# Patient Record
Sex: FEMALE | Race: WHITE | NOT HISPANIC OR LATINO | Employment: OTHER | URBAN - METROPOLITAN AREA
[De-identification: names, ages, dates, MRNs, and addresses within clinical notes are randomized per-mention and may not be internally consistent; named-entity substitution may affect disease eponyms.]

---

## 2017-01-16 RX ORDER — ESCITALOPRAM OXALATE 10 MG/1
TABLET ORAL
Qty: 90 TABLET | Refills: 0 | Status: SHIPPED | OUTPATIENT
Start: 2017-01-16 | End: 2017-05-20 | Stop reason: SDUPTHER

## 2017-02-03 ENCOUNTER — OFFICE VISIT (OUTPATIENT)
Dept: FAMILY MEDICINE CLINIC | Facility: CLINIC | Age: 72
End: 2017-02-03

## 2017-02-03 VITALS
RESPIRATION RATE: 16 BRPM | OXYGEN SATURATION: 93 % | BODY MASS INDEX: 26.55 KG/M2 | SYSTOLIC BLOOD PRESSURE: 122 MMHG | HEIGHT: 66 IN | HEART RATE: 63 BPM | TEMPERATURE: 97.5 F | DIASTOLIC BLOOD PRESSURE: 70 MMHG | WEIGHT: 165.2 LBS

## 2017-02-03 DIAGNOSIS — S83.401A SPRAIN OF COLLATERAL LIGAMENT OF RIGHT KNEE, INITIAL ENCOUNTER: Primary | ICD-10-CM

## 2017-02-03 DIAGNOSIS — M25.561 ACUTE PAIN OF RIGHT KNEE: ICD-10-CM

## 2017-02-03 PROCEDURE — 99213 OFFICE O/P EST LOW 20 MIN: CPT | Performed by: INTERNAL MEDICINE

## 2017-02-03 NOTE — PROGRESS NOTES
Subjective   Chris Cox is a 71 y.o. female. Patient is here today for   Chief Complaint   Patient presents with   • Knee Pain     right knee pain and pain behind knee and worried about blood clot and burning pain           Vitals:    02/03/17 1010   BP: 122/70   Pulse: 63   Resp: 16   Temp: 97.5 °F (36.4 °C)   SpO2: 93%     The following portions of the patient's history were reviewed and updated as appropriate: allergies, current medications, past family history, past medical history, past social history, past surgical history and problem list.    Past Medical History   Diagnosis Date   • Abdominal pain    • Disease of thyroid gland    • Hyperlipidemia    • Hypertension    • Obstructive sleep apnea    • Seizures      As a baby   • Tuberculosis      As a child   • Vertigo       Allergies   Allergen Reactions   • Cephalexin    • Cephalosporins    • Erythromycin    • Sulfa Antibiotics       Social History     Social History   • Marital status:      Spouse name: N/A   • Number of children: N/A   • Years of education: N/A     Occupational History   • Not on file.     Social History Main Topics   • Smoking status: Never Smoker   • Smokeless tobacco: Not on file   • Alcohol use No   • Drug use: Not on file   • Sexual activity: Not on file     Other Topics Concern   • Not on file     Social History Narrative        Current Outpatient Prescriptions:   •  VITAMIN K PO, Take  by mouth., Disp: , Rfl:   •  Ascorbic Acid (VITAMIN C PO), Take 1 tablet by mouth daily., Disp: , Rfl:   •  B Complex Vitamins (VITAMIN B COMPLEX PO), Take 1 tablet by mouth daily., Disp: , Rfl:   •  Dietary Management Product (GABADONE) capsule, Take by mouth., Disp: , Rfl:   •  escitalopram (LEXAPRO) 10 MG tablet, TAKE 1 TABLET BY MOUTH DAILY, Disp: 90 tablet, Rfl: 0  •  Nutritional Supplements (PYCNOGENOL) 30 MG capsule, Take by mouth., Disp: , Rfl:   •  vitamin A 49832 UNIT capsule, Take 10,000 Units by mouth daily., Disp: , Rfl:   •   VITAMIN D, CHOLECALCIFEROL, PO, Take 1 tablet by mouth daily., Disp: , Rfl:      Objective     History of Present Illness Chris slipped on a regular day and felt a sharp pain along her medial right knee.  She does have pain when she weight bears and has been using a cane.  Her right knee has severe osteoarthritis and she has seen Dr. Okeefe in the past.    Review of Systems   Constitutional: Positive for activity change.   Musculoskeletal: Negative for joint swelling.       Physical Exam   Constitutional: She appears well-developed and well-nourished.   Musculoskeletal:   Right knee has varus deformity.  There is no effusion.  There is normal range of motion.  There is point tenderness of the medial collateral ligament on the femoral epicondyle   Vitals reviewed.      ASSESSMENT     Problem List Items Addressed This Visit        Musculoskeletal and Integument    Right knee pain    Sprain of collateral ligament of right knee - Primary          PLAN  Patient Instructions   Suggest wearing a knee brace and rest.  Start some leg extension exercises that are nonweightbearing.    Return if symptoms worsen or fail to improve.

## 2017-02-03 NOTE — PATIENT INSTRUCTIONS
Suggest wearing a knee brace and rest.  Start some leg extension exercises that are nonweightbearing.

## 2017-03-10 ENCOUNTER — OFFICE VISIT (OUTPATIENT)
Dept: FAMILY MEDICINE CLINIC | Facility: CLINIC | Age: 72
End: 2017-03-10

## 2017-03-10 VITALS
BODY MASS INDEX: 26.52 KG/M2 | HEIGHT: 66 IN | DIASTOLIC BLOOD PRESSURE: 72 MMHG | TEMPERATURE: 97.9 F | HEART RATE: 68 BPM | SYSTOLIC BLOOD PRESSURE: 120 MMHG | WEIGHT: 165 LBS | OXYGEN SATURATION: 98 % | RESPIRATION RATE: 16 BRPM

## 2017-03-10 DIAGNOSIS — J06.9 VIRAL URI WITH COUGH: ICD-10-CM

## 2017-03-10 DIAGNOSIS — R68.89 FLU-LIKE SYMPTOMS: Primary | ICD-10-CM

## 2017-03-10 DIAGNOSIS — R50.9 FEVER IN ADULT: ICD-10-CM

## 2017-03-10 LAB
EXPIRATION DATE: NORMAL
FLUAV AG NPH QL: NEGATIVE
FLUBV AG NPH QL: NEGATIVE
INTERNAL CONTROL: NORMAL
Lab: NORMAL

## 2017-03-10 PROCEDURE — 99213 OFFICE O/P EST LOW 20 MIN: CPT | Performed by: INTERNAL MEDICINE

## 2017-03-10 PROCEDURE — 87804 INFLUENZA ASSAY W/OPTIC: CPT | Performed by: INTERNAL MEDICINE

## 2017-03-10 PROCEDURE — 85025 COMPLETE CBC W/AUTO DIFF WBC: CPT | Performed by: INTERNAL MEDICINE

## 2017-03-10 RX ORDER — AMOXICILLIN AND CLAVULANATE POTASSIUM 875; 125 MG/1; MG/1
1 TABLET, FILM COATED ORAL 2 TIMES DAILY
Qty: 14 TABLET | Refills: 0 | Status: SHIPPED | OUTPATIENT
Start: 2017-03-10 | End: 2017-04-12

## 2017-03-10 NOTE — PROGRESS NOTES
Subjective   Chris Cox is a 71 y.o. female. Patient is here today for   Chief Complaint   Patient presents with   • Earache     bilaterl eaer pain, sore throat and fever x 4 days           Vitals:    03/10/17 1028   BP: 120/72   Pulse: 68   Resp: 16   Temp: 97.9 °F (36.6 °C)   SpO2: 98%       Past Medical History   Diagnosis Date   • Abdominal pain    • Disease of thyroid gland    • Hyperlipidemia    • Hypertension    • Obstructive sleep apnea    • Seizures      As a baby   • Tuberculosis      As a child   • Vertigo       Allergies   Allergen Reactions   • Cephalexin    • Cephalosporins    • Erythromycin    • Sulfa Antibiotics       Social History     Social History   • Marital status:      Spouse name: N/A   • Number of children: N/A   • Years of education: N/A     Occupational History   • Not on file.     Social History Main Topics   • Smoking status: Never Smoker   • Smokeless tobacco: Not on file   • Alcohol use No   • Drug use: Not on file   • Sexual activity: Not on file     Other Topics Concern   • Not on file     Social History Narrative        Current Outpatient Prescriptions:   •  Ascorbic Acid (VITAMIN C PO), Take 1 tablet by mouth daily., Disp: , Rfl:   •  B Complex Vitamins (VITAMIN B COMPLEX PO), Take 1 tablet by mouth daily., Disp: , Rfl:   •  Dietary Management Product (GABADONE) capsule, Take by mouth., Disp: , Rfl:   •  escitalopram (LEXAPRO) 10 MG tablet, TAKE 1 TABLET BY MOUTH DAILY, Disp: 90 tablet, Rfl: 0  •  Nutritional Supplements (PYCNOGENOL) 30 MG capsule, Take by mouth., Disp: , Rfl:   •  vitamin A 16069 UNIT capsule, Take 10,000 Units by mouth daily., Disp: , Rfl:   •  VITAMIN D, CHOLECALCIFEROL, PO, Take 1 tablet by mouth daily., Disp: , Rfl:   •  VITAMIN K PO, Take  by mouth., Disp: , Rfl:   •  amoxicillin-clavulanate (AUGMENTIN) 875-125 MG per tablet, Take 1 tablet by mouth 2 (Two) Times a Day., Disp: 14 tablet, Rfl: 0     Objective     HPI Comments: She has had some  bilateral ear pain worse in the right side last week.    She has had a cough, she's had some nasal and chest congestion.    She has had some fevers or chills at home.    She denies any dyspnea or myalgias.    Earache           Review of Systems   Constitutional: Negative.    HENT: Positive for ear pain, postnasal drip and sinus pressure.    Eyes: Negative.    Respiratory: Negative.    Cardiovascular: Negative.    Genitourinary: Negative.    Musculoskeletal: Negative.    Psychiatric/Behavioral: Negative.        Physical Exam   Constitutional: She is oriented to person, place, and time. She appears well-developed and well-nourished.   HENT:   Head: Normocephalic and atraumatic.   Right Ear: External ear normal.   Left Ear: External ear normal.   Nose: Nose normal.   Mouth/Throat: Oropharynx is clear and moist.   Cardiovascular: Normal rate and regular rhythm.    Pulmonary/Chest: Effort normal and breath sounds normal.   Neurological: She is alert and oriented to person, place, and time.   Psychiatric: She has a normal mood and affect. Her behavior is normal.   Nursing note and vitals reviewed.        Problem List Items Addressed This Visit        Respiratory    Viral URI with cough    Relevant Medications    amoxicillin-clavulanate (AUGMENTIN) 875-125 MG per tablet      Other Visit Diagnoses     Flu-like symptoms    -  Primary    Relevant Orders    POC Influenza A / B (Completed)    Fever in adult        Relevant Orders    POC CBC With / Auto Diff (Completed)            COOPER    A was negative on this patient today.  Her CBC showed normal white blood cell count.    I feel should get a viral upper respiratory tract infection I asked her to treat her symptoms symptomatically with over-the-counter medications.    Should she not continue to improve or get worse by next week I asked her to take the prescription for an anabiotic I wrote her for Augmentin 875 one by mouth twice a day #14 with no refills.       No Follow-up on  file.

## 2017-04-12 ENCOUNTER — OFFICE VISIT (OUTPATIENT)
Dept: FAMILY MEDICINE CLINIC | Facility: CLINIC | Age: 72
End: 2017-04-12

## 2017-04-12 VITALS
WEIGHT: 164.8 LBS | HEART RATE: 66 BPM | SYSTOLIC BLOOD PRESSURE: 120 MMHG | BODY MASS INDEX: 26.48 KG/M2 | DIASTOLIC BLOOD PRESSURE: 58 MMHG | RESPIRATION RATE: 18 BRPM | HEIGHT: 66 IN

## 2017-04-12 DIAGNOSIS — H69.81 DYSFUNCTION OF RIGHT EUSTACHIAN TUBE: Primary | ICD-10-CM

## 2017-04-12 PROBLEM — H69.91 DYSFUNCTION OF RIGHT EUSTACHIAN TUBE: Status: ACTIVE | Noted: 2017-04-12

## 2017-04-12 PROCEDURE — 99213 OFFICE O/P EST LOW 20 MIN: CPT | Performed by: INTERNAL MEDICINE

## 2017-04-12 RX ORDER — FLUTICASONE PROPIONATE 50 MCG
2 SPRAY, SUSPENSION (ML) NASAL DAILY
Qty: 1 EACH | Refills: 0 | Status: SHIPPED | OUTPATIENT
Start: 2017-04-12 | End: 2017-05-12

## 2017-04-12 NOTE — PROGRESS NOTES
"Subjective   Chris Cox is a 71 y.o. female. Patient is here today for   Chief Complaint   Patient presents with   • \"Noises in ear\" no ringing     right ear           Vitals:    04/12/17 0947   BP: 120/58   Pulse: 66   Resp: 18     The following portions of the patient's history were reviewed and updated as appropriate: allergies, current medications, past family history, past medical history, past social history, past surgical history and problem list.    Past Medical History:   Diagnosis Date   • Abdominal pain    • Disease of thyroid gland    • Hyperlipidemia    • Hypertension    • Obstructive sleep apnea    • Seizures     As a baby   • Tuberculosis     As a child   • Vertigo       Allergies   Allergen Reactions   • Cephalexin    • Cephalosporins    • Erythromycin    • Sulfa Antibiotics       Social History     Social History   • Marital status:      Spouse name: N/A   • Number of children: N/A   • Years of education: N/A     Occupational History   • Not on file.     Social History Main Topics   • Smoking status: Never Smoker   • Smokeless tobacco: Not on file   • Alcohol use No   • Drug use: Not on file   • Sexual activity: Not on file     Other Topics Concern   • Not on file     Social History Narrative        Current Outpatient Prescriptions:   •  Ascorbic Acid (VITAMIN C PO), Take 1 tablet by mouth daily., Disp: , Rfl:   •  B Complex Vitamins (VITAMIN B COMPLEX PO), Take 1 tablet by mouth daily., Disp: , Rfl:   •  Dietary Management Product (GABADONE) capsule, Take by mouth., Disp: , Rfl:   •  escitalopram (LEXAPRO) 10 MG tablet, TAKE 1 TABLET BY MOUTH DAILY, Disp: 90 tablet, Rfl: 0  •  Nutritional Supplements (PYCNOGENOL) 30 MG capsule, Take by mouth., Disp: , Rfl:   •  vitamin A 78953 UNIT capsule, Take 10,000 Units by mouth daily., Disp: , Rfl:   •  VITAMIN D, CHOLECALCIFEROL, PO, Take 1 tablet by mouth daily., Disp: , Rfl:   •  VITAMIN K PO, Take  by mouth., Disp: , Rfl:   •  fluticasone " (FLONASE) 50 MCG/ACT nasal spray, 2 sprays into each nostril Daily for 30 days., Disp: 1 each, Rfl: 0     Objective     History of Present Illness Chris complains of intermittent noises in her right ear.  She has been putting cotton in her ear.  She was treated for an ear infection and sinusitis about 3 weeks ago with Augmentin.  She denies any ear pain.  She denies any history of upper respiratory allergies.    Review of Systems   Constitutional: Negative.    HENT: Negative for congestion, ear pain, hearing loss and rhinorrhea.    Respiratory: Negative for cough.        Physical Exam   Constitutional: She appears well-developed and well-nourished.   HENT:   Right Ear: Tympanic membrane is bulging. Tympanic membrane is not erythematous.   Left Ear: Tympanic membrane is bulging. Tympanic membrane is not erythematous.   Nose: Mucosal edema (on right) present.   There is no tympanic membrane movement with Valsalva maneuver   Lymphadenopathy:     She has no cervical adenopathy.   Vitals reviewed.      ASSESSMENT     Problem List Items Addressed This Visit        Nervous and Auditory    Dysfunction of right eustachian tube - Primary    Relevant Medications    fluticasone (FLONASE) 50 MCG/ACT nasal spray          PLAN  Patient Instructions   You have some residual eustachian tube dysfunction left over from your recent ear infection.  Start fluticasone nasal spray 1 puff twice a day for a week or 2.  You could also take pseudoephedrine 30 mg decongestant tablets as needed for ear congestion.    No Follow-up on file.

## 2017-04-12 NOTE — PATIENT INSTRUCTIONS
You have some residual eustachian tube dysfunction left over from your recent ear infection.  Start fluticasone nasal spray 1 puff twice a day for a week or 2.  You could also take pseudoephedrine 30 mg decongestant tablets as needed for ear congestion.

## 2017-04-20 ENCOUNTER — OFFICE VISIT (OUTPATIENT)
Dept: FAMILY MEDICINE CLINIC | Facility: CLINIC | Age: 72
End: 2017-04-20

## 2017-04-20 VITALS
DIASTOLIC BLOOD PRESSURE: 72 MMHG | SYSTOLIC BLOOD PRESSURE: 120 MMHG | HEIGHT: 66 IN | TEMPERATURE: 98 F | WEIGHT: 159 LBS | HEART RATE: 68 BPM | BODY MASS INDEX: 25.55 KG/M2 | OXYGEN SATURATION: 98 % | RESPIRATION RATE: 16 BRPM

## 2017-04-20 DIAGNOSIS — F98.8 ADD (ATTENTION DEFICIT DISORDER): Primary | ICD-10-CM

## 2017-04-20 PROCEDURE — 99213 OFFICE O/P EST LOW 20 MIN: CPT | Performed by: INTERNAL MEDICINE

## 2017-04-20 RX ORDER — ATOMOXETINE 25 MG/1
25 CAPSULE ORAL 2 TIMES DAILY
Qty: 60 CAPSULE | Refills: 3 | Status: SHIPPED | OUTPATIENT
Start: 2017-04-20 | End: 2018-05-07

## 2017-04-20 NOTE — PROGRESS NOTES
Subjective   Chris Cox is a 71 y.o. female. Patient is here today for   Chief Complaint   Patient presents with   • Stress     med check           Vitals:    04/20/17 1542   BP: 120/72   Pulse: 68   Resp: 16   Temp: 98 °F (36.7 °C)   SpO2: 98%       Past Medical History:   Diagnosis Date   • Abdominal pain    • Disease of thyroid gland    • Hyperlipidemia    • Hypertension    • Obstructive sleep apnea    • Seizures     As a baby   • Tuberculosis     As a child   • Vertigo       Allergies   Allergen Reactions   • Cephalexin    • Cephalosporins    • Erythromycin    • Sulfa Antibiotics       Social History     Social History   • Marital status:      Spouse name: N/A   • Number of children: N/A   • Years of education: N/A     Occupational History   • Not on file.     Social History Main Topics   • Smoking status: Never Smoker   • Smokeless tobacco: Not on file   • Alcohol use No   • Drug use: Not on file   • Sexual activity: Not on file     Other Topics Concern   • Not on file     Social History Narrative        Current Outpatient Prescriptions:   •  Ascorbic Acid (VITAMIN C PO), Take 1 tablet by mouth daily., Disp: , Rfl:   •  B Complex Vitamins (VITAMIN B COMPLEX PO), Take 1 tablet by mouth daily., Disp: , Rfl:   •  Dietary Management Product (GABADONE) capsule, Take by mouth., Disp: , Rfl:   •  escitalopram (LEXAPRO) 10 MG tablet, TAKE 1 TABLET BY MOUTH DAILY, Disp: 90 tablet, Rfl: 0  •  fluticasone (FLONASE) 50 MCG/ACT nasal spray, 2 sprays into each nostril Daily for 30 days., Disp: 1 each, Rfl: 0  •  Nutritional Supplements (PYCNOGENOL) 30 MG capsule, Take by mouth., Disp: , Rfl:   •  vitamin A 44365 UNIT capsule, Take 10,000 Units by mouth daily., Disp: , Rfl:   •  VITAMIN D, CHOLECALCIFEROL, PO, Take 1 tablet by mouth daily., Disp: , Rfl:   •  VITAMIN K PO, Take  by mouth., Disp: , Rfl:   •  atomoxetine (STRATTERA) 25 MG capsule, Take 1 capsule by mouth 2 (Two) Times a Day., Disp: 60 capsule, Rfl: 3      Objective     HPI Comments: She is feeling particularly stressed.  Her mother is in a nursing home and she is developed very severe memory loss over the years.  She sees her mother regularly in the nursing home.  Of course, she is been recently concerned about the care her mother receives.    Chris has ADD.  She is found Strattera to be useful in the past.  She was thinking that if she could manage her ADD a bit better (it's been a bit worse with the stresses that she is under regarding her mother's care) that perhaps her stress would be better controlled.       Review of Systems   Constitutional: Negative.    HENT: Negative.    Respiratory: Negative.    Cardiovascular: Negative.    Psychiatric/Behavioral:        She has had exacerbations of her ADD recently.       Physical Exam   Constitutional: She is oriented to person, place, and time. She appears well-developed and well-nourished.   HENT:   Head: Normocephalic and atraumatic.   Pulmonary/Chest: Effort normal.   Neurological: She is alert and oriented to person, place, and time.   Psychiatric: She has a normal mood and affect. Her behavior is normal.   Nursing note and vitals reviewed.        Problem List Items Addressed This Visit        Other    ADD (attention deficit disorder) - Primary    Relevant Medications    atomoxetine (STRATTERA) 25 MG capsule            PLAN  I will start her on a low-dose of Strattera 25 mg twice a day.  The patient also takes S-Citalopram which tend to increase the level of Strattera in the system.  It makes sense that she start a lower dose.  I like to see her back in about a month to see how she is getting along.  She knows to let me know if she needs attention before then.  No Follow-up on file.

## 2017-05-10 RX ORDER — ATOMOXETINE 10 MG/1
10 CAPSULE ORAL DAILY
Qty: 60 CAPSULE | Refills: 3 | Status: SHIPPED | OUTPATIENT
Start: 2017-05-10 | End: 2018-02-12

## 2017-05-22 RX ORDER — ESCITALOPRAM OXALATE 10 MG/1
TABLET ORAL
Qty: 90 TABLET | Refills: 0 | Status: SHIPPED | OUTPATIENT
Start: 2017-05-22 | End: 2017-08-19 | Stop reason: SDUPTHER

## 2017-08-21 RX ORDER — ESCITALOPRAM OXALATE 10 MG/1
TABLET ORAL
Qty: 90 TABLET | Refills: 0 | Status: SHIPPED | OUTPATIENT
Start: 2017-08-21 | End: 2017-11-17 | Stop reason: SDUPTHER

## 2017-08-22 ENCOUNTER — OFFICE VISIT (OUTPATIENT)
Dept: FAMILY MEDICINE CLINIC | Facility: CLINIC | Age: 72
End: 2017-08-22

## 2017-08-22 VITALS
HEIGHT: 66 IN | RESPIRATION RATE: 16 BRPM | SYSTOLIC BLOOD PRESSURE: 124 MMHG | TEMPERATURE: 98 F | DIASTOLIC BLOOD PRESSURE: 78 MMHG | OXYGEN SATURATION: 98 % | BODY MASS INDEX: 26.2 KG/M2 | WEIGHT: 163 LBS | HEART RATE: 68 BPM

## 2017-08-22 DIAGNOSIS — K59.1 FUNCTIONAL DIARRHEA: Primary | ICD-10-CM

## 2017-08-22 PROCEDURE — 99213 OFFICE O/P EST LOW 20 MIN: CPT | Performed by: INTERNAL MEDICINE

## 2017-08-22 RX ORDER — DIPHENOXYLATE HYDROCHLORIDE AND ATROPINE SULFATE 2.5; .025 MG/1; MG/1
1 TABLET ORAL 4 TIMES DAILY PRN
Qty: 100 TABLET | Refills: 0 | Status: SHIPPED | OUTPATIENT
Start: 2017-08-22 | End: 2019-10-09

## 2017-08-22 NOTE — PROGRESS NOTES
Subjective   Chris Cox is a 72 y.o. female. Patient is here today for   Chief Complaint   Patient presents with   • Diarrhea     diarrhea and abdominal pain and gas          Vitals:    08/22/17 0945   BP: 124/78   Pulse: 68   Resp: 16   Temp: 98 °F (36.7 °C)   SpO2: 98%       Past Medical History:   Diagnosis Date   • Abdominal pain    • Disease of thyroid gland    • Hyperlipidemia    • Hypertension    • Obstructive sleep apnea    • Seizures     As a baby   • Tuberculosis     As a child   • Vertigo       Allergies   Allergen Reactions   • Cephalexin    • Cephalosporins    • Erythromycin    • Sulfa Antibiotics       Social History     Social History   • Marital status:      Spouse name: N/A   • Number of children: N/A   • Years of education: N/A     Occupational History   • Not on file.     Social History Main Topics   • Smoking status: Never Smoker   • Smokeless tobacco: Not on file   • Alcohol use No   • Drug use: Not on file   • Sexual activity: Not on file     Other Topics Concern   • Not on file     Social History Narrative        Current Outpatient Prescriptions:   •  Ascorbic Acid (VITAMIN C PO), Take 1 tablet by mouth daily., Disp: , Rfl:   •  atomoxetine (STRATTERA) 10 MG capsule, Take 1 capsule by mouth Daily., Disp: 60 capsule, Rfl: 3  •  atomoxetine (STRATTERA) 25 MG capsule, Take 1 capsule by mouth 2 (Two) Times a Day., Disp: 60 capsule, Rfl: 3  •  B Complex Vitamins (VITAMIN B COMPLEX PO), Take 1 tablet by mouth daily., Disp: , Rfl:   •  Dietary Management Product (GABADONE) capsule, Take by mouth., Disp: , Rfl:   •  escitalopram (LEXAPRO) 10 MG tablet, TAKE 1 TABLET BY MOUTH DAILY, Disp: 90 tablet, Rfl: 0  •  Nutritional Supplements (PYCNOGENOL) 30 MG capsule, Take by mouth., Disp: , Rfl:   •  vitamin A 25016 UNIT capsule, Take 10,000 Units by mouth daily., Disp: , Rfl:   •  VITAMIN D, CHOLECALCIFEROL, PO, Take 1 tablet by mouth daily., Disp: , Rfl:   •  VITAMIN K PO, Take  by mouth., Disp:  , Rfl:   •  diphenoxylate-atropine (LOMOTIL) 2.5-0.025 MG per tablet, Take 1 tablet by mouth 4 (Four) Times a Day As Needed for Diarrhea., Disp: 100 tablet, Rfl: 0     Objective     HPI Comments: This patient occasionally has sharp crampy abdominal pain and diarrhea.  Happened pretty infrequently.  When it does happen, she finds Lomotil is pretty effective.  She requested refill this medication.    She has no other complaints.    Diarrhea           Review of Systems   Constitutional: Negative.    HENT: Negative.    Respiratory: Negative.    Cardiovascular: Negative.    Gastrointestinal: Positive for diarrhea.   Musculoskeletal: Negative.    Psychiatric/Behavioral: Negative.        Physical Exam   Constitutional: She is oriented to person, place, and time. She appears well-developed and well-nourished.   HENT:   Head: Normocephalic and atraumatic.   Pulmonary/Chest: Effort normal.   Neurological: She is alert and oriented to person, place, and time.   Psychiatric: She has a normal mood and affect. Her behavior is normal.   Nursing note and vitals reviewed.        Problem List Items Addressed This Visit        Digestive    Functional diarrhea - Primary            PLAN  She finds Lomotil be useful for her.  I've written a prescription for Lomotil for her to use occasionally.    She tells me that she is used to previous prescription for Lomotil 100 tablets.  She has about 30° tablets remaining and she get this prescription filled 2008.    I refilled this prescription for her.  No Follow-up on file.

## 2017-08-31 ENCOUNTER — TELEPHONE (OUTPATIENT)
Dept: FAMILY MEDICINE CLINIC | Facility: CLINIC | Age: 72
End: 2017-08-31

## 2017-08-31 DIAGNOSIS — K59.1 FUNCTIONAL DIARRHEA: ICD-10-CM

## 2017-08-31 DIAGNOSIS — Z80.0 FAMILY HISTORY OF COLON CANCER: Primary | ICD-10-CM

## 2017-09-06 ENCOUNTER — OFFICE VISIT (OUTPATIENT)
Dept: SURGERY | Facility: CLINIC | Age: 72
End: 2017-09-06

## 2017-09-06 VITALS
HEIGHT: 66 IN | HEART RATE: 59 BPM | BODY MASS INDEX: 26.34 KG/M2 | DIASTOLIC BLOOD PRESSURE: 64 MMHG | OXYGEN SATURATION: 98 % | SYSTOLIC BLOOD PRESSURE: 110 MMHG | WEIGHT: 163.9 LBS

## 2017-09-06 DIAGNOSIS — K92.1 BLACK STOOL: Primary | ICD-10-CM

## 2017-09-06 DIAGNOSIS — K92.1 MELANOTIC STOOLS: ICD-10-CM

## 2017-09-06 PROCEDURE — 99204 OFFICE O/P NEW MOD 45 MIN: CPT | Performed by: SURGERY

## 2017-09-06 RX ORDER — ASPIRIN 81 MG/1
81 TABLET ORAL DAILY
COMMUNITY
End: 2019-10-09

## 2017-09-06 NOTE — PROGRESS NOTES
Chief Complaint   Patient presents with   • Diarrhea     change in bowel color        Patient is a 72 y.o. female referred by Raghavendra Marquez MD for evaluation of diarrhea and black stool that started about 3 weeks ago. Patient denies any bright red blood per rectum, hematochezia or abdominal pain.Patient denies fever, chills, nausea or vomiting.  Patient has not traveled outside of the country.  Patient reports she has not had this in the past.  Patient has not had a previous history of peptic ulcer disease.  Patient denies taking NSAIDs.  Patient has multiple family members with colon caner. Patient denies F/C/N/V, no family history of Ulcerative Colitis, Crohn's disease or polyposis.     Past Medical History:   Diagnosis Date   • Abdominal pain    • Arthritis    • Disease of thyroid gland    • Hyperlipidemia    • Hypertension    • Obstructive sleep apnea    • Seizures     As a baby   • Tuberculosis     As a child   • Vertigo      Past Surgical History:   Procedure Laterality Date   • BREAST SURGERY      Benign fibroadnoma removed from the left breast   • HAND SURGERY     • LITHOTRIPSY      renal   • WRIST SURGERY       Family History   Problem Relation Age of Onset   • Hypertension Mother    • Other Mother    • Transient ischemic attack Mother    • Cancer Father      colon   • Diabetes Father    • Hypertension Father    • Other Father      Renal artery aneurysm   • Heart disease Father    • Stroke Father      Social History   Substance Use Topics   • Smoking status: Never Smoker   • Smokeless tobacco: None   • Alcohol use No         Current Outpatient Prescriptions:   •  Ascorbic Acid (VITAMIN C PO), Take 1 tablet by mouth daily., Disp: , Rfl:   •  aspirin 81 MG EC tablet, Take 81 mg by mouth Daily., Disp: , Rfl:   •  Aspirin-Acetaminophen-Caffeine (EXCEDRIN MIGRAINE PO), Take  by mouth., Disp: , Rfl:   •  B Complex Vitamins (VITAMIN B COMPLEX PO), Take 1 tablet by mouth daily., Disp: , Rfl:   •  Dietary  "Management Product (GABADONE) capsule, Take by mouth., Disp: , Rfl:   •  diphenoxylate-atropine (LOMOTIL) 2.5-0.025 MG per tablet, Take 1 tablet by mouth 4 (Four) Times a Day As Needed for Diarrhea., Disp: 100 tablet, Rfl: 0  •  escitalopram (LEXAPRO) 10 MG tablet, TAKE 1 TABLET BY MOUTH DAILY, Disp: 90 tablet, Rfl: 0  •  Nutritional Supplements (PYCNOGENOL) 30 MG capsule, Take by mouth., Disp: , Rfl:   •  PHOSPHATIDYLSERINE PO, Take  by mouth., Disp: , Rfl:   •  vitamin A 05651 UNIT capsule, Take 10,000 Units by mouth daily., Disp: , Rfl:   •  VITAMIN D, CHOLECALCIFEROL, PO, Take 1 tablet by mouth daily., Disp: , Rfl:   •  VITAMIN K PO, Take  by mouth., Disp: , Rfl:   •  atomoxetine (STRATTERA) 10 MG capsule, Take 1 capsule by mouth Daily., Disp: 60 capsule, Rfl: 3  •  atomoxetine (STRATTERA) 25 MG capsule, Take 1 capsule by mouth 2 (Two) Times a Day., Disp: 60 capsule, Rfl: 3    Review of Systems   Constitutional: Positive for unexpected weight change.   Eyes: Positive for visual disturbance.   Respiratory: Positive for shortness of breath.    Cardiovascular: Positive for leg swelling.   Genitourinary: Positive for dysuria.   Musculoskeletal: Positive for back pain and joint swelling.   Neurological: Positive for dizziness and headaches.   Hematological: Bruises/bleeds easily.     All other systems have been reviewed and are negative.  Vitals:    09/06/17 0932   BP: 110/64   BP Location: Left arm   Patient Position: Sitting   Cuff Size: Adult   Pulse: 59   SpO2: 98%   Weight: 163 lb 14.4 oz (74.3 kg)   Height: 66\" (167.6 cm)       Physical Exam  General/physcological:   Alert and oriented x3 in no acute distress  HEENT: Normal cephalic, atraumatic, PERRLA, EOMI, sclera anicteric, moist mucous membranes, neck is supple, no JVD, no carotid bruits, no thyromegaly no adenopathy  Respiratory: CTA and percussion  CVA: RRR, normal S1-S2, no murmurs, no gallops or rubs  GI: Positive BS, soft, nondistended, nontender, no " rebound, no guarding, no hernias, no organomegaly and no masses  Musculoskeletal: Full range of motion, no clubbing, no cyanosis or edema  Neurovascular: Grossly intact    Patient does not use tobacco products currently and I have counseled the patient to not start using tobacco products in the future.    Assessment:  Melanotic stool  Family history of multiple colon cancers    Plan:  I have recommended that the patient undergo further evaluation with a complete colonoscopy. I have discussed this procedure in detail with the patient. I have discussed the risk, benefits and alternatives. I have discussed the risk of anesthesia, bleeding and perforation. Patient understands these risk, benefits and alternatives and wishes to proceed.    Leonor Xavier MD  General, Minimally Invasive and Endoscopic Surgery  [facility]    [address], [address]  [place], [place], [address]  P: [phone]  F: [phone]    Cc:  Raghavendra Marquez MD

## 2017-09-11 ENCOUNTER — OUTSIDE FACILITY SERVICE (OUTPATIENT)
Dept: SURGERY | Facility: CLINIC | Age: 72
End: 2017-09-11

## 2017-09-11 PROCEDURE — 45378 DIAGNOSTIC COLONOSCOPY: CPT | Performed by: SURGERY

## 2017-09-11 NOTE — H&P
Chief Complaint   Patient presents with   • Diarrhea     change in bowel color        Patient is a 72 y.o. female referred by Raghavendra Marquez MD for evaluation of diarrhea and black stool that started about 3 weeks ago. Patient denies any bright red blood per rectum, hematochezia or abdominal pain. Patient has multiple family members with colon caner. Patient denies F/C/N/V, no family history of Ulcerative Colitis, Crohn's disease or polyposis.     Past Medical History:   Diagnosis Date   • Abdominal pain    • Arthritis    • Disease of thyroid gland    • Hyperlipidemia    • Hypertension    • Obstructive sleep apnea    • Seizures     As a baby   • Tuberculosis     As a child   • Vertigo      Past Surgical History:   Procedure Laterality Date   • BREAST SURGERY      Benign fibroadnoma removed from the left breast   • HAND SURGERY     • LITHOTRIPSY      renal   • WRIST SURGERY       Family History   Problem Relation Age of Onset   • Hypertension Mother    • Other Mother    • Transient ischemic attack Mother    • Cancer Father      colon   • Diabetes Father    • Hypertension Father    • Other Father      Renal artery aneurysm   • Heart disease Father    • Stroke Father      Social History   Substance Use Topics   • Smoking status: Never Smoker   • Smokeless tobacco: None   • Alcohol use No         Current Outpatient Prescriptions:   •  Ascorbic Acid (VITAMIN C PO), Take 1 tablet by mouth daily., Disp: , Rfl:   •  aspirin 81 MG EC tablet, Take 81 mg by mouth Daily., Disp: , Rfl:   •  Aspirin-Acetaminophen-Caffeine (EXCEDRIN MIGRAINE PO), Take  by mouth., Disp: , Rfl:   •  B Complex Vitamins (VITAMIN B COMPLEX PO), Take 1 tablet by mouth daily., Disp: , Rfl:   •  Dietary Management Product (GABADONE) capsule, Take by mouth., Disp: , Rfl:   •  diphenoxylate-atropine (LOMOTIL) 2.5-0.025 MG per tablet, Take 1 tablet by mouth 4 (Four) Times a Day As Needed for Diarrhea., Disp: 100 tablet, Rfl: 0  •  escitalopram (LEXAPRO) 10  "MG tablet, TAKE 1 TABLET BY MOUTH DAILY, Disp: 90 tablet, Rfl: 0  •  Nutritional Supplements (PYCNOGENOL) 30 MG capsule, Take by mouth., Disp: , Rfl:   •  PHOSPHATIDYLSERINE PO, Take  by mouth., Disp: , Rfl:   •  vitamin A 02245 UNIT capsule, Take 10,000 Units by mouth daily., Disp: , Rfl:   •  VITAMIN D, CHOLECALCIFEROL, PO, Take 1 tablet by mouth daily., Disp: , Rfl:   •  VITAMIN K PO, Take  by mouth., Disp: , Rfl:   •  atomoxetine (STRATTERA) 10 MG capsule, Take 1 capsule by mouth Daily., Disp: 60 capsule, Rfl: 3  •  atomoxetine (STRATTERA) 25 MG capsule, Take 1 capsule by mouth 2 (Two) Times a Day., Disp: 60 capsule, Rfl: 3    Review of Systems   Constitutional: Positive for unexpected weight change.   Eyes: Positive for visual disturbance.   Respiratory: Positive for shortness of breath.    Cardiovascular: Positive for leg swelling.   Genitourinary: Positive for dysuria.   Musculoskeletal: Positive for back pain and joint swelling.   Neurological: Positive for dizziness and headaches.   Hematological: Bruises/bleeds easily.     All other systems have been reviewed and are negative.  Vitals:    09/06/17 0932   BP: 110/64   BP Location: Left arm   Patient Position: Sitting   Cuff Size: Adult   Pulse: 59   SpO2: 98%   Weight: 163 lb 14.4 oz (74.3 kg)   Height: 66\" (167.6 cm)       Physical Exam  General/physcological:   Alert and oriented x3 in no acute distress  HEENT: Normal cephalic, atraumatic, PERRLA, EOMI, sclera anicteric, moist mucous membranes, neck is supple, no JVD, no carotid bruits, no thyromegaly no adenopathy  Respiratory: CTA and percussion  CVA: RRR, normal S1-S2, no murmurs, no gallops or rubs  GI: Positive BS, soft, nondistended, nontender, no rebound, no guarding, no hernias, no organomegaly and no masses  Musculoskeletal: Full range of motion, no clubbing, no cyanosis or edema  Neurovascular: Grossly intact    Patient does not use tobacco products currently and I have counseled the patient to " not start using tobacco products in the future.    Assessment:  Melanotic stool  Family history of multiple colon cancers    Plan:  I have recommended that the patient undergo further evaluation with a complete colonoscopy. I have discussed this procedure in detail with the patient. I have discussed the risk, benefits and alternatives. I have discussed the risk of anesthesia, bleeding and perforation. Patient understands these risk, benefits and alternatives and wishes to proceed.    Leonor Xavier MD  General, Minimally Invasive and Endoscopic Surgery  [facility]    [address], [address]  [place], [place], [address]  P: [phone]  F: [phone]    Cc:  Raghavendra Marquez MD

## 2017-10-10 ENCOUNTER — OFFICE VISIT (OUTPATIENT)
Dept: FAMILY MEDICINE CLINIC | Facility: CLINIC | Age: 72
End: 2017-10-10

## 2017-10-10 VITALS
OXYGEN SATURATION: 98 % | HEIGHT: 66 IN | SYSTOLIC BLOOD PRESSURE: 115 MMHG | BODY MASS INDEX: 26.52 KG/M2 | TEMPERATURE: 98 F | DIASTOLIC BLOOD PRESSURE: 64 MMHG | RESPIRATION RATE: 16 BRPM | HEART RATE: 65 BPM | WEIGHT: 165 LBS

## 2017-10-10 DIAGNOSIS — R30.9 PAINFUL URINATION: Primary | ICD-10-CM

## 2017-10-10 DIAGNOSIS — R31.29 MICROSCOPIC HEMATURIA: ICD-10-CM

## 2017-10-10 LAB
BILIRUB BLD-MCNC: NEGATIVE MG/DL
CLARITY, POC: CLEAR
COLOR UR: YELLOW
GLUCOSE UR STRIP-MCNC: NEGATIVE MG/DL
KETONES UR QL: NEGATIVE
LEUKOCYTE EST, POC: NEGATIVE
NITRITE UR-MCNC: NEGATIVE MG/ML
PH UR: 5.5 [PH] (ref 5–8)
PROT UR STRIP-MCNC: ABNORMAL MG/DL
RBC # UR STRIP: ABNORMAL /UL
SP GR UR: 1.02 (ref 1–1.03)
UROBILINOGEN UR QL: NORMAL

## 2017-10-10 PROCEDURE — 81003 URINALYSIS AUTO W/O SCOPE: CPT | Performed by: INTERNAL MEDICINE

## 2017-10-10 PROCEDURE — 99213 OFFICE O/P EST LOW 20 MIN: CPT | Performed by: INTERNAL MEDICINE

## 2017-10-10 NOTE — PROGRESS NOTES
Subjective   Chris Cox is a 72 y.o. female. Patient is here today for   Chief Complaint   Patient presents with   • Difficulty Urinating     x 1 week           Vitals:    10/10/17 0934   BP: 115/64   Pulse: 65   Resp: 16   Temp: 98 °F (36.7 °C)   SpO2: 98%       Past Medical History:   Diagnosis Date   • Abdominal pain    • Arthritis    • Disease of thyroid gland    • Hyperlipidemia    • Hypertension    • Obstructive sleep apnea    • Seizures     As a baby   • Tuberculosis     As a child   • Vertigo       Allergies   Allergen Reactions   • Cephalexin    • Cephalosporins    • Eggs Or Egg-Derived Products    • Erythromycin    • Milk-Related Compounds    • Molds & Smuts    • Sulfa Antibiotics       Social History     Social History   • Marital status:      Spouse name: N/A   • Number of children: N/A   • Years of education: N/A     Occupational History   • Not on file.     Social History Main Topics   • Smoking status: Never Smoker   • Smokeless tobacco: Not on file   • Alcohol use No   • Drug use: Not on file   • Sexual activity: Not on file     Other Topics Concern   • Not on file     Social History Narrative        Current Outpatient Prescriptions:   •  Ascorbic Acid (VITAMIN C PO), Take 1 tablet by mouth daily., Disp: , Rfl:   •  aspirin 81 MG EC tablet, Take 81 mg by mouth Daily., Disp: , Rfl:   •  Aspirin-Acetaminophen-Caffeine (EXCEDRIN MIGRAINE PO), Take  by mouth., Disp: , Rfl:   •  atomoxetine (STRATTERA) 10 MG capsule, Take 1 capsule by mouth Daily., Disp: 60 capsule, Rfl: 3  •  atomoxetine (STRATTERA) 25 MG capsule, Take 1 capsule by mouth 2 (Two) Times a Day., Disp: 60 capsule, Rfl: 3  •  B Complex Vitamins (VITAMIN B COMPLEX PO), Take 1 tablet by mouth daily., Disp: , Rfl:   •  Dietary Management Product (GABADONE) capsule, Take by mouth., Disp: , Rfl:   •  diphenoxylate-atropine (LOMOTIL) 2.5-0.025 MG per tablet, Take 1 tablet by mouth 4 (Four) Times a Day As Needed for Diarrhea., Disp: 100  tablet, Rfl: 0  •  escitalopram (LEXAPRO) 10 MG tablet, TAKE 1 TABLET BY MOUTH DAILY, Disp: 90 tablet, Rfl: 0  •  Nutritional Supplements (PYCNOGENOL) 30 MG capsule, Take by mouth., Disp: , Rfl:   •  PHOSPHATIDYLSERINE PO, Take  by mouth., Disp: , Rfl:   •  vitamin A 77399 UNIT capsule, Take 10,000 Units by mouth daily., Disp: , Rfl:   •  VITAMIN D, CHOLECALCIFEROL, PO, Take 1 tablet by mouth daily., Disp: , Rfl:   •  VITAMIN K PO, Take  by mouth., Disp: , Rfl:      Objective     HPI Comments: She notes urinating every 2 hours or so for many days.  She denies any burning on urination or dysuria.  She does have urgency.    Difficulty Urinating   Pertinent negatives include no chills or fever.        Review of Systems   Constitutional: Negative for chills and fever.   Genitourinary: Positive for difficulty urinating and frequency.   Musculoskeletal: Negative.    Psychiatric/Behavioral: Negative.        Physical Exam   Constitutional: She is oriented to person, place, and time. She appears well-developed and well-nourished.   HENT:   Head: Normocephalic and atraumatic.   Pulmonary/Chest: Effort normal.   Neurological: She is alert and oriented to person, place, and time.   Psychiatric: She has a normal mood and affect. Her behavior is normal.   Nursing note and vitals reviewed.        Problem List Items Addressed This Visit        Nervous and Auditory    Painful urination - Primary    Relevant Orders    POC Urinalysis Dipstick, Automated (Completed)       Genitourinary    Microscopic hematuria    Relevant Orders    CT Abdomen Pelvis Stone Protocol            PLAN  She has urinary frequency and microscopic hematuria.  She has a history of kidney problems.  I'm going to get a CT scan of her abdomen and pelvis with renal stone protocol.    I like her to try Mybetrique 25 mg daily.    I'll discuss the results of her CAT scan when it is available.  No Follow-up on file.

## 2017-10-12 ENCOUNTER — HOSPITAL ENCOUNTER (OUTPATIENT)
Dept: CT IMAGING | Facility: HOSPITAL | Age: 72
Discharge: HOME | End: 2017-10-12
Admitting: INTERNAL MEDICINE

## 2017-10-12 PROCEDURE — 74176 CT ABD & PELVIS W/O CONTRAST: CPT

## 2017-11-20 RX ORDER — ESCITALOPRAM OXALATE 10 MG/1
TABLET ORAL
Qty: 90 TABLET | Refills: 0 | Status: SHIPPED | OUTPATIENT
Start: 2017-11-20 | End: 2018-02-15 | Stop reason: SDUPTHER

## 2017-12-19 ENCOUNTER — OFFICE VISIT (OUTPATIENT)
Dept: FAMILY MEDICINE CLINIC | Facility: CLINIC | Age: 72
End: 2017-12-19

## 2017-12-19 VITALS
HEIGHT: 66 IN | WEIGHT: 167 LBS | DIASTOLIC BLOOD PRESSURE: 82 MMHG | RESPIRATION RATE: 18 BRPM | BODY MASS INDEX: 26.84 KG/M2 | HEART RATE: 68 BPM | TEMPERATURE: 97.9 F | OXYGEN SATURATION: 97 % | SYSTOLIC BLOOD PRESSURE: 156 MMHG

## 2017-12-19 DIAGNOSIS — H69.93 EUSTACHIAN TUBE DISORDER, BILATERAL: Primary | ICD-10-CM

## 2017-12-19 PROCEDURE — 99213 OFFICE O/P EST LOW 20 MIN: CPT | Performed by: NURSE PRACTITIONER

## 2017-12-19 NOTE — PROGRESS NOTES
Subjective   Cornel Cox is a 72 y.o. female.   Chief Complaint   Patient presents with   • Earache     patient c/o bilateral earache     Vitals:    12/19/17 1310   BP: 156/82   Pulse: 68   Resp: 18   Temp: 97.9 °F (36.6 °C)   SpO2: 97%     No LMP recorded.    History of Present Illness  cornel is a patient of Dr Marquez who is here for an acute visit. She c/o bilateral ear pressure for a few days. She has has some mild nasal congestion as well. She denies fever, chills or body aches. She has had some mild dizziness.     The following portions of the patient's history were reviewed and updated as appropriate: allergies, current medications, past family history, past medical history, past social history, past surgical history and problem list.    Review of Systems   Constitutional: Negative for chills, fatigue and fever.   HENT: Positive for congestion and ear pain. Negative for ear discharge.    Respiratory: Negative.    Cardiovascular: Negative.    Neurological: Positive for dizziness (mild ).       Objective   Physical Exam   Constitutional: Vital signs are normal. She appears well-developed and well-nourished. No distress.   HENT:   Right Ear: Ear canal normal. A middle ear effusion is present.   Left Ear: Tympanic membrane and ear canal normal.   Nose: Mucosal edema (nasal mucosa inflammed ) present.   Mouth/Throat: Uvula is midline and oropharynx is clear and moist.   Cardiovascular: Normal rate, regular rhythm and normal heart sounds.    Pulmonary/Chest: Effort normal and breath sounds normal.   Neurological: She is alert.   Skin: Skin is warm and dry.       Assessment/Plan   Cornel was seen today for earache.    Diagnoses and all orders for this visit:    Eustachian tube disorder, bilateral    Recommend flonase or nasacort otc for 30 days, follow up sooner if symptoms worsen or if new symptoms develop

## 2018-02-12 ENCOUNTER — OFFICE VISIT (OUTPATIENT)
Dept: FAMILY MEDICINE CLINIC | Facility: CLINIC | Age: 73
End: 2018-02-12

## 2018-02-12 VITALS
DIASTOLIC BLOOD PRESSURE: 80 MMHG | OXYGEN SATURATION: 98 % | TEMPERATURE: 97.9 F | SYSTOLIC BLOOD PRESSURE: 110 MMHG | HEART RATE: 69 BPM | BODY MASS INDEX: 26.65 KG/M2 | RESPIRATION RATE: 16 BRPM | HEIGHT: 66 IN | WEIGHT: 165.8 LBS

## 2018-02-12 DIAGNOSIS — M25.551 PAIN OF RIGHT HIP JOINT: ICD-10-CM

## 2018-02-12 DIAGNOSIS — M25.562 CHRONIC PAIN OF LEFT KNEE: ICD-10-CM

## 2018-02-12 DIAGNOSIS — G89.29 CHRONIC PAIN OF LEFT KNEE: ICD-10-CM

## 2018-02-12 DIAGNOSIS — M25.561 CHRONIC PAIN OF RIGHT KNEE: ICD-10-CM

## 2018-02-12 DIAGNOSIS — M25.552 LEFT HIP PAIN: ICD-10-CM

## 2018-02-12 DIAGNOSIS — D69.9 BLEEDING DIATHESIS (HCC): Primary | ICD-10-CM

## 2018-02-12 DIAGNOSIS — H11.30 CONJUNCTIVAL HEMORRHAGE, UNSPECIFIED LATERALITY: ICD-10-CM

## 2018-02-12 DIAGNOSIS — G89.29 CHRONIC PAIN OF RIGHT KNEE: ICD-10-CM

## 2018-02-12 DIAGNOSIS — J01.00 SUBACUTE MAXILLARY SINUSITIS: ICD-10-CM

## 2018-02-12 PROCEDURE — 99214 OFFICE O/P EST MOD 30 MIN: CPT | Performed by: INTERNAL MEDICINE

## 2018-02-12 RX ORDER — CIPROFLOXACIN 500 MG/1
500 TABLET, FILM COATED ORAL EVERY 12 HOURS SCHEDULED
Qty: 20 TABLET | Refills: 0 | Status: SHIPPED | OUTPATIENT
Start: 2018-02-12 | End: 2018-02-12 | Stop reason: SDUPTHER

## 2018-02-12 RX ORDER — CIPROFLOXACIN 500 MG/1
500 TABLET, FILM COATED ORAL EVERY 12 HOURS SCHEDULED
Qty: 28 TABLET | Refills: 0 | Status: SHIPPED | OUTPATIENT
Start: 2018-02-12 | End: 2018-05-07

## 2018-02-12 NOTE — PROGRESS NOTES
Subjective   Chris Cox is a 72 y.o. female. Patient is here today for   Chief Complaint   Patient presents with   • Eye Problem     note from [facility] in chart    • Ear Fullness     pt states all the time since 12/19/17   • Balance Issues          Vitals:    02/12/18 1446   BP: 110/80   Pulse: 69   Resp: 16   Temp: 97.9 °F (36.6 °C)   SpO2: 98%       Past Medical History:   Diagnosis Date   • Abdominal pain    • Arthritis    • Disease of thyroid gland    • Hyperlipidemia    • Hypertension    • Obstructive sleep apnea    • Seizures     As a baby   • Tuberculosis     As a child   • Vertigo       Allergies   Allergen Reactions   • Cephalexin    • Cephalosporins    • Eggs Or Egg-Derived Products    • Erythromycin    • Milk-Related Compounds    • Molds & Smuts    • Sulfa Antibiotics       Social History     Social History   • Marital status:      Spouse name: N/A   • Number of children: N/A   • Years of education: N/A     Occupational History   • Not on file.     Social History Main Topics   • Smoking status: Never Smoker   • Smokeless tobacco: Never Used   • Alcohol use No   • Drug use: Not on file   • Sexual activity: Not on file     Other Topics Concern   • Not on file     Social History Narrative   • No narrative on file        Current Outpatient Prescriptions:   •  Ascorbic Acid (VITAMIN C PO), Take 1 tablet by mouth daily., Disp: , Rfl:   •  aspirin 81 MG EC tablet, Take 81 mg by mouth Daily., Disp: , Rfl:   •  Aspirin-Acetaminophen-Caffeine (EXCEDRIN MIGRAINE PO), Take  by mouth., Disp: , Rfl:   •  atomoxetine (STRATTERA) 25 MG capsule, Take 1 capsule by mouth 2 (Two) Times a Day., Disp: 60 capsule, Rfl: 3  •  ciprofloxacin (CIPRO) 500 MG tablet, Take 1 tablet by mouth Every 12 (Twelve) Hours., Disp: 28 tablet, Rfl: 0  •  Dietary Management Product (GABADONE) capsule, Take by mouth., Disp: , Rfl:   •  diphenoxylate-atropine (LOMOTIL) 2.5-0.025 MG per tablet, Take 1 tablet by mouth 4  (Four) Times a Day As Needed for Diarrhea., Disp: 100 tablet, Rfl: 0  •  escitalopram (LEXAPRO) 10 MG tablet, TAKE 1 TABLET BY MOUTH DAILY, Disp: 90 tablet, Rfl: 0  •  Nutritional Supplements (PYCNOGENOL) 30 MG capsule, Take by mouth., Disp: , Rfl:   •  PHOSPHATIDYLSERINE PO, Take  by mouth., Disp: , Rfl:   •  vitamin A 35350 UNIT capsule, Take 10,000 Units by mouth daily., Disp: , Rfl:   •  VITAMIN D, CHOLECALCIFEROL, PO, Take 1 tablet by mouth daily., Disp: , Rfl:   •  VITAMIN K PO, Take  by mouth., Disp: , Rfl:      Objective     HPI Comments: She presents today with pain in her right maxillary sinus.  This been going on for at least 10 days.    She had the occurrence of a sub-conjunctival hemorrhage.  Her optometrist felt she ought to have a PT, INR, and a CBC done at least preliminarily to rule out a bleeding diathesis.    She complains of pain in both knees and in both hips.  The right hip and knee hurt a bit more than the left hip and knee.    Eye Problem      Ear Fullness           Review of Systems   Constitutional: Negative.    HENT: Positive for sinus pain and sinus pressure.    Cardiovascular: Negative.    Musculoskeletal: Negative.    Psychiatric/Behavioral: Negative.        Physical Exam   Constitutional: She is oriented to person, place, and time. She appears well-developed and well-nourished.   HENT:   Head: Normocephalic and atraumatic.   She has pain on palpation of the right maxillary sinus.    External auditory canals and tympanic membranes are normal bilaterally.   Eyes:   She does not have any conjunctival hemorrhage on today's exam.   Neurological: She is alert and oriented to person, place, and time.   Psychiatric: She has a normal mood and affect. Her behavior is normal.   Nursing note and vitals reviewed.        Problem List Items Addressed This Visit        Respiratory    Subacute maxillary sinusitis       Nervous and Auditory    Left hip pain    Relevant Orders    Ambulatory Referral to  Physical Therapy Evaluate and treat    Pain of right hip joint    Relevant Orders    Ambulatory Referral to Physical Therapy Evaluate and treat       Musculoskeletal and Integument    Right knee pain    Relevant Orders    Ambulatory Referral to Physical Therapy Evaluate and treat    Chronic pain of left knee    Relevant Orders    Ambulatory Referral to Physical Therapy Evaluate and treat       Hematopoietic and Hemostatic    Bleeding diathesis - Primary    Relevant Orders    Comprehensive Metabolic Panel    Protime-INR       Other    Conjunctival hemorrhage            PLAN  I've sent a prescription out for ciprofloxacin 500 mg 1 by mouth twice a day.  If she remains with any sinus tenderness in 2 weeks, she will let me know.  In that case, I would get a CT scan of the sinuses and would probably refer her to see ENT.    She has chronic right knee pain, left knee pain, right hip pain, left hip pain.  The right hip in the hurt a little bit more than the left hip and knee.    I think she may benefit from physical therapy and have referred her to results physical therapy per her request.    I think it would be prudent to check an INR and PT and a CBC and light of her recent conjunctival hemorrhage.  No Follow-up on file.

## 2018-02-13 ENCOUNTER — TELEPHONE (OUTPATIENT)
Dept: FAMILY MEDICINE CLINIC | Facility: CLINIC | Age: 73
End: 2018-02-13

## 2018-02-13 LAB
ALBUMIN SERPL-MCNC: 4.2 G/DL (ref 3.5–5.2)
ALBUMIN/GLOB SERPL: 1.6 G/DL
ALP SERPL-CCNC: 76 U/L (ref 39–117)
ALT SERPL-CCNC: 15 U/L (ref 1–33)
AST SERPL-CCNC: 16 U/L (ref 1–32)
BILIRUB SERPL-MCNC: 0.3 MG/DL (ref 0.1–1.2)
BUN SERPL-MCNC: 19 MG/DL (ref 8–23)
BUN/CREAT SERPL: 25.7 (ref 7–25)
CALCIUM SERPL-MCNC: 9.5 MG/DL (ref 8.6–10.5)
CHLORIDE SERPL-SCNC: 102 MMOL/L (ref 98–107)
CO2 SERPL-SCNC: 32.1 MMOL/L (ref 22–29)
CREAT SERPL-MCNC: 0.74 MG/DL (ref 0.57–1)
GFR SERPLBLD CREATININE-BSD FMLA CKD-EPI: 77 ML/MIN/1.73
GFR SERPLBLD CREATININE-BSD FMLA CKD-EPI: 94 ML/MIN/1.73
GLOBULIN SER CALC-MCNC: 2.7 GM/DL
GLUCOSE SERPL-MCNC: 89 MG/DL (ref 65–99)
INR PPP: 0.96 (ref 0.9–1.1)
POTASSIUM SERPL-SCNC: 4.4 MMOL/L (ref 3.5–5.2)
PROT SERPL-MCNC: 6.9 G/DL (ref 6–8.5)
PROTHROMBIN TIME: 12.6 SECONDS (ref 11.7–14.2)
SODIUM SERPL-SCNC: 145 MMOL/L (ref 136–145)

## 2018-02-13 RX ORDER — AMOXICILLIN AND CLAVULANATE POTASSIUM 875; 125 MG/1; MG/1
1 TABLET, FILM COATED ORAL EVERY 12 HOURS SCHEDULED
Qty: 28 TABLET | Refills: 0 | Status: SHIPPED | OUTPATIENT
Start: 2018-02-13 | End: 2018-05-07

## 2018-02-13 NOTE — TELEPHONE ENCOUNTER
PER DR. ARNDT SENDING OVER AMOXICILLIAN PER PT'S REQUEST, BECAUSE SHE DID NOT WANT TO TRY CIPROFLOXIN DUE TO THE SIDE AFFECTS.   ----- Message from Sonya Pantoja MA sent at 2/13/2018  1:09 PM EST -----  Contact: PT   PT  CALLED ABOUT PT RX HE SAYS THERE IS A PROBLEM WITH THE RX HE WOULD NOT GIVE ME ANY MORE INFORMATION HE CAN BE REACHED [phone]

## 2018-02-15 RX ORDER — ESCITALOPRAM OXALATE 10 MG/1
TABLET ORAL
Qty: 90 TABLET | Refills: 0 | Status: SHIPPED | OUTPATIENT
Start: 2018-02-15 | End: 2018-05-14 | Stop reason: SDUPTHER

## 2018-03-20 ENCOUNTER — TELEPHONE (OUTPATIENT)
Dept: FAMILY MEDICINE CLINIC | Facility: CLINIC | Age: 73
End: 2018-03-20

## 2018-03-20 NOTE — TELEPHONE ENCOUNTER
ATTEMPTED TO CALL PT AND LM FOR PT TO RETURN CALL. WANTED PT TO KNOW HER LABS WHERE NORMAL.   ----- Message from Reshma Baeza sent at 3/19/2018  8:14 AM EDT -----  Contact: PT  PLEASE CALL PT RE HER LABS RESULTS DONE ON 2-12-18.     CELL [phone]

## 2018-05-07 ENCOUNTER — OFFICE VISIT (OUTPATIENT)
Dept: FAMILY MEDICINE CLINIC | Facility: CLINIC | Age: 73
End: 2018-05-07

## 2018-05-07 ENCOUNTER — HOSPITAL ENCOUNTER (OUTPATIENT)
Dept: GENERAL RADIOLOGY | Facility: HOSPITAL | Age: 73
Discharge: HOME | End: 2018-05-07
Admitting: INTERNAL MEDICINE

## 2018-05-07 VITALS
SYSTOLIC BLOOD PRESSURE: 130 MMHG | RESPIRATION RATE: 16 BRPM | TEMPERATURE: 97.8 F | BODY MASS INDEX: 26.55 KG/M2 | HEIGHT: 66 IN | HEART RATE: 68 BPM | OXYGEN SATURATION: 97 % | DIASTOLIC BLOOD PRESSURE: 84 MMHG | WEIGHT: 165.2 LBS

## 2018-05-07 DIAGNOSIS — M25.562 CHRONIC PAIN OF LEFT KNEE: ICD-10-CM

## 2018-05-07 DIAGNOSIS — R60.0 LOWER EXTREMITY EDEMA: Primary | ICD-10-CM

## 2018-05-07 DIAGNOSIS — G89.29 CHRONIC PAIN OF LEFT KNEE: ICD-10-CM

## 2018-05-07 DIAGNOSIS — M25.511 ACUTE PAIN OF RIGHT SHOULDER: ICD-10-CM

## 2018-05-07 PROCEDURE — 99214 OFFICE O/P EST MOD 30 MIN: CPT | Performed by: INTERNAL MEDICINE

## 2018-05-07 PROCEDURE — 73030 X-RAY EXAM OF SHOULDER: CPT

## 2018-05-07 NOTE — PROGRESS NOTES
Subjective   Chris oCx is a 72 y.o. female. Patient is here today for   Chief Complaint   Patient presents with   • Joint Swelling     pt states left knee, has been going on for years, but gotten worse and become more frequent over last couple months    • Shoulder Pain     pt states right shoulder, cannot lay on right side, shooting pain and hurts to the touch    • Leg Swelling     pt state has started swelling since thursday           Vitals:    05/07/18 1359   BP: 130/84   Pulse: 68   Resp: 16   Temp: 97.8 °F (36.6 °C)   SpO2: 97%       Past Medical History:   Diagnosis Date   • Abdominal pain    • Arthritis    • Disease of thyroid gland    • Hyperlipidemia    • Hypertension    • Obstructive sleep apnea    • Seizures     As a baby   • Tuberculosis     As a child   • Vertigo       Allergies   Allergen Reactions   • Cephalexin    • Cephalosporins    • Eggs Or Egg-Derived Products    • Erythromycin    • Milk-Related Compounds    • Molds & Smuts    • Sulfa Antibiotics       Social History     Social History   • Marital status:      Spouse name: N/A   • Number of children: N/A   • Years of education: N/A     Occupational History   • Not on file.     Social History Main Topics   • Smoking status: Never Smoker   • Smokeless tobacco: Never Used   • Alcohol use No   • Drug use: Unknown   • Sexual activity: Not on file     Other Topics Concern   • Not on file     Social History Narrative   • No narrative on file        Current Outpatient Prescriptions:   •  Ascorbic Acid (VITAMIN C PO), Take 1 tablet by mouth daily., Disp: , Rfl:   •  aspirin 81 MG EC tablet, Take 81 mg by mouth Daily., Disp: , Rfl:   •  Aspirin-Acetaminophen-Caffeine (EXCEDRIN MIGRAINE PO), Take  by mouth., Disp: , Rfl:   •  Dietary Management Product (GABADONE) capsule, Take by mouth., Disp: , Rfl:   •  diphenoxylate-atropine (LOMOTIL) 2.5-0.025 MG per tablet, Take 1 tablet by mouth 4 (Four) Times a Day As Needed for Diarrhea., Disp: 100  tablet, Rfl: 0  •  escitalopram (LEXAPRO) 10 MG tablet, TAKE 1 TABLET BY MOUTH DAILY, Disp: 90 tablet, Rfl: 0  •  Nutritional Supplements (PYCNOGENOL) 30 MG capsule, Take by mouth., Disp: , Rfl:   •  PHOSPHATIDYLSERINE PO, Take  by mouth., Disp: , Rfl:   •  vitamin A 60144 UNIT capsule, Take 10,000 Units by mouth daily., Disp: , Rfl:   •  VITAMIN D, CHOLECALCIFEROL, PO, Take 1 tablet by mouth daily., Disp: , Rfl:   •  VITAMIN K PO, Take  by mouth., Disp: , Rfl:      Objective     She complains of extreme pain in her right shoulder, bilateral lower extremity edema, chronic pain of her left knee (she wished to have referral to see Dr. Okeefe orthopedic surgery).      Joint Swelling   Associated symptoms include joint swelling.   Leg Swelling   Associated symptoms include joint swelling.        Review of Systems   Constitutional: Negative.    HENT: Negative.    Respiratory: Negative.    Cardiovascular: Negative.    Musculoskeletal: Positive for joint swelling.        She was bilateral lower extremity swelling and tenderness in the calves.    She complains of pain in her left knee.    She complains of pain in her right shoulder and decreased range of motion.       Physical Exam   Constitutional: She is oriented to person, place, and time. She appears well-developed and well-nourished.   Pleasant, neatly groomed, in no distress.   Cardiovascular: Normal rate, regular rhythm and normal heart sounds.    Pulmonary/Chest: Effort normal and breath sounds normal.   Musculoskeletal:   She has 2+ bilateral lower extremity edema from the proximal calf distally.    She has crepitus in the left knee without effusion.    She is pain on palpation right shoulder.  There is no crepitus.   Neurological: She is alert and oriented to person, place, and time.   Nursing note and vitals reviewed.        Problem List Items Addressed This Visit        Nervous and Auditory    Right shoulder pain    Relevant Orders    XR shoulder 2+ vw right        Musculoskeletal and Integument    Chronic pain of left knee    Relevant Orders    Ambulatory Referral to Orthopedic Surgery       Other    Lower extremity edema - Primary    Relevant Orders    Comprehensive Metabolic Panel    CBC & Differential    TSH    Urinalysis With / Microscopic If Indicated - Urine, Clean Catch      Other Visit Diagnoses    None.           PLAN  I will be asking her to see Dr. Okeefe for her left knee.    I'm going get an x-ray of her left shoulder.    I like her to get a bilateral lower extremity venous Doppler to rule out DVT.    I asked her to keep relevant for her legs elevated.  If she has a negative venous Doppler, I will ask her to use knee-high KRISTIE hose.  No Follow-up on file.

## 2018-05-08 LAB
ALBUMIN SERPL-MCNC: 4.2 G/DL (ref 3.5–5.2)
ALBUMIN/GLOB SERPL: 1.9 G/DL
ALP SERPL-CCNC: 75 U/L (ref 39–117)
ALT SERPL-CCNC: 14 U/L (ref 1–33)
APPEARANCE UR: CLEAR
AST SERPL-CCNC: 18 U/L (ref 1–32)
BASOPHILS # BLD AUTO: 0.02 10*3/MM3 (ref 0–0.2)
BASOPHILS NFR BLD AUTO: 0.4 % (ref 0–1.5)
BILIRUB SERPL-MCNC: 0.3 MG/DL (ref 0.1–1.2)
BILIRUB UR QL STRIP: NEGATIVE
BUN SERPL-MCNC: 17 MG/DL (ref 8–23)
BUN/CREAT SERPL: 23 (ref 7–25)
CALCIUM SERPL-MCNC: 8.8 MG/DL (ref 8.6–10.5)
CHLORIDE SERPL-SCNC: 102 MMOL/L (ref 98–107)
CO2 SERPL-SCNC: 26.6 MMOL/L (ref 22–29)
COLOR UR: YELLOW
CREAT SERPL-MCNC: 0.74 MG/DL (ref 0.57–1)
EOSINOPHIL # BLD AUTO: 0.1 10*3/MM3 (ref 0–0.7)
EOSINOPHIL NFR BLD AUTO: 2 % (ref 0.3–6.2)
ERYTHROCYTE [DISTWIDTH] IN BLOOD BY AUTOMATED COUNT: 13.9 % (ref 11.7–13)
GFR SERPLBLD CREATININE-BSD FMLA CKD-EPI: 77 ML/MIN/1.73
GFR SERPLBLD CREATININE-BSD FMLA CKD-EPI: 94 ML/MIN/1.73
GLOBULIN SER CALC-MCNC: 2.2 GM/DL
GLUCOSE SERPL-MCNC: 89 MG/DL (ref 65–99)
GLUCOSE UR QL: NEGATIVE
HCT VFR BLD AUTO: 43.6 % (ref 35.6–45.5)
HGB BLD-MCNC: 13.6 G/DL (ref 11.9–15.5)
HGB UR QL STRIP: NEGATIVE
IMM GRANULOCYTES # BLD: 0 10*3/MM3 (ref 0–0.03)
IMM GRANULOCYTES NFR BLD: 0 % (ref 0–0.5)
KETONES UR QL STRIP: NEGATIVE
LEUKOCYTE ESTERASE UR QL STRIP: NEGATIVE
LYMPHOCYTES # BLD AUTO: 1.52 10*3/MM3 (ref 0.9–4.8)
LYMPHOCYTES NFR BLD AUTO: 30.3 % (ref 19.6–45.3)
MCH RBC QN AUTO: 29.5 PG (ref 26.9–32)
MCHC RBC AUTO-ENTMCNC: 31.2 G/DL (ref 32.4–36.3)
MCV RBC AUTO: 94.6 FL (ref 80.5–98.2)
MONOCYTES # BLD AUTO: 0.24 10*3/MM3 (ref 0.2–1.2)
MONOCYTES NFR BLD AUTO: 4.8 % (ref 5–12)
NEUTROPHILS # BLD AUTO: 3.13 10*3/MM3 (ref 1.9–8.1)
NEUTROPHILS NFR BLD AUTO: 62.5 % (ref 42.7–76)
NITRITE UR QL STRIP: NEGATIVE
PH UR STRIP: <=5 [PH] (ref 5–8)
PLATELET # BLD AUTO: 218 10*3/MM3 (ref 140–500)
POTASSIUM SERPL-SCNC: 4.2 MMOL/L (ref 3.5–5.2)
PROT SERPL-MCNC: 6.4 G/DL (ref 6–8.5)
PROT UR QL STRIP: NEGATIVE
RBC # BLD AUTO: 4.61 10*6/MM3 (ref 3.9–5.2)
SODIUM SERPL-SCNC: 144 MMOL/L (ref 136–145)
SP GR UR: 1.02 (ref 1–1.03)
TSH SERPL DL<=0.005 MIU/L-ACNC: 2.16 MIU/ML (ref 0.27–4.2)
UROBILINOGEN UR STRIP-MCNC: NORMAL MG/DL
WBC # BLD AUTO: 5.01 10*3/MM3 (ref 4.5–10.7)

## 2018-05-14 RX ORDER — ESCITALOPRAM OXALATE 10 MG/1
TABLET ORAL
Qty: 90 TABLET | Refills: 0 | Status: SHIPPED | OUTPATIENT
Start: 2018-05-14 | End: 2018-11-20 | Stop reason: SDUPTHER

## 2018-05-18 ENCOUNTER — HOSPITAL ENCOUNTER (OUTPATIENT)
Dept: CARDIOLOGY | Facility: HOSPITAL | Age: 73
Discharge: HOME | End: 2018-05-18
Admitting: INTERNAL MEDICINE

## 2018-05-18 DIAGNOSIS — R60.0 LOWER EXTREMITY EDEMA: ICD-10-CM

## 2018-05-18 LAB
BH CV LOW VAS RIGHT POPLITEAL SPONT: 1
BH CV LOWER VASCULAR LEFT COMMON FEMORAL AUGMENT: NORMAL
BH CV LOWER VASCULAR LEFT COMMON FEMORAL COMPETENT: NORMAL
BH CV LOWER VASCULAR LEFT COMMON FEMORAL COMPRESS: NORMAL
BH CV LOWER VASCULAR LEFT COMMON FEMORAL PHASIC: NORMAL
BH CV LOWER VASCULAR LEFT COMMON FEMORAL SPONT: NORMAL
BH CV LOWER VASCULAR LEFT DISTAL FEMORAL COMPRESS: NORMAL
BH CV LOWER VASCULAR LEFT GASTRONEMIUS COMPRESS: NORMAL
BH CV LOWER VASCULAR LEFT GREATER SAPH AK COMPRESS: NORMAL
BH CV LOWER VASCULAR LEFT GREATER SAPH BK COMPRESS: NORMAL
BH CV LOWER VASCULAR LEFT LESSER SAPH COMPRESS: NORMAL
BH CV LOWER VASCULAR LEFT MID FEMORAL AUGMENT: NORMAL
BH CV LOWER VASCULAR LEFT MID FEMORAL COMPETENT: NORMAL
BH CV LOWER VASCULAR LEFT MID FEMORAL COMPRESS: NORMAL
BH CV LOWER VASCULAR LEFT MID FEMORAL PHASIC: NORMAL
BH CV LOWER VASCULAR LEFT MID FEMORAL SPONT: NORMAL
BH CV LOWER VASCULAR LEFT PERONEAL COMPRESS: NORMAL
BH CV LOWER VASCULAR LEFT POPLITEAL AUGMENT: NORMAL
BH CV LOWER VASCULAR LEFT POPLITEAL COMPETENT: NORMAL
BH CV LOWER VASCULAR LEFT POPLITEAL COMPRESS: NORMAL
BH CV LOWER VASCULAR LEFT POPLITEAL PHASIC: NORMAL
BH CV LOWER VASCULAR LEFT POPLITEAL SPONT: NORMAL
BH CV LOWER VASCULAR LEFT POSTERIOR TIBIAL COMPRESS: NORMAL
BH CV LOWER VASCULAR LEFT PROXIMAL FEMORAL COMPRESS: NORMAL
BH CV LOWER VASCULAR LEFT SAPHENOFEMORAL JUNCTION AUGMENT: NORMAL
BH CV LOWER VASCULAR LEFT SAPHENOFEMORAL JUNCTION COMPETENT: NORMAL
BH CV LOWER VASCULAR LEFT SAPHENOFEMORAL JUNCTION COMPRESS: NORMAL
BH CV LOWER VASCULAR LEFT SAPHENOFEMORAL JUNCTION PHASIC: NORMAL
BH CV LOWER VASCULAR LEFT SAPHENOFEMORAL JUNCTION SPONT: NORMAL
BH CV LOWER VASCULAR RIGHT COMMON FEMORAL AUGMENT: NORMAL
BH CV LOWER VASCULAR RIGHT COMMON FEMORAL COMPETENT: NORMAL
BH CV LOWER VASCULAR RIGHT COMMON FEMORAL COMPRESS: NORMAL
BH CV LOWER VASCULAR RIGHT COMMON FEMORAL PHASIC: NORMAL
BH CV LOWER VASCULAR RIGHT COMMON FEMORAL SPONT: NORMAL
BH CV LOWER VASCULAR RIGHT DISTAL FEMORAL COMPRESS: NORMAL
BH CV LOWER VASCULAR RIGHT GASTRONEMIUS COMPRESS: NORMAL
BH CV LOWER VASCULAR RIGHT GREATER SAPH AK COMPRESS: NORMAL
BH CV LOWER VASCULAR RIGHT GREATER SAPH BK COMPRESS: NORMAL
BH CV LOWER VASCULAR RIGHT LESSER SAPH COMPRESS: NORMAL
BH CV LOWER VASCULAR RIGHT MID FEMORAL AUGMENT: NORMAL
BH CV LOWER VASCULAR RIGHT MID FEMORAL COMPETENT: NORMAL
BH CV LOWER VASCULAR RIGHT MID FEMORAL COMPRESS: NORMAL
BH CV LOWER VASCULAR RIGHT MID FEMORAL PHASIC: NORMAL
BH CV LOWER VASCULAR RIGHT MID FEMORAL SPONT: NORMAL
BH CV LOWER VASCULAR RIGHT PERONEAL COMPRESS: NORMAL
BH CV LOWER VASCULAR RIGHT POPLITEAL AUGMENT: NORMAL
BH CV LOWER VASCULAR RIGHT POPLITEAL COMPETENT: NORMAL
BH CV LOWER VASCULAR RIGHT POPLITEAL COMPRESS: NORMAL
BH CV LOWER VASCULAR RIGHT POPLITEAL PHASIC: NORMAL
BH CV LOWER VASCULAR RIGHT POPLITEAL SPONT: NORMAL
BH CV LOWER VASCULAR RIGHT POSTERIOR TIBIAL COMPRESS: NORMAL
BH CV LOWER VASCULAR RIGHT PROXIMAL FEMORAL COMPRESS: NORMAL
BH CV LOWER VASCULAR RIGHT SAPHENOFEMORAL JUNCTION AUGMENT: NORMAL
BH CV LOWER VASCULAR RIGHT SAPHENOFEMORAL JUNCTION COMPETENT: NORMAL
BH CV LOWER VASCULAR RIGHT SAPHENOFEMORAL JUNCTION COMPRESS: NORMAL
BH CV LOWER VASCULAR RIGHT SAPHENOFEMORAL JUNCTION PHASIC: NORMAL
BH CV LOWER VASCULAR RIGHT SAPHENOFEMORAL JUNCTION SPONT: NORMAL
BH CV VAS POP FLUID COLLECTED: 1

## 2018-05-18 PROCEDURE — 93970 EXTREMITY STUDY: CPT

## 2018-08-23 ENCOUNTER — OFFICE VISIT (OUTPATIENT)
Dept: FAMILY MEDICINE CLINIC | Facility: CLINIC | Age: 73
End: 2018-08-23

## 2018-08-23 VITALS
BODY MASS INDEX: 26.03 KG/M2 | SYSTOLIC BLOOD PRESSURE: 116 MMHG | DIASTOLIC BLOOD PRESSURE: 58 MMHG | RESPIRATION RATE: 18 BRPM | HEART RATE: 64 BPM | WEIGHT: 162 LBS | OXYGEN SATURATION: 98 % | TEMPERATURE: 97.9 F | HEIGHT: 66 IN

## 2018-08-23 DIAGNOSIS — M25.475 BILATERAL SWELLING OF FEET AND ANKLES: Primary | ICD-10-CM

## 2018-08-23 DIAGNOSIS — M25.472 BILATERAL SWELLING OF FEET AND ANKLES: Primary | ICD-10-CM

## 2018-08-23 DIAGNOSIS — M25.474 BILATERAL SWELLING OF FEET AND ANKLES: Primary | ICD-10-CM

## 2018-08-23 DIAGNOSIS — M25.471 BILATERAL SWELLING OF FEET AND ANKLES: Primary | ICD-10-CM

## 2018-08-23 PROCEDURE — 99213 OFFICE O/P EST LOW 20 MIN: CPT | Performed by: INTERNAL MEDICINE

## 2018-08-23 NOTE — PROGRESS NOTES
Subjective   Chris Cox is a 73 y.o. female. Patient is here today for   Chief Complaint   Patient presents with   • Joint Swelling     swollen and hot to touch           Vitals:    08/23/18 1559   BP: 116/58   Pulse: 64   Resp: 18   Temp: 97.9 °F (36.6 °C)   SpO2: 98%     The following portions of the patient's history were reviewed and updated as appropriate: allergies, current medications, past family history, past medical history, past social history, past surgical history and problem list.    Past Medical History:   Diagnosis Date   • Abdominal pain    • Arthritis    • Disease of thyroid gland    • Hyperlipidemia    • Hypertension    • Obstructive sleep apnea    • Seizures (CMS/HCC)     As a baby   • Tuberculosis     As a child   • Vertigo       Allergies   Allergen Reactions   • Cephalexin    • Cephalosporins    • Eggs Or Egg-Derived Products    • Erythromycin    • Milk-Related Compounds    • Molds & Smuts    • Sulfa Antibiotics       Social History     Social History   • Marital status:      Spouse name: N/A   • Number of children: N/A   • Years of education: N/A     Occupational History   • Not on file.     Social History Main Topics   • Smoking status: Never Smoker   • Smokeless tobacco: Never Used   • Alcohol use No   • Drug use: Unknown   • Sexual activity: Not on file     Other Topics Concern   • Not on file     Social History Narrative   • No narrative on file        Current Outpatient Prescriptions:   •  Ascorbic Acid (VITAMIN C PO), Take 1 tablet by mouth daily., Disp: , Rfl:   •  aspirin 81 MG EC tablet, Take 81 mg by mouth Daily., Disp: , Rfl:   •  Aspirin-Acetaminophen-Caffeine (EXCEDRIN MIGRAINE PO), Take  by mouth., Disp: , Rfl:   •  Dietary Management Product (GABADONE) capsule, Take by mouth., Disp: , Rfl:   •  diphenoxylate-atropine (LOMOTIL) 2.5-0.025 MG per tablet, Take 1 tablet by mouth 4 (Four) Times a Day As Needed for Diarrhea., Disp: 100 tablet, Rfl: 0  •  escitalopram  (LEXAPRO) 10 MG tablet, TAKE 1 TABLET BY MOUTH DAILY, Disp: 90 tablet, Rfl: 0  •  Nutritional Supplements (PYCNOGENOL) 30 MG capsule, Take by mouth., Disp: , Rfl:   •  PHOSPHATIDYLSERINE PO, Take  by mouth., Disp: , Rfl:   •  vitamin A 22175 UNIT capsule, Take 10,000 Units by mouth daily., Disp: , Rfl:   •  VITAMIN D, CHOLECALCIFEROL, PO, Take 1 tablet by mouth daily., Disp: , Rfl:   •  VITAMIN K PO, Take  by mouth., Disp: , Rfl:      Objective     History of Present Illness  Oksana complains of bilateral ankle swelling that started today.  She is in physical therapy and her physical therapist felt that her left ankle was also warm to touch and suggest that she see a physician.  She does watch dietary sodium but has recently been eating potato chips.  She had a normal lower extremity venous Doppler recently in May.  She denies any calf pain, chest pain, or shortness of breath.    Review of Systems   Constitutional: Positive for activity change.   Respiratory: Negative for chest tightness.    Cardiovascular: Positive for leg swelling. Negative for chest pain.   Skin: Positive for color change. Negative for rash.   Psychiatric/Behavioral: Negative.        Physical Exam   Constitutional: She appears well-developed and well-nourished.   Cardiovascular: Normal rate, regular rhythm and normal heart sounds.    Pulmonary/Chest: Effort normal and breath sounds normal.   Musculoskeletal:   There is trace edema both ankles.  Calves are nontender.  Negative Homans sign bilaterally.   Skin:   There is no erythema, tenderness, or increased temperature.  Ankles are discolored due to spider veins.   Psychiatric: She has a normal mood and affect. Her behavior is normal. Judgment and thought content normal.   Vitals reviewed.      ASSESSMENT     Problem List Items Addressed This Visit        Other    Bilateral swelling of feet and ankles - Primary          PLAN  Patient Instructions   I do not see any evidence of cellulitis or DVT.   Suggest getting legs elevated and avoiding dietary sodium.  We'll prescribed furosemide 20 mg daily as needed if indicated.    No Follow-up on file.

## 2018-08-23 NOTE — PATIENT INSTRUCTIONS
I do not see any evidence of cellulitis or DVT.  Suggest getting legs elevated and avoiding dietary sodium.  We'll prescribed furosemide 20 mg daily as needed if indicated.

## 2018-11-06 ENCOUNTER — OFFICE VISIT (OUTPATIENT)
Dept: FAMILY MEDICINE CLINIC | Facility: CLINIC | Age: 73
End: 2018-11-06

## 2018-11-06 VITALS
OXYGEN SATURATION: 96 % | BODY MASS INDEX: 26.03 KG/M2 | HEIGHT: 66 IN | SYSTOLIC BLOOD PRESSURE: 120 MMHG | RESPIRATION RATE: 20 BRPM | DIASTOLIC BLOOD PRESSURE: 68 MMHG | HEART RATE: 65 BPM | WEIGHT: 162 LBS | TEMPERATURE: 97.9 F

## 2018-11-06 DIAGNOSIS — Z00.00 INITIAL MEDICARE ANNUAL WELLNESS VISIT: Primary | ICD-10-CM

## 2018-11-06 PROBLEM — H69.91 DYSFUNCTION OF RIGHT EUSTACHIAN TUBE: Status: RESOLVED | Noted: 2017-04-12 | Resolved: 2018-11-06

## 2018-11-06 PROBLEM — M25.511 RIGHT SHOULDER PAIN: Status: RESOLVED | Noted: 2018-05-07 | Resolved: 2018-11-06

## 2018-11-06 PROBLEM — J01.00 SUBACUTE MAXILLARY SINUSITIS: Status: RESOLVED | Noted: 2018-02-12 | Resolved: 2018-11-06

## 2018-11-06 PROBLEM — M25.552 LEFT HIP PAIN: Status: RESOLVED | Noted: 2018-02-12 | Resolved: 2018-11-06

## 2018-11-06 PROBLEM — M25.551 PAIN OF RIGHT HIP JOINT: Status: RESOLVED | Noted: 2018-02-12 | Resolved: 2018-11-06

## 2018-11-06 PROBLEM — R30.9 PAINFUL URINATION: Status: RESOLVED | Noted: 2017-10-10 | Resolved: 2018-11-06

## 2018-11-06 PROBLEM — H11.30 CONJUNCTIVAL HEMORRHAGE: Status: RESOLVED | Noted: 2018-02-12 | Resolved: 2018-11-06

## 2018-11-06 PROBLEM — H69.81 DYSFUNCTION OF RIGHT EUSTACHIAN TUBE: Status: RESOLVED | Noted: 2017-04-12 | Resolved: 2018-11-06

## 2018-11-06 PROBLEM — J06.9 VIRAL URI WITH COUGH: Status: RESOLVED | Noted: 2017-03-10 | Resolved: 2018-11-06

## 2018-11-06 PROCEDURE — G0439 PPPS, SUBSEQ VISIT: HCPCS | Performed by: NURSE PRACTITIONER

## 2018-11-06 NOTE — PROGRESS NOTES
QUICK REFERENCE INFORMATION:  The ABCs of the Annual Wellness Visit    Initial Medicare Wellness Visit    HEALTH RISK ASSESSMENT    1945    Recent Hospitalizations:  No hospitalization(s) within the last year..        Current Medical Providers:  Patient Care Team:  Raghavendra Marquez MD as PCP - General  Raghavendra Marquez MD as PCP - Family Medicine  Raghavendra Marquez MD as PCP - Claims Attributed        Smoking Status:  History   Smoking Status   • Never Smoker   Smokeless Tobacco   • Never Used       Alcohol Consumption:  History   Alcohol Use No       Depression Screen:   PHQ-2/PHQ-9 Depression Screening 11/6/2018   Little interest or pleasure in doing things 0   Feeling down, depressed, or hopeless 0   Total Score 0       Health Habits and Functional and Cognitive Screening:  Functional & Cognitive Status 11/6/2018   Do you have difficulty preparing food and eating? No   Do you have difficulty bathing yourself, getting dressed or grooming yourself? No   Do you have difficulty using the toilet? No   Do you have difficulty moving around from place to place? Yes   Do you have trouble with steps or getting out of a bed or a chair? Yes   In the past year have you fallen or experienced a near fall? Yes   Current Diet Well Balanced Diet   Dental Exam Up to date   Eye Exam Up to date   Exercise (times per week) 0 times per week   Current Exercise Activities Include None   Do you need help using the phone?  No   Are you deaf or do you have serious difficulty hearing?  No   Do you need help with transportation? No   Do you need help shopping? No   Do you need help preparing meals?  No   Do you need help with housework?  No   Do you need help with laundry? No   Do you need help taking your medications? No   Do you need help managing money? No   Do you ever drive or ride in a car without wearing a seat belt? No   Have you felt unusual stress, anger or loneliness in the last month? Yes   Who do you live with? Spouse   If  you need help, do you have trouble finding someone available to you? No   Have you been bothered in the last four weeks by sexual problems? No   Do you have difficulty concentrating, remembering or making decisions? No           Does the patient have evidence of cognitive impairment? No    Asiprin use counseling: does not take on a regular basis       Recent Lab Results:    Visual Acuity:  No exam data present    Age-appropriate Screening Schedule:  Refer to the list below for future screening recommendations based on patient's age, sex and/or medical conditions. Orders for these recommended tests are listed in the plan section. The patient has been provided with a written plan.    Health Maintenance   Topic Date Due   • TDAP/TD VACCINES (1 - Tdap) 07/15/1964   • ZOSTER VACCINE (1 of 2) 07/15/1995   • LIPID PANEL  07/18/2016   • MAMMOGRAM  11/20/2016   • COLONOSCOPY  09/11/2027   • INFLUENZA VACCINE  Addressed   • PNEUMOCOCCAL VACCINES (65+ LOW/MEDIUM RISK)  Excluded        Subjective   History of Present Illness    Chris Cox is a 73 y.o. female who presents for an Annual Wellness Visit.    The following portions of the patient's history were reviewed and updated as appropriate: allergies, current medications, past family history, past medical history, past social history, past surgical history and problem list.    Outpatient Medications Prior to Visit   Medication Sig Dispense Refill   • Ascorbic Acid (VITAMIN C PO) Take 1 tablet by mouth daily.     • aspirin 81 MG EC tablet Take 81 mg by mouth Daily.     • Aspirin-Acetaminophen-Caffeine (EXCEDRIN MIGRAINE PO) Take  by mouth.     • Dietary Management Product (GABADONE) capsule Take by mouth.     • diphenoxylate-atropine (LOMOTIL) 2.5-0.025 MG per tablet Take 1 tablet by mouth 4 (Four) Times a Day As Needed for Diarrhea. 100 tablet 0   • escitalopram (LEXAPRO) 10 MG tablet TAKE 1 TABLET BY MOUTH DAILY 90 tablet 0   • Nutritional Supplements (PYCNOGENOL) 30 MG  "capsule Take by mouth.     • PHOSPHATIDYLSERINE PO Take  by mouth.     • vitamin A 57206 UNIT capsule Take 10,000 Units by mouth daily.     • VITAMIN D, CHOLECALCIFEROL, PO Take 1 tablet by mouth daily.     • VITAMIN K PO Take  by mouth.       No facility-administered medications prior to visit.        Patient Active Problem List   Diagnosis   • Abdominal bruit   • Blues   • DD (diverticular disease)   • Menopausal and perimenopausal disorder   • Neuralgia neuritis, sciatic nerve   • Right knee pain   • Primary osteoarthritis of both knees   • Radiculopathy with lower extremity symptoms   • Idiopathic peripheral neuropathy   • Sprain of collateral ligament of right knee   • ADD (attention deficit disorder)   • Functional diarrhea   • Microscopic hematuria   • Chronic pain of left knee   • Bleeding diathesis (CMS/HCC)   • Lower extremity edema   • Bilateral swelling of feet and ankles       Advance Care Planning:  has an advance directive - a copy HAS NOT been provided. Have asked the patient to send this to us to add to record.    Identification of Risk Factors:  Risk factors include: increased fall risk.    Review of Systems    Compared to one year ago, the patient feels her physical health is the same.  Compared to one year ago, the patient feels her mental health is the same.    Objective     Physical Exam    Vitals:    11/06/18 1332   BP: 120/68   BP Location: Left arm   Patient Position: Sitting   Cuff Size: Adult   Pulse: 65   Resp: 20   Temp: 97.9 °F (36.6 °C)   TempSrc: Oral   SpO2: 96%   Weight: 73.5 kg (162 lb)   Height: 167.6 cm (65.98\")   PainSc:   2   PainLoc: Knee       Patient's Body mass index is 26.16 kg/m². BMI is within normal parameters. No follow-up required.      Assessment/Plan   Patient Self-Management and Personalized Health Advice  The patient has been provided with information about: . and preventive services including:   · TdaP vaccine, at pharmacy, screening mammogram, DEXA and pap at " Manjeet's office, Declined pneumonia and flu vaccines, discussed shingrix at pharmacy .    Visit Diagnoses:    ICD-10-CM ICD-9-CM   1. Initial Medicare annual wellness visit Z00.00 V70.0       No orders of the defined types were placed in this encounter.      Outpatient Encounter Prescriptions as of 11/6/2018   Medication Sig Dispense Refill   • Ascorbic Acid (VITAMIN C PO) Take 1 tablet by mouth daily.     • aspirin 81 MG EC tablet Take 81 mg by mouth Daily.     • Aspirin-Acetaminophen-Caffeine (EXCEDRIN MIGRAINE PO) Take  by mouth.     • BIOTIN PO Take  by mouth.     • Dietary Management Product (GABADONE) capsule Take by mouth.     • diphenoxylate-atropine (LOMOTIL) 2.5-0.025 MG per tablet Take 1 tablet by mouth 4 (Four) Times a Day As Needed for Diarrhea. 100 tablet 0   • escitalopram (LEXAPRO) 10 MG tablet TAKE 1 TABLET BY MOUTH DAILY 90 tablet 0   • Nutritional Supplements (PYCNOGENOL) 30 MG capsule Take by mouth.     • PHOSPHATIDYLSERINE PO Take  by mouth.     • vitamin A 97391 UNIT capsule Take 10,000 Units by mouth daily.     • VITAMIN D, CHOLECALCIFEROL, PO Take 1 tablet by mouth daily.     • VITAMIN K PO Take  by mouth.       No facility-administered encounter medications on file as of 11/6/2018.        Reviewed use of high risk medication in the elderly: yes  Reviewed for potential of harmful drug interactions in the elderly: yes    Follow Up:  Schedule fasting lipid panel, cmp, hep c screening and follow up with Dr Alma Jarrett After Visit Summary and PPPS with all of these plans were given to the patient.

## 2018-11-20 RX ORDER — ESCITALOPRAM OXALATE 10 MG/1
TABLET ORAL
Qty: 90 TABLET | Refills: 1 | Status: SHIPPED | OUTPATIENT
Start: 2018-11-20 | End: 2019-05-16 | Stop reason: SDUPTHER

## 2018-11-21 DIAGNOSIS — E55.9 VITAMIN D DEFICIENCY: ICD-10-CM

## 2018-11-21 DIAGNOSIS — R73.01 ELEVATED FASTING GLUCOSE: Primary | ICD-10-CM

## 2018-11-26 LAB
25(OH)D3+25(OH)D2 SERPL-MCNC: 34.8 NG/ML (ref 30–100)
ALBUMIN SERPL-MCNC: 3.8 G/DL (ref 3.5–5.2)
ALBUMIN/GLOB SERPL: 1.6 G/DL
ALP SERPL-CCNC: 67 U/L (ref 39–117)
ALT SERPL-CCNC: 13 U/L (ref 1–33)
AST SERPL-CCNC: 14 U/L (ref 1–32)
BASOPHILS # BLD AUTO: 0.03 10*3/MM3 (ref 0–0.2)
BASOPHILS NFR BLD AUTO: 0.6 % (ref 0–1.5)
BILIRUB SERPL-MCNC: 0.3 MG/DL (ref 0.1–1.2)
BUN SERPL-MCNC: 15 MG/DL (ref 8–23)
BUN/CREAT SERPL: 20.3 (ref 7–25)
CALCIUM SERPL-MCNC: 9.1 MG/DL (ref 8.6–10.5)
CHLORIDE SERPL-SCNC: 105 MMOL/L (ref 98–107)
CO2 SERPL-SCNC: 30 MMOL/L (ref 22–29)
CREAT SERPL-MCNC: 0.74 MG/DL (ref 0.57–1)
EOSINOPHIL # BLD AUTO: 0.18 10*3/MM3 (ref 0–0.7)
EOSINOPHIL NFR BLD AUTO: 3.9 % (ref 0.3–6.2)
ERYTHROCYTE [DISTWIDTH] IN BLOOD BY AUTOMATED COUNT: 14.7 % (ref 11.7–13)
GLOBULIN SER CALC-MCNC: 2.4 GM/DL
GLUCOSE SERPL-MCNC: 103 MG/DL (ref 65–99)
HCT VFR BLD AUTO: 41.6 % (ref 35.6–45.5)
HGB BLD-MCNC: 13.2 G/DL (ref 11.9–15.5)
IMM GRANULOCYTES # BLD: 0.01 10*3/MM3 (ref 0–0.03)
IMM GRANULOCYTES NFR BLD: 0.2 % (ref 0–0.5)
LYMPHOCYTES # BLD AUTO: 1.38 10*3/MM3 (ref 0.9–4.8)
LYMPHOCYTES NFR BLD AUTO: 29.6 % (ref 19.6–45.3)
MCH RBC QN AUTO: 29.3 PG (ref 26.9–32)
MCHC RBC AUTO-ENTMCNC: 31.7 G/DL (ref 32.4–36.3)
MCV RBC AUTO: 92.2 FL (ref 80.5–98.2)
MONOCYTES # BLD AUTO: 0.23 10*3/MM3 (ref 0.2–1.2)
MONOCYTES NFR BLD AUTO: 4.9 % (ref 5–12)
NEUTROPHILS # BLD AUTO: 2.85 10*3/MM3 (ref 1.9–8.1)
NEUTROPHILS NFR BLD AUTO: 61 % (ref 42.7–76)
PLATELET # BLD AUTO: 223 10*3/MM3 (ref 140–500)
POTASSIUM SERPL-SCNC: 4.7 MMOL/L (ref 3.5–5.2)
PROT SERPL-MCNC: 6.2 G/DL (ref 6–8.5)
RBC # BLD AUTO: 4.51 10*6/MM3 (ref 3.9–5.2)
SODIUM SERPL-SCNC: 144 MMOL/L (ref 136–145)
WBC # BLD AUTO: 4.67 10*3/MM3 (ref 4.5–10.7)

## 2018-12-03 ENCOUNTER — OFFICE VISIT (OUTPATIENT)
Dept: FAMILY MEDICINE CLINIC | Facility: CLINIC | Age: 73
End: 2018-12-03

## 2018-12-03 VITALS
BODY MASS INDEX: 27.16 KG/M2 | HEIGHT: 65 IN | OXYGEN SATURATION: 98 % | RESPIRATION RATE: 15 BRPM | DIASTOLIC BLOOD PRESSURE: 60 MMHG | TEMPERATURE: 98.1 F | SYSTOLIC BLOOD PRESSURE: 120 MMHG | HEART RATE: 66 BPM | WEIGHT: 163 LBS

## 2018-12-03 DIAGNOSIS — M25.474 BILATERAL SWELLING OF FEET AND ANKLES: ICD-10-CM

## 2018-12-03 DIAGNOSIS — M25.472 BILATERAL SWELLING OF FEET AND ANKLES: ICD-10-CM

## 2018-12-03 DIAGNOSIS — M25.471 BILATERAL SWELLING OF FEET AND ANKLES: ICD-10-CM

## 2018-12-03 DIAGNOSIS — R73.9 HYPERGLYCEMIA: ICD-10-CM

## 2018-12-03 DIAGNOSIS — R60.0 LOWER EXTREMITY EDEMA: Primary | ICD-10-CM

## 2018-12-03 DIAGNOSIS — M25.475 BILATERAL SWELLING OF FEET AND ANKLES: ICD-10-CM

## 2018-12-03 PROBLEM — I87.8 VENOUS STASIS: Status: ACTIVE | Noted: 2018-12-03

## 2018-12-03 PROCEDURE — 99213 OFFICE O/P EST LOW 20 MIN: CPT | Performed by: INTERNAL MEDICINE

## 2018-12-03 NOTE — PROGRESS NOTES
Subjective   Chris Cox is a 73 y.o. female. Patient is here today for   Chief Complaint   Patient presents with   • Leg Swelling     both x 1 year right leg is worse   • Results     had labs          Vitals:    12/03/18 0900   BP: 120/60   Pulse: 66   Resp: 15   Temp: 98.1 °F (36.7 °C)   SpO2: 98%       Past Medical History:   Diagnosis Date   • Abdominal pain    • Arthritis    • Disease of thyroid gland    • Hyperlipidemia    • Hypertension    • Obstructive sleep apnea    • Seizures (CMS/HCC)     As a baby   • Tuberculosis     As a child   • Vertigo       Allergies   Allergen Reactions   • Erythromycin Anaphylaxis   • Cephalexin    • Cephalosporins    • Eggs Or Egg-Derived Products    • Milk-Related Compounds    • Molds & Smuts    • Sulfa Antibiotics       Social History     Socioeconomic History   • Marital status:      Spouse name: Not on file   • Number of children: Not on file   • Years of education: Not on file   • Highest education level: Not on file   Social Needs   • Financial resource strain: Not on file   • Food insecurity - worry: Not on file   • Food insecurity - inability: Not on file   • Transportation needs - medical: Not on file   • Transportation needs - non-medical: Not on file   Occupational History   • Not on file   Tobacco Use   • Smoking status: Never Smoker   • Smokeless tobacco: Never Used   Substance and Sexual Activity   • Alcohol use: No   • Drug use: Not on file   • Sexual activity: Not on file   Other Topics Concern   • Not on file   Social History Narrative   • Not on file        Current Outpatient Medications:   •  Ascorbic Acid (VITAMIN C PO), Take 1 tablet by mouth daily., Disp: , Rfl:   •  aspirin 81 MG EC tablet, Take 81 mg by mouth Daily., Disp: , Rfl:   •  Aspirin-Acetaminophen-Caffeine (EXCEDRIN MIGRAINE PO), Take  by mouth., Disp: , Rfl:   •  BIOTIN PO, Take  by mouth., Disp: , Rfl:   •  Dietary Management Product (GABADONE) capsule, Take by mouth., Disp: , Rfl:    •  diphenoxylate-atropine (LOMOTIL) 2.5-0.025 MG per tablet, Take 1 tablet by mouth 4 (Four) Times a Day As Needed for Diarrhea., Disp: 100 tablet, Rfl: 0  •  escitalopram (LEXAPRO) 10 MG tablet, TAKE 1 TABLET BY MOUTH DAILY, Disp: 90 tablet, Rfl: 1  •  Nutritional Supplements (PYCNOGENOL) 30 MG capsule, Take by mouth., Disp: , Rfl:   •  PHOSPHATIDYLSERINE PO, Take  by mouth., Disp: , Rfl:   •  vitamin A 76973 UNIT capsule, Take 10,000 Units by mouth daily., Disp: , Rfl:   •  VITAMIN D, CHOLECALCIFEROL, PO, Take 1 tablet by mouth daily., Disp: , Rfl:   •  VITAMIN K PO, Take  by mouth., Disp: , Rfl:      Objective     She complains of swelling bilateral lower extremities from what year.    She finds is worse at the end of the day but it isn't beginning of the day.    She denies any dyspnea, chest pain etc.    She had some lab work done last week that she is here to follow-up on her regarding her vitamin D deficiency,         Review of Systems   Constitutional: Negative.    HENT: Negative.    Respiratory: Negative.    Cardiovascular: Negative.    Musculoskeletal: Negative.    Psychiatric/Behavioral: Negative.        Physical Exam   Constitutional: She is oriented to person, place, and time. She appears well-developed and well-nourished.   Pleasant, neatly groomed, BMI 27.   HENT:   Head: Normocephalic and atraumatic.   Cardiovascular: Normal rate and regular rhythm.   Pulmonary/Chest: Effort normal.   Musculoskeletal: She exhibits edema.   She has 2+ pitting edema bilateral lower extremities to midcalf.   Neurological: She is alert and oriented to person, place, and time.   Psychiatric: She has a normal mood and affect. Her behavior is normal.   Nursing note and vitals reviewed.        Problem List Items Addressed This Visit        Other    Lower extremity edema - Primary    Bilateral swelling of feet and ankles    Hyperglycemia            PLAN  She is warned knee-high compression stockings in the past.  I think it  would be good on a regular basis at least 4 months.    Of asked her to keep her feet elevated whenever sitting down.    She and I reviewed her labs.  All seem to be in order except her blood sugar was a little bit elevated.    Unlike him back in 4-6 months.  Lab work should be drawn about a week before that visit to include: Hemoglobin A1c, comprehensive metabolic panel, urinalysis, and CBC.  No Follow-up on file.

## 2018-12-28 ENCOUNTER — APPOINTMENT (OUTPATIENT)
Dept: WOMENS IMAGING | Facility: HOSPITAL | Age: 73
End: 2018-12-28

## 2018-12-28 PROCEDURE — 77063 BREAST TOMOSYNTHESIS BI: CPT | Performed by: RADIOLOGY

## 2018-12-28 PROCEDURE — 77067 SCR MAMMO BI INCL CAD: CPT | Performed by: RADIOLOGY

## 2019-01-07 ENCOUNTER — OFFICE VISIT (OUTPATIENT)
Dept: FAMILY MEDICINE CLINIC | Facility: CLINIC | Age: 74
End: 2019-01-07

## 2019-01-07 VITALS
OXYGEN SATURATION: 96 % | HEIGHT: 65 IN | WEIGHT: 164.4 LBS | RESPIRATION RATE: 16 BRPM | DIASTOLIC BLOOD PRESSURE: 68 MMHG | HEART RATE: 64 BPM | TEMPERATURE: 97.6 F | SYSTOLIC BLOOD PRESSURE: 114 MMHG | BODY MASS INDEX: 27.39 KG/M2

## 2019-01-07 DIAGNOSIS — F98.8 ATTENTION DEFICIT DISORDER, UNSPECIFIED HYPERACTIVITY PRESENCE: Primary | ICD-10-CM

## 2019-01-07 PROCEDURE — 99213 OFFICE O/P EST LOW 20 MIN: CPT | Performed by: INTERNAL MEDICINE

## 2019-01-07 RX ORDER — ESTRADIOL 0.1 MG/G
CREAM VAGINAL
COMMUNITY
Start: 2018-12-31 | End: 2019-02-23

## 2019-01-07 RX ORDER — ATOMOXETINE 25 MG/1
25 CAPSULE ORAL 2 TIMES DAILY
Qty: 60 CAPSULE | Refills: 11 | Status: SHIPPED | OUTPATIENT
Start: 2019-01-07 | End: 2019-01-17 | Stop reason: SDUPTHER

## 2019-01-07 NOTE — PROGRESS NOTES
Subjective   Chris Cox is a 73 y.o. female. Patient is here today for   Chief Complaint   Patient presents with   • Follow-up     pt would like to go over thyroid labs performed by another physician, pt has labs with her today    • Memory Loss     PT WOULD ALSO LIKE MMSE PERFORMED           Vitals:    01/07/19 1047   BP: 114/68   Pulse: 64   Resp: 16   Temp: 97.6 °F (36.4 °C)   SpO2: 96%       Past Medical History:   Diagnosis Date   • Abdominal pain    • Arthritis    • Disease of thyroid gland    • Hyperlipidemia    • Hypertension    • Obstructive sleep apnea    • Seizures (CMS/HCC)     As a baby   • Tuberculosis     As a child   • Vertigo       Allergies   Allergen Reactions   • Erythromycin Anaphylaxis   • Cephalexin    • Cephalosporins    • Eggs Or Egg-Derived Products    • Milk-Related Compounds    • Molds & Smuts    • Sulfa Antibiotics       Social History     Socioeconomic History   • Marital status:      Spouse name: Not on file   • Number of children: Not on file   • Years of education: Not on file   • Highest education level: Not on file   Social Needs   • Financial resource strain: Not on file   • Food insecurity - worry: Not on file   • Food insecurity - inability: Not on file   • Transportation needs - medical: Not on file   • Transportation needs - non-medical: Not on file   Occupational History   • Not on file   Tobacco Use   • Smoking status: Never Smoker   • Smokeless tobacco: Never Used   Substance and Sexual Activity   • Alcohol use: No   • Drug use: Not on file   • Sexual activity: Not on file   Other Topics Concern   • Not on file   Social History Narrative   • Not on file        Current Outpatient Medications:   •  Ascorbic Acid (VITAMIN C PO), Take 1 tablet by mouth daily., Disp: , Rfl:   •  aspirin 81 MG EC tablet, Take 81 mg by mouth Daily., Disp: , Rfl:   •  Aspirin-Acetaminophen-Caffeine (EXCEDRIN MIGRAINE PO), Take  by mouth., Disp: , Rfl:   •  BIOTIN PO, Take  by mouth.,  Disp: , Rfl:   •  Dietary Management Product (GABADONE) capsule, Take by mouth., Disp: , Rfl:   •  diphenoxylate-atropine (LOMOTIL) 2.5-0.025 MG per tablet, Take 1 tablet by mouth 4 (Four) Times a Day As Needed for Diarrhea., Disp: 100 tablet, Rfl: 0  •  escitalopram (LEXAPRO) 10 MG tablet, TAKE 1 TABLET BY MOUTH DAILY, Disp: 90 tablet, Rfl: 1  •  ESTRACE VAGINAL 0.1 MG/GM vaginal cream, , Disp: , Rfl:   •  Nutritional Supplements (PYCNOGENOL) 30 MG capsule, Take by mouth., Disp: , Rfl:   •  PHOSPHATIDYLSERINE PO, Take  by mouth., Disp: , Rfl:   •  vitamin A 26054 UNIT capsule, Take 10,000 Units by mouth daily., Disp: , Rfl:   •  VITAMIN D, CHOLECALCIFEROL, PO, Take 1 tablet by mouth daily., Disp: , Rfl:   •  VITAMIN K PO, Take  by mouth., Disp: , Rfl:   •  atomoxetine (STRATTERA) 25 MG capsule, Take 1 capsule by mouth 2 (Two) Times a Day., Disp: 60 capsule, Rfl: 11     Objective     This patient was in her gynecologist office.  The day.  She repeated herself several times at that visit.  Her gynecologist recommended that she follow-up with me for the Mini-Mental Status test.    Patient is a family history significant for Alzheimer's dementia and her mother recently passed away.  Her mother's passing his been a source of stress for her.  She has a strained relationship with her brother with whom she was in close contact in the last month or so.    Patient has history of ADD which is not being treated currently.    She feels her depression is relatively well-controlled on S-Citalopram.             Review of Systems   Constitutional: Negative.    HENT: Negative.    Respiratory: Negative.    Cardiovascular: Negative.    Musculoskeletal: Negative.    Psychiatric/Behavioral:        She's had some difficulty with short-term memory since her mother's death.       Physical Exam   Constitutional: She is oriented to person, place, and time. She appears well-developed and well-nourished.   Pleasant, neatly groomed, BMI 27.  "  HENT:   Head: Normocephalic and atraumatic.   Cardiovascular: Normal rate and regular rhythm.   Pulmonary/Chest: Effort normal and breath sounds normal.   Neurological: She is alert and oriented to person, place, and time.   Psychiatric: She has a normal mood and affect. Her behavior is normal. Judgment and thought content normal.   Nursing note and vitals reviewed.    She achieves a 29 out of 30 on the Mini-Mental Status exam.        Problem List Items Addressed This Visit        Other    ADD (attention deficit disorder) - Primary    Relevant Medications    atomoxetine (STRATTERA) 25 MG capsule      I think her difficulty with short-term memory probably represents an exacerbation of her hypertension deficit disorder.  She had taken Strattera in the past and had found that useful however the cost is excessive.  I have sent out a prescription for Strattera 25 mg tablets.  I demonstrated to her how to use \"good Rx\" on her cell phone.    I do not believe that she needs a neuropsychological evaluation at this point..    I feel that she just needs some treatment for her ADD.  Like to have her back in about 3 months to see how she is getting along.          PLAN    No Follow-up on file.  "

## 2019-01-10 ENCOUNTER — APPOINTMENT (OUTPATIENT)
Dept: WOMENS IMAGING | Facility: HOSPITAL | Age: 74
End: 2019-01-10

## 2019-01-10 PROCEDURE — 77065 DX MAMMO INCL CAD UNI: CPT | Performed by: RADIOLOGY

## 2019-01-17 ENCOUNTER — OFFICE VISIT (OUTPATIENT)
Dept: FAMILY MEDICINE CLINIC | Facility: CLINIC | Age: 74
End: 2019-01-17

## 2019-01-17 VITALS
TEMPERATURE: 98.2 F | DIASTOLIC BLOOD PRESSURE: 70 MMHG | HEART RATE: 65 BPM | BODY MASS INDEX: 27.16 KG/M2 | OXYGEN SATURATION: 98 % | WEIGHT: 163 LBS | HEIGHT: 65 IN | SYSTOLIC BLOOD PRESSURE: 110 MMHG

## 2019-01-17 DIAGNOSIS — M25.562 CHRONIC PAIN OF LEFT KNEE: Primary | ICD-10-CM

## 2019-01-17 DIAGNOSIS — F98.8 ATTENTION DEFICIT DISORDER (ADD) WITHOUT HYPERACTIVITY: ICD-10-CM

## 2019-01-17 DIAGNOSIS — R60.0 LOWER EXTREMITY EDEMA: ICD-10-CM

## 2019-01-17 DIAGNOSIS — G89.29 CHRONIC PAIN OF LEFT KNEE: Primary | ICD-10-CM

## 2019-01-17 PROCEDURE — 99214 OFFICE O/P EST MOD 30 MIN: CPT | Performed by: INTERNAL MEDICINE

## 2019-01-17 RX ORDER — ATOMOXETINE 25 MG/1
25 CAPSULE ORAL 2 TIMES DAILY
Qty: 60 CAPSULE | Refills: 11 | OUTPATIENT
Start: 2019-01-17 | End: 2019-02-23

## 2019-01-20 NOTE — PROGRESS NOTES
Subjective   Chris Cox is a 73 y.o. female. Patient is here today for   Chief Complaint   Patient presents with   • Leg Swelling   • PT Referral          Vitals:    01/17/19 1344   BP: 110/70   Pulse: 65   Temp: 98.2 °F (36.8 °C)   SpO2: 98%       Past Medical History:   Diagnosis Date   • Abdominal pain    • Arthritis    • Disease of thyroid gland    • Hyperlipidemia    • Hypertension    • Obstructive sleep apnea    • Seizures (CMS/HCC)     As a baby   • Tuberculosis     As a child   • Vertigo       Allergies   Allergen Reactions   • Erythromycin Anaphylaxis   • Cephalexin    • Cephalosporins    • Eggs Or Egg-Derived Products    • Milk-Related Compounds    • Molds & Smuts    • Sulfa Antibiotics       Social History     Socioeconomic History   • Marital status:      Spouse name: Not on file   • Number of children: Not on file   • Years of education: Not on file   • Highest education level: Not on file   Social Needs   • Financial resource strain: Not on file   • Food insecurity - worry: Not on file   • Food insecurity - inability: Not on file   • Transportation needs - medical: Not on file   • Transportation needs - non-medical: Not on file   Occupational History   • Not on file   Tobacco Use   • Smoking status: Never Smoker   • Smokeless tobacco: Never Used   Substance and Sexual Activity   • Alcohol use: No   • Drug use: Not on file   • Sexual activity: Not on file   Other Topics Concern   • Not on file   Social History Narrative   • Not on file        Current Outpatient Medications:   •  Ascorbic Acid (VITAMIN C PO), Take 1 tablet by mouth daily., Disp: , Rfl:   •  aspirin 81 MG EC tablet, Take 81 mg by mouth Daily., Disp: , Rfl:   •  Aspirin-Acetaminophen-Caffeine (EXCEDRIN MIGRAINE PO), Take  by mouth., Disp: , Rfl:   •  atomoxetine (STRATTERA) 25 MG capsule, Take 1 capsule by mouth 2 (Two) Times a Day., Disp: 60 capsule, Rfl: 11  •  BIOTIN PO, Take  by mouth., Disp: , Rfl:   •  Dietary Management  Product (GABADONE) capsule, Take by mouth., Disp: , Rfl:   •  diphenoxylate-atropine (LOMOTIL) 2.5-0.025 MG per tablet, Take 1 tablet by mouth 4 (Four) Times a Day As Needed for Diarrhea., Disp: 100 tablet, Rfl: 0  •  escitalopram (LEXAPRO) 10 MG tablet, TAKE 1 TABLET BY MOUTH DAILY, Disp: 90 tablet, Rfl: 1  •  ESTRACE VAGINAL 0.1 MG/GM vaginal cream, , Disp: , Rfl:   •  Nutritional Supplements (PYCNOGENOL) 30 MG capsule, Take by mouth., Disp: , Rfl:   •  PHOSPHATIDYLSERINE PO, Take  by mouth., Disp: , Rfl:   •  vitamin A 66085 UNIT capsule, Take 10,000 Units by mouth daily., Disp: , Rfl:   •  VITAMIN D, CHOLECALCIFEROL, PO, Take 1 tablet by mouth daily., Disp: , Rfl:   •  VITAMIN K PO, Take  by mouth., Disp: , Rfl:      Objective     She would like physical therapy referral regarding some swelling that she's had in her legs and some pain that she has in her knees.  She requested a specific physical therapist today.    She would like to restart Strattera to help manage her ADD.             Review of Systems   Constitutional: Negative.    HENT: Negative.    Respiratory: Negative.    Cardiovascular: Negative.    Musculoskeletal:        She has pain above her knees.  She has some swelling in both of her legs as well.   Psychiatric/Behavioral: Negative.        Physical Exam   Constitutional: She is oriented to person, place, and time. She appears well-developed and well-nourished.   HENT:   Head: Normocephalic and atraumatic.   Pulmonary/Chest: Effort normal.   Musculoskeletal:   She has crepitus in both knees.  She has nonpitting edema bilaterally from the mid calf distally.  There are no venous stasis changes.   Neurological: She is alert and oriented to person, place, and time.   Psychiatric: She has a normal mood and affect. Her behavior is normal.   Nursing note and vitals reviewed.        Problem List Items Addressed This Visit        Musculoskeletal and Integument    Chronic pain of left knee - Primary        Other    Lower extremity edema    Attention deficit disorder (ADD) without hyperactivity    Relevant Medications    atomoxetine (STRATTERA) 25 MG capsule            PLAN  I referred her to physical therapy per her request.  Also, it's a good idea regarding her knee pain.    I asked her to wear knee-high support stockings if she were to go for long periods of time during the day standing or sitting.    Emy try Strattera for her ADD.  She had a good experience with this visit a few years ago.  No Follow-up on file.

## 2019-01-30 ENCOUNTER — APPOINTMENT (OUTPATIENT)
Dept: PREADMISSION TESTING | Facility: HOSPITAL | Age: 74
End: 2019-01-30

## 2019-01-30 ENCOUNTER — APPOINTMENT (OUTPATIENT)
Dept: WOMENS IMAGING | Facility: HOSPITAL | Age: 74
End: 2019-01-30

## 2019-01-30 PROCEDURE — 19081 BX BREAST 1ST LESION STRTCTC: CPT | Performed by: RADIOLOGY

## 2019-02-23 ENCOUNTER — APPOINTMENT (OUTPATIENT)
Dept: GENERAL RADIOLOGY | Facility: HOSPITAL | Age: 74
End: 2019-02-23

## 2019-02-23 PROCEDURE — 73630 X-RAY EXAM OF FOOT: CPT | Performed by: GENERAL PRACTICE

## 2019-02-23 PROCEDURE — 73610 X-RAY EXAM OF ANKLE: CPT | Performed by: GENERAL PRACTICE

## 2019-04-15 DIAGNOSIS — Z11.59 NEED FOR HEPATITIS C SCREENING TEST: ICD-10-CM

## 2019-04-15 DIAGNOSIS — R73.9 HYPERGLYCEMIA: Primary | ICD-10-CM

## 2019-04-17 LAB
ALBUMIN SERPL-MCNC: 4.4 G/DL (ref 3.5–5.2)
ALBUMIN/GLOB SERPL: 2.2 G/DL
ALP SERPL-CCNC: 74 U/L (ref 39–117)
ALT SERPL-CCNC: 12 U/L (ref 1–33)
APPEARANCE UR: CLEAR
AST SERPL-CCNC: 12 U/L (ref 1–32)
BACTERIA #/AREA URNS HPF: NORMAL /HPF
BASOPHILS # BLD AUTO: 0.04 10*3/MM3 (ref 0–0.2)
BASOPHILS NFR BLD AUTO: 0.9 % (ref 0–1.5)
BILIRUB SERPL-MCNC: 0.4 MG/DL (ref 0.2–1.2)
BILIRUB UR QL STRIP: NEGATIVE
BUN SERPL-MCNC: 17 MG/DL (ref 8–23)
BUN/CREAT SERPL: 21.5 (ref 7–25)
CALCIUM SERPL-MCNC: 9.3 MG/DL (ref 8.6–10.5)
CASTS URNS MICRO: NORMAL
CHLORIDE SERPL-SCNC: 104 MMOL/L (ref 98–107)
CO2 SERPL-SCNC: 30.7 MMOL/L (ref 22–29)
COLOR UR: YELLOW
CREAT SERPL-MCNC: 0.79 MG/DL (ref 0.57–1)
EOSINOPHIL # BLD AUTO: 0.17 10*3/MM3 (ref 0–0.4)
EOSINOPHIL NFR BLD AUTO: 3.9 % (ref 0.3–6.2)
EPI CELLS #/AREA URNS HPF: NORMAL /HPF
ERYTHROCYTE [DISTWIDTH] IN BLOOD BY AUTOMATED COUNT: 14 % (ref 12.3–15.4)
GLOBULIN SER CALC-MCNC: 2 GM/DL
GLUCOSE SERPL-MCNC: 86 MG/DL (ref 65–99)
GLUCOSE UR QL: NEGATIVE
HBA1C MFR BLD: 5.3 % (ref 4.8–5.6)
HCT VFR BLD AUTO: 44.1 % (ref 34–46.6)
HCV AB S/CO SERPL IA: <0.1 S/CO RATIO (ref 0–0.9)
HCV AB SERPL QL IA: NORMAL
HGB BLD-MCNC: 13.7 G/DL (ref 12–15.9)
HGB UR QL STRIP: NEGATIVE
IMM GRANULOCYTES # BLD AUTO: 0.01 10*3/MM3 (ref 0–0.05)
IMM GRANULOCYTES NFR BLD AUTO: 0.2 % (ref 0–0.5)
KETONES UR QL STRIP: NEGATIVE
LEUKOCYTE ESTERASE UR QL STRIP: NEGATIVE
LYMPHOCYTES # BLD AUTO: 1.29 10*3/MM3 (ref 0.7–3.1)
LYMPHOCYTES NFR BLD AUTO: 29.4 % (ref 19.6–45.3)
MCH RBC QN AUTO: 29.2 PG (ref 26.6–33)
MCHC RBC AUTO-ENTMCNC: 31.1 G/DL (ref 31.5–35.7)
MCV RBC AUTO: 94 FL (ref 79–97)
MONOCYTES # BLD AUTO: 0.33 10*3/MM3 (ref 0.1–0.9)
MONOCYTES NFR BLD AUTO: 7.5 % (ref 5–12)
NEUTROPHILS # BLD AUTO: 2.55 10*3/MM3 (ref 1.4–7)
NEUTROPHILS NFR BLD AUTO: 58.1 % (ref 42.7–76)
NITRITE UR QL STRIP: NEGATIVE
NRBC BLD AUTO-RTO: 0 /100 WBC (ref 0–0)
PH UR STRIP: 5.5 [PH] (ref 5–8)
PLATELET # BLD AUTO: 206 10*3/MM3 (ref 140–450)
POTASSIUM SERPL-SCNC: 3.9 MMOL/L (ref 3.5–5.2)
PROT SERPL-MCNC: 6.4 G/DL (ref 6–8.5)
PROT UR QL STRIP: NEGATIVE
RBC # BLD AUTO: 4.69 10*6/MM3 (ref 3.77–5.28)
RBC #/AREA URNS HPF: NORMAL /HPF
SODIUM SERPL-SCNC: 143 MMOL/L (ref 136–145)
SP GR UR: 1.02 (ref 1–1.03)
UROBILINOGEN UR STRIP-MCNC: (no result) MG/DL
WBC # BLD AUTO: 4.39 10*3/MM3 (ref 3.4–10.8)
WBC #/AREA URNS HPF: NORMAL /HPF

## 2019-04-23 ENCOUNTER — OFFICE VISIT (OUTPATIENT)
Dept: FAMILY MEDICINE CLINIC | Facility: CLINIC | Age: 74
End: 2019-04-23

## 2019-04-23 VITALS
SYSTOLIC BLOOD PRESSURE: 122 MMHG | BODY MASS INDEX: 27.27 KG/M2 | RESPIRATION RATE: 16 BRPM | TEMPERATURE: 97.8 F | DIASTOLIC BLOOD PRESSURE: 72 MMHG | OXYGEN SATURATION: 96 % | HEART RATE: 72 BPM | WEIGHT: 163.7 LBS | HEIGHT: 65 IN

## 2019-04-23 DIAGNOSIS — R73.9 HYPERGLYCEMIA: Primary | ICD-10-CM

## 2019-04-23 PROCEDURE — 99213 OFFICE O/P EST LOW 20 MIN: CPT | Performed by: INTERNAL MEDICINE

## 2019-04-23 NOTE — PROGRESS NOTES
Subjective   Chris Cox is a 73 y.o. female. Patient is here today for   Chief Complaint   Patient presents with   • Follow-up     HYPERGLYCEMIA          Vitals:    04/23/19 1022   BP: 122/72   Pulse: 72   Resp: 16   Temp: 97.8 °F (36.6 °C)   SpO2: 96%       Past Medical History:   Diagnosis Date   • Abdominal pain    • Arthritis    • Disease of thyroid gland    • Hyperlipidemia    • Hypertension    • Obstructive sleep apnea    • Seizures (CMS/HCC)     As a baby   • Tuberculosis     As a child   • Vertigo       Allergies   Allergen Reactions   • Erythromycin Anaphylaxis   • Cephalexin    • Cephalosporins    • Eggs Or Egg-Derived Products    • Milk-Related Compounds    • Molds & Smuts    • Sulfa Antibiotics       Social History     Socioeconomic History   • Marital status:      Spouse name: Not on file   • Number of children: Not on file   • Years of education: Not on file   • Highest education level: Not on file   Tobacco Use   • Smoking status: Never Smoker   • Smokeless tobacco: Never Used   Substance and Sexual Activity   • Alcohol use: No        Current Outpatient Medications:   •  Ascorbic Acid (VITAMIN C PO), Take 1 tablet by mouth daily., Disp: , Rfl:   •  aspirin 81 MG EC tablet, Take 81 mg by mouth Daily., Disp: , Rfl:   •  Aspirin-Acetaminophen-Caffeine (EXCEDRIN MIGRAINE PO), Take  by mouth., Disp: , Rfl:   •  BIOTIN PO, Take  by mouth., Disp: , Rfl:   •  Dietary Management Product (GABADONE) capsule, Take by mouth., Disp: , Rfl:   •  diphenoxylate-atropine (LOMOTIL) 2.5-0.025 MG per tablet, Take 1 tablet by mouth 4 (Four) Times a Day As Needed for Diarrhea., Disp: 100 tablet, Rfl: 0  •  escitalopram (LEXAPRO) 10 MG tablet, TAKE 1 TABLET BY MOUTH DAILY, Disp: 90 tablet, Rfl: 1  •  Nutritional Supplements (PYCNOGENOL) 30 MG capsule, Take by mouth., Disp: , Rfl:   •  PHOSPHATIDYLSERINE PO, Take  by mouth., Disp: , Rfl:   •  vitamin A 94905 UNIT capsule, Take 10,000 Units by mouth daily., Disp: ,  Rfl:   •  VITAMIN D, CHOLECALCIFEROL, PO, Take 1 tablet by mouth daily., Disp: , Rfl:   •  VITAMIN K PO, Take  by mouth., Disp: , Rfl:      Objective     She is here to follow-up on some lab work done last week.    She tells me that she is actually feeling pretty well.         Review of Systems   Constitutional: Negative.    HENT: Negative.    Respiratory: Negative.    Cardiovascular: Negative.    Musculoskeletal: Negative.    Psychiatric/Behavioral: Negative.        Physical Exam   Constitutional: She appears well-developed and well-nourished.   Cardiovascular: Normal rate and regular rhythm.   Pulmonary/Chest: Effort normal and breath sounds normal.   Abdominal: Soft. Bowel sounds are normal.   Psychiatric: She has a normal mood and affect. Her behavior is normal.   Nursing note and vitals reviewed.        Problem List Items Addressed This Visit        Other    Hyperglycemia - Primary            PLAN  Review of her most recent labs shows no hyperglycemia.    She was concerned about her hyperglycemia in light of her family history which is significant for type 2 diabetes.  I encouraged her to make sure that her weight was appropriate.    I would like to see her back in about 6 months to see how she is getting along and check the following labs: Lipid profile, comprehensive metabolic panel, hemoglobin A1c, urinalysis, and  No Follow-up on file.

## 2019-05-16 RX ORDER — ESCITALOPRAM OXALATE 10 MG/1
TABLET ORAL
Qty: 90 TABLET | Refills: 0 | Status: SHIPPED | OUTPATIENT
Start: 2019-05-16 | End: 2019-08-13 | Stop reason: SDUPTHER

## 2019-07-15 ENCOUNTER — OFFICE VISIT (OUTPATIENT)
Dept: FAMILY MEDICINE CLINIC | Facility: CLINIC | Age: 74
End: 2019-07-15

## 2019-07-15 VITALS
RESPIRATION RATE: 16 BRPM | DIASTOLIC BLOOD PRESSURE: 78 MMHG | SYSTOLIC BLOOD PRESSURE: 118 MMHG | HEART RATE: 67 BPM | HEIGHT: 65 IN | TEMPERATURE: 97.9 F | BODY MASS INDEX: 27.76 KG/M2 | OXYGEN SATURATION: 98 % | WEIGHT: 166.6 LBS

## 2019-07-15 DIAGNOSIS — R60.0 BILATERAL LOWER EXTREMITY EDEMA: Primary | ICD-10-CM

## 2019-07-15 PROCEDURE — 99213 OFFICE O/P EST LOW 20 MIN: CPT | Performed by: INTERNAL MEDICINE

## 2019-07-21 NOTE — PROGRESS NOTES
Subjective   Chris Cox is a 74 y.o. female. Patient is here today for   Chief Complaint   Patient presents with   • leg swelling     pt states leg swelling           Vitals:    07/15/19 1254   BP: 118/78   Pulse: 67   Resp: 16   Temp: 97.9 °F (36.6 °C)   SpO2: 98%       Past Medical History:   Diagnosis Date   • Abdominal pain    • Arthritis    • Disease of thyroid gland    • Hyperlipidemia    • Hypertension    • Obstructive sleep apnea    • Seizures (CMS/HCC)     As a baby   • Tuberculosis     As a child   • Vertigo       Allergies   Allergen Reactions   • Erythromycin Anaphylaxis   • Cephalexin    • Cephalosporins    • Eggs Or Egg-Derived Products    • Milk-Related Compounds    • Molds & Smuts    • Sulfa Antibiotics       Social History     Socioeconomic History   • Marital status:      Spouse name: Not on file   • Number of children: Not on file   • Years of education: Not on file   • Highest education level: Not on file   Tobacco Use   • Smoking status: Never Smoker   • Smokeless tobacco: Never Used   Substance and Sexual Activity   • Alcohol use: No        Current Outpatient Medications:   •  Ascorbic Acid (VITAMIN C PO), Take 1 tablet by mouth daily., Disp: , Rfl:   •  aspirin 81 MG EC tablet, Take 81 mg by mouth Daily., Disp: , Rfl:   •  Aspirin-Acetaminophen-Caffeine (EXCEDRIN MIGRAINE PO), Take  by mouth., Disp: , Rfl:   •  BIOTIN PO, Take  by mouth., Disp: , Rfl:   •  Dietary Management Product (GABADONE) capsule, Take by mouth., Disp: , Rfl:   •  diphenoxylate-atropine (LOMOTIL) 2.5-0.025 MG per tablet, Take 1 tablet by mouth 4 (Four) Times a Day As Needed for Diarrhea., Disp: 100 tablet, Rfl: 0  •  escitalopram (LEXAPRO) 10 MG tablet, TAKE 1 TABLET BY MOUTH DAILY, Disp: 90 tablet, Rfl: 0  •  Mirabegron ER (MYRBETRIQ) 50 MG tablet sustained-release 24 hour 24 hr tablet, Take 50 mg by mouth Daily., Disp: , Rfl:   •  Nutritional Supplements (PYCNOGENOL) 30 MG capsule, Take by mouth., Disp: ,  Rfl:   •  PHOSPHATIDYLSERINE PO, Take  by mouth., Disp: , Rfl:   •  vitamin A 54087 UNIT capsule, Take 10,000 Units by mouth daily., Disp: , Rfl:   •  VITAMIN D, CHOLECALCIFEROL, PO, Take 1 tablet by mouth daily., Disp: , Rfl:   •  VITAMIN K PO, Take  by mouth., Disp: , Rfl:      Objective     This pleasant patient notes that she has swelling in both of her calves but more notably on the left.         Review of Systems   Constitutional: Negative.    HENT: Negative.    Respiratory: Negative.    Cardiovascular: Negative.    Psychiatric/Behavioral: Negative.        Physical Exam   Constitutional: She is oriented to person, place, and time. She appears well-developed and well-nourished.   Cardiovascular: Normal rate, regular rhythm and normal heart sounds.   Pulmonary/Chest: Effort normal and breath sounds normal.   Musculoskeletal:   She has some varicosities evident in both of her calves.  She has some 1+ pitting edema bilateral lower extremities from the mid calf distally.   Neurological: She is alert and oriented to person, place, and time.   Nursing note and vitals reviewed.        Problem List Items Addressed This Visit        Other    Bilateral lower extremity edema - Primary            PLAN  She has bilateral lower extremity edema which is secondary to her varicosities.  She could wear knee-high support stockings during the day and to take them off at night.  It is a very hot time of year and I know that that would be inconvenient.    Also, if she spends any time sitting I asked her to elevate her legs on the ottoman.      No Follow-up on file.

## 2019-07-31 ENCOUNTER — OFFICE VISIT (OUTPATIENT)
Dept: FAMILY MEDICINE CLINIC | Facility: CLINIC | Age: 74
End: 2019-07-31

## 2019-07-31 VITALS
HEART RATE: 64 BPM | OXYGEN SATURATION: 97 % | SYSTOLIC BLOOD PRESSURE: 118 MMHG | WEIGHT: 166 LBS | TEMPERATURE: 97.6 F | RESPIRATION RATE: 16 BRPM | DIASTOLIC BLOOD PRESSURE: 70 MMHG | BODY MASS INDEX: 27.66 KG/M2 | HEIGHT: 65 IN

## 2019-07-31 DIAGNOSIS — J06.9 ACUTE URI: Primary | ICD-10-CM

## 2019-07-31 PROCEDURE — 99213 OFFICE O/P EST LOW 20 MIN: CPT | Performed by: NURSE PRACTITIONER

## 2019-07-31 RX ORDER — ALBUTEROL SULFATE 90 UG/1
2 AEROSOL, METERED RESPIRATORY (INHALATION) EVERY 4 HOURS PRN
Qty: 1 INHALER | Refills: 3 | Status: SHIPPED | OUTPATIENT
Start: 2019-07-31 | End: 2019-10-09

## 2019-07-31 NOTE — PROGRESS NOTES
Subjective   Chris Cox is a 74 y.o. female.   Chief Complaint   Patient presents with   • Cough     x 1 day    • Wheezing     Vitals:    07/31/19 0811   BP: 118/70   Pulse: 64   Resp: 16   Temp: 97.6 °F (36.4 °C)   SpO2: 97%     No LMP recorded. Patient is postmenopausal.    History of Present Illness  Chris is a patient of Dr Marquez who is here for an acute visit. She c/o dry cough, chest congestion that started yesterday. She had some wheezing at night. She denies fever, chills or body aches but has been fatigued. She denies shortness of breath and pleuritic CP.     The following portions of the patient's history were reviewed and updated as appropriate: allergies, current medications, past family history, past medical history, past social history, past surgical history and problem list.    Review of Systems   Constitutional: Positive for fatigue. Negative for chills and fever.   HENT: Negative for congestion, postnasal drip and rhinorrhea.    Respiratory: Positive for cough and wheezing. Negative for chest tightness and shortness of breath.    Cardiovascular: Negative.        Objective   Physical Exam   Constitutional: Vital signs are normal. She appears well-developed and well-nourished. No distress.   HENT:   Right Ear: Tympanic membrane and ear canal normal.   Left Ear: Tympanic membrane and ear canal normal.   Nose: Rhinorrhea present.   Mouth/Throat: Uvula is midline, oropharynx is clear and moist and mucous membranes are normal.   Cardiovascular: Normal rate, regular rhythm and normal heart sounds.   Pulmonary/Chest: Effort normal and breath sounds normal.   Neurological: She is alert.   Skin: Skin is warm, dry and intact.       Assessment/Plan   Chris was seen today for cough and wheezing.    Diagnoses and all orders for this visit:    Acute URI    Other orders  -     albuterol sulfate  (90 Base) MCG/ACT inhaler; Inhale 2 puffs Every 4 (Four) Hours As Needed for Wheezing or Shortness of  Air.      Her symptoms started yesterday so is likely the early start of a viral URI or bronchitis. Recommend symptom treatment for 7-10 days  Rest, fluids, robitussin  I gave her an albuterol inhaler to take as needed for wheezing  Call if symptoms persist,   RTO if new symptoms develop or symptoms worsen

## 2019-08-02 ENCOUNTER — TELEPHONE (OUTPATIENT)
Dept: FAMILY MEDICINE CLINIC | Facility: CLINIC | Age: 74
End: 2019-08-02

## 2019-08-02 RX ORDER — AMOXICILLIN AND CLAVULANATE POTASSIUM 875; 125 MG/1; MG/1
1 TABLET, FILM COATED ORAL 2 TIMES DAILY
Qty: 20 TABLET | Refills: 0 | Status: SHIPPED | OUTPATIENT
Start: 2019-08-02 | End: 2019-10-09

## 2019-08-02 NOTE — TELEPHONE ENCOUNTER
Patient states her cough got much worse, it's deep and she coughs some stuff up.   She will have chills, then get hot and sweaty. She has not checked her temp.

## 2019-08-02 NOTE — TELEPHONE ENCOUNTER
8/2/19 spoke to pt . Advised her to take the mucinex over the counter and if she wasn't better after the 7 days from which she was seen , that she has an antibiotic called into her pharmacy [facility]

## 2019-08-02 NOTE — TELEPHONE ENCOUNTER
Please call her and see what symptoms she has. It is still likely viral and she will have to treat her symptoms for now   ----- Message from Jaya Gonzáles MA sent at 8/2/2019  8:05 AM EDT -----    Follow up?     ----- Message -----  From: Cheyenne Norman  Sent: 8/1/2019   1:49 PM  To: Jaya Gonzáles MA    PT SYMPTOMS ARE WORSENEING  COUGH IS DEEPER AND SHE SAYS SHE IS SWEATING AND HOT   AND WAS TOLD TO CALL IF THIS WAS TO HAPPEN     HAS BEEN IN THE BED SINCE SHE SAW TATI ON WED     PLEASE CLL PT TO DISCUSS WHAT SHE IS TO DO    [phone]      [facility]

## 2019-08-02 NOTE — TELEPHONE ENCOUNTER
Please call her and tell her this is still likely a viral illness, she has only had symptoms for 3 days, things may get worse before they improve.  and let her know to continue to treat her symptoms at least for 7-10 days with results, fluids, mucinex. I will go ahead and send her an rx for augmentin to hold on to if things do not improve since I will be out of town next week. But antibiotics will not treat a viral infection

## 2019-08-13 RX ORDER — ESCITALOPRAM OXALATE 10 MG/1
10 TABLET ORAL DAILY
Qty: 90 TABLET | Refills: 1 | Status: SHIPPED | OUTPATIENT
Start: 2019-08-13 | End: 2020-02-04

## 2019-08-14 ENCOUNTER — OFFICE VISIT (OUTPATIENT)
Dept: FAMILY MEDICINE CLINIC | Facility: CLINIC | Age: 74
End: 2019-08-14

## 2019-08-14 VITALS
OXYGEN SATURATION: 98 % | HEART RATE: 61 BPM | WEIGHT: 162.8 LBS | SYSTOLIC BLOOD PRESSURE: 110 MMHG | HEIGHT: 65 IN | TEMPERATURE: 98.2 F | RESPIRATION RATE: 16 BRPM | DIASTOLIC BLOOD PRESSURE: 74 MMHG | BODY MASS INDEX: 27.12 KG/M2

## 2019-08-14 DIAGNOSIS — L98.9 LESION OF SKIN OF SCALP: Primary | ICD-10-CM

## 2019-08-14 PROCEDURE — 99213 OFFICE O/P EST LOW 20 MIN: CPT | Performed by: INTERNAL MEDICINE

## 2019-08-14 NOTE — PROGRESS NOTES
Subjective   Chris Cox is a 74 y.o. female. Patient is here today for   Chief Complaint   Patient presents with   • Rash     pt states in back of hair and itchy           Vitals:    08/14/19 1258   BP: 110/74   Pulse: 61   Resp: 16   Temp: 98.2 °F (36.8 °C)   SpO2: 98%       Past Medical History:   Diagnosis Date   • Abdominal pain    • Arthritis    • Disease of thyroid gland    • Hyperlipidemia    • Hypertension    • Obstructive sleep apnea    • Seizures (CMS/HCC)     As a baby   • Tuberculosis     As a child   • Vertigo       Allergies   Allergen Reactions   • Erythromycin Anaphylaxis   • Cephalexin    • Cephalosporins    • Eggs Or Egg-Derived Products    • Milk-Related Compounds    • Molds & Smuts    • Sulfa Antibiotics       Social History     Socioeconomic History   • Marital status:      Spouse name: Not on file   • Number of children: Not on file   • Years of education: Not on file   • Highest education level: Not on file   Tobacco Use   • Smoking status: Never Smoker   • Smokeless tobacco: Never Used   Substance and Sexual Activity   • Alcohol use: No        Current Outpatient Medications:   •  albuterol sulfate  (90 Base) MCG/ACT inhaler, Inhale 2 puffs Every 4 (Four) Hours As Needed for Wheezing or Shortness of Air., Disp: 1 inhaler, Rfl: 3  •  amoxicillin-clavulanate (AUGMENTIN) 875-125 MG per tablet, Take 1 tablet by mouth 2 (Two) Times a Day., Disp: 20 tablet, Rfl: 0  •  Ascorbic Acid (VITAMIN C PO), Take 1 tablet by mouth daily., Disp: , Rfl:   •  aspirin 81 MG EC tablet, Take 81 mg by mouth Daily., Disp: , Rfl:   •  Aspirin-Acetaminophen-Caffeine (EXCEDRIN MIGRAINE PO), Take  by mouth., Disp: , Rfl:   •  BIOTIN PO, Take  by mouth., Disp: , Rfl:   •  Dietary Management Product (GABADONE) capsule, Take by mouth., Disp: , Rfl:   •  diphenoxylate-atropine (LOMOTIL) 2.5-0.025 MG per tablet, Take 1 tablet by mouth 4 (Four) Times a Day As Needed for Diarrhea., Disp: 100 tablet, Rfl: 0  •   escitalopram (LEXAPRO) 10 MG tablet, Take 1 tablet by mouth Daily., Disp: 90 tablet, Rfl: 1  •  Mirabegron ER (MYRBETRIQ) 50 MG tablet sustained-release 24 hour 24 hr tablet, Take 50 mg by mouth Daily., Disp: , Rfl:   •  Nutritional Supplements (PYCNOGENOL) 30 MG capsule, Take by mouth., Disp: , Rfl:   •  PHOSPHATIDYLSERINE PO, Take  by mouth., Disp: , Rfl:   •  vitamin A 58830 UNIT capsule, Take 10,000 Units by mouth daily., Disp: , Rfl:   •  VITAMIN D, CHOLECALCIFEROL, PO, Take 1 tablet by mouth daily., Disp: , Rfl:   •  VITAMIN K PO, Take  by mouth., Disp: , Rfl:      Objective     She had developed a rash with an open sore to the left side of her nuchal line a couple weeks ago but that is since scabbed over and healed it is a bit itchy.         Review of Systems   Constitutional: Negative.    HENT: Negative.    Respiratory: Negative.    Cardiovascular: Negative.    Gastrointestinal: Negative.    Skin:        She has a nontender 3 x 2-1/2 cm patch at the left edge of her nuchal line which is hyperpigmented.  There is no ulcer in this region.  There is no scaling.  There are no papules.  There is no tenderness palpation.   Psychiatric/Behavioral: Negative.        Physical Exam      Problem List Items Addressed This Visit        Other    Lesion of skin of scalp - Primary            PLAN  This lesion at the left edge of her nuchal line.  The occipital scalp appears to be a healing wound.  She does not have any evidence of cellulitis today.  She finds that the itching that she has here resolves with hydrocortisone cream as needed which I told her she should continue as needed.    If she develops any bleeding of this lesion, or gets worse in any way, she will let me know.  No Follow-up on file.   English

## 2019-10-08 ENCOUNTER — OFFICE VISIT (OUTPATIENT)
Dept: FAMILY MEDICINE CLINIC | Facility: CLINIC | Age: 74
End: 2019-10-08

## 2019-10-08 ENCOUNTER — HOSPITAL ENCOUNTER (OUTPATIENT)
Dept: GENERAL RADIOLOGY | Facility: HOSPITAL | Age: 74
Discharge: HOME | End: 2019-10-08
Admitting: INTERNAL MEDICINE

## 2019-10-08 VITALS
TEMPERATURE: 98 F | HEIGHT: 65 IN | RESPIRATION RATE: 18 BRPM | BODY MASS INDEX: 26.82 KG/M2 | HEART RATE: 63 BPM | DIASTOLIC BLOOD PRESSURE: 72 MMHG | WEIGHT: 161 LBS | OXYGEN SATURATION: 98 % | SYSTOLIC BLOOD PRESSURE: 124 MMHG

## 2019-10-08 DIAGNOSIS — Z13.220 SCREENING FOR CHOLESTEROL LEVEL: Primary | ICD-10-CM

## 2019-10-08 DIAGNOSIS — Z01.818 PRE-OPERATIVE CLEARANCE: Primary | ICD-10-CM

## 2019-10-08 DIAGNOSIS — M17.0 PRIMARY OSTEOARTHRITIS OF BOTH KNEES: ICD-10-CM

## 2019-10-08 PROCEDURE — 99214 OFFICE O/P EST MOD 30 MIN: CPT | Performed by: INTERNAL MEDICINE

## 2019-10-08 PROCEDURE — 71046 X-RAY EXAM CHEST 2 VIEWS: CPT

## 2019-10-08 PROCEDURE — 93000 ELECTROCARDIOGRAM COMPLETE: CPT | Performed by: INTERNAL MEDICINE

## 2019-10-08 NOTE — PROGRESS NOTES
Subjective   Chris Cox is a 74 y.o. female. Patient is here today for   Chief Complaint   Patient presents with   • Pre-op Exam     total knee replacement right          Vitals:    10/08/19 1333   BP: 124/72   Pulse: 63   Resp: 18   Temp: 98 °F (36.7 °C)   SpO2: 98%     The following portions of the patient's history were reviewed and updated as appropriate: allergies, current medications, past family history, past medical history, past social history, past surgical history and problem list.    Past Medical History:   Diagnosis Date   • Abdominal pain    • ADHD    • Arthritis    • Bronchitis 08/2019   • Disease of thyroid gland    • Fractures     MULTIPLE BONE FRACTURES-TOOK FOSAMAX 3.5 YRS   • Hyperlipidemia    • Hypertension    • Irregular heart beat    • Kidney stone    • Migraines    • Mono exposure     AGE 35   • Obstructive sleep apnea     20 YRS AGO-NOT CURRENTLY   • Osteoporosis    • Seizures (CMS/HCC)     As a baby   • Tuberculosis     As a child   • Vertigo       Allergies   Allergen Reactions   • Erythromycin Anaphylaxis   • Sulfa Antibiotics Rash   • Milk-Related Compounds GI Intolerance   • Eggs Or Egg-Derived Products Other (See Comments)     PER ALLERGY TESTING RESULTS   • Cephalexin Rash   • Cephalosporins Rash     STOMACH ISSUES   • Molds & Smuts Rash     ITCHING      Social History     Socioeconomic History   • Marital status:      Spouse name: Not on file   • Number of children: Not on file   • Years of education: Not on file   • Highest education level: Not on file   Tobacco Use   • Smoking status: Never Smoker   • Smokeless tobacco: Never Used   Substance and Sexual Activity   • Alcohol use: No   • Drug use: No   • Sexual activity: Defer        Current Outpatient Medications:   •  ascorbic acid (VITAMIN C) 500 MG/5ML liquid, Take 500 mg by mouth Daily., Disp: , Rfl:   •  Aspirin-Acetaminophen-Caffeine (EXCEDRIN MIGRAINE PO), Take  by mouth As Needed (MIGRAINES)., Disp: , Rfl:   •   BIOTIN PO, Take 1 tablet by mouth Every Other Day. ON HOLD, Disp: , Rfl:   •  Dietary Management Product (GABADONE) capsule, Take 1 tablet by mouth. ON HOLD FOR SURGERY, Disp: , Rfl:   •  escitalopram (LEXAPRO) 10 MG tablet, Take 1 tablet by mouth Daily., Disp: 90 tablet, Rfl: 1  •  Mirabegron ER (MYRBETRIQ) 50 MG tablet sustained-release 24 hour 24 hr tablet, Take 50 mg by mouth Daily. CURRENTLY OUT OF THIS MED-TOO EXPENSIVE, Disp: , Rfl:   •  Nutritional Supplements (PYCNOGENOL) 30 MG capsule, Take 1 capsule by mouth Daily. ON HOLD, Disp: , Rfl:   •  PHOSPHATIDYLSERINE PO, Take 1 tablet by mouth Daily. ON HOLD, Disp: , Rfl:   •  vitamin A 58901 UNIT capsule, Take 10,000 Units by mouth Daily. ON HOLD, Disp: , Rfl:   •  VITAMIN D, CHOLECALCIFEROL, PO, Take 1 tablet by mouth daily., Disp: , Rfl:   •  VITAMIN K PO, Take 1 tablet by mouth Daily. ON HOLD, Disp: , Rfl:   No current facility-administered medications for this visit.     Facility-Administered Medications Ordered in Other Visits:   •  mupirocin (BACTROBAN) 2 % nasal ointment, , Each Nare, BID, Sary, Aneesh York II, MD     Objective     History of Present Illness Merly is here for a preop clearance.  She is planning a total knee replacement on October 18.  She has been healthy.  She has osteoarthritis of both knees and also the hips and is having some instability.  She has been doing physical therapy.  She has fallen a few times.  She really does not have any cardiovascular risk except for age.  She had lab work in April which was normal however lipid panel was not done.  She has preop labs scheduled for tomorrow.  Her father did have a cerebrovascular accident.    Review of Systems   Constitutional: Positive for activity change.   Respiratory: Negative.    Cardiovascular: Negative.    Gastrointestinal: Negative.    Genitourinary: Negative.    Musculoskeletal: Positive for gait problem.   Psychiatric/Behavioral: Negative.        Physical Exam    Constitutional: She appears well-developed and well-nourished.   Cardiovascular: Normal rate, regular rhythm and normal heart sounds.   108/55   Pulmonary/Chest: Effort normal and breath sounds normal.   Abdominal: Soft. Bowel sounds are normal.   Musculoskeletal: She exhibits no edema.   Neurological: She is alert.   Psychiatric: She has a normal mood and affect. Her behavior is normal. Judgment and thought content normal.   Vitals reviewed.      ASSESSMENT     Problem List Items Addressed This Visit        Other    Primary osteoarthritis of both knees    Pre-operative clearance - Primary    Relevant Orders    ECG 12 Lead (Completed)    XR Chest PA & Lateral (Completed)          PLAN  Patient Instructions   There are no contraindications to surgery.  Preop labs and chest x-ray are normal.  We will also check a fasting lipid panel.    No Follow-up on file.

## 2019-10-08 NOTE — PATIENT INSTRUCTIONS
There are no contraindications to surgery.  Preop labs and chest x-ray are normal.  We will also check a fasting lipid panel.

## 2019-10-09 ENCOUNTER — APPOINTMENT (OUTPATIENT)
Dept: PREADMISSION TESTING | Facility: HOSPITAL | Age: 74
End: 2019-10-09

## 2019-10-09 ENCOUNTER — HOSPITAL ENCOUNTER (OUTPATIENT)
Dept: GENERAL RADIOLOGY | Facility: HOSPITAL | Age: 74
Discharge: HOME | End: 2019-10-09
Admitting: ORTHOPAEDIC SURGERY

## 2019-10-09 VITALS
HEART RATE: 58 BPM | SYSTOLIC BLOOD PRESSURE: 130 MMHG | DIASTOLIC BLOOD PRESSURE: 70 MMHG | WEIGHT: 159 LBS | OXYGEN SATURATION: 97 % | RESPIRATION RATE: 18 BRPM | BODY MASS INDEX: 25.55 KG/M2 | HEIGHT: 66 IN | TEMPERATURE: 97.1 F

## 2019-10-09 DIAGNOSIS — Z96.659 STATUS POST TOTAL KNEE REPLACEMENT, UNSPECIFIED LATERALITY: ICD-10-CM

## 2019-10-09 LAB
ABO GROUP BLD: NORMAL
ALBUMIN SERPL-MCNC: 4.4 G/DL (ref 3.5–5.2)
ALBUMIN/GLOB SERPL: 1.8 G/DL
ALP SERPL-CCNC: 73 U/L (ref 39–117)
ALT SERPL W P-5'-P-CCNC: 15 U/L (ref 1–33)
ANION GAP SERPL CALCULATED.3IONS-SCNC: 12.2 MMOL/L (ref 5–15)
APTT PPP: 29.4 SECONDS (ref 22.7–35.4)
AST SERPL-CCNC: 15 U/L (ref 1–32)
BACTERIA UR QL AUTO: NORMAL /HPF
BASOPHILS # BLD AUTO: 0.03 10*3/MM3 (ref 0–0.2)
BASOPHILS NFR BLD AUTO: 0.7 % (ref 0–1.5)
BILIRUB SERPL-MCNC: 0.4 MG/DL (ref 0.2–1.2)
BILIRUB UR QL STRIP: NEGATIVE
BLD GP AB SCN SERPL QL: NEGATIVE
BUN BLD-MCNC: 15 MG/DL (ref 8–23)
BUN/CREAT SERPL: 21.7 (ref 7–25)
CALCIUM SPEC-SCNC: 8.8 MG/DL (ref 8.6–10.5)
CHLORIDE SERPL-SCNC: 104 MMOL/L (ref 98–107)
CLARITY UR: CLEAR
CO2 SERPL-SCNC: 27.8 MMOL/L (ref 22–29)
COLOR UR: YELLOW
CREAT BLD-MCNC: 0.69 MG/DL (ref 0.57–1)
DEPRECATED RDW RBC AUTO: 45.4 FL (ref 37–54)
EOSINOPHIL # BLD AUTO: 0.15 10*3/MM3 (ref 0–0.4)
EOSINOPHIL NFR BLD AUTO: 3.4 % (ref 0.3–6.2)
ERYTHROCYTE [DISTWIDTH] IN BLOOD BY AUTOMATED COUNT: 13.8 % (ref 12.3–15.4)
GFR SERPL CREATININE-BSD FRML MDRD: 83 ML/MIN/1.73
GLOBULIN UR ELPH-MCNC: 2.4 GM/DL
GLUCOSE BLD-MCNC: 96 MG/DL (ref 65–99)
GLUCOSE UR STRIP-MCNC: NEGATIVE MG/DL
HCT VFR BLD AUTO: 43.6 % (ref 34–46.6)
HGB BLD-MCNC: 14 G/DL (ref 12–15.9)
HGB UR QL STRIP.AUTO: NEGATIVE
HYALINE CASTS UR QL AUTO: NORMAL /LPF
IMM GRANULOCYTES # BLD AUTO: 0 10*3/MM3 (ref 0–0.05)
IMM GRANULOCYTES NFR BLD AUTO: 0 % (ref 0–0.5)
INR PPP: 0.93 (ref 0.9–1.1)
KETONES UR QL STRIP: ABNORMAL
LEUKOCYTE ESTERASE UR QL STRIP.AUTO: NEGATIVE
LYMPHOCYTES # BLD AUTO: 1.1 10*3/MM3 (ref 0.7–3.1)
LYMPHOCYTES NFR BLD AUTO: 25.3 % (ref 19.6–45.3)
MCH RBC QN AUTO: 28.7 PG (ref 26.6–33)
MCHC RBC AUTO-ENTMCNC: 32.1 G/DL (ref 31.5–35.7)
MCV RBC AUTO: 89.3 FL (ref 79–97)
MONOCYTES # BLD AUTO: 0.31 10*3/MM3 (ref 0.1–0.9)
MONOCYTES NFR BLD AUTO: 7.1 % (ref 5–12)
NEUTROPHILS # BLD AUTO: 2.76 10*3/MM3 (ref 1.7–7)
NEUTROPHILS NFR BLD AUTO: 63.5 % (ref 42.7–76)
NITRITE UR QL STRIP: NEGATIVE
NRBC BLD AUTO-RTO: 0 /100 WBC (ref 0–0.2)
PH UR STRIP.AUTO: <=5 [PH] (ref 5–8)
PLATELET # BLD AUTO: 215 10*3/MM3 (ref 140–450)
PMV BLD AUTO: 10.2 FL (ref 6–12)
POTASSIUM BLD-SCNC: 3.8 MMOL/L (ref 3.5–5.2)
PROT SERPL-MCNC: 6.8 G/DL (ref 6–8.5)
PROT UR QL STRIP: NEGATIVE
PROTHROMBIN TIME: 12.2 SECONDS (ref 11.7–14.2)
RBC # BLD AUTO: 4.88 10*6/MM3 (ref 3.77–5.28)
RBC # UR: NORMAL /HPF
REF LAB TEST METHOD: NORMAL
RH BLD: POSITIVE
SODIUM BLD-SCNC: 144 MMOL/L (ref 136–145)
SP GR UR STRIP: 1.03 (ref 1–1.03)
SQUAMOUS #/AREA URNS HPF: NORMAL /HPF
T&S EXPIRATION DATE: NORMAL
UROBILINOGEN UR QL STRIP: ABNORMAL
WBC NRBC COR # BLD: 4.35 10*3/MM3 (ref 3.4–10.8)
WBC UR QL AUTO: NORMAL /HPF

## 2019-10-09 PROCEDURE — A9270 NON-COVERED ITEM OR SERVICE: HCPCS | Performed by: ORTHOPAEDIC SURGERY

## 2019-10-09 PROCEDURE — 86850 RBC ANTIBODY SCREEN: CPT | Performed by: ORTHOPAEDIC SURGERY

## 2019-10-09 PROCEDURE — 81001 URINALYSIS AUTO W/SCOPE: CPT | Performed by: ORTHOPAEDIC SURGERY

## 2019-10-09 PROCEDURE — 80053 COMPREHEN METABOLIC PANEL: CPT | Performed by: ORTHOPAEDIC SURGERY

## 2019-10-09 PROCEDURE — 86901 BLOOD TYPING SEROLOGIC RH(D): CPT | Performed by: ORTHOPAEDIC SURGERY

## 2019-10-09 PROCEDURE — 36415 COLL VENOUS BLD VENIPUNCTURE: CPT

## 2019-10-09 PROCEDURE — 85025 COMPLETE CBC W/AUTO DIFF WBC: CPT | Performed by: ORTHOPAEDIC SURGERY

## 2019-10-09 PROCEDURE — 86900 BLOOD TYPING SEROLOGIC ABO: CPT | Performed by: ORTHOPAEDIC SURGERY

## 2019-10-09 PROCEDURE — 73560 X-RAY EXAM OF KNEE 1 OR 2: CPT

## 2019-10-09 PROCEDURE — 85730 THROMBOPLASTIN TIME PARTIAL: CPT | Performed by: ORTHOPAEDIC SURGERY

## 2019-10-09 PROCEDURE — 85610 PROTHROMBIN TIME: CPT | Performed by: ORTHOPAEDIC SURGERY

## 2019-10-09 PROCEDURE — 63710000001 MUPIROCIN 2 % OINTMENT: Performed by: ORTHOPAEDIC SURGERY

## 2019-10-09 ASSESSMENT — KOOS JR
KOOS JR SCORE: 47.487
KOOS JR SCORE: 16

## 2019-10-09 NOTE — DISCHARGE INSTRUCTIONS
Take the following medications the morning of surgery with a small sip of water: NONE    PLEASE ARRIVE AT THE  HOSPITAL AT: 0730 AM    General Instructions:  • Do not eat solid food after midnight the night before surgery.  • You may drink clear liquids day of surgery but must stop at least one hour before your hospital arrival time.  • It is beneficial for you to have a clear drink that contains carbohydrates the day of surgery.  We suggest a 12 to 20 ounce bottle of Gatorade or Powerade for non-diabetic patients or a 12 to 20 ounce bottle of G2 or Powerade Zero for diabetic patients. (Pediatric patients, are not advised to drink a 12 to 20 ounce carbohydrate drink)    Clear liquids are liquids you can see through.  Nothing red in color.     Plain water                               Sports drinks  Sodas                                   Gelatin (Jell-O)  Fruit juices without pulp such as white grape juice and apple juice  Popsicles that contain no fruit or yogurt  Tea or coffee (no cream or milk added)  Gatorade / Powerade  G2 / Powerade Zero    • Infants may have breast milk up to four hours before surgery.  • Infants drinking formula may drink formula up to six hours before surgery.   • Patients who avoid smoking, chewing tobacco and alcohol for 4 weeks prior to surgery have a reduced risk of post-operative complications.  Quit smoking as many days before surgery as you can.  • Do not smoke, use chewing tobacco or drink alcohol the day of surgery.   • If applicable bring your C-PAP/ BI-PAP machine.  • Bring any papers given to you in the doctor’s office.  • Wear clean comfortable clothes.  • Do not wear contact lenses, false eyelashes or make-up.  Bring a case for your glasses.   • Bring crutches or walker if applicable.  • Remove all piercings.  Leave jewelry and any other valuables at home.  • Hair extensions with metal clips must be removed prior to surgery.  • The Pre-Admission Testing nurse will instruct you  to bring medications if unable to obtain an accurate list in Pre-Admission Testing.        If you were given a blood bank ID arm band remember to bring it with you the day of surgery.    Preventing a Surgical Site Infection:  • For 2 to 3 days before surgery, avoid shaving with a razor because the razor can irritate skin and make it easier to develop an infection.    • Any areas of open skin can increase the risk of a post-operative wound infection by allowing bacteria to enter and travel throughout the body.  Notify your surgeon if you have any skin wounds / rashes even if it is not near the expected surgical site.  The area will need assessed to determine if surgery should be delayed until it is healed.  • The night prior to surgery sleep in a clean bed with clean clothing.  Do not allow pets to sleep with you.  • Shower on the morning of surgery using a fresh bar of anti-bacterial soap (such as Dial) and clean washcloth.  Dry with a clean towel and dress in clean clothing.  • Ask your surgeon if you will be receiving antibiotics prior to surgery.  • Make sure you, your family, and all healthcare providers clean their hands with soap and water or an alcohol based hand  before caring for you or your wound.    Day of surgery:  Your arrival time is approximately two hours before your scheduled surgery time.  Upon arrival, a Pre-op nurse and Anesthesiologist will review your health history, obtain vital signs, and answer questions you may have.  The only belongings needed at this time will be a list of your home medications and if applicable your C-PAP/BI-PAP machine.  If you are staying overnight your family can leave the rest of your belongings in the car and bring them to your room later.  A Pre-op nurse will start an IV and you may receive medication in preparation for surgery, including something to help you relax.  Your family will be able to see you in the Pre-op area.  Two visitors at a time will be  allowed in the Pre-op room.  While you are in surgery your family should notify the waiting room  if they leave the waiting room area and provide a contact phone number.    Please be aware that surgery does come with discomfort.  We want to make every effort to control your discomfort so please discuss any uncontrolled symptoms with your nurse.   Your doctor will most likely have prescribed pain medications.      If you are going home after surgery you will receive individualized written care instructions before being discharged.  A responsible adult must drive you to and from the hospital on the day of your surgery and stay with you for 24 hours.    If you are staying overnight following surgery, you will be transported to your hospital room following the recovery period.  [facility] has all private rooms.    You have received a list of surgical assistants for your reference.  If you have any questions please call Pre-Admission Testing at [phone].  Deductibles and co-payments are collected on the day of service. Please be prepared to pay the required co-pay, deductible or deposit on the day of service as defined by your plan.    2% CHLORAHEXIDINE GLUCONATE* CLOTH  Preparing or “prepping” skin before surgery can reduce the risk of infection at the surgical site. To make the process easier, [facility] has chosen disposable cloths moistened with a rinse-free, 2% Chlorhexidine Gluconate (CHG) antiseptic solution. The steps below outline the prepping process and should be carefully followed.        Use the prep cloth on the area that is circled in the diagram             Directions Night before Surgery  1) Shower using a fresh bar of anti-bacterial soap (such as Dial) and clean washcloth.  Use a clean towel to completely dry your skin.  2) Do not use any lotions, oils or creams on your skin.  3) Open the package and remove 1 cloth, wipe your skin for 30 seconds in a circular  motion.  Allow to dry for 3 minutes.  4) Repeat #3 with second cloth.  5) Do not touch your eyes, ears, or mouth with the prep cloth.  6) Allow the wet prep solution to air dry.  7) Discard the prep cloth and wash your hands with soap and water.   8) Dress in clean bed clothes and sleep on fresh clean bed sheets.   9) You may experience some temporary itching after the prep.    Directions Day of Surgery  1) Repeat steps 1,2,3,4,5,6,7, and 9.   2) Dress in clean clothes before coming to the hospital.    BACTROBAN NASAL OINTMENT  There are many germs normally in your nose. Bactroban is an ointment that will help reduce these germs. Please follow these instructions for Bactroban use:      ____The day before surgery in the morning  Date________    ____The day before surgery in the evening              Date________    ____The day of surgery in the morning    Date________    **Squirt ½ package of Bactroban Ointment onto a cotton applicator and apply to inside of 1st nostril.  Squirt the remaining Bactroban and apply to the inside of the other nostril.    PERIDEX- ORAL:  Use only if your surgeon has ordered  Use the night before and morning of surgery - Swish, gargle, and spit - do not swallow.

## 2019-10-12 LAB
CHOLEST SERPL-MCNC: 201 MG/DL (ref 0–200)
HDLC SERPL-MCNC: 63 MG/DL (ref 40–60)
LDLC SERPL CALC-MCNC: 121 MG/DL (ref 0–100)
LDLC/HDLC SERPL: 1.93 {RATIO}
TRIGL SERPL-MCNC: 83 MG/DL (ref 0–150)
VLDLC SERPL CALC-MCNC: 16.6 MG/DL

## 2019-10-21 ENCOUNTER — ANESTHESIA EVENT (OUTPATIENT)
Dept: PERIOP | Facility: HOSPITAL | Age: 74
End: 2019-10-21

## 2019-10-22 ENCOUNTER — APPOINTMENT (OUTPATIENT)
Dept: GENERAL RADIOLOGY | Facility: HOSPITAL | Age: 74
End: 2019-10-22

## 2019-10-22 ENCOUNTER — ANESTHESIA (OUTPATIENT)
Dept: PERIOP | Facility: HOSPITAL | Age: 74
End: 2019-10-22

## 2019-10-22 ENCOUNTER — HOSPITAL ENCOUNTER (INPATIENT)
Facility: HOSPITAL | Age: 74
LOS: 3 days | Discharge: HOME | End: 2019-10-25
Attending: ORTHOPAEDIC SURGERY | Admitting: ORTHOPAEDIC SURGERY

## 2019-10-22 PROBLEM — Z96.659 TOTAL KNEE REPLACEMENT STATUS: Status: ACTIVE | Noted: 2019-10-22

## 2019-10-22 LAB
ABO GROUP BLD: NORMAL
BLD GP AB SCN SERPL QL: NEGATIVE
RH BLD: POSITIVE
T&S EXPIRATION DATE: NORMAL

## 2019-10-22 PROCEDURE — C1713 ANCHOR/SCREW BN/BN,TIS/BN: HCPCS | Performed by: ORTHOPAEDIC SURGERY

## 2019-10-22 PROCEDURE — 25010000002 NEOSTIGMINE PER 0.5 MG: Performed by: NURSE ANESTHETIST, CERTIFIED REGISTERED

## 2019-10-22 PROCEDURE — 25010000002 ONDANSETRON PER 1 MG: Performed by: NURSE ANESTHETIST, CERTIFIED REGISTERED

## 2019-10-22 PROCEDURE — 25010000002 VANCOMYCIN PER 500 MG: Performed by: ORTHOPAEDIC SURGERY

## 2019-10-22 PROCEDURE — 25010000002 HYDROMORPHONE PER 4 MG: Performed by: NURSE ANESTHETIST, CERTIFIED REGISTERED

## 2019-10-22 PROCEDURE — C9290 INJ, BUPIVACAINE LIPOSOME: HCPCS | Performed by: ORTHOPAEDIC SURGERY

## 2019-10-22 PROCEDURE — 86901 BLOOD TYPING SEROLOGIC RH(D): CPT | Performed by: ORTHOPAEDIC SURGERY

## 2019-10-22 PROCEDURE — 0SRC069 REPLACEMENT OF RIGHT KNEE JOINT WITH OXIDIZED ZIRCONIUM ON POLYETHYLENE SYNTHETIC SUBSTITUTE, CEMENTED, OPEN APPROACH: ICD-10-PCS | Performed by: ORTHOPAEDIC SURGERY

## 2019-10-22 PROCEDURE — 25010000002 FENTANYL CITRATE (PF) 100 MCG/2ML SOLUTION: Performed by: ANESTHESIOLOGY

## 2019-10-22 PROCEDURE — 25010000002 DEXAMETHASONE PER 1 MG: Performed by: NURSE ANESTHETIST, CERTIFIED REGISTERED

## 2019-10-22 PROCEDURE — 86850 RBC ANTIBODY SCREEN: CPT | Performed by: ORTHOPAEDIC SURGERY

## 2019-10-22 PROCEDURE — C1776 JOINT DEVICE (IMPLANTABLE): HCPCS | Performed by: ORTHOPAEDIC SURGERY

## 2019-10-22 PROCEDURE — 25010000002 MIDAZOLAM PER 1 MG: Performed by: NURSE ANESTHETIST, CERTIFIED REGISTERED

## 2019-10-22 PROCEDURE — 86900 BLOOD TYPING SEROLOGIC ABO: CPT | Performed by: ORTHOPAEDIC SURGERY

## 2019-10-22 PROCEDURE — 25010000003 BUPIVACAINE LIPOSOME 1.3 % SUSPENSION 20 ML VIAL: Performed by: ORTHOPAEDIC SURGERY

## 2019-10-22 PROCEDURE — 97110 THERAPEUTIC EXERCISES: CPT

## 2019-10-22 PROCEDURE — 25010000002 PROPOFOL 10 MG/ML EMULSION: Performed by: NURSE ANESTHETIST, CERTIFIED REGISTERED

## 2019-10-22 PROCEDURE — 73560 X-RAY EXAM OF KNEE 1 OR 2: CPT

## 2019-10-22 PROCEDURE — 97162 PT EVAL MOD COMPLEX 30 MIN: CPT

## 2019-10-22 DEVICE — PALACOS® R IS A FAST-CURING, RADIOPAQUE, POLY(METHYL METHACRYLATE)-BASED BONE CEMENT.PALACOS ® R CONTAINS THE X-RAY CONTRAST MEDIUM ZIRCONIUM DIOXIDE. TO IMPROVE VISIBILITY IN THE SURGICAL FIELD PALACOS ® R HAS BEEN COLOURED WITH CHLOROPHYLL (E141). THE BONE CEMENT IS PREPARED DIRECTLY BEFORE USE BY MIXING A POLYMER POWDER COMPONENT WITH A LIQUID MONOMER COMPONENT. A DUCTILE DOUGH FORMS WHICH CURES WITHIN A FEW MINUTES.
Type: IMPLANTABLE DEVICE | Site: KNEE | Status: FUNCTIONAL
Brand: PALACOS®

## 2019-10-22 DEVICE — IMPLANTABLE DEVICE: Type: IMPLANTABLE DEVICE | Site: KNEE | Status: FUNCTIONAL

## 2019-10-22 DEVICE — JOURNEY II CR INSERT XLPE RIGHT SIZE 3-4 9MM
Type: IMPLANTABLE DEVICE | Site: KNEE | Status: FUNCTIONAL
Brand: JOURNEY

## 2019-10-22 DEVICE — JOURNEY 7.5 ROUND RESURF PAT 35MM STANDARD
Type: IMPLANTABLE DEVICE | Site: KNEE | Status: FUNCTIONAL
Brand: JOURNEY

## 2019-10-22 DEVICE — JOURNEY TIBIAL BASEPLATE NONPOROUS                                    RIGHT SIZE 4
Type: IMPLANTABLE DEVICE | Site: KNEE | Status: FUNCTIONAL
Brand: JOURNEY

## 2019-10-22 DEVICE — JOURNEY II CR FEMORAL OXINIUM                                    NONPOROUS RIGHT SIZE 6
Type: IMPLANTABLE DEVICE | Site: KNEE | Status: FUNCTIONAL
Brand: JOURNEY

## 2019-10-22 RX ORDER — ACETAMINOPHEN 160 MG/5ML
650 SOLUTION ORAL EVERY 8 HOURS
Status: DISCONTINUED | OUTPATIENT
Start: 2019-10-22 | End: 2019-10-25 | Stop reason: HOSPADM

## 2019-10-22 RX ORDER — GLYCOPYRROLATE 0.2 MG/ML
INJECTION INTRAMUSCULAR; INTRAVENOUS AS NEEDED
Status: DISCONTINUED | OUTPATIENT
Start: 2019-10-22 | End: 2019-10-22 | Stop reason: SURG

## 2019-10-22 RX ORDER — BISACODYL 10 MG
10 SUPPOSITORY, RECTAL RECTAL DAILY PRN
Status: DISCONTINUED | OUTPATIENT
Start: 2019-10-22 | End: 2019-10-25 | Stop reason: HOSPADM

## 2019-10-22 RX ORDER — ONDANSETRON 2 MG/ML
4 INJECTION INTRAMUSCULAR; INTRAVENOUS EVERY 6 HOURS PRN
Status: DISCONTINUED | OUTPATIENT
Start: 2019-10-22 | End: 2019-10-25 | Stop reason: HOSPADM

## 2019-10-22 RX ORDER — SODIUM CHLORIDE 0.9 % (FLUSH) 0.9 %
3 SYRINGE (ML) INJECTION EVERY 12 HOURS SCHEDULED
Status: DISCONTINUED | OUTPATIENT
Start: 2019-10-22 | End: 2019-10-22 | Stop reason: HOSPADM

## 2019-10-22 RX ORDER — HYDROCODONE BITARTRATE AND ACETAMINOPHEN 5; 325 MG/1; MG/1
2 TABLET ORAL EVERY 4 HOURS PRN
Status: DISCONTINUED | OUTPATIENT
Start: 2019-10-22 | End: 2019-10-25 | Stop reason: HOSPADM

## 2019-10-22 RX ORDER — OXYCODONE AND ACETAMINOPHEN 7.5; 325 MG/1; MG/1
1 TABLET ORAL ONCE AS NEEDED
Status: DISCONTINUED | OUTPATIENT
Start: 2019-10-22 | End: 2019-10-22 | Stop reason: HOSPADM

## 2019-10-22 RX ORDER — HYDROMORPHONE HYDROCHLORIDE 1 MG/ML
0.5 INJECTION, SOLUTION INTRAMUSCULAR; INTRAVENOUS; SUBCUTANEOUS
Status: DISCONTINUED | OUTPATIENT
Start: 2019-10-22 | End: 2019-10-22 | Stop reason: HOSPADM

## 2019-10-22 RX ORDER — NALOXONE HCL 0.4 MG/ML
0.2 VIAL (ML) INJECTION AS NEEDED
Status: DISCONTINUED | OUTPATIENT
Start: 2019-10-22 | End: 2019-10-22 | Stop reason: HOSPADM

## 2019-10-22 RX ORDER — DIPHENHYDRAMINE HYDROCHLORIDE 50 MG/ML
25 INJECTION INTRAMUSCULAR; INTRAVENOUS EVERY 6 HOURS PRN
Status: DISCONTINUED | OUTPATIENT
Start: 2019-10-22 | End: 2019-10-25 | Stop reason: HOSPADM

## 2019-10-22 RX ORDER — VANCOMYCIN HYDROCHLORIDE 1 G/200ML
15 INJECTION, SOLUTION INTRAVENOUS EVERY 12 HOURS
Status: DISCONTINUED | OUTPATIENT
Start: 2019-10-22 | End: 2019-10-23

## 2019-10-22 RX ORDER — MAGNESIUM HYDROXIDE 1200 MG/15ML
LIQUID ORAL AS NEEDED
Status: DISCONTINUED | OUTPATIENT
Start: 2019-10-22 | End: 2019-10-22 | Stop reason: HOSPADM

## 2019-10-22 RX ORDER — LIDOCAINE HYDROCHLORIDE 20 MG/ML
INJECTION, SOLUTION INFILTRATION; PERINEURAL AS NEEDED
Status: DISCONTINUED | OUTPATIENT
Start: 2019-10-22 | End: 2019-10-22 | Stop reason: SURG

## 2019-10-22 RX ORDER — ACETAMINOPHEN 500 MG
1000 TABLET ORAL ONCE
Status: COMPLETED | OUTPATIENT
Start: 2019-10-22 | End: 2019-10-22

## 2019-10-22 RX ORDER — HYDROMORPHONE HYDROCHLORIDE 1 MG/ML
1 INJECTION, SOLUTION INTRAMUSCULAR; INTRAVENOUS; SUBCUTANEOUS EVERY 4 HOURS PRN
Status: DISCONTINUED | OUTPATIENT
Start: 2019-10-22 | End: 2019-10-25 | Stop reason: HOSPADM

## 2019-10-22 RX ORDER — HYDROCODONE BITARTRATE AND ACETAMINOPHEN 5; 325 MG/1; MG/1
1 TABLET ORAL EVERY 4 HOURS PRN
Status: DISCONTINUED | OUTPATIENT
Start: 2019-10-22 | End: 2019-10-25 | Stop reason: HOSPADM

## 2019-10-22 RX ORDER — TRANEXAMIC ACID 100 MG/ML
INJECTION, SOLUTION INTRAVENOUS AS NEEDED
Status: DISCONTINUED | OUTPATIENT
Start: 2019-10-22 | End: 2019-10-22 | Stop reason: HOSPADM

## 2019-10-22 RX ORDER — PROMETHAZINE HYDROCHLORIDE 25 MG/ML
6.25 INJECTION, SOLUTION INTRAMUSCULAR; INTRAVENOUS
Status: DISCONTINUED | OUTPATIENT
Start: 2019-10-22 | End: 2019-10-22 | Stop reason: HOSPADM

## 2019-10-22 RX ORDER — SODIUM CHLORIDE 0.9 % (FLUSH) 0.9 %
3-10 SYRINGE (ML) INJECTION AS NEEDED
Status: DISCONTINUED | OUTPATIENT
Start: 2019-10-22 | End: 2019-10-22 | Stop reason: HOSPADM

## 2019-10-22 RX ORDER — FENTANYL CITRATE 50 UG/ML
50 INJECTION, SOLUTION INTRAMUSCULAR; INTRAVENOUS
Status: DISCONTINUED | OUTPATIENT
Start: 2019-10-22 | End: 2019-10-22 | Stop reason: HOSPADM

## 2019-10-22 RX ORDER — LIDOCAINE HYDROCHLORIDE 40 MG/ML
SOLUTION TOPICAL AS NEEDED
Status: DISCONTINUED | OUTPATIENT
Start: 2019-10-22 | End: 2019-10-22 | Stop reason: SURG

## 2019-10-22 RX ORDER — TRANEXAMIC ACID 100 MG/ML
1000 INJECTION, SOLUTION INTRAVENOUS ONCE
Status: COMPLETED | OUTPATIENT
Start: 2019-10-22 | End: 2019-10-22

## 2019-10-22 RX ORDER — PROMETHAZINE HYDROCHLORIDE 25 MG/1
25 SUPPOSITORY RECTAL ONCE AS NEEDED
Status: DISCONTINUED | OUTPATIENT
Start: 2019-10-22 | End: 2019-10-22 | Stop reason: HOSPADM

## 2019-10-22 RX ORDER — NALOXONE HCL 0.4 MG/ML
0.1 VIAL (ML) INJECTION
Status: DISCONTINUED | OUTPATIENT
Start: 2019-10-22 | End: 2019-10-25 | Stop reason: HOSPADM

## 2019-10-22 RX ORDER — FAMOTIDINE 40 MG/1
40 TABLET, FILM COATED ORAL DAILY
Status: DISCONTINUED | OUTPATIENT
Start: 2019-10-22 | End: 2019-10-25 | Stop reason: HOSPADM

## 2019-10-22 RX ORDER — SODIUM CHLORIDE, SODIUM LACTATE, POTASSIUM CHLORIDE, CALCIUM CHLORIDE 600; 310; 30; 20 MG/100ML; MG/100ML; MG/100ML; MG/100ML
INJECTION, SOLUTION INTRAVENOUS CONTINUOUS PRN
Status: DISCONTINUED | OUTPATIENT
Start: 2019-10-22 | End: 2019-10-22 | Stop reason: SURG

## 2019-10-22 RX ORDER — DIPHENHYDRAMINE HCL 25 MG
25 CAPSULE ORAL
Status: DISCONTINUED | OUTPATIENT
Start: 2019-10-22 | End: 2019-10-22 | Stop reason: HOSPADM

## 2019-10-22 RX ORDER — OXYCODONE HYDROCHLORIDE 5 MG/1
5 TABLET ORAL ONCE
Status: COMPLETED | OUTPATIENT
Start: 2019-10-22 | End: 2019-10-22

## 2019-10-22 RX ORDER — FAMOTIDINE 10 MG/ML
20 INJECTION, SOLUTION INTRAVENOUS ONCE
Status: COMPLETED | OUTPATIENT
Start: 2019-10-22 | End: 2019-10-22

## 2019-10-22 RX ORDER — ONDANSETRON 4 MG/1
4 TABLET, FILM COATED ORAL EVERY 6 HOURS PRN
Status: DISCONTINUED | OUTPATIENT
Start: 2019-10-22 | End: 2019-10-25 | Stop reason: HOSPADM

## 2019-10-22 RX ORDER — PROMETHAZINE HYDROCHLORIDE 25 MG/1
25 TABLET ORAL ONCE AS NEEDED
Status: DISCONTINUED | OUTPATIENT
Start: 2019-10-22 | End: 2019-10-22 | Stop reason: HOSPADM

## 2019-10-22 RX ORDER — DOCUSATE SODIUM 100 MG/1
100 CAPSULE, LIQUID FILLED ORAL 2 TIMES DAILY PRN
Status: DISCONTINUED | OUTPATIENT
Start: 2019-10-22 | End: 2019-10-25 | Stop reason: HOSPADM

## 2019-10-22 RX ORDER — SODIUM CHLORIDE 9 MG/ML
100 INJECTION, SOLUTION INTRAVENOUS CONTINUOUS
Status: DISCONTINUED | OUTPATIENT
Start: 2019-10-22 | End: 2019-10-22

## 2019-10-22 RX ORDER — HYDRALAZINE HYDROCHLORIDE 20 MG/ML
5 INJECTION INTRAMUSCULAR; INTRAVENOUS
Status: DISCONTINUED | OUTPATIENT
Start: 2019-10-22 | End: 2019-10-22 | Stop reason: HOSPADM

## 2019-10-22 RX ORDER — VANCOMYCIN HYDROCHLORIDE 1 G/200ML
1000 INJECTION, SOLUTION INTRAVENOUS ONCE
Status: COMPLETED | OUTPATIENT
Start: 2019-10-22 | End: 2019-10-22

## 2019-10-22 RX ORDER — HYDROCODONE BITARTRATE AND ACETAMINOPHEN 5; 325 MG/1; MG/1
1 TABLET ORAL EVERY 4 HOURS PRN
Qty: 40 TABLET | Refills: 0 | Status: SHIPPED | OUTPATIENT
Start: 2019-10-22 | End: 2019-11-01

## 2019-10-22 RX ORDER — ASPIRIN 325 MG
325 TABLET ORAL AS NEEDED
Status: ON HOLD | COMMUNITY
End: 2019-10-23 | Stop reason: SDUPTHER

## 2019-10-22 RX ORDER — SENNA AND DOCUSATE SODIUM 50; 8.6 MG/1; MG/1
2 TABLET, FILM COATED ORAL NIGHTLY
Status: DISCONTINUED | OUTPATIENT
Start: 2019-10-22 | End: 2019-10-25 | Stop reason: HOSPADM

## 2019-10-22 RX ORDER — MIDAZOLAM HYDROCHLORIDE 1 MG/ML
1 INJECTION INTRAMUSCULAR; INTRAVENOUS
Status: DISCONTINUED | OUTPATIENT
Start: 2019-10-22 | End: 2019-10-22 | Stop reason: HOSPADM

## 2019-10-22 RX ORDER — ACETAMINOPHEN 325 MG/1
650 TABLET ORAL ONCE AS NEEDED
Status: DISCONTINUED | OUTPATIENT
Start: 2019-10-22 | End: 2019-10-22 | Stop reason: HOSPADM

## 2019-10-22 RX ORDER — DIPHENHYDRAMINE HYDROCHLORIDE 50 MG/ML
12.5 INJECTION INTRAMUSCULAR; INTRAVENOUS
Status: DISCONTINUED | OUTPATIENT
Start: 2019-10-22 | End: 2019-10-22 | Stop reason: HOSPADM

## 2019-10-22 RX ORDER — LIDOCAINE HYDROCHLORIDE 10 MG/ML
0.5 INJECTION, SOLUTION EPIDURAL; INFILTRATION; INTRACAUDAL; PERINEURAL ONCE AS NEEDED
Status: DISCONTINUED | OUTPATIENT
Start: 2019-10-22 | End: 2019-10-22 | Stop reason: HOSPADM

## 2019-10-22 RX ORDER — PROPOFOL 10 MG/ML
VIAL (ML) INTRAVENOUS AS NEEDED
Status: DISCONTINUED | OUTPATIENT
Start: 2019-10-22 | End: 2019-10-22 | Stop reason: SURG

## 2019-10-22 RX ORDER — DEXAMETHASONE SODIUM PHOSPHATE 10 MG/ML
INJECTION INTRAMUSCULAR; INTRAVENOUS AS NEEDED
Status: DISCONTINUED | OUTPATIENT
Start: 2019-10-22 | End: 2019-10-22 | Stop reason: SURG

## 2019-10-22 RX ORDER — EPHEDRINE SULFATE 50 MG/ML
5 INJECTION, SOLUTION INTRAVENOUS ONCE AS NEEDED
Status: DISCONTINUED | OUTPATIENT
Start: 2019-10-22 | End: 2019-10-22 | Stop reason: HOSPADM

## 2019-10-22 RX ORDER — ONDANSETRON 2 MG/ML
4 INJECTION INTRAMUSCULAR; INTRAVENOUS ONCE AS NEEDED
Status: DISCONTINUED | OUTPATIENT
Start: 2019-10-22 | End: 2019-10-22 | Stop reason: HOSPADM

## 2019-10-22 RX ORDER — PROMETHAZINE HYDROCHLORIDE 25 MG/ML
12.5 INJECTION, SOLUTION INTRAMUSCULAR; INTRAVENOUS ONCE AS NEEDED
Status: DISCONTINUED | OUTPATIENT
Start: 2019-10-22 | End: 2019-10-22 | Stop reason: HOSPADM

## 2019-10-22 RX ORDER — FLUMAZENIL 0.1 MG/ML
0.2 INJECTION INTRAVENOUS AS NEEDED
Status: DISCONTINUED | OUTPATIENT
Start: 2019-10-22 | End: 2019-10-22 | Stop reason: HOSPADM

## 2019-10-22 RX ORDER — ESCITALOPRAM OXALATE 10 MG/1
10 TABLET ORAL DAILY
Status: DISCONTINUED | OUTPATIENT
Start: 2019-10-22 | End: 2019-10-25 | Stop reason: HOSPADM

## 2019-10-22 RX ORDER — DIPHENHYDRAMINE HCL 25 MG
50 CAPSULE ORAL EVERY 6 HOURS PRN
Status: DISCONTINUED | OUTPATIENT
Start: 2019-10-22 | End: 2019-10-25 | Stop reason: HOSPADM

## 2019-10-22 RX ORDER — ONDANSETRON 2 MG/ML
INJECTION INTRAMUSCULAR; INTRAVENOUS AS NEEDED
Status: DISCONTINUED | OUTPATIENT
Start: 2019-10-22 | End: 2019-10-22 | Stop reason: SURG

## 2019-10-22 RX ORDER — HYDROCODONE BITARTRATE AND ACETAMINOPHEN 7.5; 325 MG/1; MG/1
1 TABLET ORAL ONCE AS NEEDED
Status: DISCONTINUED | OUTPATIENT
Start: 2019-10-22 | End: 2019-10-22 | Stop reason: HOSPADM

## 2019-10-22 RX ORDER — MIDAZOLAM HYDROCHLORIDE 1 MG/ML
INJECTION INTRAMUSCULAR; INTRAVENOUS AS NEEDED
Status: DISCONTINUED | OUTPATIENT
Start: 2019-10-22 | End: 2019-10-22 | Stop reason: SURG

## 2019-10-22 RX ORDER — WOUND DRESSING ADHESIVE - LIQUID
LIQUID MISCELLANEOUS AS NEEDED
Status: DISCONTINUED | OUTPATIENT
Start: 2019-10-22 | End: 2019-10-22 | Stop reason: HOSPADM

## 2019-10-22 RX ORDER — EPHEDRINE SULFATE 50 MG/ML
INJECTION, SOLUTION INTRAVENOUS AS NEEDED
Status: DISCONTINUED | OUTPATIENT
Start: 2019-10-22 | End: 2019-10-22 | Stop reason: SURG

## 2019-10-22 RX ORDER — UREA 10 %
1 LOTION (ML) TOPICAL NIGHTLY PRN
Status: DISCONTINUED | OUTPATIENT
Start: 2019-10-22 | End: 2019-10-25 | Stop reason: HOSPADM

## 2019-10-22 RX ORDER — ACETAMINOPHEN 650 MG/1
650 SUPPOSITORY RECTAL EVERY 8 HOURS
Status: DISCONTINUED | OUTPATIENT
Start: 2019-10-22 | End: 2019-10-25 | Stop reason: HOSPADM

## 2019-10-22 RX ORDER — ASPIRIN 325 MG
325 TABLET, DELAYED RELEASE (ENTERIC COATED) ORAL DAILY
Status: DISCONTINUED | OUTPATIENT
Start: 2019-10-23 | End: 2019-10-25 | Stop reason: HOSPADM

## 2019-10-22 RX ORDER — ACETAMINOPHEN 500 MG
1000 TABLET ORAL EVERY 8 HOURS
Status: DISCONTINUED | OUTPATIENT
Start: 2019-10-22 | End: 2019-10-25 | Stop reason: HOSPADM

## 2019-10-22 RX ORDER — ROCURONIUM BROMIDE 10 MG/ML
INJECTION, SOLUTION INTRAVENOUS AS NEEDED
Status: DISCONTINUED | OUTPATIENT
Start: 2019-10-22 | End: 2019-10-22 | Stop reason: SURG

## 2019-10-22 RX ORDER — LABETALOL HYDROCHLORIDE 5 MG/ML
5 INJECTION, SOLUTION INTRAVENOUS
Status: DISCONTINUED | OUTPATIENT
Start: 2019-10-22 | End: 2019-10-22 | Stop reason: HOSPADM

## 2019-10-22 RX ORDER — SODIUM CHLORIDE, SODIUM LACTATE, POTASSIUM CHLORIDE, CALCIUM CHLORIDE 600; 310; 30; 20 MG/100ML; MG/100ML; MG/100ML; MG/100ML
9 INJECTION, SOLUTION INTRAVENOUS CONTINUOUS
Status: DISCONTINUED | OUTPATIENT
Start: 2019-10-22 | End: 2019-10-22

## 2019-10-22 RX ADMIN — LIDOCAINE HYDROCHLORIDE 80 MG: 20 INJECTION, SOLUTION INFILTRATION; PERINEURAL at 10:02

## 2019-10-22 RX ADMIN — ACETAMINOPHEN 1000 MG: 500 TABLET, FILM COATED ORAL at 09:33

## 2019-10-22 RX ADMIN — HYDROCODONE BITARTRATE AND ACETAMINOPHEN 1 TABLET: 5; 325 TABLET ORAL at 15:12

## 2019-10-22 RX ADMIN — ESCITALOPRAM 10 MG: 10 TABLET, FILM COATED ORAL at 15:12

## 2019-10-22 RX ADMIN — GLYCOPYRROLATE 0.4 MG: 0.2 INJECTION INTRAMUSCULAR; INTRAVENOUS at 11:09

## 2019-10-22 RX ADMIN — VANCOMYCIN HYDROCHLORIDE 1000 MG: 1 INJECTION, SOLUTION INTRAVENOUS at 21:05

## 2019-10-22 RX ADMIN — FENTANYL CITRATE 50 MCG: 50 INJECTION INTRAMUSCULAR; INTRAVENOUS at 10:19

## 2019-10-22 RX ADMIN — VANCOMYCIN HYDROCHLORIDE 1000 MG: 1 INJECTION, SOLUTION INTRAVENOUS at 09:33

## 2019-10-22 RX ADMIN — SENNOSIDES AND DOCUSATE SODIUM 2 TABLET: 8.6; 5 TABLET ORAL at 21:05

## 2019-10-22 RX ADMIN — EPHEDRINE SULFATE 10 MG: 50 INJECTION INTRAMUSCULAR; INTRAVENOUS; SUBCUTANEOUS at 10:58

## 2019-10-22 RX ADMIN — DEXAMETHASONE SODIUM PHOSPHATE 8 MG: 10 INJECTION INTRAMUSCULAR; INTRAVENOUS at 10:09

## 2019-10-22 RX ADMIN — SODIUM CHLORIDE, POTASSIUM CHLORIDE, SODIUM LACTATE AND CALCIUM CHLORIDE 9 ML/HR: 600; 310; 30; 20 INJECTION, SOLUTION INTRAVENOUS at 09:33

## 2019-10-22 RX ADMIN — FENTANYL CITRATE 50 MCG: 50 INJECTION INTRAMUSCULAR; INTRAVENOUS at 10:14

## 2019-10-22 RX ADMIN — TRANEXAMIC ACID 1000 MG: 100 INJECTION, SOLUTION INTRAVENOUS at 10:06

## 2019-10-22 RX ADMIN — FAMOTIDINE 20 MG: 10 INJECTION INTRAVENOUS at 09:33

## 2019-10-22 RX ADMIN — HYDROMORPHONE HYDROCHLORIDE 0.5 MG: 1 INJECTION, SOLUTION INTRAMUSCULAR; INTRAVENOUS; SUBCUTANEOUS at 12:20

## 2019-10-22 RX ADMIN — SODIUM CHLORIDE 100 ML/HR: 9 INJECTION, SOLUTION INTRAVENOUS at 15:13

## 2019-10-22 RX ADMIN — LIDOCAINE HYDROCHLORIDE 1 EACH: 40 SOLUTION TOPICAL at 10:05

## 2019-10-22 RX ADMIN — HYDROMORPHONE HYDROCHLORIDE 0.5 MG: 1 INJECTION, SOLUTION INTRAMUSCULAR; INTRAVENOUS; SUBCUTANEOUS at 11:55

## 2019-10-22 RX ADMIN — SODIUM CHLORIDE, POTASSIUM CHLORIDE, SODIUM LACTATE AND CALCIUM CHLORIDE: 600; 310; 30; 20 INJECTION, SOLUTION INTRAVENOUS at 11:16

## 2019-10-22 RX ADMIN — HYDROCODONE BITARTRATE AND ACETAMINOPHEN 1 TABLET: 5; 325 TABLET ORAL at 22:31

## 2019-10-22 RX ADMIN — SODIUM CHLORIDE, POTASSIUM CHLORIDE, SODIUM LACTATE AND CALCIUM CHLORIDE: 600; 310; 30; 20 INJECTION, SOLUTION INTRAVENOUS at 08:24

## 2019-10-22 RX ADMIN — NEOSTIGMINE METHYLSULFATE 3 MG: 1 INJECTION INTRAMUSCULAR; INTRAVENOUS; SUBCUTANEOUS at 11:09

## 2019-10-22 RX ADMIN — ONDANSETRON 4 MG: 2 INJECTION INTRAMUSCULAR; INTRAVENOUS at 11:09

## 2019-10-22 RX ADMIN — OXYCODONE HYDROCHLORIDE 5 MG: 5 TABLET ORAL at 09:32

## 2019-10-22 RX ADMIN — PROPOFOL 150 MG: 10 INJECTION, EMULSION INTRAVENOUS at 10:02

## 2019-10-22 RX ADMIN — Medication 2 MG: at 09:54

## 2019-10-22 RX ADMIN — ROCURONIUM BROMIDE 30 MG: 10 INJECTION INTRAVENOUS at 10:02

## 2019-10-22 RX ADMIN — GLYCOPYRROLATE 0.2 MG: 0.2 INJECTION INTRAMUSCULAR; INTRAVENOUS at 10:34

## 2019-10-22 NOTE — PLAN OF CARE
Problem: Patient Care Overview  Goal: Plan of Care Review  Outcome: Ongoing (interventions implemented as appropriate)   10/22/19 5019   Coping/Psychosocial   Plan of Care Reviewed With patient   OTHER   Outcome Summary Pt presents s/p R TKA POD 0 and is WBAT. This visit she walked in room, performed toilet transfer and exercises and mild complaints of pain. PLOF is independent, lives with  and has no stairs to enter and has a walker at home already. Plan is d/c home with assist/HH PT.

## 2019-10-22 NOTE — ANESTHESIA PREPROCEDURE EVALUATION
Anesthesia Evaluation     Patient summary reviewed and Nursing notes reviewed   no history of anesthetic complications:  NPO Solid Status: > 8 hours  NPO Liquid Status: > 2 hours           Airway   Mallampati: II  TM distance: >3 FB  Neck ROM: full  no difficulty expected  Dental - normal exam     Pulmonary - normal exam   (+) sleep apnea on CPAP,   (-) COPD, asthma, not a smoker, lung cancer  Cardiovascular - normal exam  Exercise tolerance: good (4-7 METS)    ECG reviewed  Rhythm: regular  Rate: normal    (+) hypertension well controlled less than 2 medications, hyperlipidemia,   (-) valvular problems/murmurs, past MI, CAD, dysrhythmias, angina, CHF, cardiac stents, CABG, pericardial effusion      Neuro/Psych  (+) headaches, dizziness/light headedness, numbness, psychiatric history ADD and ADHD,     GI/Hepatic/Renal/Endo    (+)   renal disease stones,     Musculoskeletal     Abdominal  - normal exam   Substance History - negative use     OB/GYN negative ob/gyn ROS         Other   (+) arthritis                     Anesthesia Plan    ASA 2     general     intravenous induction   Anesthetic plan, all risks, benefits, and alternatives have been provided, discussed and informed consent has been obtained with: patient.    Plan discussed with CRNA and attending.

## 2019-10-22 NOTE — THERAPY TREATMENT NOTE
Patient Name: Chris Cox  : 1945    MRN: [id number]                              Today's Date: 10/22/2019       Admit Date: 10/22/2019    Visit Dx: No diagnosis found.  Patient Active Problem List   Diagnosis   • Abdominal bruit   • Blues   • DD (diverticular disease)   • Menopausal and perimenopausal disorder   • Neuralgia neuritis, sciatic nerve   • Right knee pain   • Primary osteoarthritis of both knees   • Radiculopathy with lower extremity symptoms   • Idiopathic peripheral neuropathy   • Sprain of collateral ligament of right knee   • ADD (attention deficit disorder)   • Functional diarrhea   • Microscopic hematuria   • Chronic pain of left knee   • Bleeding diathesis (CMS/HCC)   • Bilateral lower extremity edema   • Bilateral swelling of feet and ankles   • Hyperglycemia   • Venous stasis   • Attention deficit disorder (ADD) without hyperactivity   • Lesion of skin of scalp   • Pre-operative clearance   • Total knee replacement status     Past Medical History:   Diagnosis Date   • Abdominal pain    • ADHD    • Arthritis    • Bronchitis 2019   • Disease of thyroid gland    • Fractures     MULTIPLE BONE FRACTURES-TOOK FOSAMAX 3.5 YRS   • Hyperlipidemia    • Hypertension    • Irregular heart beat    • Kidney stone    • Migraines    • Mono exposure     AGE 35   • Obstructive sleep apnea     20 YRS AGO-NOT CURRENTLY   • Osteoporosis    • Seizures (CMS/HCC)     As a baby   • Tuberculosis     As a child   • Vertigo      Past Surgical History:   Procedure Laterality Date   • BREAST SURGERY      Benign fibroadnoma removed from the left breast   • HAND SURGERY Bilateral 2012    RECONSTRUCTION ON BOTH HANDS   • LITHOTRIPSY      renal   • WRIST SURGERY       General Information     Row Name 10/22/19 1547          PT Evaluation Time/Intention    Document Type  evaluation  -CS     Mode of Treatment  physical therapy  -CS     Row Name 10/22/19 1549          General Information    Patient Profile Reviewed?   yes  -CS     Prior Level of Function  independent:;all household mobility;community mobility  -CS     Existing Precautions/Restrictions  fall  -CS     Row Name 10/22/19 1547          Relationship/Environment    Lives With  spouse  -CS     Row Name 10/22/19 1547          Resource/Environmental Concerns    Current Living Arrangements  home/apartment/condo  -CS     Row Name 10/22/19 1547          Home Main Entrance    Number of Stairs, Main Entrance  none  -CS     Row Name 10/22/19 1547          Cognitive Assessment/Intervention- PT/OT    Orientation Status (Cognition)  oriented x 3  -CS     Row Name 10/22/19 1547          Safety Issues, Functional Mobility    Safety Issues Affecting Function (Mobility)  insight into deficits/self awareness  -CS     Impairments Affecting Function (Mobility)  pain  -CS       User Key  (r) = Recorded By, (t) = Taken By, (c) = Cosigned By    Initials Name Provider Type    CS Raghavendra Erickson, PT Physical Therapist        Mobility     Row Name 10/22/19 1547          Bed Mobility Assessment/Treatment    Bed Mobility Assessment/Treatment  supine-sit;sit-supine  -CS     Supine-Sit [place] (Bed Mobility)  minimum assist (75% patient effort)  -CS     Assistive Device (Bed Mobility)  bed rails;head of bed elevated  -CS     Row Name 10/22/19 1547          Bed-Chair Transfer    Bed-Chair [place] (Transfers)  minimum assist (75% patient effort)  -CS     Assistive Device (Bed-Chair Transfers)  walker, front-wheeled  -CS     Row Name 10/22/19 1547          Sit-Stand Transfer    Sit-Stand [place] (Transfers)  minimum assist (75% patient effort)  -CS     Assistive Device (Sit-Stand Transfers)  walker, front-wheeled  -CS     Row Name 10/22/19 1547          Gait/Stairs Assessment/Training    Gait/Stairs Assessment/Training  gait/ambulation assistive device  -CS     [place] Level (Gait)  minimum assist (75% patient effort)  -CS     Assistive Device (Gait)  walker, front-wheeled  -CS      Distance in Feet (Gait)  12  -CS     Deviations/Abnormal Patterns (Gait)  gait speed decreased;antalgic  -CS     Row Name 10/22/19 1547          Mobility Assessment/Intervention    Extremity Weight-bearing Status  right lower extremity  -CS     Right Lower Extremity (Weight-bearing Status)  weight-bearing as tolerated (WBAT)  -CS       User Key  (r) = Recorded By, (t) = Taken By, (c) = Cosigned By    Initials Name Provider Type    Raghavendra oMntalvo, PT Physical Therapist        Obj/Interventions     Row Name 10/22/19 1548          General ROM    GENERAL ROM COMMENTS  10-95  -CS     Row Name 10/22/19 1548          MMT (Manual Muscle Testing)    General MMT Comments  >3/5  -CS     Row Name 10/22/19 1548          Therapeutic Exercise    Comment (Therapeutic Exercise)  R TKA protocol x10  -CS       User Key  (r) = Recorded By, (t) = Taken By, (c) = Cosigned By    Initials Name Provider Type    Raghavendra Montalvo, PT Physical Therapist        Goals/Plan     Row Name 10/22/19 1549          Bed Mobility Goal 1 (PT)    Activity/Assistive Device (Bed Mobility Goal 1, PT)  sit to supine;supine to sit  -CS     [place] Level/Cues Needed (Bed Mobility Goal 1, PT)  conditional independence  -CS     Time Frame (Bed Mobility Goal 1, PT)  1 week  -CS     Row Name 10/22/19 1549          Transfer Goal 1 (PT)    Activity/Assistive Device (Transfer Goal 1, PT)  sit-to-stand/stand-to-sit;bed-to-chair/chair-to-bed  -CS     [place] Level/Cues Needed (Transfer Goal 1, PT)  conditional independence  -CS     Time Frame (Transfer Goal 1, PT)  1 week  -CS     Row Name 10/22/19 1549          Gait Training Goal 1 (PT)    Activity/Assistive Device (Gait Training Goal 1, PT)  assistive device use  -CS     [place] Level (Gait Training Goal 1, PT)  conditional independence  -CS     Distance (Gait Goal 1, PT)  150  -CS     Time Frame (Gait Training Goal 1, PT)  1 week  -CS       User Key  (r) = Recorded By, (t) = Taken By, (c) =  Cosigned By    Initials Name Provider Type    Raghavendra Montalvo, PT Physical Therapist        Clinical Impression     Row Name 10/22/19 1548          Pain Assessment    Additional Documentation  Pain Scale: FACES Pre/Post-Treatment (Group)  -CS     Row Name 10/22/19 1548          Pain Scale: Numbers Pre/Post-Treatment    Pain Location - Side  Right  -CS     Pain Location - Orientation  incisional  -CS     Pain Location  knee  -CS     Pain Intervention(s)  Repositioned;Ambulation/increased activity  -CS     Row Name 10/22/19 1548          Pain Scale: FACES Pre/Post-Treatment    Pain: FACES Scale, Pretreatment  2-->hurts little bit  -CS     Pain: FACES Scale, Post-Treatment  4-->hurts little more  -CS     Row Name 10/22/19 1548          Plan of Care Review    Plan of Care Reviewed With  patient  -CS     Row Name 10/22/19 1548          Physical Therapy Clinical Impression    Patient/Family Goals Statement (PT Clinical Impression)  home  -CS     Criteria for Skilled Interventions Met (PT Clinical Impression)  yes  -CS     Rehab Potential (PT Clinical Summary)  good, to achieve stated therapy goals  -CS     Row Name 10/22/19 1548          Vital Signs    O2 Delivery Pre Treatment  supplemental O2  -CS     O2 Delivery Intra Treatment  room air  -CS     O2 Delivery Post Treatment  supplemental O2  -CS     Pre Patient Position  Supine  -CS     Intra Patient Position  Standing  -CS     Post Patient Position  Sitting  -CS     Row Name 10/22/19 1548          Positioning and Restraints    Pre-Treatment Position  in bed  -CS     Post Treatment Position  chair  -CS     In Chair  reclined;call light within reach;encouraged to call for assist;with family/caregiver;exit alarm on  -CS       User Key  (r) = Recorded By, (t) = Taken By, (c) = Cosigned By    Initials Name Provider Type    Raghavendra Montalvo, PT Physical Therapist        Outcome Measures     Row Name 10/22/19 1952          How much help from another person do you  currently need...    Turning from your back to your side while in flat bed without using bedrails?  3  -CS     Moving from lying on back to sitting on the side of a flat bed without bedrails?  3  -CS     Moving to and from a bed to a chair (including a wheelchair)?  3  -CS     Standing up from a chair using your arms (e.g., wheelchair, bedside chair)?  3  -CS     Climbing 3-5 steps with a railing?  3  -CS     To walk in hospital room?  3  -CS     AM-PAC 6 Clicks Score (PT)  18  -CS     Row Name 10/22/19 1552          Functional Assessment    Outcome Measure Options  AM-PAC 6 Clicks Basic Mobility (PT)  -CS       User Key  (r) = Recorded By, (t) = Taken By, (c) = Cosigned By    Initials Name Provider Type    Raghavendra Montalvo, PT Physical Therapist        Physical Therapy Education     Title: PT OT SLP Therapies (Done)     Topic: Physical Therapy (Done)     Point: Mobility training (Done)     Learning Progress Summary           Patient Acceptance, E,TB, VU,NR by  at 10/22/2019  3:50 PM                   Point: Home exercise program (Done)     Learning Progress Summary           Patient Acceptance, E,TB, VU,NR by  at 10/22/2019  3:50 PM                   Point: Body mechanics (Done)     Learning Progress Summary           Patient Acceptance, E,TB, VU,NR by  at 10/22/2019  3:50 PM                   Point: Precautions (Done)     Learning Progress Summary           Patient Acceptance, E,TB, VU,NR by  at 10/22/2019  3:50 PM                               User Key     Initials Effective Dates Name Provider Type Discipline     05/14/18 -  Raghavendra Erickson, PT Physical Therapist PT              PT Recommendation and Plan  Planned Therapy Interventions (PT Eval): balance training, bed mobility training, gait training, transfer training, home exercise program  Outcome Summary/Treatment Plan (PT)  Anticipated Discharge Disposition (PT): home with assist, home with home health  Plan of Care Reviewed With:  patient  Outcome Summary: Pt presents s/p R TKA POD 0 and is WBAT. This visit she walked in room, performed toilet transfer and exercises and mild complaints of pain. PLOF is independent, lives with  and has no stairs to enter and has a walker at home already. Plan is d/c home with assist/HH PT.      Time Calculation:   PT Charges     Row Name 10/22/19 1552             Time Calculation    Start Time  1533  -CS      Stop Time  1552  -CS      Time Calculation (min)  19 min  -CS      PT Received On  10/22/19  -      PT - Next Appointment  10/23/19  -      PT Goal Re-Cert Due Date  10/29/19  -        User Key  (r) = Recorded By, (t) = Taken By, (c) = Cosigned By    Initials Name Provider Type    CS Raghavendra Erickson, PT Physical Therapist        Therapy Charges for Today     Code Description Service Date Service Provider Modifiers Qty    [id number]  PT EVAL MOD COMPLEXITY 2 10/22/2019 Raghavendra Erickson, PT GP 1    [id number]  PT THER PROC EA 15 MIN 10/22/2019 Raghavendra Erickson, PT GP 1    [id number]  PT THER SUPP EA 15 MIN 10/22/2019 Raghavendra Erickson, PT GP 1          PT G-Codes  Outcome Measure Options: AM-PAC 6 Clicks Basic Mobility (PT)  AM-PAC 6 Clicks Score (PT): 18    Raghavendra Erickson PT  10/22/2019

## 2019-10-22 NOTE — DISCHARGE INSTRUCTIONS
Total Knee  Discharge Instructions  Dr. MONALISA Pina” Berg[place] II  [phone]    • INCISION CARE  o Wash your hands prior to dressing changes  o SAIMA Wound VAC: Postoperatively you had a SAIMA Wound Vac placed on the incision. This was placed under sterile conditions in the operating room. It remains in place for 7 days postoperatively. After 7 days, the entire dressing must be removed, including all of the sticky adhesive. The dressing and battery pack provide gentle suction to the incision and provide several benefits over a traditional dressing:  - It maintains the sterile environment of the OR and reduces the risk of infection  - The suction removes unwanted buildup of blood/hematoma under the skin to reduce swelling  - The suction also promotes fresh blood supply to the skin and soft tissue to speed up healing  - The postoperative scar is reduced in size  - Showering is permitted immediately after surgery, but the battery pack must be protected or removed during the shower.   o After 7 days the SAIMA Wound Vac is removed. If there is no drainage, no dressing is required. If there is some scant drainage a dry bandage can be applied and changed daily until seen in the office or until the drainage stops.   o No creams or ointments to the incisions until 4 weeks post op.  o Do not touch or pick at the incision  o Check incision every day and notify surgeon immediately if any of the following signs or symptoms are seen:  - Increase in redness  - Increase in swelling around the incision and of the entire extremity  - Increase in pain  - NEW drainage or oozing from the incision  - Pulling apart of the edges of the incision  - Increase in overall body temperature (greater than 100.4 degrees)  o Zip-Line: your incision was closed with a state of the art device.   - Is a non-invasive and easy to use wound closure device that replaces sutures, staples and glue for surgical incisions  - It minimizes scarring and eliminating  “railroad” marks that come with staples or sutures  - It makes removal as atraumatic as peeling off a bandage  - Can be removed at home or by a physical therapist or nurse at 10-14 days postoperatively    • ACTIVITIES  o Exercises:  - Physical therapy will begin immediately while in the hospital. Patients going to a nursing home will get therapy as part of their care at the SNU/SNF facility. Patients going home may also have a therapist come to the house to help them mobilize until they can safely get to an outpatient therapy facility.  - Elevate the affected leg most of the day during the first week post operatively. Caution must be taken to avoid pillow placement directly under the heel of the leg, as this can cause pressure ulcers even with a soft pillow. All pillows and blankets should be placed underneath of the thigh and calf so that the heel is free-floating.  - Use cold packs for 20-30 minutes approximately 5 times per day.  - You should perform the daily stretching and strengthening exercises as taught by the therapist as often as possible. This can be done many times a day.  - Full weight bearing is allowed after surgery. It will be sore/painful to put weight on the leg, but this will help the bone to heal and prevent complications such as pneumonia, bed sores and blood clots. Mobilization is vital to the recovery process.  o Activities of Daily Living:  - No tub baths, hot tubs, or swimming pools for 4 weeks.  - May shower and let water run over the incision immediately after surgery. The battery pack of the SAIMA Wound Vac must be protected or removed while in the shower. After the SAIMA is removed 7 days after surgery showering is permitted as long as there is no drainage from the wound.     • Restrictions  o Weight: It is ok to allow full weight bearing after surgery. Weight on the leg actually quickens the recovery process. While it will be sore/painful to put weight on the leg, it is safe to do so. Hip  replacement after hip fracture has a much slower recover process. It can take months to heal fully from a hip fracture and patients even make some slow benefits up to a year afterwards.   o Driving: Many patients have questions about when it is safe to return to driving. The answer is that this is extremely variable. It depends on the extent of the surgery, as well as how quickly you heal. Certainly left leg surgeries make returning to driving easier while right leg surgeries require more extensive rehabilitation before driving can be safe. Until you can press down on the brake hard, and are off of all narcotics, driving is not permitted. Your surgeon cannot “clear” you to return to driving, only you can make the decision when you feel it is safe.    • Medications  o Anticoagulants: After upper extremity surgery most patients do not require an anticoagulant unless you have another injury that will be keeping you from mobilizing. Lower extremity surgery typically does require use of an anticoagulation medicine.   - IF YOU HAD LOWER EXTREMITY SURGERY AND ARE NOT DISCHARGED HOME WITH ANY ANTICOAGULANT MEDICINE YOU SHOULD TAKE ASPIRIN 325mg DAILY FOR 30 DAYS POSTOPERATIVELY.  - If you are discharged home with an anticoagulant such as Aspirin, Xarelto, Eliquis, Coumadin, or Lovenox, follow these simple instructions:   - Notify surgeon immediately if any adam bleeding is noted in the urine, stool, emesis, or from the nose or the incision. Blood in the stool will often appear as black rather than red. Blood in urine may appear as pink. Blood in emesis may appear as brown/black like coffee grounds.  - You will need to apply pressure for longer periods of time to any cuts or abrasions to stop bleeding  - Avoid alcohol while taking anticoagulants  - Most anticoagulants are to be taken for 30 days postoperatively. After this time, you may stop using them unless instructed otherwise.   - If you were already taking an  anticoagulant (commonly Aspirin, Coumadin, or Plavix) you will likely be resuming your normal dose postoperatively and will be continuing that medication at the discretion of the prescribing physician.  o Stool Softeners: You will be at greater risk of constipation after surgery due to being less mobile and the pain medications.  - Take stool softeners as needed. Over the counter Colace 100 mg 1-2 capsules twice daily can be taken.  - If stools become too loose or too frequent, please decreases the dosage or stop the stool softener.  - If constipation occurs despite use of stool softeners, you are to continue the stool softeners and add a laxative (Milk of Magnesia 1 ounce daily as needed)  - Drink plenty of fluids, and eat fruits and vegetables during your recovery time. Getting up and mobilizing will help the bowels to recover their regular function, as will weaning off of all narcotics when the pain becomes tolerable.  o Pain Medications: Utilized after surgery are narcotics. This is some general information about these medications.  - CLASSIFICATION: Pain medications are called Opioids and are narcotics  - LEGALITIES: It is illegal to share narcotics with others  - DRIVING: it is illegal to drive while under the influence of narcotics. Doing so is a DUI.  - POTENTIAL SIDE EFFECTS: nausea, vomiting, itching, dizziness, drowsiness, dry mouth, constipation, and difficulty urinating.  - POTENTIAL ADVERSE EFFECTS:  - Opioid tolerance can develop with use of pain medications and this simply means that it requires more and more of the medication to control pain. However, this is seen more in patients that use opioids for longer periods of time.  - Opioid dependence can develop with use of Opioids. People with opioid dependence will experience withdrawal symptoms upon cessation of the medication.  - Opioid addiction can develop with use of Opioids. The incidence of this is very unlikely in patients who take the  medications as ordered and stop the medications as instructed.  - Opioid overdose can be dangerous, but is unlikely when the medication is taken as ordered and stopped when ordered. It is important not to mix opioids with alcohol as this can lead to over sedation and respiratory difficulty.  - DOSAGE:  - After the initial surgical pain begins to resolve, you may begin to decrease the pain medication. By the end of a few weeks, you should be off of pain medications.  - Refills will not be given by the office during evening hours, on weekends, or after 6 weeks post-op. You are responsible for weaning off of pain medication. You can increase the time between narcotic pills, taking one every 4 then 6 then 8 hours and so on.  - To seek refills on pain medications during the initial 6-week post-operative period, you must call the office to request the refill. The office will then notify you when to  the prescription. DO NOT wait until you are out of the medication to request a refill. Prescriptions will not be filled over the weekend and depending on the schedule, it may take a couple days for the prescription to be available. Someone will have to pick the prescription in person at the office.    • FOLLOW-UP VISITS  o You will need to follow up in the office with your surgeon in 3 weeks, or as instructed elsewhere in your discharge paperwork. Please call this number [phone] to schedule this appointment. If you are going to an SNF/SNU facility, they will arrange for you to follow up in the office.  o If you have any concerns or suspected complications prior to your follow up visit, please call the office. Do not wait until your appointment time if you suspect complications. These will need to be addressed in the office promptly.      Aneesh Okeefe II, MD  Orthopaedic Surgery  [facility]

## 2019-10-22 NOTE — ANESTHESIA POSTPROCEDURE EVALUATION
Patient: Chris Cox    Procedure Summary     Date:  10/22/19 Room / Location:  [facility] OR [address] [place] [address] MAIN OR    Anesthesia Start:  0951 Anesthesia Stop:  1134    Procedure:  TOTAL KNEE ARTHROPLASTY RIGHT (Right Knee) Diagnosis:      Surgeon:  Aneesh Okeefe II, MD Provider:  Doug Diop MD    Anesthesia Type:  general ASA Status:  2          Anesthesia Type: general  Last vitals  BP   142/64 (10/22/19 1215)   Temp   36.3 °C (97.3 °F) (10/22/19 1135)   Pulse   54 (10/22/19 1215)   Resp   16 (10/22/19 1215)     SpO2   100 % (10/22/19 1215)     Post Anesthesia Care and Evaluation    Patient location during evaluation: bedside  Patient participation: complete - patient participated  Level of consciousness: awake and alert  Pain management: adequate  Airway patency: patent  Anesthetic complications: No anesthetic complications  PONV Status: controlled  Cardiovascular status: acceptable  Respiratory status: acceptable  Hydration status: acceptable

## 2019-10-22 NOTE — PLAN OF CARE
Problem: Patient Care Overview  Goal: Plan of Care Review  Outcome: Ongoing (interventions implemented as appropriate)   10/22/19 1551 10/22/19 1645   Coping/Psychosocial   Plan of Care Reviewed With patient --    OTHER   Outcome Summary --  Pt is 75 y/o female, s/p right TKA. Dressing is CDI, vss, n/v intact. Pt complains of some dizziness but was able to ambulate with PT. Pain controlled with PO meds. Will continue to monitor bp r/t history of HTN.   Plan of Care Review   Progress --  improving     Goal: Individualization and Mutuality  Outcome: Ongoing (interventions implemented as appropriate)    Goal: Discharge Needs Assessment  Outcome: Ongoing (interventions implemented as appropriate)    Goal: Interprofessional Rounds/Family Conf  Outcome: Ongoing (interventions implemented as appropriate)      Problem: Pain, Chronic (Adult)  Goal: Identify Related Risk Factors and Signs and Symptoms  Outcome: Ongoing (interventions implemented as appropriate)    Goal: Acceptable Pain/Comfort Level and Functional Ability  Outcome: Ongoing (interventions implemented as appropriate)      Problem: Fall Risk (Adult)  Goal: Identify Related Risk Factors and Signs and Symptoms  Outcome: Ongoing (interventions implemented as appropriate)    Goal: Absence of Fall  Outcome: Ongoing (interventions implemented as appropriate)      Problem: Knee Arthroplasty (Total, Partial) (Adult)  Goal: Signs and Symptoms of Listed Potential Problems Will be Absent, Minimized or Managed (Knee Arthroplasty)  Outcome: Ongoing (interventions implemented as appropriate)    Goal: Anesthesia/Sedation Recovery  Outcome: Ongoing (interventions implemented as appropriate)

## 2019-10-22 NOTE — OP NOTE
Total Knee Replacement Operative Note  Dr. MONALISA Okeefe II  [phone]    PATIENT NAME: Chris Cox  MRN: [id number]  : 1945 AGE: 74 y.o. GENDER: female  DATE OF OPERATION: 10/22/2019  PREOPERATIVE DIAGNOSIS: End Stage Arthritis  POSTOPERATIVE DIAGNOSIS: Same  OPERATION PERFORMED: Right Total Knee Arthroplasty  SURGEON: Aneesh Okeefe MD  Circulator: Brianna Tripp RN  Scrub Person: Luisa Hennessy  Vendor Representative: Abraham Pierce  Assistant: Taty Lovell PA  ANESTHESIA: General  ASSISTANT: Taty Lovell. This case would not have been possible without another set of skilled surgical hands for retraction, use of instrumentation, and general assistance.  This assistance was vital to the success of the case.   ESTIMATED BLOOD LOSS: 50cc  SPONGE AND NEEDLE COUNT: Correct  INDICATIONS:   A discussion of operative versus nonoperative treatment was had with the patient and they failed conservative management. They elected to undergo total knee arthroplasty. The risks of surgery were discussed and included the risk of anesthesia, infection, damage to neurovascular structures, implant loosening/failure, fracture, hardware prominence, continued pain, early failure, the need for further procedures, medical complications, and others. No guarantees were made. The patient wished to proceed with surgery and a surgical consent was signed.    COMPONENTS:   · Journey II CR Oxinium Femoral Component: Size 6  · Journey II Tibial Baseplate: 4   · CR Articular Insert: 9  · Patella: 35mm    PERTINENT FINDINGS: Degenerative Arthritis    DETAILS OF PROCEDURE:  The patient was met in the preoperative area. The site was marked. The consent and H&P were reviewed. The patient was then wheeled back to the operative suite and transferred to the operative table. The patient underwent anesthesia. A tourniquet was placed on the upper thigh. The Osorio baseplate was secured to the table. Surgical alcohol was used to  thoroughly clean the entire operative extremity. A bump was placed under the operative hip.     The leg was then prepped in the normal sterile fashion, which included ChloraPrep, multiple layers of sterile drapes, and surgical space suits for the entire operative team. The Osorio boot was applied to the foot after adequate padding. An Ioban dressing was applied to the knee after the surgical incision was marked. New outer gloves were used by all sterile surgical team members after final draping. After a surgical timeout in which administration of preoperative antibiotics as well as 1g of tranexamic acid (if not contraindicated) and the surgical site were confirmed, the tourniquet was inflated.     In flexion, a midline knee incision was utilized centered on the patella and ending medial to the tibial tubercle. Dissection was carried down to the knee capsule. A midvastus ararthrotomy was completed. The patellar fat pad was excised. The MCL was minimally elevated to gain adequate exposure to the knee.      The patella was subluxed laterally and then the height was measured. The patella was held vertical using 2 clamps, and was then cut using a saw. The patella was then sized, and the lug holes were drilled. Excess patellar bone was removed using a saw. The patella was then protected during this case using the metal patella shield.    The femoral canal was breached using the reamer. The canal was thoroughly irrigated. The intramedullary femoral jig was inserted. The distal cutting block was pinned in place and held with a kocher clamp. The cut was made with an oscillating saw. With all saw cuts, the soft tissues were protected with retractors. The sizing jig was placed onto the femur and set to 3 degrees of external rotation. Rotation was checked against the epicondylar axis and Whitesides line. The femur was then sized. The size matching block was placed and secured with pins. The cuts were then made with  oscillating saw in a routine fashion. All bone cuts were removed.     The tibial canal was then breached using the entry drill. The canal was thoroughly irrigated. Next the intramedullary guide was inserted down the tibial canal. The cutting jig was aligned with the medial third of the tibial tubercle. The height of the cut was measured and the cutting jig was then secured with pins. The slope and varus/valgus positioning was checked with an extramedullary jose enrique which was attached to the cutting jig.     The cut was then made using the oscillating saw, again ensuring that the retractors were in proper position to protect the collateral ligaments and the patellar tendon, as well as the neurovascular bundle and PCL posteriorly.     A lamina  was inserted into the notch with the knee in flexion and used to expose the posterior joint. Using a kocher and bovie the remaining medial and lateral menisci were removed. Excess posterior osteophytes were also removed with a osteotome, mallet, and rongeur as necessary.      Next a trial of the knee was performed using trial femoral and tibial trial components. Polyethylene trials were inserted as needed to gain appropriate stability. A drop jose enrique was once again used to assess the varus/valgus alignment of the knee and the knee was noted to be in excellent alignment. Soft tissue releases were then performed as necessary to fine-tune the balancing of the knee.  After taking the knee through one final range of motion, the tibial rotation of the trial was noted. The femoral lug holes were then drilled and the anterior femoral cut was finalized with a saw.    We next turned our attention back to the tibia to finish the tibial preparation. The tibia was measured and sized. The tibial plate was aligned with the rotation from the trialing process and verified to be positioned near the medial third of the tibial tubercle. The tibial surface was then prepared for the keel .    The  "knee was thoroughly irrigated with sterile saline using a pulse-lavage system while the final tibial baseplate, femoral component and patellar component were opened. Cement was prepared and mixed using standard techniques. Outer gloves were changed before implant handling to ensure no soft tissue or oily material was exposed to the surfaces of the final implants. The bony knee surfaces were dried and the implants were cemented in place, starting with the tibia, then the femur and finally the patella. Excess cement was removed at each step. A trial poly was utilized during cementation for compression. The tourniquet was taken down and adequate hemostasis was achieved. The knee was thoroughly irrigated once again.     The soft tissues about the knee were then injected with an anesthetic cocktail. Care was taken to avoid the peroneal nerve and the neurovascular bundle posteriorly. The cement was allowed to harden. After the cement was fully set, the knee was ranged with various thickness of polyethylene trials to achieve full extension and adequate flexion. The knee was inspected for excess cement, which was removed. The real poly, of corresponding thickness was then opened and inserted into the knee. One final range of motion and stability test showed the knee to be in good condition with a well tracking patellar component.    The knee capsule was then closed with a running barbed suture and supplemented with interrupted #1 Vicryl. 2g of tranexamic acid was then injected into the knee joint, and the knee capsule was noted to be water tight. The subcutaneous layer was closed with 2-0 Vicryl and the skin was closed with a running barbed Monocryl. A SAIMA Wound VAC was then applied    The patient was awoken from anesthesia, moved to the [facility] and taken to the recovery room in stable condition. Sponge and needle count were correct. There were no complications. Patient tolerated the procedure well.    R \"Triston\" Sweet II " MD  Orthopaedic Surgery  [facility]  [phone]

## 2019-10-23 LAB
ANION GAP SERPL CALCULATED.3IONS-SCNC: 10 MMOL/L (ref 5–15)
BUN BLD-MCNC: 11 MG/DL (ref 8–23)
BUN/CREAT SERPL: 16.4 (ref 7–25)
CALCIUM SPEC-SCNC: 8.2 MG/DL (ref 8.6–10.5)
CHLORIDE SERPL-SCNC: 104 MMOL/L (ref 98–107)
CO2 SERPL-SCNC: 27 MMOL/L (ref 22–29)
CREAT BLD-MCNC: 0.67 MG/DL (ref 0.57–1)
DEPRECATED RDW RBC AUTO: 44.3 FL (ref 37–54)
ERYTHROCYTE [DISTWIDTH] IN BLOOD BY AUTOMATED COUNT: 13.5 % (ref 12.3–15.4)
GFR SERPL CREATININE-BSD FRML MDRD: 86 ML/MIN/1.73
GLUCOSE BLD-MCNC: 133 MG/DL (ref 65–99)
HCT VFR BLD AUTO: 36.5 % (ref 34–46.6)
HGB BLD-MCNC: 12.2 G/DL (ref 12–15.9)
MCH RBC QN AUTO: 30.1 PG (ref 26.6–33)
MCHC RBC AUTO-ENTMCNC: 33.4 G/DL (ref 31.5–35.7)
MCV RBC AUTO: 90.1 FL (ref 79–97)
PLATELET # BLD AUTO: 201 10*3/MM3 (ref 140–450)
PMV BLD AUTO: 10.6 FL (ref 6–12)
POTASSIUM BLD-SCNC: 4.2 MMOL/L (ref 3.5–5.2)
RBC # BLD AUTO: 4.05 10*6/MM3 (ref 3.77–5.28)
SODIUM BLD-SCNC: 141 MMOL/L (ref 136–145)
WBC NRBC COR # BLD: 9.01 10*3/MM3 (ref 3.4–10.8)

## 2019-10-23 PROCEDURE — 80048 BASIC METABOLIC PNL TOTAL CA: CPT | Performed by: ORTHOPAEDIC SURGERY

## 2019-10-23 PROCEDURE — 97150 GROUP THERAPEUTIC PROCEDURES: CPT

## 2019-10-23 PROCEDURE — 97110 THERAPEUTIC EXERCISES: CPT

## 2019-10-23 PROCEDURE — 85027 COMPLETE CBC AUTOMATED: CPT | Performed by: ORTHOPAEDIC SURGERY

## 2019-10-23 RX ORDER — ASPIRIN 325 MG
325 TABLET ORAL DAILY
Qty: 60 TABLET | Refills: 0
Start: 2019-10-23 | End: 2019-12-05

## 2019-10-23 RX ADMIN — FAMOTIDINE 40 MG: 40 TABLET, FILM COATED ORAL at 08:02

## 2019-10-23 RX ADMIN — SENNOSIDES AND DOCUSATE SODIUM 2 TABLET: 8.6; 5 TABLET ORAL at 20:15

## 2019-10-23 RX ADMIN — HYDROCODONE BITARTRATE AND ACETAMINOPHEN 1 TABLET: 5; 325 TABLET ORAL at 11:22

## 2019-10-23 RX ADMIN — HYDROCODONE BITARTRATE AND ACETAMINOPHEN 1 TABLET: 5; 325 TABLET ORAL at 18:17

## 2019-10-23 RX ADMIN — ASPIRIN 325 MG: 325 TABLET, DELAYED RELEASE ORAL at 08:02

## 2019-10-23 RX ADMIN — ESCITALOPRAM 10 MG: 10 TABLET, FILM COATED ORAL at 08:02

## 2019-10-23 RX ADMIN — HYDROCODONE BITARTRATE AND ACETAMINOPHEN 1 TABLET: 5; 325 TABLET ORAL at 06:34

## 2019-10-23 NOTE — DISCHARGE SUMMARY
Orthopaedic Discharge Summary  Dr. SHELDON “Triston” Ovando II  [phone]    NAME: Chris Cox PCP: Raghavendra Marquez MD   :  MRN: [id number]  [id number] LOS:  ADMIT: 2 days  10/22/2019   AGE/SEX: 74 y.o. female DC:  tomorrow             · Admitting Diagnosis: Total knee replacement status [Z96.659]    · Surgery Performed: TOTAL KNEE ARTHROPLASTY RIGHT    · Discharge Medications:         Discharge Medications      New Medications      Instructions Start Date   HYDROcodone-acetaminophen 5-325 MG per tablet  Commonly known as:  NORCO   1 tablet, Oral, Every 4 Hours PRN         Changes to Medications      Instructions Start Date   aspirin 325 MG tablet  What changed:    · when to take this  · reasons to take this   325 mg, Oral, Daily, TAKES FOR PAIN.          Continue These Medications      Instructions Start Date   ascorbic acid 500 MG/5ML liquid  Commonly known as:  VITAMIN C   500 mg, Oral, Daily      BIOTIN PO   1 tablet, Oral, Every Other Day, ON HOLD      escitalopram 10 MG tablet  Commonly known as:  LEXAPRO   10 mg, Oral, Daily      EXCEDRIN MIGRAINE PO   Oral, As Needed      GABADONE capsule   1 tablet, Oral, ON HOLD FOR SURGERY      MYRBETRIQ 50 MG tablet sustained-release 24 hour 24 hr tablet  Generic drug:  Mirabegron ER   50 mg, Oral, Daily, CURRENTLY OUT OF THIS MED-TOO EXPENSIVE      PHOSPHATIDYLSERINE PO   1 tablet, Oral, Daily, ON HOLD      Pycnogenol 30 MG capsule   1 capsule, Oral, Daily, ON HOLD      vitamin A 26815 UNIT capsule   10,000 Units, Oral, Daily, ON HOLD      VITAMIN D (CHOLECALCIFEROL) PO   1 tablet, Oral, Daily      VITAMIN K PO   1 tablet, Oral, Daily, ON HOLD             · Vitals:     Vitals:    10/23/19 1445 10/23/19 1900 10/23/19 2248 10/24/19 0352   BP: 111/50 106/50 107/56 116/64   BP Location: Left arm Right arm Right arm Right arm   Patient Position: Sitting Lying Lying Lying   Pulse: 63 66 65 72   Resp: 16 16 16 16   Temp: 98.2 °F (36.8 °C) 98.6 °F (37 °C) 99.7 °F (37.6 °C)  99.4 °F (37.4 °C)   TempSrc: Oral      SpO2: 94% 95% 94% 97%   Weight:       Height:           · Labs:      Admission on 10/22/2019   Component Date Value Ref Range Status   • ABO Type 10/22/2019 A   Final   • RH type 10/22/2019 Positive   Final   • Antibody Screen 10/22/2019 Negative   Final   • T&S Expiration Date 10/22/2019 10/25/2019 11:59:59 PM   Final   • Glucose 10/23/2019 133* 65 - 99 mg/dL Final   • BUN 10/23/2019 11  8 - 23 mg/dL Final   • Creatinine 10/23/2019 0.67  0.57 - 1.00 mg/dL Final   • Sodium 10/23/2019 141  136 - 145 mmol/L Final   • Potassium 10/23/2019 4.2  3.5 - 5.2 mmol/L Final   • Chloride 10/23/2019 104  98 - 107 mmol/L Final   • CO2 10/23/2019 27.0  22.0 - 29.0 mmol/L Final   • Calcium 10/23/2019 8.2* 8.6 - 10.5 mg/dL Final   • eGFR Non  Amer 10/23/2019 86  >60 mL/min/1.73 Final   • BUN/Creatinine Ratio 10/23/2019 16.4  7.0 - 25.0 Final   • Anion Gap 10/23/2019 10.0  5.0 - 15.0 mmol/L Final   • WBC 10/23/2019 9.01  3.40 - 10.80 10*3/mm3 Final   • RBC 10/23/2019 4.05  3.77 - 5.28 10*6/mm3 Final   • Hemoglobin 10/23/2019 12.2  12.0 - 15.9 g/dL Final   • Hematocrit 10/23/2019 36.5  34.0 - 46.6 % Final   • MCV 10/23/2019 90.1  79.0 - 97.0 fL Final   • MCH 10/23/2019 30.1  26.6 - 33.0 pg Final   • MCHC 10/23/2019 33.4  31.5 - 35.7 g/dL Final   • RDW 10/23/2019 13.5  12.3 - 15.4 % Final   • RDW-SD 10/23/2019 44.3  37.0 - 54.0 fl Final   • MPV 10/23/2019 10.6  6.0 - 12.0 fL Final   • Platelets 10/23/2019 201  140 - 450 10*3/mm3 Final        No results found.    · Hospital Course:   74 y.o. female was admitted to [facility], [place], operated by [facility] to services of Aneesh Okeefe II, MD with Total knee replacement status [Z96.659] on 10/22/2019 and underwent TOTAL KNEE ARTHROPLASTY RIGHT. Postoperative DVT ppx was accomplished with ASA 325mg Daily starting on POD#1 and will be continued for 30 days. Post-operatively the patient transferred to the floor where the patient underwent mobilization therapy.  "Opioids were titrated to achieve appropriate pain management to allow for participation in mobilization exercises. Vital signs and laboratory values are now within safe parameters for discharge. The dressings and/or incision is intact without signs or symptoms of acute infection. Operative extremity neurovascular status remains intact as compared to the preoperative exam. Appropriate education re: incision care, activity levels, medications, and follow up visits was completed and all questions were answered. The patient is now deemed stable for discharge.  She did stay an extra night for some fatigue and not feeling safe to go home.    HOME: The patient progressed well with physical therapy. There were cleared for discharge to home. The patietn was sent home in good condition}.       R \"Triston\" Sary SULLIVAN MD  Orthopaedic Surgery  [facility]  [phone]                                               "

## 2019-10-23 NOTE — PLAN OF CARE
Problem: Patient Care Overview  Goal: Plan of Care Review  Outcome: Ongoing (interventions implemented as appropriate)   10/23/19 1949   Coping/Psychosocial   Plan of Care Reviewed With patient;spouse   OTHER   Outcome Summary Pt is 73 y/o female pod 1 right TKA. Dressing is dry and intact with small amount of drainage. N/v intact, vss, pain controlled with po meds. Pt complains of increased pain today and decided to spend another night and discharge home tomorrow. Dr. Okeefe gave the pt this choice for d/c this morning. Will continue to monitor bp r/t history of HTN.   Plan of Care Review   Progress improving     Goal: Individualization and Mutuality  Outcome: Ongoing (interventions implemented as appropriate)    Goal: Discharge Needs Assessment  Outcome: Ongoing (interventions implemented as appropriate)    Goal: Interprofessional Rounds/Family Conf  Outcome: Ongoing (interventions implemented as appropriate)      Problem: Pain, Chronic (Adult)  Goal: Identify Related Risk Factors and Signs and Symptoms  Outcome: Ongoing (interventions implemented as appropriate)    Goal: Acceptable Pain/Comfort Level and Functional Ability  Outcome: Ongoing (interventions implemented as appropriate)      Problem: Fall Risk (Adult)  Goal: Absence of Fall  Outcome: Ongoing (interventions implemented as appropriate)      Problem: Knee Arthroplasty (Total, Partial) (Adult)  Goal: Signs and Symptoms of Listed Potential Problems Will be Absent, Minimized or Managed (Knee Arthroplasty)  Outcome: Ongoing (interventions implemented as appropriate)

## 2019-10-23 NOTE — PROGRESS NOTES
Discharge Planning Assessment  [facility]     Patient Name: Chris Cox  MRN: [id number]  Today's Date: 10/23/2019    Admit Date: 10/22/2019    Discharge Needs Assessment     Row Name 10/23/19 0859       Living Environment    Lives With  spouse    Name(s) of Who Lives With Patient  , MARKY    Current Living Arrangements  home/apartment/condo    Primary Care Provided by  self    Provides Primary Care For  no one    Family Caregiver if Needed  friend(s);spouse    Family Caregiver Names  spouse, Marky.  Patient states that she also has friends who can help as needed    Quality of Family Relationships  helpful;involved;supportive       Resource/Environmental Concerns    Resource/Environmental Concerns  none    Transportation Concerns  car, none       Transition Planning    Patient/Family Anticipates Transition to  home with family;home with help/services    Patient/Family Anticipated Services at Transition  ;home health care    Transportation Anticipated  family or friend will provide       Discharge Needs Assessment    Readmission Within the Last 30 Days  no previous admission in last 30 days    Concerns to be Addressed  discharge planning    Equipment Currently Used at Home  cane, quad;grab bar;shower chair    Anticipated Changes Related to Illness  none    Equipment Needed After Discharge  walker, rolling;raised toilet    Outpatient/Agency/Support Group Needs  homecare agency    Discharge Facility/Level of Care Needs  home with home health    Offered/Gave Vendor List  yes        Discharge Plan     Row Name 10/23/19 0853       Plan    Plan  retun home with spouse and [facility]    Patient/Family in Agreement with Plan  yes    Plan Comments  Spoke with patient at bedside.  Facesheet, PCP and pharmacy verified.  Patient lives in a multi level house with her , Marky Cox.  She does have a bathroom and sleeping area on the main floor.  She has borrowed a walker and 3 in 1 commode from a friend.   Her  is current with [facility] and she would like to have them follow as well.  Referral sent via EPIC and message left at [phone]. Indira Smith RN        Destination      No service coordination in this encounter.      Durable Medical Equipment      No service coordination in this encounter.      Dialysis/Infusion      No service coordination in this encounter.      Home Medical Care      Service Provider Request Status Selected Services Address Phone Number Fax Number    [facility] Pending - Request Sent N/A [address] [address] [phone]      Therapy      No service coordination in this encounter.      Community Resources      No service coordination in this encounter.        Expected Discharge Date and Time     Expected Discharge Date Expected Discharge Time    Oct 23, 2019         Demographic Summary     Row Name 10/23/19 0859       General Information    Admission Type  observation    Arrived From  PACU/recovery room    Required Notices Provided  Observation Status Notice    Referral Source  admission list    Reason for Consult  discharge planning    Preferred Language  English        Functional Status     Row Name 10/23/19 0859       Functional Status    Usual Activity Tolerance  moderate    Current Activity Tolerance  fair       Functional Status, IADL    Medications  independent    Meal Preparation  independent    Housekeeping  independent    Laundry  independent    Shopping  independent       Mental Status    General Appearance WDL  WDL       Mental Status Summary    Recent Changes in Mental Status/Cognitive Functioning  no changes        Psychosocial    No documentation.       Abuse/Neglect    No documentation.       Legal    No documentation.       Substance Abuse    No documentation.       Patient Forms    No documentation.           Indira Smith RN

## 2019-10-23 NOTE — PLAN OF CARE
Problem: Patient Care Overview  Goal: Plan of Care Review   10/23/19 1140   Coping/Psychosocial   Plan of Care Reviewed With patient   OTHER   Outcome Summary Pt is s/p R TKA. Pt PLOF was independent but requires CGA x1 for safe ambulation. Pt improving in exercise repetitions. Pt demonstrates deficits in R LE ROM, strength and functional mobility. PT is recommending d/c with home health to address the deficts listed above.    Plan of Care Review   Progress improving

## 2019-10-23 NOTE — PLAN OF CARE
Problem: Patient Care Overview  Goal: Plan of Care Review  Outcome: Ongoing (interventions implemented as appropriate)   10/23/19 0111   Coping/Psychosocial   Plan of Care Reviewed With patient   OTHER   Outcome Summary patient ambulating with assistance to bathroom and in hallway, pain controlled at this time, educated on b/p monitoring   Plan of Care Review   Progress improving     Goal: Individualization and Mutuality  Outcome: Ongoing (interventions implemented as appropriate)    Goal: Discharge Needs Assessment  Outcome: Ongoing (interventions implemented as appropriate)    Goal: Interprofessional Rounds/Family Conf  Outcome: Ongoing (interventions implemented as appropriate)      Problem: Fall Risk (Adult)  Goal: Identify Related Risk Factors and Signs and Symptoms  Outcome: Outcome(s) achieved Date Met: 10/23/19    Goal: Absence of Fall  Outcome: Ongoing (interventions implemented as appropriate)      Problem: Knee Arthroplasty (Total, Partial) (Adult)  Goal: Signs and Symptoms of Listed Potential Problems Will be Absent, Minimized or Managed (Knee Arthroplasty)  Outcome: Ongoing (interventions implemented as appropriate)    Goal: Anesthesia/Sedation Recovery  Outcome: Outcome(s) achieved Date Met: 10/23/19

## 2019-10-23 NOTE — THERAPY TREATMENT NOTE
Patient Name: Chris Cox  : 1945    MRN: [id number]                              Today's Date: 10/23/2019       Admit Date: 10/22/2019    Visit Dx: No diagnosis found.  Patient Active Problem List   Diagnosis   • Abdominal bruit   • Blues   • DD (diverticular disease)   • Menopausal and perimenopausal disorder   • Neuralgia neuritis, sciatic nerve   • Right knee pain   • Primary osteoarthritis of both knees   • Radiculopathy with lower extremity symptoms   • Idiopathic peripheral neuropathy   • Sprain of collateral ligament of right knee   • ADD (attention deficit disorder)   • Functional diarrhea   • Microscopic hematuria   • Chronic pain of left knee   • Bleeding diathesis (CMS/HCC)   • Bilateral lower extremity edema   • Bilateral swelling of feet and ankles   • Hyperglycemia   • Venous stasis   • Attention deficit disorder (ADD) without hyperactivity   • Lesion of skin of scalp   • Pre-operative clearance   • Total knee replacement status     Past Medical History:   Diagnosis Date   • Abdominal pain    • ADHD    • Arthritis    • Bronchitis 2019   • Disease of thyroid gland    • Fractures     MULTIPLE BONE FRACTURES-TOOK FOSAMAX 3.5 YRS   • Hyperlipidemia    • Hypertension    • Irregular heart beat    • Kidney stone    • Migraines    • Mono exposure     AGE 35   • Obstructive sleep apnea     20 YRS AGO-NOT CURRENTLY   • Osteoporosis    • Seizures (CMS/HCC)     As a baby   • Tuberculosis     As a child   • Vertigo      Past Surgical History:   Procedure Laterality Date   • BREAST SURGERY      Benign fibroadnoma removed from the left breast   • HAND SURGERY Bilateral 2012    RECONSTRUCTION ON BOTH HANDS   • LITHOTRIPSY      renal   • TOTAL KNEE ARTHROPLASTY Right 10/22/2019    Procedure: TOTAL KNEE ARTHROPLASTY RIGHT;  Surgeon: Aneesh Okeefe II, MD;  Location: [facility] OR;  Service: Orthopedics   • WRIST SURGERY       General Information     Row Name 10/23/19 1134          PT Evaluation  Time/Intention    Document Type  therapy note (daily note)  -MD (r) CF (t) MD (c)     Mode of Treatment  physical therapy  -MD (r) CF (t) MD (c)     Row Name 10/23/19 1134          Cognitive Assessment/Intervention- PT/OT    Orientation Status (Cognition)  oriented x 3  -MD (r) CF (t) MD (c)       User Key  (r) = Recorded By, (t) = Taken By, (c) = Cosigned By    Initials Name Provider Type    Carolyn Naidu, PT Physical Therapist    CF Barbara Xie, PT Student PT Student        Mobility     Row Name 10/23/19 1134          Sit-Stand Transfer    Sit-Stand [place] (Transfers)  contact guard;1 person assist;verbal cues  -MD (r) CF (t) MD (c)     Assistive Device (Sit-Stand Transfers)  walker, front-wheeled  -MD (r) CF (t) MD (c)     Row Name 10/23/19 1134          Gait/Stairs Assessment/Training    Gait/Stairs Assessment/Training  gait/ambulation assistive device  -MD (r) CF (t) MD (c)     [place] Level (Gait)  contact guard;1 person assist;verbal cues  -MD (r) CF (t) MD (c)     Assistive Device (Gait)  walker, front-wheeled  -MD (r) CF (t) MD (c)     Distance in Feet (Gait)  30'  -MD (r) CF (t) MD (c)     Pattern (Gait)  step-to  -MD (r) CF (t) MD (c)     Deviations/Abnormal Patterns (Gait)  antalgic;monica decreased;gait speed decreased;stride length decreased  -MD (r) CF (t) MD (c)     Bilateral Gait Deviations  forward flexed posture  -MD (r) CF (t) MD (c)     Right Sided Gait Deviations  weight shift ability decreased  -MD (r) CF (t) MD (c)     Row Name 10/23/19 1134          Mobility Assessment/Intervention    Extremity Weight-bearing Status  right lower extremity  -MD (r) CF (t) MD (c)     Right Lower Extremity (Weight-bearing Status)  weight-bearing as tolerated (WBAT)  -MD (r) CF (t) MD (c)       User Key  (r) = Recorded By, (t) = Taken By, (c) = Cosigned By    Initials Name Provider Type    Carolyn Naidu, PT Physical Therapist    Barbara Perry, PT Student PT Student         Obj/Interventions     [facility] Name 10/23/19 1136          General ROM    GENERAL ROM COMMENTS  R knee AROM 20-75  -MD looney) KVNG (t) MD (c)     [facility] Name 10/23/19 1136          Therapeutic Exercise    Comment (Therapeutic Exercise)  R TKA exercise program x15 reps  -MD KVNG montoya (r)) MD (c)     Row Name 10/23/19 1136          Static Standing Balance    Level of [place] (Supported Standing, Static Balance)  contact guard assist;1 person assist  -MD KVNG montoya (r)) MD (c)     Time Able to Maintain Position (Supported Standing, Static Balance)  15 to 30 seconds  -MD KVNG montoya (r)) MD (c)     Assistive Device Utilized (Supported Standing, Static Balance)  walker, rolling  -MD KVNG montoya (r)) MD (jose martin)       User Key  (r) = Recorded By, (t) = Taken By, (c) = Cosigned By    Initials Name Provider Type    Carolyn Naidu, PT Physical Therapist    CF Barbara Xie, PT Student PT Student        Goals/Plan    No documentation.       Clinical Impression     Row Name 10/23/19 1136          Pain Assessment    Additional Documentation  Pain Scale: Numbers Pre/Post-Treatment (Group)  -MD looney) KVNG (destiny) MD (c)     Row Name 10/23/19 1136          Pain Scale: Numbers Pre/Post-Treatment    Pain Scale: Numbers, Pretreatment  8/10  -MD looney) KVNG montoya) MD (jose martin)     Pain Location - Side  Right  -MD KVNG montoya (r)) MD (jose martin)     Pain Location  knee  -MD KVNG montoya (r)) MD (jose martin)     Pain Intervention(s)  Medication (See MAR);Repositioned;Cold pack;Ambulation/increased activity  -MD looney) KVNG (destiny) MD (c)     Row Name 10/23/19 1136          Plan of Care Review    Plan of Care Reviewed With  patient  -MD looney) KVNG montoya) MD (c)     Row Name 10/23/19 1136          Positioning and Restraints    Pre-Treatment Position  sitting in chair/recliner  -MD looney) KVNG montoya) MD (jose martin)     Post Treatment Position  chair  -MD KVNG montoya (r)) MD tyson)     In Chair  sitting;call light within reach;exit alarm on;with family/caregiver  -MD looney) KVNG (t) MD (jose martin)       User Key  (r) = Recorded By, (t) = Taken By, (c) = Cosigned By     Initials Name Provider Type    Carolyn Naidu, PT Physical Therapist     Barbara Xie, PT Student PT Student        Outcome Measures     Row Name 10/23/19 1143          How much help from another person do you currently need...    Turning from your back to your side while in flat bed without using bedrails?  3  -MD (r) KVNG (t) MD (c)     Moving from lying on back to sitting on the side of a flat bed without bedrails?  3  -MD (r) KVNG (t) MD (c)     Moving to and from a bed to a chair (including a wheelchair)?  3  -MD (r) KVNG (t) MD (c)     Standing up from a chair using your arms (e.g., wheelchair, bedside chair)?  3  -MD (r) CF (t) MD (c)     Climbing 3-5 steps with a railing?  3  -MD (r) KVNG (t) MD (c)     To walk in hospital room?  3  -MD (r) CF (t) MD (c)     AM-PAC 6 Clicks Score (PT)  18  -MD (r) KVNG (t)     Row Name 10/23/19 1149          Functional Assessment    Outcome Measure Options  AM-PAC 6 Clicks Basic Mobility (PT)  -MD (r) CF (t) MD (c)       User Key  (r) = Recorded By, (t) = Taken By, (c) = Cosigned By    Initials Name Provider Type    Carolyn Naidu, PT Physical Therapist     Barbara Xie, PT Student PT Student        Physical Therapy Education     Title: PT OT SLP Therapies (Done)     Topic: Physical Therapy (Done)     Point: Mobility training (Done)     Learning Progress Summary           Patient Acceptance, E, VU by  at 10/23/2019 11:39 AM    Acceptance, E,TB, VU,NR by  at 10/22/2019  3:50 PM                   Point: Home exercise program (Done)     Learning Progress Summary           Patient Acceptance, E,TB, VU,NR by  at 10/22/2019  3:50 PM                   Point: Body mechanics (Done)     Learning Progress Summary           Patient Acceptance, E,TB, VU,NR by  at 10/22/2019  3:50 PM                   Point: Precautions (Done)     Learning Progress Summary           Patient Acceptance, E,TB, VU,NR by  at 10/22/2019  3:50 PM                               User Key     Initials  Effective Dates Name Provider Type Discipline     05/14/18 -  Raghavendra Erickson, PT Physical Therapist PT     10/14/19 -  Barbara Xie PT Student PT Student PT              PT Recommendation and Plan     Outcome Summary/Treatment Plan (PT)  Anticipated Discharge Disposition (PT): home with assist, home with home health  Plan of Care Reviewed With: patient  Progress: improving  Outcome Summary: Pt is s/p R TKA. Pt PLOF was independent but requires CGA x1 for safe ambulation. Pt improving in exercise repetitions. Pt demonstrates deficits in R LE ROM, strength and functional mobility. PT is recommending d/c with home health to address the deficts listed above.      Time Calculation:   PT Charges     Row Name 10/23/19 1144             Time Calculation    Start Time  1034  -MD (r) CF (t) MD (c)      Stop Time  1117  -MD (r) KVNG (t) MD (c)      Time Calculation (min)  43 min  -MD (r) CF (t)      PT Received On  10/23/19  -MD (r) KVNG (t) MD (c)        User Key  (r) = Recorded By, (t) = Taken By, (c) = Cosigned By    Initials Name Provider Type    Carolyn Naidu, PT Physical Therapist     Barbara Xie, PT Student PT Student        Therapy Charges for Today     Code Description Service Date Service Provider Modifiers Qty    08346598706 HC PT THER PROC GROUP 10/23/2019 Barbara Xie, PT Student GP 1    84256820355 HC PT THER PROC EA 15 MIN 10/23/2019 Barbara Xie, PT Student GP 1          PT G-Codes  Outcome Measure Options: AM-PAC 6 Clicks Basic Mobility (PT)  AM-PAC 6 Clicks Score (PT): 18    Barbara Xie PT Student  10/23/2019

## 2019-10-23 NOTE — PROGRESS NOTES
Discharge Planning Assessment  [facility]     Patient Name: Chris Cox  MRN: [id number]  Today's Date: 10/23/2019    Admit Date: 10/22/2019    Discharge Needs Assessment    No documentation.       Discharge Plan     Row Name 10/23/19 0853       Plan    Plan  retun home with spouse and [facility]    Patient/Family in Agreement with Plan  yes    Plan Comments  Spoke with patient at bedside.  Facesheet, PCP and pharmacy verified.  Patient lives in a multi level house with her , Marky Cox.  She does have a bathroom and sleeping area on the main floor.  She has borrowed a walker and 3 in 1 commode from a friend.  Her  is current with [facility] and she would like to have them follow as well.  Referral sent via EPIC and message left at [phone]. Indira Smith RN        Destination      No service coordination in this encounter.      Durable Medical Equipment      No service coordination in this encounter.      Dialysis/Infusion      No service coordination in this encounter.      Home Medical Care      Service Provider Request Status Selected Services Address Phone Number Fax Number    [facility] Pending - Request Sent N/A [id number] [address] [phone]      Therapy      No service coordination in this encounter.      Community Resources      No service coordination in this encounter.        Expected Discharge Date and Time     Expected Discharge Date Expected Discharge Time    Oct 23, 2019         Demographic Summary    No documentation.       Functional Status    No documentation.       Psychosocial    No documentation.       Abuse/Neglect    No documentation.       Legal    No documentation.       Substance Abuse    No documentation.       Patient Forms    No documentation.           Indira Smith RN

## 2019-10-23 NOTE — DISCHARGE PLACEMENT REQUEST
"Mildred Aggarwal (74 y.o. Female)     Date of Birth Social Security Number Address Home Phone MRN    1945  [id number] [address] [phone] [id number]    Gnosticist Marital Status          Mormon        Admission Date Admission Type Admitting Provider Attending Provider Department, Room/Bed    10/22/19 Elective Aneesh Okeefe II, MD Sweet, Richard Alexander II, MD [address], 78/[address]    Discharge Date Discharge Disposition Discharge Destination         Home or Self Care              Attending Provider:  Aneesh Okeefe II, MD    Allergies:  Erythromycin, Sulfa Antibiotics, Milk-related Compounds, Eggs Or Egg-derived Products, Cephalexin, Cephalosporins, Molds & Smuts    Isolation:  None   Infection:  None   Code Status:  CPR    Ht:  167.6 cm (66\")   Wt:  72.2 kg (159 lb 1 oz)    Admission Cmt:  None   Principal Problem:  None                Active Insurance as of 10/22/2019     Primary Coverage     Payor Plan Insurance Group Employer/Plan Group    MEDICARE MEDICARE A & B      Payor Plan Address Payor Plan Phone Number Payor Plan Fax Number Effective Dates    [address] [phone]  7/1/2010 - None Entered    [facility] [id number]       Subscriber Name Subscriber Birth Date Member ID       MILDRED AGGARWAL 1945 [id number]           Secondary Coverage     Payor Plan Insurance Group Employer/Plan Group    AETNA COMMERCIAL AETNA  [employer]      Payor Plan Address Payor Plan Phone Number Payor Plan Fax Number Effective Dates    [address] [phone]  1/1/2016 - None Entered    [facility] [id number]       Subscriber Name Subscriber Birth Date Member ID       MILDRED AGGARWAL 1945 [id number]                 Emergency Contacts      (Rel.) Home Phone Work Phone Mobile Phone    Marky Aggarwal (Spouse) [phone] -- --              "

## 2019-10-23 NOTE — PROGRESS NOTES
Case Management Discharge Note    Final Note: [place] home with [facility]. Indira Smith, NENA    Destination      No service has been selected for the patient.      Durable Medical Equipment      No service has been selected for the patient.      Dialysis/Infusion      No service has been selected for the patient.      Home Medical Care - Selection Complete      Service Provider Request Status Selected Services Address Phone Number Fax Number    [facility] Selected Home Health Services [address] [address] [phone]      Therapy      No service has been selected for the patient.      Community Resources      No service has been selected for the patient.        Transportation Services  Private: Car    Final Discharge Disposition Code: 01 - home or self-care, 06 - home with home health care

## 2019-10-23 NOTE — PROGRESS NOTES
Orthopaedic Surgery   Daily Progress Note  Dr. MONALISA Okeefe II  [phone]  DEMOGRAPHICS:   · Chris Cox   · Age:74 y.o.   · MRN:[id number]  · Admitted: 10/22/2019    PROCEDURE: 1 Day Post-Op s/p Procedure(s):  TOTAL KNEE ARTHROPLASTY RIGHT     HOSPITAL PROGRESS  · Patient Issues: No major issues, overall doing very well  · Ambulation/Activity: Ambulating minimally with PT/Nursing.      VITALS:  Vitals:    10/22/19 2225 10/23/19 0300 10/23/19 0700 10/23/19 1100   BP: 126/57 107/56 113/61 104/56   BP Location: Right arm Right arm Right arm Left arm   Patient Position: Lying Lying Lying Sitting   Pulse: 56 60 61 64   Resp: 16 16 16 16   Temp: 97 °F (36.1 °C) 97.1 °F (36.2 °C) 98.5 °F (36.9 °C) 97.1 °F (36.2 °C)   TempSrc:   Oral Oral   SpO2: 98% 100% 92% 96%   Weight:       Height:           PHYSICAL EXAM:  · LUNGS: Equal chest rise, no shortness of air  · CARDIOVASCULAR: brisk capillary refill intact  · WOUND: SAIMA Wound Vac System working in good condition, minimal drainage  · EXTREMITY: Operative Leg  · Pulses: brisk capillary refill intact  · Sensation: Sensation intact to light touch to the saphenous/sural/tibial/deep peroneal/superficial peroneal nerves, and grossly throughout the extremity.  · Motor: 5/5 EHL/FHL/TA/GS motor complexes    LABS:   Lab Results   Component Value Date    HGB 12.2 10/23/2019     Lab Results   Component Value Date    WBC 9.01 10/23/2019     Lab Results   Component Value Date    GLUCOSE 133 (H) 10/23/2019    CALCIUM 8.2 (L) 10/23/2019     10/23/2019    K 4.2 10/23/2019    CO2 27.0 10/23/2019     10/23/2019    BUN 11 10/23/2019    CREATININE 0.67 10/23/2019    EGFRIFAFRI 86 04/16/2019    EGFRIFNONA 86 10/23/2019    BCR 16.4 10/23/2019    ANIONGAP 10.0 10/23/2019       ASSESSMENT: Patient is a 74 y.o. female who is 1 Day Post-Op s/p Procedure(s):  TOTAL KNEE ARTHROPLASTY RIGHT     PLAN:   · Weight Bearing: Weight Bearing As Tolerated  · Labs: Above lab values  "review. Plan: No intervention necessary at this time.   · PT/OT: To Mobilize  · DVT PPX: ASA 325mg Daily  · Post-Op Xray: TKA implants in good alignment.   · Dispo: Home today    R \"Triston\" Sary SULLIVAN MD  Orthopaedic Surgery  [facility]  [phone] - [place] Office  [phone] - [place] Office      "

## 2019-10-24 PROCEDURE — 97110 THERAPEUTIC EXERCISES: CPT

## 2019-10-24 PROCEDURE — 97150 GROUP THERAPEUTIC PROCEDURES: CPT

## 2019-10-24 RX ADMIN — HYDROCODONE BITARTRATE AND ACETAMINOPHEN 1 TABLET: 5; 325 TABLET ORAL at 17:52

## 2019-10-24 RX ADMIN — HYDROCODONE BITARTRATE AND ACETAMINOPHEN 1 TABLET: 5; 325 TABLET ORAL at 09:13

## 2019-10-24 RX ADMIN — SENNOSIDES AND DOCUSATE SODIUM 2 TABLET: 8.6; 5 TABLET ORAL at 19:49

## 2019-10-24 RX ADMIN — ESCITALOPRAM 10 MG: 10 TABLET, FILM COATED ORAL at 07:39

## 2019-10-24 RX ADMIN — ASPIRIN 325 MG: 325 TABLET, DELAYED RELEASE ORAL at 07:39

## 2019-10-24 RX ADMIN — FAMOTIDINE 40 MG: 40 TABLET, FILM COATED ORAL at 07:39

## 2019-10-24 NOTE — THERAPY TREATMENT NOTE
Patient Name: Chris Cox  : 1945    MRN: [id number]                              Today's Date: 10/24/2019       Admit Date: 10/22/2019    Visit Dx: No diagnosis found.  Patient Active Problem List   Diagnosis   • Abdominal bruit   • Blues   • DD (diverticular disease)   • Menopausal and perimenopausal disorder   • Neuralgia neuritis, sciatic nerve   • Right knee pain   • Primary osteoarthritis of both knees   • Radiculopathy with lower extremity symptoms   • Idiopathic peripheral neuropathy   • Sprain of collateral ligament of right knee   • ADD (attention deficit disorder)   • Functional diarrhea   • Microscopic hematuria   • Chronic pain of left knee   • Bleeding diathesis (CMS/HCC)   • Bilateral lower extremity edema   • Bilateral swelling of feet and ankles   • Hyperglycemia   • Venous stasis   • Attention deficit disorder (ADD) without hyperactivity   • Lesion of skin of scalp   • Pre-operative clearance   • Total knee replacement status     Past Medical History:   Diagnosis Date   • Abdominal pain    • ADHD    • Arthritis    • Bronchitis 2019   • Disease of thyroid gland    • Fractures     MULTIPLE BONE FRACTURES-TOOK FOSAMAX 3.5 YRS   • Hyperlipidemia    • Hypertension    • Irregular heart beat    • Kidney stone    • Migraines    • Mono exposure     AGE 35   • Obstructive sleep apnea     20 YRS AGO-NOT CURRENTLY   • Osteoporosis    • Seizures (CMS/HCC)     As a baby   • Tuberculosis     As a child   • Vertigo      Past Surgical History:   Procedure Laterality Date   • BREAST SURGERY      Benign fibroadnoma removed from the left breast   • HAND SURGERY Bilateral 2012    RECONSTRUCTION ON BOTH HANDS   • LITHOTRIPSY      renal   • TOTAL KNEE ARTHROPLASTY Right 10/22/2019    Procedure: TOTAL KNEE ARTHROPLASTY RIGHT;  Surgeon: Aneesh Okeefe II, MD;  [facility];  Service: Orthopedics   • WRIST SURGERY       General Information     Row Name 10/24/19 1516          PT Evaluation  Time/Intention    Document Type  therapy note (daily note)  -     Mode of Treatment  physical therapy;group therapy  -     Row Name 10/24/19 1516          General Information    Existing Precautions/Restrictions  fall  -     Row Name 10/24/19 1516          Cognitive Assessment/Intervention- PT/OT    Orientation Status (Cognition)  oriented x 3  -       User Key  (r) = Recorded By, (t) = Taken By, (c) = Cosigned By    Initials Name Provider Type     Isabel Amin PTA Physical Therapy Assistant        Mobility     Row Name 10/24/19 1516          Bed-Chair Transfer    Bed-Chair [place] (Transfers)  minimum assist (75% patient effort) toilet transfer sit<>stand  -     Assistive Device (Bed-Chair Transfers)  walker, front-wheeled  -     Row Name 10/24/19 1516          Sit-Stand Transfer    Sit-Stand [place] (Transfers)  contact guard  -     Assistive Device (Sit-Stand Transfers)  walker, front-wheeled  -     Row Name 10/24/19 1516          Gait/Stairs Assessment/Training    [place] Level (Gait)  contact guard  -     Assistive Device (Gait)  walker, front-wheeled  -     Distance in Feet (Gait)  125  -     Pattern (Gait)  step-to  -     Deviations/Abnormal Patterns (Gait)  gait speed decreased;antalgic;monica decreased;stride length decreased  -     Bilateral Gait Deviations  forward flexed posture  -     Right Sided Gait Deviations  weight shift ability decreased  -     Row Name 10/24/19 1516          Mobility Assessment/Intervention    Extremity Weight-bearing Status  right lower extremity  -     Right Lower Extremity (Weight-bearing Status)  weight-bearing as tolerated (WBAT)  -       User Key  (r) = Recorded By, (t) = Taken By, (c) = Cosigned By    Initials Name Provider Type     Isabel Amin PTA Physical Therapy Assistant        Obj/Interventions     Row Name 10/24/19 1518          General ROM    GENERAL ROM COMMENTS  R Knee AROM -15, 82  -     Row Name  10/24/19 1518          Therapeutic Exercise    Comment (Therapeutic Exercise)  R TKA protocol X20 reps, assist with SLRs and SAQs  -       User Key  (r) = Recorded By, (t) = Taken By, (c) = Cosigned By    Initials Name Provider Type    Isabel Weinstein PTA Physical Therapy Assistant        Goals/Plan    No documentation.       Clinical Impression     Row Name 10/24/19 1518          Pain Scale: Numbers Pre/Post-Treatment    Pain Location - Side  Right  -EH     Pain Location  knee  -     Pain Intervention(s)  Repositioned;Ambulation/increased activity  -     Row Name 10/24/19 1518          Positioning and Restraints    Pre-Treatment Position  sitting in chair/recliner  -     Post Treatment Position  chair  -EH     In Chair  reclined;call light within reach;encouraged to call for assist;exit alarm on;with family/caregiver  -     Row Name 10/24/19 1518          Pain Scale: Word Pre/Post-Treatment    Pain: Word Scale, Pretreatment  6 - moderate-severe pain  -       User Key  (r) = Recorded By, (t) = Taken By, (c) = Cosigned By    Initials Name Provider Type    Isabel Weinstein PTA Physical Therapy Assistant        Outcome Measures     Row Name 10/24/19 1519          How much help from another person do you currently need...    Turning from your back to your side while in flat bed without using bedrails?  3  -EH     Moving from lying on back to sitting on the side of a flat bed without bedrails?  3  -EH     Moving to and from a bed to a chair (including a wheelchair)?  3  -EH     Standing up from a chair using your arms (e.g., wheelchair, bedside chair)?  3  -EH     Climbing 3-5 steps with a railing?  3  -EH     To walk in hospital room?  3  -EH     AM-PAC 6 Clicks Score (PT)  18  -EH       User Key  (r) = Recorded By, (t) = Taken By, (c) = Cosigned By    Initials Name Provider Type    Isabel Weinstein PTA Physical Therapy Assistant        Physical Therapy Education     Title: PT OT SLP Therapies (Done)      Topic: Physical Therapy (Done)     Point: Mobility training (Done)     Learning Progress Summary           Patient Acceptance, E,TB,D, VU,DU by  at 10/24/2019  3:19 PM    Acceptance, E, VU by  at 10/23/2019 11:39 AM    Acceptance, E,TB, VU,NR by  at 10/22/2019  3:50 PM                   Point: Home exercise program (Done)     Learning Progress Summary           Patient Acceptance, E,TB,D, VU,DU by  at 10/24/2019  3:19 PM    Acceptance, E,TB, VU,NR by  at 10/22/2019  3:50 PM                   Point: Body mechanics (Done)     Learning Progress Summary           Patient Acceptance, E,TB,D, VU,DU by  at 10/24/2019  3:19 PM    Acceptance, E,TB, VU,NR by  at 10/22/2019  3:50 PM                   Point: Precautions (Done)     Learning Progress Summary           Patient Acceptance, E,TB,D, VU,DU by  at 10/24/2019  3:19 PM    Acceptance, E,TB, VU,NR by  at 10/22/2019  3:50 PM                               User Key     Initials Effective Dates Name Provider Type Discipline     08/19/18 -  Isabel Amin, PTA Physical Therapy Assistant PT     05/14/18 -  Raghavendra Erickson PT Physical Therapist PT     10/14/19 -  Barbara Xie, ELY Student PT Student PT              PT Recommendation and Plan     Plan of Care Reviewed With: patient  Progress: improving  Outcome Summary: Pt tolerated treatment with c/o pain of right knee. Pt continues to be able to perform TKR protocol exercises with min c/o difficulty and assist with SLRS and SAQs. Pt ambulated 125 feet with rwx, CGA.      Time Calculation:   PT Charges     Row Name 10/24/19 5356             Time Calculation    Start Time  1030  -      Stop Time  1205  -      Time Calculation (min)  95 min  -      PT Received On  10/24/19  -      PT - Next Appointment  10/24/19  -         Time Calculation- PT    Total Timed Code Minutes- PT  45 minute(s)  -        User Key  (r) = Recorded By, (t) = Taken By, (c) = Cosigned By    Initials Name Provider Type      Isabel Amin PTA Physical Therapy Assistant        Therapy Charges for Today     Code Description Service Date Service Provider Modifiers Qty    63841019279 HC PT THER PROC EA 15 MIN 10/24/2019 Isabel Amin PTA GP 2    75812746315 HC PT THER PROC GROUP 10/24/2019 Isabel Amin PTA GP 1    02506078994 HC PT THER SUPP EA 15 MIN 10/24/2019 Isabel Amin PTA GP 1          PT G-Codes  Outcome Measure Options: AM-PAC 6 Clicks Basic Mobility (PT)  AM-PAC 6 Clicks Score (PT): 18    Isabel Amin PTA  10/24/2019

## 2019-10-24 NOTE — PROGRESS NOTES
Orthopaedic Surgery   Daily Progress Note  Dr. MONALISA Okeefe   [phone]  DEMOGRAPHICS:   · Chris Cox   · Age:74 y.o.   · MRN:[id number]  · Admitted: 10/22/2019    PROCEDURE: 2 Days Post-Op s/p Procedure(s):  TOTAL KNEE ARTHROPLASTY RIGHT     HOSPITAL PROGRESS  · Patient Issues: Had some minor fatigue yesterday that kept her from going home as her  just had open heart surgery, but otherwise doing very well.  Feeling much better today  · Ambulation/Activity: Improved mobility    VITALS:  Vitals:    10/23/19 1445 10/23/19 1900 10/23/19 2248 10/24/19 0352   BP: 111/50 106/50 107/56 116/64   BP Location: Left arm Right arm Right arm Right arm   Patient Position: Sitting Lying Lying Lying   Pulse: 63 66 65 72   Resp: 16 16 16 16   Temp: 98.2 °F (36.8 °C) 98.6 °F (37 °C) 99.7 °F (37.6 °C) 99.4 °F (37.4 °C)   TempSrc: Oral      SpO2: 94% 95% 94% 97%   Weight:       Height:           PHYSICAL EXAM:  · LUNGS: Equal chest rise, no shortness of air  · CARDIOVASCULAR: brisk capillary refill intact  · WOUND: SAIMA Wound Vac System working in good condition, minimal drainage  · EXTREMITY: Operative Leg  · Pulses: brisk capillary refill intact  · Sensation: Sensation intact to light touch to the saphenous/sural/tibial/deep peroneal/superficial peroneal nerves, and grossly throughout the extremity.  · Motor: 5/5 EHL/FHL/TA/GS motor complexes    LABS:   Lab Results   Component Value Date    HGB 12.2 10/23/2019     Lab Results   Component Value Date    WBC 9.01 10/23/2019     Lab Results   Component Value Date    GLUCOSE 133 (H) 10/23/2019    CALCIUM 8.2 (L) 10/23/2019     10/23/2019    K 4.2 10/23/2019    CO2 27.0 10/23/2019     10/23/2019    BUN 11 10/23/2019    CREATININE 0.67 10/23/2019    EGFRIFAFRI 86 04/16/2019    EGFRIFNONA 86 10/23/2019    BCR 16.4 10/23/2019    ANIONGAP 10.0 10/23/2019       ASSESSMENT: Patient is a 74 y.o. female who is 2 Days Post-Op s/p Procedure(s):  TOTAL KNEE  "ARTHROPLASTY RIGHT     PLAN:   · Weight Bearing: Weight Bearing As Tolerated  · Labs: Above lab values review. Plan: No intervention necessary at this time.   · PT/OT: To Mobilize  · DVT PPX: ASA 325mg Daily  · Post-Op Xray: TKA implants in good alignment.   · Dispo: Home today as long as she is feeling up to it    R \"Triston\" Sary SULLIVAN MD  Orthopaedic Surgery  [facility]  [phone] - [place] Office  [phone] - [place] Office      "

## 2019-10-24 NOTE — PLAN OF CARE
Problem: Patient Care Overview  Goal: Plan of Care Review  Outcome: Ongoing (interventions implemented as appropriate)   10/24/19 5540   Coping/Psychosocial   Plan of Care Reviewed With patient   OTHER   Outcome Summary Pt s/p R TKA post op day 2. Staying one more night for pain controll. Pain being controlled with oral pain medications. VSS. On RA. IV S/L. 1 assist to BR. Plans do d/c home with hh tomorrow.     Goal: Individualization and Mutuality  Outcome: Ongoing (interventions implemented as appropriate)    Goal: Discharge Needs Assessment  Outcome: Ongoing (interventions implemented as appropriate)    Goal: Interprofessional Rounds/Family Conf  Outcome: Ongoing (interventions implemented as appropriate)      Problem: Pain, Chronic (Adult)  Goal: Identify Related Risk Factors and Signs and Symptoms  Outcome: Ongoing (interventions implemented as appropriate)    Goal: Acceptable Pain/Comfort Level and Functional Ability  Outcome: Ongoing (interventions implemented as appropriate)      Problem: Fall Risk (Adult)  Goal: Absence of Fall  Outcome: Ongoing (interventions implemented as appropriate)

## 2019-10-24 NOTE — THERAPY TREATMENT NOTE
Patient Name: Chris Cox  : 1945    MRN: [id number]                              Today's Date: 10/24/2019       Admit Date: 10/22/2019    Visit Dx: No diagnosis found.  Patient Active Problem List   Diagnosis   • Abdominal bruit   • Blues   • DD (diverticular disease)   • Menopausal and perimenopausal disorder   • Neuralgia neuritis, sciatic nerve   • Right knee pain   • Primary osteoarthritis of both knees   • Radiculopathy with lower extremity symptoms   • Idiopathic peripheral neuropathy   • Sprain of collateral ligament of right knee   • ADD (attention deficit disorder)   • Functional diarrhea   • Microscopic hematuria   • Chronic pain of left knee   • Bleeding diathesis (CMS/HCC)   • Bilateral lower extremity edema   • Bilateral swelling of feet and ankles   • Hyperglycemia   • Venous stasis   • Attention deficit disorder (ADD) without hyperactivity   • Lesion of skin of scalp   • Pre-operative clearance   • Total knee replacement status     Past Medical History:   Diagnosis Date   • Abdominal pain    • ADHD    • Arthritis    • Bronchitis 2019   • Disease of thyroid gland    • Fractures     MULTIPLE BONE FRACTURES-TOOK FOSAMAX 3.5 YRS   • Hyperlipidemia    • Hypertension    • Irregular heart beat    • Kidney stone    • Migraines    • Mono exposure     AGE 35   • Obstructive sleep apnea     20 YRS AGO-NOT CURRENTLY   • Osteoporosis    • Seizures (CMS/HCC)     As a baby   • Tuberculosis     As a child   • Vertigo      Past Surgical History:   Procedure Laterality Date   • BREAST SURGERY      Benign fibroadnoma removed from the left breast   • HAND SURGERY Bilateral 2012    RECONSTRUCTION ON BOTH HANDS   • LITHOTRIPSY      renal   • TOTAL KNEE ARTHROPLASTY Right 10/22/2019    Procedure: TOTAL KNEE ARTHROPLASTY RIGHT;  Surgeon: Aneesh Okeefe II, MD;  Location: [facility] OR;  Service: Orthopedics   • WRIST SURGERY       General Information     Row Name 10/24/19 1522 10/24/19 1516       PT  Evaluation Time/Intention    Document Type  therapy note (daily note)  -  therapy note (daily note)  -    Mode of Treatment  physical therapy;group therapy  -  physical therapy;group therapy  -    Row Name 10/24/19 1522 10/24/19 1516       General Information    Existing Precautions/Restrictions  fall  -  fall  -    Row Name 10/24/19 1522 10/24/19 1516       Cognitive Assessment/Intervention- PT/OT    Orientation Status (Cognition)  oriented x 3  -EH  oriented x 3  -EH      User Key  (r) = Recorded By, (t) = Taken By, (c) = Cosigned By    Initials Name Provider Type     Isabel Amin PTA Physical Therapy Assistant        Mobility     Row Name 10/24/19 1524          Bed Mobility Assessment/Treatment    Supine-Sit [place] (Bed Mobility)  minimum assist (75% patient effort)  -     Row Name 10/24/19 1516          Bed-Chair Transfer    Bed-Chair [place] (Transfers)  minimum assist (75% patient effort) toilet transfer sit<>stand  -     Assistive Device (Bed-Chair Transfers)  walker, front-wheeled  -     Row Name 10/24/19 1524 10/24/19 1516       Sit-Stand Transfer    Sit-Stand [place] (Transfers)  contact guard  -  contact guard  -    Assistive Device (Sit-Stand Transfers)  walker, front-wheeled  -  walker, front-wheeled  -    Row Name 10/24/19 1524 10/24/19 1516       Gait/Stairs Assessment/Training    [place] Level (Gait)  contact guard  -  contact guard  -    Assistive Device (Gait)  walker, front-wheeled  -  walker, front-wheeled  -    Distance in Feet (Gait)  125  -EH  125  -    Pattern (Gait)  --  step-to  -EH    Deviations/Abnormal Patterns (Gait)  gait speed decreased;antalgic;monica decreased;stride length decreased  -  gait speed decreased;antalgic;monica decreased;stride length decreased  -    Bilateral Gait Deviations  forward flexed posture  -  forward flexed posture  -    Right Sided Gait Deviations  --  weight shift ability decreased  -     Row Name 10/24/19 1524 10/24/19 1516       Mobility Assessment/Intervention    Extremity Weight-bearing Status  right lower extremity  -EH  right lower extremity  -EH    Right Lower Extremity (Weight-bearing Status)  weight-bearing as tolerated (WBAT)  -EH  weight-bearing as tolerated (WBAT)  -      User Key  (r) = Recorded By, (t) = Taken By, (c) = Cosigned By    Initials Name Provider Type     Isabel Amin PTA Physical Therapy Assistant        Obj/Interventions     Row Name 10/24/19 1518          General ROM    GENERAL ROM COMMENTS  R Knee AROM -15, 82  -EH     Row Name 10/24/19 1525 10/24/19 1518       Therapeutic Exercise    Comment (Therapeutic Exercise)  R TKA protocol X25 reps, assist with SLRs and SAQs, spent time educating pt with exercises.   -EH  R TKA protocol X20 reps, assist with SLRs and SAQs  -      User Key  (r) = Recorded By, (t) = Taken By, (c) = Cosigned By    Initials Name Provider Type     Isabel Amin PTA Physical Therapy Assistant        Goals/Plan    No documentation.       Clinical Impression     [facility] Name 10/24/19 1529 10/24/19 1518       Pain Scale: Numbers Pre/Post-Treatment    Pain Location - Side  Right  -EH  Right  -EH    Pain Location  knee  -EH  knee  -EH    Pain Intervention(s)  Ambulation/increased activity;Repositioned  -EH  Repositioned;Ambulation/increased activity  -EH    [facility] Name 10/24/19 1529 10/24/19 1518       Positioning and Restraints    Pre-Treatment Position  sitting in chair/recliner  -  sitting in chair/recliner  -    Post Treatment Position  bed  -  chair  -    In Bed  supine;call light within reach;encouraged to call for assist;exit alarm on;with family/caregiver  -  --    In Chair  --  reclined;call light within reach;encouraged to call for assist;exit alarm on;with family/caregiver  -    Row Name 10/24/19 1529 10/24/19 1518       Pain Scale: Word Pre/Post-Treatment    Pain: Word Scale, Pretreatment  2 - mild pain  -EH  6 -  moderate-severe pain  -      User Key  (r) = Recorded By, (t) = Taken By, (c) = Cosigned By    Initials Name Provider Type     Isabel Amin PTA Physical Therapy Assistant        Outcome Measures     Row Name 10/24/19 1518          How much help from another person do you currently need...    Turning from your back to your side while in flat bed without using bedrails?  3  -EH     Moving from lying on back to sitting on the side of a flat bed without bedrails?  3  -EH     Moving to and from a bed to a chair (including a wheelchair)?  3  -EH     Standing up from a chair using your arms (e.g., wheelchair, bedside chair)?  3  -EH     Climbing 3-5 steps with a railing?  3  -EH     To walk in hospital room?  3  -EH     AM-PAC 6 Clicks Score (PT)  18  -       User Key  (r) = Recorded By, (t) = Taken By, (c) = Cosigned By    Initials Name Provider Type     Isabel Amin PTA Physical Therapy Assistant        Physical Therapy Education     Title: PT OT SLP Therapies (Done)     Topic: Physical Therapy (Done)     Point: Mobility training (Done)     Learning Progress Summary           Patient Acceptance, E,TB,D, VU,DU by  at 10/24/2019  3:19 PM    Acceptance, E, VU by  at 10/23/2019 11:39 AM    Acceptance, E,TB, VU,NR by  at 10/22/2019  3:50 PM                   Point: Home exercise program (Done)     Learning Progress Summary           Patient Acceptance, E,TB,D, VU,DU by  at 10/24/2019  3:19 PM    Acceptance, E,TB, VU,NR by  at 10/22/2019  3:50 PM                   Point: Body mechanics (Done)     Learning Progress Summary           Patient Acceptance, E,TB,D, VU,DU by  at 10/24/2019  3:19 PM    Acceptance, E,TB, VU,NR by  at 10/22/2019  3:50 PM                   Point: Precautions (Done)     Learning Progress Summary           Patient Acceptance, E,TB,D, VU,DU by  at 10/24/2019  3:19 PM    Acceptance, E,TB, VU,NR by  at 10/22/2019  3:50 PM                               User Key     Initials  Effective Dates Name Provider Type Discipline     08/19/18 -  Isabel Amin PTA Physical Therapy Assistant PT    CS 05/14/18 -  Raghavendra Erickson PT Physical Therapist PT    CF 10/14/19 -  Barbara Xie PT Student PT Student PT              PT Recommendation and Plan     Plan of Care Reviewed With: patient  Progress: improving  Outcome Summary: Pt tolerated treatment with c/o pain of right knee. Pt continues to be able to perform TKR protocol exercises with min c/o difficulty and assist with SLRS and SAQs. Pt ambulated 125 feet with rwx, CGA.      Time Calculation:   PT Charges     Row Name 10/24/19 1521 10/24/19 1516          Time Calculation    Start Time  1400  -  1030  -     Stop Time  1442  -  1205  -     Time Calculation (min)  42 min  -  95 min  -     PT Received On  10/24/19  -  10/24/19  -     PT - Next Appointment  10/25/19  -  10/24/19  -        Time Calculation- PT    Total Timed Code Minutes- PT  42 minute(s)  -EH  45 minute(s)  -       User Key  (r) = Recorded By, (t) = Taken By, (c) = Cosigned By    Initials Name Provider Type     Isabel Amin PTA Physical Therapy Assistant        Therapy Charges for Today     Code Description Service Date Service Provider Modifiers Qty    71928342779 HC PT THER PROC EA 15 MIN 10/24/2019 Isabel Amin PTA GP 2    59525272411 HC PT THER PROC GROUP 10/24/2019 Isabel Amin PTA GP 1    71963126156 HC PT THER SUPP EA 15 MIN 10/24/2019 Isabel Amin PTA GP 1    69761798414 HC PT THER PROC EA 15 MIN 10/24/2019 Isabel Amin PTA GP 2    50370842558 HC PT THER PROC GROUP 10/24/2019 Isabel Amin, MIGUEL GP 1          PT G-Codes  Outcome Measure Options: AM-PAC 6 Clicks Basic Mobility (PT)  AM-PAC 6 Clicks Score (PT): 18    Isabel Amin PTA  10/24/2019

## 2019-10-24 NOTE — PLAN OF CARE
Problem: Patient Care Overview  Goal: Plan of Care Review  Outcome: Ongoing (interventions implemented as appropriate)   10/24/19 9120   Coping/Psychosocial   Plan of Care Reviewed With patient   OTHER   Outcome Summary patient ambulating with assistance, rates pain 7/10 but falls asleep while talking, appears to rest comfortably between care, educated on b/p monitoring   Plan of Care Review   Progress improving     Goal: Individualization and Mutuality  Outcome: Ongoing (interventions implemented as appropriate)    Goal: Discharge Needs Assessment  Outcome: Ongoing (interventions implemented as appropriate)    Goal: Interprofessional Rounds/Family Conf  Outcome: Ongoing (interventions implemented as appropriate)      Problem: Fall Risk (Adult)  Goal: Absence of Fall  Outcome: Ongoing (interventions implemented as appropriate)      Problem: Knee Arthroplasty (Total, Partial) (Adult)  Goal: Signs and Symptoms of Listed Potential Problems Will be Absent, Minimized or Managed (Knee Arthroplasty)  Outcome: Ongoing (interventions implemented as appropriate)

## 2019-10-24 NOTE — PLAN OF CARE
Problem: Patient Care Overview  Goal: Plan of Care Review  Outcome: Ongoing (interventions implemented as appropriate)   10/24/19 2988   Coping/Psychosocial   Plan of Care Reviewed With patient   OTHER   Outcome Summary Pt tolerated treatment with c/o pain of right knee. Pt continues to be able to perform TKR protocol exercises with min c/o difficulty and assist with SLRS and SAQs. Pt ambulated 125 feet with rwx, CGA.    Plan of Care Review   Progress improving

## 2019-10-25 VITALS
HEART RATE: 72 BPM | TEMPERATURE: 97 F | OXYGEN SATURATION: 97 % | RESPIRATION RATE: 16 BRPM | DIASTOLIC BLOOD PRESSURE: 59 MMHG | WEIGHT: 159.06 LBS | SYSTOLIC BLOOD PRESSURE: 120 MMHG | HEIGHT: 66 IN | BODY MASS INDEX: 25.56 KG/M2

## 2019-10-25 PROCEDURE — 97150 GROUP THERAPEUTIC PROCEDURES: CPT

## 2019-10-25 PROCEDURE — 97110 THERAPEUTIC EXERCISES: CPT

## 2019-10-25 RX ADMIN — DOCUSATE SODIUM 100 MG: 100 CAPSULE, LIQUID FILLED ORAL at 08:40

## 2019-10-25 RX ADMIN — ASPIRIN 325 MG: 325 TABLET, DELAYED RELEASE ORAL at 08:40

## 2019-10-25 RX ADMIN — HYDROCODONE BITARTRATE AND ACETAMINOPHEN 1 TABLET: 5; 325 TABLET ORAL at 08:40

## 2019-10-25 RX ADMIN — ESCITALOPRAM 10 MG: 10 TABLET, FILM COATED ORAL at 08:40

## 2019-10-25 RX ADMIN — FAMOTIDINE 40 MG: 40 TABLET, FILM COATED ORAL at 08:40

## 2019-10-25 NOTE — THERAPY TREATMENT NOTE
Patient Name: Chris Cox  : 1945    MRN: [id number]                              Today's Date: 10/25/2019       Admit Date: 10/22/2019    Visit Dx: No diagnosis found.  Patient Active Problem List   Diagnosis   • Abdominal bruit   • Blues   • DD (diverticular disease)   • Menopausal and perimenopausal disorder   • Neuralgia neuritis, sciatic nerve   • Right knee pain   • Primary osteoarthritis of both knees   • Radiculopathy with lower extremity symptoms   • Idiopathic peripheral neuropathy   • Sprain of collateral ligament of right knee   • ADD (attention deficit disorder)   • Functional diarrhea   • Microscopic hematuria   • Chronic pain of left knee   • Bleeding diathesis (CMS/HCC)   • Bilateral lower extremity edema   • Bilateral swelling of feet and ankles   • Hyperglycemia   • Venous stasis   • Attention deficit disorder (ADD) without hyperactivity   • Lesion of skin of scalp   • Pre-operative clearance   • Total knee replacement status     Past Medical History:   Diagnosis Date   • Abdominal pain    • ADHD    • Arthritis    • Bronchitis 2019   • Disease of thyroid gland    • Fractures     MULTIPLE BONE FRACTURES-TOOK FOSAMAX 3.5 YRS   • Hyperlipidemia    • Hypertension    • Irregular heart beat    • Kidney stone    • Migraines    • Mono exposure     AGE 35   • Obstructive sleep apnea     20 YRS AGO-NOT CURRENTLY   • Osteoporosis    • Seizures (CMS/HCC)     As a baby   • Tuberculosis     As a child   • Vertigo      Past Surgical History:   Procedure Laterality Date   • BREAST SURGERY      Benign fibroadnoma removed from the left breast   • HAND SURGERY Bilateral 2012    RECONSTRUCTION ON BOTH HANDS   • LITHOTRIPSY      renal   • TOTAL KNEE ARTHROPLASTY Right 10/22/2019    Procedure: TOTAL KNEE ARTHROPLASTY RIGHT;  Surgeon: Aneesh Okeefe II, MD;  Location: [facility] OR;  Service: Orthopedics   • WRIST SURGERY       General Information     Row Name 10/25/19 1114          PT Evaluation  Time/Intention    Document Type  therapy note (daily note)  -     Mode of Treatment  physical therapy;group therapy  -     Row Name 10/25/19 1114          General Information    Existing Precautions/Restrictions  fall  -     Row Name 10/25/19 1114          Cognitive Assessment/Intervention- PT/OT    Orientation Status (Cognition)  oriented x 3  -       User Key  (r) = Recorded By, (t) = Taken By, (c) = Cosigned By    Initials Name Provider Type     Isabel Amin PTA Physical Therapy Assistant        Mobility     Row Name 10/25/19 1115          Sit-Stand Transfer    Sit-Stand [place] (Transfers)  contact guard  -     Assistive Device (Sit-Stand Transfers)  walker, front-wheeled  -     Row Name 10/25/19 1115          Gait/Stairs Assessment/Training    [place] Level (Gait)  contact guard  -     Assistive Device (Gait)  walker, front-wheeled  -     Distance in Feet (Gait)  125  -EH     Pattern (Gait)  step-to  -EH     Deviations/Abnormal Patterns (Gait)  gait speed decreased;antalgic;monica decreased;stride length decreased  -     Row Name 10/25/19 1115          Mobility Assessment/Intervention    Extremity Weight-bearing Status  right lower extremity  -     Right Lower Extremity (Weight-bearing Status)  weight-bearing as tolerated (WBAT)  -       User Key  (r) = Recorded By, (t) = Taken By, (c) = Cosigned By    Initials Name Provider Type     Isabel Amin PTA Physical Therapy Assistant        Obj/Interventions     Row Name 10/25/19 1115          General ROM    GENERAL ROM COMMENTS  R Knee AROM -15, 88  -     Row Name 10/25/19 1115          Therapeutic Exercise    Comment (Therapeutic Exercise)  R TKA protocol X30 reps, assist with SLRs  -       User Key  (r) = Recorded By, (t) = Taken By, (c) = Cosigned By    Initials Name Provider Type     Isabel Amin PTA Physical Therapy Assistant        Goals/Plan    No documentation.       Clinical Impression     Row Name 10/25/19  1115          Pain Scale: Numbers Pre/Post-Treatment    Pain Location - Side  Right  -EH     Pain Location  knee  -EH     Pain Intervention(s)  Ambulation/increased activity;Repositioned;Cold applied  -     Row Name 10/25/19 1115          Positioning and Restraints    Pre-Treatment Position  sitting in chair/recliner  -EH     Post Treatment Position  chair  -EH     In Chair  reclined;call light within reach;encouraged to call for assist;with family/caregiver  -     Row Name 10/25/19 1115          Pain Scale: Word Pre/Post-Treatment    Pain: Word Scale, Pretreatment  2 - mild pain  -       User Key  (r) = Recorded By, (t) = Taken By, (c) = Cosigned By    Initials Name Provider Type     Isabel Amin PTA Physical Therapy Assistant        Outcome Measures     Row Name 10/25/19 1116          How much help from another person do you currently need...    Turning from your back to your side while in flat bed without using bedrails?  3  -EH     Moving from lying on back to sitting on the side of a flat bed without bedrails?  3  -EH     Moving to and from a bed to a chair (including a wheelchair)?  3  -EH     Standing up from a chair using your arms (e.g., wheelchair, bedside chair)?  3  -EH     Climbing 3-5 steps with a railing?  3  -EH     To walk in hospital room?  3  -EH     AM-PAC 6 Clicks Score (PT)  18  -       User Key  (r) = Recorded By, (t) = Taken By, (c) = Cosigned By    Initials Name Provider Type     Isabel Amin PTA Physical Therapy Assistant        Physical Therapy Education     Title: PT OT SLP Therapies (Resolved)     Topic: Physical Therapy (Resolved)     Point: Mobility training (Resolved)     Learning Progress Summary           Patient Acceptance, E,TB, VU,DU by NL at 10/25/2019 10:34 AM    Acceptance, E,TB,D, VU,DU by  at 10/24/2019  3:19 PM    Acceptance, E, VU by CF at 10/23/2019 11:39 AM    Acceptance, E,TB, VU,NR by CS at 10/22/2019  3:50 PM                   Point: Home exercise  program (Resolved)     Learning Progress Summary           Patient Acceptance, E,TB, VU,DU by NL at 10/25/2019 10:34 AM    Acceptance, E,TB,D, VU,DU by  at 10/24/2019  3:19 PM    Acceptance, E,TB, VU,NR by  at 10/22/2019  3:50 PM                   Point: Body mechanics (Resolved)     Learning Progress Summary           Patient Acceptance, E,TB, VU,DU by NL at 10/25/2019 10:34 AM    Acceptance, E,TB,D, VU,DU by  at 10/24/2019  3:19 PM    Acceptance, E,TB, VU,NR by  at 10/22/2019  3:50 PM                   Point: Precautions (Resolved)     Learning Progress Summary           Patient Acceptance, E,TB, VU,DU by NL at 10/25/2019 10:34 AM    Acceptance, E,TB,D, VU,DU by  at 10/24/2019  3:19 PM    Acceptance, E,TB, VU,NR by  at 10/22/2019  3:50 PM                               User Key     Initials Effective Dates Name Provider Type Discipline    NL 06/16/16 -  Danita Melendrez RN Registered Nurse Nurse     08/19/18 -  Isabel Amin PTA Physical Therapy Assistant PT     05/14/18 -  Raghavendra Erickson, PT Physical Therapist PT     10/14/19 -  Barbara Xie PT Student PT Student PT              PT Recommendation and Plan     Plan of Care Reviewed With: patient  Progress: improving  Outcome Summary: Pt tolerated treatment with c/o pain of right knee. Pt continues to be able to perform TKR protocol exercises with min c/o difficulty and assist with SLRS and SAQs. Pt ambulated 125 feet with rwx, CGA.      Time Calculation:   PT Charges     Row Name 10/25/19 1114             Time Calculation    Start Time  0930  -      Stop Time  1016  -      Time Calculation (min)  46 min  -      PT Received On  10/25/19  -         Time Calculation- PT    Total Timed Code Minutes- PT  36 minute(s)  -        User Key  (r) = Recorded By, (t) = Taken By, (c) = Cosigned By    Initials Name Provider Type     Isabel Amin PTA Physical Therapy Assistant        Therapy Charges for Today     Code Description Service Date  Service Provider Modifiers Qty    29462585971 HC PT THER PROC EA 15 MIN 10/24/2019 Isabel Amin, PTA GP 2    00783085728 HC PT THER PROC GROUP 10/24/2019 Isabel Amin, PTA GP 1    13608752197 HC PT THER SUPP EA 15 MIN 10/24/2019 Isabel Amin, PTA GP 1    76779453369 HC PT THER PROC EA 15 MIN 10/24/2019 Isabel Amin, PTA GP 2    19695168658 HC PT THER PROC GROUP 10/24/2019 Isabel Amin, PTA GP 1    38838126383 HC PT THER PROC EA 15 MIN 10/25/2019 Isabel Amin, PTA GP 1    93045102935 HC PT THER PROC GROUP 10/25/2019 Isabel Amin, PTA GP 1          PT G-Codes  Outcome Measure Options: AM-PAC 6 Clicks Basic Mobility (PT)  AM-PAC 6 Clicks Score (PT): 18    Isabel Amin PTA  10/25/2019

## 2019-10-25 NOTE — PLAN OF CARE
Problem: Patient Care Overview  Goal: Plan of Care Review  Outcome: Ongoing (interventions implemented as appropriate)   10/24/19 4662   Coping/Psychosocial   Plan of Care Reviewed With patient   OTHER   Outcome Summary patient resting comfortably between care, ambulating with assistance, pain controlled at this time, educated on b/p monitoring   Plan of Care Review   Progress improving     Goal: Individualization and Mutuality  Outcome: Ongoing (interventions implemented as appropriate)    Goal: Discharge Needs Assessment  Outcome: Ongoing (interventions implemented as appropriate)    Goal: Interprofessional Rounds/Family Conf  Outcome: Ongoing (interventions implemented as appropriate)      Problem: Fall Risk (Adult)  Goal: Absence of Fall  Outcome: Ongoing (interventions implemented as appropriate)      Problem: Knee Arthroplasty (Total, Partial) (Adult)  Goal: Signs and Symptoms of Listed Potential Problems Will be Absent, Minimized or Managed (Knee Arthroplasty)  Outcome: Ongoing (interventions implemented as appropriate)

## 2019-10-25 NOTE — H&P
Orthopaedic Surgery  History & Physical For Elective Total Knee  Dr. MONALISA Okeefe II  [phone]    HPI:  Patient is a 74 y.o. Not  or  female who presents with End-stage arthritis of the right knee. They failed conservative treatment of their knee pain and a thorough discussion of the risks and benefits of surgery was had. The patient wishes to continue with elective total knee replacement, they were scheduled and are here for surgery. They did get medical clearance as well as a thorough preoperative workup.    MEDICAL HISTORY  Past Medical History:   Diagnosis Date   • Abdominal pain    • ADHD    • Arthritis    • Bronchitis 08/2019   • Disease of thyroid gland    • Fractures     MULTIPLE BONE FRACTURES-TOOK FOSAMAX 3.5 YRS   • Hyperlipidemia    • Hypertension    • Irregular heart beat    • Kidney stone    • Migraines    • Mono exposure     AGE 35   • Obstructive sleep apnea     20 YRS AGO-NOT CURRENTLY   • Osteoporosis    • Seizures (CMS/HCC)     As a baby   • Tuberculosis     As a child   • Vertigo    ·   Past Surgical History:   Procedure Laterality Date   • BREAST SURGERY      Benign fibroadnoma removed from the left breast   • HAND SURGERY Bilateral 2012    RECONSTRUCTION ON BOTH HANDS   • LITHOTRIPSY      renal   • TOTAL KNEE ARTHROPLASTY Right 10/22/2019    Procedure: TOTAL KNEE ARTHROPLASTY RIGHT;  Surgeon: Aneesh Okeefe II, MD;  Location: [facility];  Service: Orthopedics   • WRIST SURGERY     ·   Prior to Admission medications    Medication Sig Start Date End Date Taking? Authorizing Provider   ascorbic acid (VITAMIN C) 500 MG/5ML liquid Take 500 mg by mouth Daily.   Yes Jose Kaba MD   aspirin 325 MG tablet Take 1 tablet by mouth Daily. TAKES FOR PAIN. 10/23/19   Aneesh Okeefe II, MD   Aspirin-Acetaminophen-Caffeine (EXCEDRIN MIGRAINE PO) Take  by mouth As Needed (MIGRAINES).    Jose Kaba MD   BIOTIN PO Take 1 tablet by mouth Every  Other Day. ON HOLD    Jose Kaba MD   Dietary Management Product (GABADONE) capsule Take 1 tablet by mouth. ON HOLD FOR SURGERY 7/29/14   Jose Kaba MD   escitalopram (LEXAPRO) 10 MG tablet Take 1 tablet by mouth Daily. 8/13/19   Raghavendra Marquez MD   HYDROcodone-acetaminophen (NORCO) 5-325 MG per tablet Take 1 tablet by mouth Every 4 (Four) Hours As Needed for Moderate Pain  for up to 10 days. 10/22/19 11/1/19  Aneesh Okeefe II, MD   Mirabegron ER (MYRBETRIQ) 50 MG tablet sustained-release 24 hour 24 hr tablet Take 50 mg by mouth Daily. CURRENTLY OUT OF THIS MED-TOO EXPENSIVE    Jose Kaba MD   Nutritional Supplements (PYCNOGENOL) 30 MG capsule Take 1 capsule by mouth Daily. ON HOLD 7/29/14   Jose Kaba MD   PHOSPHATIDYLSERINE PO Take 1 tablet by mouth Daily. ON HOLD    Jose Kaba MD   vitamin A 06528 UNIT capsule Take 10,000 Units by mouth Daily. ON HOLD    Jose Kaba MD   VITAMIN D, CHOLECALCIFEROL, PO Take 1 tablet by mouth daily. 1/26/13   Jose Kaba MD   VITAMIN K PO Take 1 tablet by mouth Daily. ON HOLD    Jose Kaba MD   ·   Allergies   Allergen Reactions   • Erythromycin Anaphylaxis   • Sulfa Antibiotics Rash   • Milk-Related Compounds GI Intolerance   • Eggs Or Egg-Derived Products Other (See Comments)     PER ALLERGY TESTING RESULTS   • Cephalexin Rash   • Cephalosporins Rash     STOMACH ISSUES   • Molds & Smuts Rash     ITCHING   ·   ·   · There is no immunization history on file for this patient.  Social History     Tobacco Use   • Smoking status: Never Smoker   • Smokeless tobacco: Never Used   Substance Use Topics   • Alcohol use: No   ·    Social History     Substance and Sexual Activity   Drug Use No   ·     REVIEW OF SYSTEMS:  · Head: negative for headache  · Respiratory: negative for shortness of breath.   · Cardiovascular: negative for chest pain.   · Gastrointestinal: negative abdominal pain.  "  · Neurological: negative for LOC  · Psychiatric/Behavioral: negative for memory loss.   · All other systems reviewed and are negative    VITALS: /59 (BP Location: Left arm, Patient Position: Sitting)   Pulse 72   Temp 97 °F (36.1 °C) (Oral)   Resp 16   Ht 167.6 cm (66\")   Wt 72.2 kg (159 lb 1 oz)   SpO2 97%   BMI 25.67 kg/m²  Body mass index is 25.67 kg/m².    PHYSICAL EXAM:   · CONSTITUTIONAL: A&Ox3, No acute distress  · LUNGS: Equal chest rise, no shortness of air  · CARDIOVASCULAR: palpable peripheral pulses  · SKIN: no skin lesions in the area examined  · LYMPH: no lymphadenopathy in the area examined  · EXTREMITY: Knee  · Pulses:  Brisk Capillary Refill  · Sensation: Intact to Saphenous, Sural, Deep Peroneal, Superficial Peroneal, and Tibial Nerves and grossly throughout extremity  · Motor: 5/5 EHL/FHL/TA/GS motor complexes    RADIOLOGY REVIEW:   No radiology results for the last 7 days    LABS:   Results for the past 24 hours: No results found for this or any previous visit (from the past 24 hour(s)).    IMPRESSION:  Patient is a 74 y.o. Not  or  female with end-stage arthritis of the right knee    PLAN:   · Surgery: Elective total knee arthroplasty  · Consent: The risks and benefits of operative versus nonoperative treatment were discussed. The patient elected to undergo operative treatment of their knee arthritis. The risks discussed included but were not limited to blood clots, MI, stroke, other medical complications, infection, damage to neurovascular structures, continued pain, hardware prominence, loss of range of motion, need for further procedures, and and risk of anesthesia..  No guarantees were made   · Disposition: Elective right Total Knee Arthroplasty today.    Aneesh Okeefe II, MD  Orthopaedic Surgery  [facility]    "

## 2019-10-28 ENCOUNTER — TRANSITIONAL CARE MANAGEMENT TELEPHONE ENCOUNTER (OUTPATIENT)
Dept: FAMILY MEDICINE CLINIC | Facility: CLINIC | Age: 74
End: 2019-10-28

## 2019-10-30 NOTE — OUTREACH NOTE
Unable to connect x 3 attempts to complete TCM Encounter. Pt is not sched for a TCM [facility]. Pt is s/p TKR

## 2019-10-31 ENCOUNTER — HOSPITAL ENCOUNTER (EMERGENCY)
Facility: HOSPITAL | Age: 74
Discharge: HOME | End: 2019-10-31
Attending: EMERGENCY MEDICINE | Admitting: EMERGENCY MEDICINE

## 2019-10-31 VITALS
TEMPERATURE: 98.3 F | BODY MASS INDEX: 26.05 KG/M2 | HEIGHT: 66 IN | HEART RATE: 70 BPM | RESPIRATION RATE: 14 BRPM | WEIGHT: 162.1 LBS | SYSTOLIC BLOOD PRESSURE: 127 MMHG | OXYGEN SATURATION: 95 % | DIASTOLIC BLOOD PRESSURE: 56 MMHG

## 2019-10-31 DIAGNOSIS — R60.0 LOCALIZED EDEMA: ICD-10-CM

## 2019-10-31 DIAGNOSIS — M25.461 PAIN AND SWELLING OF KNEE, RIGHT: Primary | ICD-10-CM

## 2019-10-31 DIAGNOSIS — M25.561 PAIN AND SWELLING OF KNEE, RIGHT: Primary | ICD-10-CM

## 2019-10-31 DIAGNOSIS — Z96.651 HISTORY OF RIGHT KNEE JOINT REPLACEMENT: ICD-10-CM

## 2019-10-31 PROCEDURE — 96372 THER/PROPH/DIAG INJ SC/IM: CPT

## 2019-10-31 PROCEDURE — 99282 EMERGENCY DEPT VISIT SF MDM: CPT

## 2019-10-31 PROCEDURE — 25010000002 ENOXAPARIN PER 10 MG: Performed by: EMERGENCY MEDICINE

## 2019-10-31 RX ADMIN — ENOXAPARIN SODIUM 70 MG: 80 INJECTION SUBCUTANEOUS at 23:21

## 2019-11-01 ENCOUNTER — HOSPITAL ENCOUNTER (OUTPATIENT)
Dept: CARDIOLOGY | Facility: HOSPITAL | Age: 74
Setting detail: HOSPITAL OUTPATIENT SURGERY
Discharge: HOME | End: 2019-11-01
Admitting: EMERGENCY MEDICINE

## 2019-11-01 DIAGNOSIS — M25.461 PAIN AND SWELLING OF KNEE, RIGHT: ICD-10-CM

## 2019-11-01 DIAGNOSIS — M25.561 PAIN AND SWELLING OF KNEE, RIGHT: ICD-10-CM

## 2019-11-01 DIAGNOSIS — Z96.651 HISTORY OF RIGHT KNEE JOINT REPLACEMENT: ICD-10-CM

## 2019-11-01 DIAGNOSIS — R60.0 LOCALIZED EDEMA: ICD-10-CM

## 2019-11-01 LAB
BH CV LOW VAS RIGHT GASTRONEMIUS VESSEL: 1
BH CV LOWER VASCULAR LEFT COMMON FEMORAL AUGMENT: NORMAL
BH CV LOWER VASCULAR LEFT COMMON FEMORAL COMPETENT: NORMAL
BH CV LOWER VASCULAR LEFT COMMON FEMORAL COMPRESS: NORMAL
BH CV LOWER VASCULAR LEFT COMMON FEMORAL PHASIC: NORMAL
BH CV LOWER VASCULAR LEFT COMMON FEMORAL SPONT: NORMAL
BH CV LOWER VASCULAR RIGHT COMMON FEMORAL AUGMENT: NORMAL
BH CV LOWER VASCULAR RIGHT COMMON FEMORAL COMPETENT: NORMAL
BH CV LOWER VASCULAR RIGHT COMMON FEMORAL COMPRESS: NORMAL
BH CV LOWER VASCULAR RIGHT COMMON FEMORAL PHASIC: NORMAL
BH CV LOWER VASCULAR RIGHT COMMON FEMORAL SPONT: NORMAL
BH CV LOWER VASCULAR RIGHT DISTAL FEMORAL COMPRESS: NORMAL
BH CV LOWER VASCULAR RIGHT GASTRONEMIUS COMPRESS: NORMAL
BH CV LOWER VASCULAR RIGHT GASTRONEMIUS THROMBUS: NORMAL
BH CV LOWER VASCULAR RIGHT GREATER SAPH AK COMPRESS: NORMAL
BH CV LOWER VASCULAR RIGHT GREATER SAPH BK COMPRESS: NORMAL
BH CV LOWER VASCULAR RIGHT MID FEMORAL AUGMENT: NORMAL
BH CV LOWER VASCULAR RIGHT MID FEMORAL COMPETENT: NORMAL
BH CV LOWER VASCULAR RIGHT MID FEMORAL COMPRESS: NORMAL
BH CV LOWER VASCULAR RIGHT MID FEMORAL PHASIC: NORMAL
BH CV LOWER VASCULAR RIGHT MID FEMORAL SPONT: NORMAL
BH CV LOWER VASCULAR RIGHT PERONEAL COMPRESS: NORMAL
BH CV LOWER VASCULAR RIGHT POPLITEAL AUGMENT: NORMAL
BH CV LOWER VASCULAR RIGHT POPLITEAL COMPETENT: NORMAL
BH CV LOWER VASCULAR RIGHT POPLITEAL COMPRESS: NORMAL
BH CV LOWER VASCULAR RIGHT POPLITEAL PHASIC: NORMAL
BH CV LOWER VASCULAR RIGHT POPLITEAL SPONT: NORMAL
BH CV LOWER VASCULAR RIGHT POSTERIOR TIBIAL COMPRESS: NORMAL
BH CV LOWER VASCULAR RIGHT PROFUNDA FEMORAL COMPRESS: NORMAL
BH CV LOWER VASCULAR RIGHT PROXIMAL FEMORAL COMPRESS: NORMAL
BH CV LOWER VASCULAR RIGHT SAPHENOFEMORAL JUNCTION COMPRESS: NORMAL

## 2019-11-01 PROCEDURE — 93971 EXTREMITY STUDY: CPT

## 2019-11-01 NOTE — ED TRIAGE NOTES
Pt reports right leg pain s/p knee replacement on 10/22. Pt concerned about a blood clot states leg more swollen than the left.

## 2019-11-01 NOTE — DISCHARGE INSTRUCTIONS
Ice and elevation for pain and swelling control tonight, return tomorrow as dictated by the radiology department for an outpatient DVT ultrasound of your lower extremity.  Return to the emergency department tomorrow afternoon if you have not been able to confirm an appointment by 3 PM.

## 2019-11-01 NOTE — ED PROVIDER NOTES
EMERGENCY DEPARTMENT ENCOUNTER    Room Number:  14/14  Date of encounter:  11/1/2019  PCP: Raghavendra Marquez MD  Historian: Patient      HPI:  Chief Complaint: Right knee swelling  A complete HPI/ROS/PMH/PSH/SH/FH are unobtainable due to: None    Context: Chris Cox is a 74 y.o. female who presents to the ED c/o acute onset of swelling of her right knee today as the day progressed.  Patient states that she had a knee replacement on October 22, has not had any significant redness or swelling until today.  She states that she was active but did not overdo it.  States that she has some very mild redness to the inside of her right knee and thigh and the right knee and the entirety is newly swollen.  Denies any specific or significant calf pain or swelling.  Denies any chest pain, shortness of breath.  Is currently taking 325 mill grams of aspirin otherwise not on any other anticoagulation.  No falls or trauma to the knee otherwise reported.      PAST MEDICAL HISTORY  Active Ambulatory Problems     Diagnosis Date Noted   • Abdominal bruit 01/26/2016   • Blues 01/26/2016   • DD (diverticular disease) 01/26/2016   • Menopausal and perimenopausal disorder 01/26/2016   • Neuralgia neuritis, sciatic nerve 01/26/2016   • Right knee pain 01/26/2016   • Primary osteoarthritis of both knees 08/08/2016   • Radiculopathy with lower extremity symptoms 08/15/2016   • Idiopathic peripheral neuropathy 09/23/2016   • Sprain of collateral ligament of right knee 02/03/2017   • ADD (attention deficit disorder) 04/20/2017   • Functional diarrhea 08/22/2017   • Microscopic hematuria 10/10/2017   • Chronic pain of left knee 02/12/2018   • Bleeding diathesis (CMS/HCC) 02/12/2018   • Bilateral lower extremity edema 05/07/2018   • Bilateral swelling of feet and ankles 08/23/2018   • Hyperglycemia 12/03/2018   • Venous stasis 12/03/2018   • Attention deficit disorder (ADD) without hyperactivity 01/07/2019   • Lesion of skin of scalp  08/14/2019   • Pre-operative clearance 10/08/2019   • Total knee replacement status 10/22/2019     Resolved Ambulatory Problems     Diagnosis Date Noted   • Chronic lower back pain 01/26/2016   • Tear of meniscus of left knee 07/29/2016   • Lumbar spine pain 08/15/2016   • Viral URI with cough 03/10/2017   • Dysfunction of right eustachian tube 04/12/2017   • Painful urination 10/10/2017   • Conjunctival hemorrhage 02/12/2018   • Subacute maxillary sinusitis 02/12/2018   • Left hip pain 02/12/2018   • Pain of right hip joint 02/12/2018   • Right shoulder pain 05/07/2018     Past Medical History:   Diagnosis Date   • Abdominal pain    • ADHD    • Arthritis    • Bronchitis 08/2019   • Disease of thyroid gland    • Fractures    • Hyperlipidemia    • Hypertension    • Irregular heart beat    • Kidney stone    • Migraines    • Mono exposure    • Obstructive sleep apnea    • Osteoporosis    • Seizures (CMS/HCC)    • Tuberculosis    • Vertigo          PAST SURGICAL HISTORY  Past Surgical History:   Procedure Laterality Date   • BREAST SURGERY      Benign fibroadnoma removed from the left breast   • HAND SURGERY Bilateral 2012    RECONSTRUCTION ON BOTH HANDS   • LITHOTRIPSY      renal   • TOTAL KNEE ARTHROPLASTY Right 10/22/2019    Procedure: TOTAL KNEE ARTHROPLASTY RIGHT;  Surgeon: Aneesh Okeefe II, MD;  Location: [facility];  Service: Orthopedics   • WRIST SURGERY           FAMILY HISTORY  Family History   Problem Relation Age of Onset   • Hypertension Mother    • Other Mother    • Transient ischemic attack Mother    • Cancer Father         colon   • Diabetes Father    • Hypertension Father    • Other Father         Renal artery aneurysm   • Heart disease Father    • Stroke Father          SOCIAL HISTORY  Social History     Socioeconomic History   • Marital status:      Spouse name: Not on file   • Number of children: Not on file   • Years of education: Not on file   • Highest education level: Not on  file   Tobacco Use   • Smoking status: Never Smoker   • Smokeless tobacco: Never Used   Substance and Sexual Activity   • Alcohol use: No   • Drug use: No   • Sexual activity: Defer         ALLERGIES  Erythromycin; Sulfa antibiotics; Milk-related compounds; Eggs or egg-derived products; Cephalexin; Cephalosporins; and Molds & smuts        REVIEW OF SYSTEMS  Review of Systems     All systems reviewed and negative except for those discussed in HPI.       PHYSICAL EXAM    I have reviewed the triage vital signs and nursing notes.    ED Triage Vitals   Temp Heart Rate Resp BP SpO2   10/31/19 2231 10/31/19 2231 10/31/19 2231 10/31/19 2246 10/31/19 2231   99.4 °F (37.4 °C) 81 16 118/60 95 %      Temp src Heart Rate Source Patient Position BP Location FiO2 (%)   10/31/19 2231 10/31/19 2231 10/31/19 2246 10/31/19 2246 --   Tympanic Monitor Sitting Right arm        Physical Exam  General: Awake, alert, no acute distress  HEENT: EOMI  Pulm: Symmetric chest rise, nonlabored breathing, lungs CTAB  CV: Regular rate and rhythm, intact distal pulses  GI: Non-distended  MSK: No deformity.  Edematous right knee with minimal erythema and mild warmth but no cellulitis or purulent drainage from the surgical site of the anterior knee  Skin: Warm, dry  Neuro: Alert and oriented x 3, moving all extremities, no focal deficit  Psych: Calm, cooperative    Vital signs and nursing notes reviewed.           LAB RESULTS  No results found for this or any previous visit (from the past 24 hour(s)).          RADIOLOGY  No Radiology Exams Resulted Within Past 24 Hours        PROCEDURES    Procedures      MEDICATIONS GIVEN IN ER    Medications   enoxaparin (LOVENOX) syringe 70 mg (70 mg Subcutaneous Given 10/31/19 2321)         PROGRESS, DATA ANALYSIS, CONSULTS, AND MEDICAL DECISION MAKING    All labs have been independently reviewed by me.  All radiology studies have been reviewed by me and discussed with radiologist dictating the report.   EKG's  independently viewed and interpreted by me.  Discussion below represents my analysis of pertinent findings related to patient's condition, differential diagnosis, treatment plan and final disposition.    She arrived today with concerns for possible developing blood clot in her right knee due to acute onset of pain and swelling.  She is approximately 1 week out from right knee replacement.  By exam, no acute infectious process apparent, without cellulitis, purulent drainage, or significant warmth.  More in line with postoperative edema versus DVT.  We will give 1 mg/kg of subcutaneous Lovenox, and order an outpatient DVT ultrasound to be performed tomorrow.  Patient to be given the number for the radiology department to contact them tomorrow to confirm appointment time.  She agrees to return to the emergency part for worsening symptoms otherwise.  Advised elevation and ice for pain and swelling tonight.         AS OF 5:57 AM VITALS:    BP - 127/56  HR - 70  TEMP - 98.3 °F (36.8 °C) (Oral)  02 SATS - 95%        DIAGNOSIS  Final diagnoses:   Pain and swelling of knee, right   History of right knee joint replacement   Localized edema          DISPOSITION  DISCHARGE    Patient discharged in stable condition.    Reviewed implications of results, diagnosis, meds, responsibility to follow up, warning signs and symptoms of possible worsening, potential complications and reasons to return to ER.    Patient/Family voiced understanding of above instructions.    Discussed plan for discharge, as there is no emergent indication for admission. Patient referred to primary care provider for BP management due to today's BP. Pt/family is agreeable and understands need for follow up and repeat testing.  Pt is aware that discharge does not mean that nothing is wrong but it indicates no emergency is present that requires admission and they must continue care with follow-up as given below or physician of their choice.      FOLLOW-UP  [facility]  [address] [facility] [address] [phone]    As needed, If symptoms worsen    Raghavednra Marquez MD  [address] [facility] [id number] [phone]    Schedule an appointment as soon as possible for a visit   As needed    Aneesh Okeefe II, MD  [address] [facility] [address]  [facility] [id number] [phone]    Call   As needed         Medication List      No changes were made to your prescriptions during this visit.                  Gee Swift MD  11/01/19 0557

## 2019-11-01 NOTE — PROGRESS NOTES
Right lower extremity venous doppler completed, Prelim positive for acute calf DVT given to BRENDON Vences and Pt sent home.

## 2019-11-03 ENCOUNTER — HOSPITAL ENCOUNTER (EMERGENCY)
Facility: HOSPITAL | Age: 74
Discharge: HOME | End: 2019-11-03
Attending: EMERGENCY MEDICINE | Admitting: EMERGENCY MEDICINE

## 2019-11-03 VITALS
HEIGHT: 66 IN | TEMPERATURE: 99.1 F | DIASTOLIC BLOOD PRESSURE: 61 MMHG | OXYGEN SATURATION: 93 % | SYSTOLIC BLOOD PRESSURE: 123 MMHG | HEART RATE: 67 BPM | RESPIRATION RATE: 16 BRPM | WEIGHT: 161 LBS | BODY MASS INDEX: 25.88 KG/M2

## 2019-11-03 DIAGNOSIS — M25.461 PAIN AND SWELLING OF RIGHT KNEE: Primary | ICD-10-CM

## 2019-11-03 DIAGNOSIS — M25.561 PAIN AND SWELLING OF RIGHT KNEE: Primary | ICD-10-CM

## 2019-11-03 DIAGNOSIS — I82.431 ACUTE DEEP VEIN THROMBOSIS (DVT) OF POPLITEAL VEIN OF RIGHT LOWER EXTREMITY (HCC): ICD-10-CM

## 2019-11-03 PROCEDURE — 99283 EMERGENCY DEPT VISIT LOW MDM: CPT

## 2019-11-03 PROCEDURE — 87205 SMEAR GRAM STAIN: CPT | Performed by: EMERGENCY MEDICINE

## 2019-11-03 PROCEDURE — 87070 CULTURE OTHR SPECIMN AEROBIC: CPT | Performed by: EMERGENCY MEDICINE

## 2019-11-03 RX ORDER — DOXYCYCLINE 100 MG/1
100 CAPSULE ORAL 2 TIMES DAILY
Qty: 20 CAPSULE | Refills: 0 | Status: SHIPPED | OUTPATIENT
Start: 2019-11-03 | End: 2020-03-09

## 2019-11-04 NOTE — ED TRIAGE NOTES
Pt to ED from home with reports of blood clot to right calf, dx earlier this week.  Pt reports leg more swollen and painful.      Pt taking 10mg eliquis BID.

## 2019-11-04 NOTE — ED PROVIDER NOTES
EMERGENCY DEPARTMENT ENCOUNTER    Room Number:  29/29  Date of encounter:  11/4/2019  PCP: Raghavendra Marquez MD  Historian: Patient      HPI:  Chief Complaint: Right leg pain and swelling  A complete HPI/ROS/PMH/PSH/SH/FH are unobtainable due to: Nothing    Context: Chris Cox is a 74 y.o. female who presents to the ED c/o right leg pain and swelling increasing over the last 3 to 4 days.  Patient had a total knee replacement with Dr. Triston Okeefe on 10/22.  She came back to the ER 2 days ago complaining of increased swelling and pain and was found to have a DVT in her popliteal vein.  For that she was started on Eliquis.    She comes in tonight complaining of increased swelling, some redness around the knee and increased pain.  She has no chest pain or shortness of breath and is been compliant with her Eliquis.      PAST MEDICAL HISTORY  Active Ambulatory Problems     Diagnosis Date Noted   • Abdominal bruit 01/26/2016   • Blues 01/26/2016   • DD (diverticular disease) 01/26/2016   • Menopausal and perimenopausal disorder 01/26/2016   • Neuralgia neuritis, sciatic nerve 01/26/2016   • Right knee pain 01/26/2016   • Primary osteoarthritis of both knees 08/08/2016   • Radiculopathy with lower extremity symptoms 08/15/2016   • Idiopathic peripheral neuropathy 09/23/2016   • Sprain of collateral ligament of right knee 02/03/2017   • ADD (attention deficit disorder) 04/20/2017   • Functional diarrhea 08/22/2017   • Microscopic hematuria 10/10/2017   • Chronic pain of left knee 02/12/2018   • Bleeding diathesis (CMS/HCC) 02/12/2018   • Bilateral lower extremity edema 05/07/2018   • Bilateral swelling of feet and ankles 08/23/2018   • Hyperglycemia 12/03/2018   • Venous stasis 12/03/2018   • Attention deficit disorder (ADD) without hyperactivity 01/07/2019   • Lesion of skin of scalp 08/14/2019   • Pre-operative clearance 10/08/2019   • Total knee replacement status 10/22/2019     Resolved Ambulatory Problems      Diagnosis Date Noted   • Chronic lower back pain 01/26/2016   • Tear of meniscus of left knee 07/29/2016   • Lumbar spine pain 08/15/2016   • Viral URI with cough 03/10/2017   • Dysfunction of right eustachian tube 04/12/2017   • Painful urination 10/10/2017   • Conjunctival hemorrhage 02/12/2018   • Subacute maxillary sinusitis 02/12/2018   • Left hip pain 02/12/2018   • Pain of right hip joint 02/12/2018   • Right shoulder pain 05/07/2018     Past Medical History:   Diagnosis Date   • Abdominal pain    • ADHD    • Arthritis    • Bronchitis 08/2019   • Disease of thyroid gland    • Fractures    • Hyperlipidemia    • Hypertension    • Irregular heart beat    • Kidney stone    • Migraines    • Mono exposure    • Obstructive sleep apnea    • Osteoporosis    • Seizures (CMS/HCC)    • Tuberculosis    • Vertigo          PAST SURGICAL HISTORY  Past Surgical History:   Procedure Laterality Date   • BREAST SURGERY      Benign fibroadnoma removed from the left breast   • HAND SURGERY Bilateral 2012    RECONSTRUCTION ON BOTH HANDS   • LITHOTRIPSY      renal   • TOTAL KNEE ARTHROPLASTY Right 10/22/2019    Procedure: TOTAL KNEE ARTHROPLASTY RIGHT;  Surgeon: Aneesh Okeefe II, MD;  Location: [facility];  Service: Orthopedics   • WRIST SURGERY           FAMILY HISTORY  Family History   Problem Relation Age of Onset   • Hypertension Mother    • Other Mother    • Transient ischemic attack Mother    • Cancer Father         colon   • Diabetes Father    • Hypertension Father    • Other Father         Renal artery aneurysm   • Heart disease Father    • Stroke Father          SOCIAL HISTORY  Social History     Socioeconomic History   • Marital status:      Spouse name: Not on file   • Number of children: Not on file   • Years of education: Not on file   • Highest education level: Not on file   Tobacco Use   • Smoking status: Never Smoker   • Smokeless tobacco: Never Used   Substance and Sexual Activity   • Alcohol  use: No   • Drug use: No   • Sexual activity: Defer         ALLERGIES  Erythromycin; Sulfa antibiotics; Milk-related compounds; Eggs or egg-derived products; Cephalexin; Cephalosporins; and Molds & smuts        REVIEW OF SYSTEMS  Review of Systems     All systems reviewed and negative except for those discussed in HPI.       PHYSICAL EXAM    I have reviewed the triage vital signs and nursing notes.    ED Triage Vitals   Temp Heart Rate Resp BP SpO2   11/03/19 2035 11/03/19 2035 11/03/19 2035 11/03/19 2043 11/03/19 2035   99.1 °F (37.3 °C) 78 18 131/62 99 %      Temp src Heart Rate Source Patient Position BP Location FiO2 (%)   11/03/19 2035 11/03/19 2035 11/03/19 2043 11/03/19 2043 --   Tympanic Monitor Lying Right arm        Physical Exam  GENERAL: not distressed  HENT: nares patent  EYES: no scleral icterus  CV: regular rhythm, regular rate  RESPIRATORY: normal effort  ABDOMEN: soft  MUSCULOSKELETAL: no deformity.  There is a surgical dressing over the right knee.  It was removed to reveal a healing surgical wound with some minimal purulent discharge near the middle, but no fluctuance.  There is some mild taylor-incisional erythema, but no significant skin induration.  The right leg is swollen from the knee down when compared to the left but there is no significant calf tenderness.  She has 2+ DP and PT pulse.  There is a moderate size knee effusion with limited range of motion.  NEURO: alert, moves all extremities, follows commands          LAB RESULTS  No results found for this or any previous visit (from the past 24 hour(s)).    Ordered the above labs and independently reviewed the results.        RADIOLOGY  No Radiology Exams Resulted Within Past 24 Hours    I ordered the above noted radiological studies. Reviewed by me and discussed with radiologist.  See dictation for official radiology interpretation.      PROCEDURES    Procedures      MEDICATIONS GIVEN IN ER    Medications - No data to display      PROGRESS,  DATA ANALYSIS, CONSULTS, AND MEDICAL DECISION MAKING    All labs have been independently reviewed by me.  All radiology studies have been reviewed by me and discussed with radiologist dictating the report.   EKG's independently viewed and interpreted by me.  Discussion below represents my analysis of pertinent findings related to patient's condition, differential diagnosis, treatment plan and final disposition.        ED Course as of Nov 04 0048 Mon Nov 04, 2019 0047 The wound was cleaned and redressed.  There was a culture taken of the purulent discharge and she was started on doxycycline.  I have encouraged her to follow-up with Dr. Okeefe on Monday  [DP]   0047 With respect to the swelling, I see no signs of a propagating DVT.  From review of the records, I see that 3 days ago she had a Doppler that showed a DVT in the popliteal vein.  She has not been completely compliant with elevation and ice, and I have encouraged her to do so.  [DP]   0048 Also provided her with an Eliquis starter pack  [DP]      ED Course User Index  [DP] Wilman Shannon MD       AS OF 12:48 AM VITALS:    BP - 123/61  HR - 67  TEMP - 99.1 °F (37.3 °C) (Tympanic)  02 SATS - 93%        DIAGNOSIS  Final diagnoses:   Pain and swelling of right knee   Acute deep vein thrombosis (DVT) of popliteal vein of right lower extremity (CMS/[facility])         DISPOSITION  Discharge           Wilman Shannon MD  11/04/19 0048

## 2019-11-08 LAB
BACTERIA SPEC AEROBE CULT: NORMAL
GRAM STN SPEC: NORMAL

## 2019-11-13 ENCOUNTER — TELEPHONE (OUTPATIENT)
Dept: FAMILY MEDICINE CLINIC | Facility: CLINIC | Age: 74
End: 2019-11-13

## 2019-11-13 RX ORDER — NITROFURANTOIN 25; 75 MG/1; MG/1
100 CAPSULE ORAL 2 TIMES DAILY
Qty: 20 CAPSULE | Refills: 0 | Status: SHIPPED | OUTPATIENT
Start: 2019-11-13 | End: 2020-03-09

## 2019-11-13 NOTE — TELEPHONE ENCOUNTER
SENT RX TO PHARMACY FOR PATIENT. PT IS AWARE    ----- Message from Juvenal Pizarro MD sent at 11/13/2019 11:01 AM EST -----  Contact: PATIENT  Macrobid 100 mg twice a day, #10      ----- Message -----  From: Marielena Rodriguez MA  Sent: 11/13/2019  10:09 AM  To: MD DR. SHANNAN Salamanca,    THIS IS A PATIENT OF DR. SOMMER, SHE THINKS SHE HAS A UTI, SHE JUST HAD A KNEE REPLACEMENT AT THE END OF October AND HER  IS IN CARDIAC REHAB. CAN YOU CALL SOMETHING IN FOR A UTI FOR PATIENT? PLEASE ADVISE. HER ALLERIGES ARE:   ErythromycinAnaphylaxis  Sulfa AntibioticsRash  Milk-related CompoundsGI Intolerance  Eggs Or Egg-derived ProductsOther (See Comments)  CephalexinRash  CephalosporinsRash  Molds & SmutsRash      ----- Message -----  From: Vangie Escobar  Sent: 11/13/2019   9:57 AM  To: Marielena Rodriguez MA    PATIENT JUST HAD KNEE REPLACEMENT AND CANT DRIVE AND HER  IS IN CARDIAC REHAB AND SHE WANTED TO KNOW IF SHE CAN COME BY AND DROP OFF A URINE SAMPLE BC SHE THINKS SHE MAY HAVE A UTI.    PLEASE CALL PT BACK ABOUT THIS -[phone] OK TO LEAVE VMAIL    THANK YOU

## 2019-12-05 ENCOUNTER — CONSULT (OUTPATIENT)
Dept: ONCOLOGY | Facility: CLINIC | Age: 74
End: 2019-12-05

## 2019-12-05 ENCOUNTER — APPOINTMENT (OUTPATIENT)
Dept: LAB | Facility: HOSPITAL | Age: 74
End: 2019-12-05

## 2019-12-05 VITALS
OXYGEN SATURATION: 99 % | TEMPERATURE: 97 F | BODY MASS INDEX: 26.6 KG/M2 | HEART RATE: 63 BPM | RESPIRATION RATE: 16 BRPM | HEIGHT: 64 IN | WEIGHT: 155.8 LBS | SYSTOLIC BLOOD PRESSURE: 135 MMHG | DIASTOLIC BLOOD PRESSURE: 62 MMHG

## 2019-12-05 DIAGNOSIS — I82.409 DEEP VEIN THROMBOSIS (DVT) OF LOWER EXTREMITY, UNSPECIFIED CHRONICITY, UNSPECIFIED LATERALITY, UNSPECIFIED VEIN (HCC): Primary | ICD-10-CM

## 2019-12-05 DIAGNOSIS — I82.4Z1 ACUTE DEEP VEIN THROMBOSIS (DVT) OF DISTAL VEIN OF RIGHT LOWER EXTREMITY (HCC): Primary | ICD-10-CM

## 2019-12-05 LAB
BASOPHILS # BLD AUTO: 0.02 10*3/MM3 (ref 0–0.2)
BASOPHILS NFR BLD AUTO: 0.3 % (ref 0–1.5)
DEPRECATED RDW RBC AUTO: 45.4 FL (ref 37–54)
EOSINOPHIL # BLD AUTO: 0.08 10*3/MM3 (ref 0–0.4)
EOSINOPHIL NFR BLD AUTO: 1.4 % (ref 0.3–6.2)
ERYTHROCYTE [DISTWIDTH] IN BLOOD BY AUTOMATED COUNT: 14 % (ref 12.3–15.4)
HCT VFR BLD AUTO: 43.1 % (ref 34–46.6)
HGB BLD-MCNC: 14 G/DL (ref 12–15.9)
IMM GRANULOCYTES # BLD AUTO: 0.01 10*3/MM3 (ref 0–0.05)
IMM GRANULOCYTES NFR BLD AUTO: 0.2 % (ref 0–0.5)
LYMPHOCYTES # BLD AUTO: 1.36 10*3/MM3 (ref 0.7–3.1)
LYMPHOCYTES NFR BLD AUTO: 23.8 % (ref 19.6–45.3)
MCH RBC QN AUTO: 29 PG (ref 26.6–33)
MCHC RBC AUTO-ENTMCNC: 32.5 G/DL (ref 31.5–35.7)
MCV RBC AUTO: 89.2 FL (ref 79–97)
MONOCYTES # BLD AUTO: 0.43 10*3/MM3 (ref 0.1–0.9)
MONOCYTES NFR BLD AUTO: 7.5 % (ref 5–12)
NEUTROPHILS # BLD AUTO: 3.82 10*3/MM3 (ref 1.7–7)
NEUTROPHILS NFR BLD AUTO: 66.8 % (ref 42.7–76)
NRBC BLD AUTO-RTO: 0 /100 WBC (ref 0–0.2)
PLATELET # BLD AUTO: 192 10*3/MM3 (ref 140–450)
PMV BLD AUTO: 11.1 FL (ref 6–12)
RBC # BLD AUTO: 4.83 10*6/MM3 (ref 3.77–5.28)
WBC NRBC COR # BLD: 5.72 10*3/MM3 (ref 3.4–10.8)

## 2019-12-05 PROCEDURE — 36416 COLLJ CAPILLARY BLOOD SPEC: CPT

## 2019-12-05 PROCEDURE — 99204 OFFICE O/P NEW MOD 45 MIN: CPT | Performed by: INTERNAL MEDICINE

## 2019-12-05 PROCEDURE — 85025 COMPLETE CBC W/AUTO DIFF WBC: CPT

## 2019-12-05 RX ORDER — VITAMIN B COMPLEX
CAPSULE ORAL DAILY
COMMUNITY
End: 2023-03-15

## 2019-12-05 NOTE — PROGRESS NOTES
Subjective     REASON FOR CONSULTATION:  Provide an opinion on any further workup or treatment on:    Right calf vein thrombosis                       REQUESTING PHYSICIAN: Aneesh Okeefe MD    RECORDS OBTAINED: Records of the patients history including those obtained from the referring provider were reviewed and summarized in detail.    HISTORY OF PRESENT ILLNESS:    Chris Cox is a 74 y.o. patient who was referred for evaluation of chronic calf vein thrombosis.  Patient had right knee replacement on 10/22/2019.  She was hospitalized between 10/22/2019 and 10/25/2019.  After discharge, she was taking aspirin 325 mg daily.  After she went home, she started noticing discomfort in the right calf area which felt to her like a knot.  She did not notice changes in the skin color or temperature.  She had a venous Doppler done on 11/1/2019 which revealed acute deep vein thrombosis in the gastrocnemius/soleal vein.  She was placed on Eliquis 10 mg twice daily for 7 days followed by 5 mg twice daily.  About 3 days after taking the medicine, she started noticing improvement.  She did not notice any symptoms in the other side.  No chest pain or shortness of breath.    Patient reports remote history of phlebitis in the 70s after an object hit her leg.  Otherwise, no history of lower extremity venous thrombosis.    Patient's family history is negative for DVT or PE.       REVIEW OF SYSTEMS:  Review of Systems   Constitutional: Positive for unexpected weight change. Negative for chills and fever.   HENT: Negative for mouth sores, nosebleeds, sore throat and voice change.    Eyes: Positive for visual disturbance.   Respiratory: Negative for cough and shortness of breath.    Cardiovascular: Negative for chest pain and leg swelling.   Gastrointestinal: Positive for diarrhea. Negative for abdominal pain, blood in stool, constipation, nausea and vomiting.   Endocrine: Positive for cold intolerance.   Genitourinary: Positive for  frequency. Negative for dysuria and hematuria.   Musculoskeletal: Positive for back pain and joint swelling. Negative for arthralgias.   Skin: Negative for rash.   Neurological: Negative for dizziness, numbness and headaches.   Hematological: Negative for adenopathy. Does not bruise/bleed easily.   Psychiatric/Behavioral: Negative for dysphoric mood. The patient is not nervous/anxious.        Past Medical History:   Diagnosis Date   • Abdominal pain    • ADHD    • Arthritis     primary both knees   • Bronchitis 08/2019   • Disease of thyroid gland    • Diverticulosis of intestine    • DVT (deep venous thrombosis) (CMS/HCC) 10/2019    right lower extremity   • Fractures     MULTIPLE BONE FRACTURES-TOOK FOSAMAX 3.5 YRS   • Hyperlipidemia    • Hypertension    • Irregular heart beat    • Kidney stone    • Lumbar spine pain    • Migraines    • Mono exposure     AGE 35   • Neuralgia neuritis, sciatic nerve    • Obstructive sleep apnea     20 YRS AGO-NOT CURRENTLY   • Osteoporosis    • Seizures (CMS/[facility])     As a baby   • Tuberculosis     As a child   • Vertigo        Past Surgical History:   Procedure Laterality Date   • BREAST SURGERY      Benign fibroadnoma removed from the left breast   • HAND SURGERY Bilateral 2012    RECONSTRUCTION ON BOTH HANDS   • LITHOTRIPSY      renal   • TONGUE SURGERY     • TOTAL KNEE ARTHROPLASTY Right 10/22/2019    Procedure: TOTAL KNEE ARTHROPLASTY RIGHT;  Surgeon: Aneesh Okeefe II, MD;  Location: [facility];  Service: Orthopedics   • WRIST SURGERY         Social History     Socioeconomic History   • Marital status:      Spouse name: Marky   • Number of children: Not on file   • Years of education: Not on file   • Highest education level: Not on file   Occupational History     Employer: RETIRED   Tobacco Use   • Smoking status: Never Smoker   • Smokeless tobacco: Never Used   Substance and Sexual Activity   • Alcohol use: No   • Drug use: No   • Sexual activity: Defer  "      Cancer-related family history includes Cancer in her father.    MEDICATIONS:    Current Outpatient Medications:   •  Apixaban (ELIQUIS STARTER PACK) tablet, Take two 5 mg tablets by mouth every 12 hours for 7 days. Followed by one 5 mg tablet every 12 hours., Disp: 74 tablet, Rfl: 0  •  ascorbic acid (VITAMIN C) 500 MG/5ML liquid, Take 500 mg by mouth Daily., Disp: , Rfl:   •  B Complex Vitamins (VITAMIN B COMPLEX) capsule capsule, Take  by mouth Daily., Disp: , Rfl:   •  escitalopram (LEXAPRO) 10 MG tablet, Take 1 tablet by mouth Daily., Disp: 90 tablet, Rfl: 1  •  HYDROcodone-acetaminophen (NORCO) 5-325 MG per tablet, Take 1-2 tablets by mouth Every 4 (Four)-6(Six) Hours As Needed for pain., Disp: 40 tablet, Rfl: 0  •  vitamin A 79149 UNIT capsule, Take 10,000 Units by mouth Daily. ON HOLD, Disp: , Rfl:   •  VITAMIN D, CHOLECALCIFEROL, PO, Take 1 tablet by mouth daily., Disp: , Rfl:   •  VITAMIN E PO, Take  by mouth., Disp: , Rfl:   •  VITAMIN K PO, Take 1 tablet by mouth Daily. ON HOLD, Disp: , Rfl:   •  doxycycline (MONODOX) 100 MG capsule, Take 1 capsule by mouth 2 (Two) Times a Day., Disp: 20 capsule, Rfl: 0  •  nitrofurantoin, macrocrystal-monohydrate, (MACROBID) 100 MG capsule, Take 1 capsule by mouth 2 (Two) Times a Day., Disp: 20 capsule, Rfl: 0     ALLERGIES:  Allergies   Allergen Reactions   • Erythromycin Anaphylaxis   • Sulfa Antibiotics Rash   • Milk-Related Compounds GI Intolerance   • Eggs Or Egg-Derived Products Unknown - Low Severity     PER ALLERGY TESTING RESULTS   • Cephalexin Rash   • Cephalosporins Rash     STOMACH ISSUES   • Molds & Smuts Rash     ITCHING        Objective   VITAL SIGNS:  Vitals:    12/05/19 1206   BP: 135/62   Pulse: 63   Resp: 16   Temp: 97 °F (36.1 °C)   TempSrc: Oral   SpO2: 99%   Weight: 70.7 kg (155 lb 12.8 oz)   Height: 163 cm (64.17\")  Comment: new ht   PainSc: 0-No pain       Wt Readings from Last 3 Encounters:   12/05/19 70.7 kg (155 lb 12.8 oz)   11/03/19 73 " kg (161 lb)   10/31/19 73.5 kg (162 lb 1.6 oz)       PHYSICAL EXAMINATION  GENERAL:  The patient appears in good general condition, not in acute distress.  SKIN: Warm and dry. No skin rashes. No ecchymosis.  HEAD:  Normocephalic.  EYES:  No Jaundice. No Pallor. Pupils equal. EOMI.  NECK:  Supple with Good ROM. No Thyromegaly. No Masses.  LYMPHATICS:  No cervical or supraclavicular lymphadenopathy.  CHEST: Normal respiratory effort. Lungs clear to auscultation.   CARDIAC:  Normal S1 & S2. No murmur. No edema.  ABDOMEN:  Soft. No tenderness. No Hepatomegaly. No Splenomegaly. No masses.  EXTREMITIES: No prominent varicose veins.  Mild right mid calf tenderness.  NEUROLOGICAL:  No Focal neurological deficits.         RESULT REVIEW:   Results from last 7 days   Lab Units 12/05/19  1124   WBC 10*3/mm3 5.72   NEUTROS ABS 10*3/mm3 3.82   HEMOGLOBIN g/dL 14.0   HEMATOCRIT % 43.1   PLATELETS 10*3/mm3 192     Venous Doppler right lower extremity on 11/1/2019:  · Acute right lower extremity deep vein thrombosis noted in the gastrocnemius/soleal.  · All other right sided veins appeared normal.    Assessment/Plan   Acute right lower extremity deep vein thrombosis involving the gastrocnemius/soleal vein.  She was diagnosed on 11/1/2019.  Her symptoms started after she had right total knee replacement on 10/22/2019.  She has no prior history of deep vein thrombosis.  Her family history is negative for venous thromboembolism.    Patient episode is considered a provoked episode since it developed soon after the surgery.  She was anticoagulated with Eliquis and noticed improvement in her symptoms about 3 days after taking the medicine.  She is tolerating Eliquis well.  No problem with bleeding.    PLAN:    1.    Based on the location of the deep vein thrombosis, I recommended a 3 month course of anticoagulation.  I will send a prescription to her pharmacy for Eliquis 5 mg twice daily.    2.  I recommended repeating the venous Doppler  in early February 2020.  This will be at the 3-month ni.  If the thrombosis has resolved, we would recommend stopping Eliquis.    3.  I did not recommend thrombophilia work-up since her family history is negative and since this was a provoked episode.    4.  We would recommend prophylactic anticoagulation in the future if the patient undergoes surgical procedures or during hospital stays.    We will see the patient in follow-up in February 2020 to review the venous Doppler and will obtain a CBC at her return visit.        Sunitha Walter MD  12/05/19

## 2020-02-03 ENCOUNTER — HOSPITAL ENCOUNTER (OUTPATIENT)
Dept: CARDIOLOGY | Facility: HOSPITAL | Age: 75
Discharge: HOME | End: 2020-02-03
Admitting: INTERNAL MEDICINE

## 2020-02-03 DIAGNOSIS — I82.4Z1 ACUTE DEEP VEIN THROMBOSIS (DVT) OF DISTAL VEIN OF RIGHT LOWER EXTREMITY (HCC): ICD-10-CM

## 2020-02-03 LAB
BH CV LOW VAS RIGHT GASTRONEMIUS VESSEL: 1
BH CV LOWER VASCULAR LEFT COMMON FEMORAL AUGMENT: NORMAL
BH CV LOWER VASCULAR LEFT COMMON FEMORAL COMPETENT: NORMAL
BH CV LOWER VASCULAR LEFT COMMON FEMORAL COMPRESS: NORMAL
BH CV LOWER VASCULAR LEFT COMMON FEMORAL PHASIC: NORMAL
BH CV LOWER VASCULAR LEFT COMMON FEMORAL SPONT: NORMAL
BH CV LOWER VASCULAR RIGHT COMMON FEMORAL AUGMENT: NORMAL
BH CV LOWER VASCULAR RIGHT COMMON FEMORAL COMPETENT: NORMAL
BH CV LOWER VASCULAR RIGHT COMMON FEMORAL COMPRESS: NORMAL
BH CV LOWER VASCULAR RIGHT COMMON FEMORAL PHASIC: NORMAL
BH CV LOWER VASCULAR RIGHT COMMON FEMORAL SPONT: NORMAL
BH CV LOWER VASCULAR RIGHT DISTAL FEMORAL COMPRESS: NORMAL
BH CV LOWER VASCULAR RIGHT GASTRONEMIUS COMPRESS: NORMAL
BH CV LOWER VASCULAR RIGHT GASTRONEMIUS THROMBUS: NORMAL
BH CV LOWER VASCULAR RIGHT GREATER SAPH AK COMPRESS: NORMAL
BH CV LOWER VASCULAR RIGHT GREATER SAPH BK COMPRESS: NORMAL
BH CV LOWER VASCULAR RIGHT LESSER SAPH COMPRESS: NORMAL
BH CV LOWER VASCULAR RIGHT MID FEMORAL AUGMENT: NORMAL
BH CV LOWER VASCULAR RIGHT MID FEMORAL COMPETENT: NORMAL
BH CV LOWER VASCULAR RIGHT MID FEMORAL COMPRESS: NORMAL
BH CV LOWER VASCULAR RIGHT MID FEMORAL PHASIC: NORMAL
BH CV LOWER VASCULAR RIGHT MID FEMORAL SPONT: NORMAL
BH CV LOWER VASCULAR RIGHT PERONEAL COMPRESS: NORMAL
BH CV LOWER VASCULAR RIGHT POPLITEAL AUGMENT: NORMAL
BH CV LOWER VASCULAR RIGHT POPLITEAL COMPETENT: NORMAL
BH CV LOWER VASCULAR RIGHT POPLITEAL COMPRESS: NORMAL
BH CV LOWER VASCULAR RIGHT POPLITEAL PHASIC: NORMAL
BH CV LOWER VASCULAR RIGHT POPLITEAL SPONT: NORMAL
BH CV LOWER VASCULAR RIGHT POSTERIOR TIBIAL COMPRESS: NORMAL
BH CV LOWER VASCULAR RIGHT PROFUNDA FEMORAL COMPRESS: NORMAL
BH CV LOWER VASCULAR RIGHT PROXIMAL FEMORAL COMPRESS: NORMAL
BH CV LOWER VASCULAR RIGHT SAPHENOFEMORAL JUNCTION COMPRESS: NORMAL

## 2020-02-03 PROCEDURE — 93971 EXTREMITY STUDY: CPT

## 2020-02-04 RX ORDER — ESCITALOPRAM OXALATE 10 MG/1
10 TABLET ORAL DAILY
Qty: 30 TABLET | Refills: 0 | Status: SHIPPED | OUTPATIENT
Start: 2020-02-04 | End: 2020-02-13 | Stop reason: SDUPTHER

## 2020-02-07 ENCOUNTER — OFFICE VISIT (OUTPATIENT)
Dept: ONCOLOGY | Facility: CLINIC | Age: 75
End: 2020-02-07

## 2020-02-07 ENCOUNTER — LAB (OUTPATIENT)
Dept: LAB | Facility: HOSPITAL | Age: 75
End: 2020-02-07

## 2020-02-07 VITALS
HEART RATE: 62 BPM | DIASTOLIC BLOOD PRESSURE: 80 MMHG | OXYGEN SATURATION: 96 % | SYSTOLIC BLOOD PRESSURE: 149 MMHG | HEIGHT: 64 IN | RESPIRATION RATE: 16 BRPM | WEIGHT: 154.4 LBS | BODY MASS INDEX: 26.36 KG/M2 | TEMPERATURE: 98.3 F

## 2020-02-07 DIAGNOSIS — I82.4Z1 ACUTE DEEP VEIN THROMBOSIS (DVT) OF DISTAL VEIN OF RIGHT LOWER EXTREMITY (HCC): ICD-10-CM

## 2020-02-07 DIAGNOSIS — I82.561 CHRONIC DEEP VEIN THROMBOSIS (DVT) OF CALF MUSCLE VEIN OF RIGHT LOWER EXTREMITY (HCC): Primary | ICD-10-CM

## 2020-02-07 LAB
BASOPHILS # BLD AUTO: 0.03 10*3/MM3 (ref 0–0.2)
BASOPHILS NFR BLD AUTO: 0.6 % (ref 0–1.5)
DEPRECATED RDW RBC AUTO: 47.9 FL (ref 37–54)
EOSINOPHIL # BLD AUTO: 0.1 10*3/MM3 (ref 0–0.4)
EOSINOPHIL NFR BLD AUTO: 2 % (ref 0.3–6.2)
ERYTHROCYTE [DISTWIDTH] IN BLOOD BY AUTOMATED COUNT: 14.7 % (ref 12.3–15.4)
HCT VFR BLD AUTO: 42.3 % (ref 34–46.6)
HGB BLD-MCNC: 13.5 G/DL (ref 12–15.9)
IMM GRANULOCYTES # BLD AUTO: 0.08 10*3/MM3 (ref 0–0.05)
IMM GRANULOCYTES NFR BLD AUTO: 1.6 % (ref 0–0.5)
LYMPHOCYTES # BLD AUTO: 1.43 10*3/MM3 (ref 0.7–3.1)
LYMPHOCYTES NFR BLD AUTO: 28.4 % (ref 19.6–45.3)
MCH RBC QN AUTO: 28.5 PG (ref 26.6–33)
MCHC RBC AUTO-ENTMCNC: 31.9 G/DL (ref 31.5–35.7)
MCV RBC AUTO: 89.2 FL (ref 79–97)
MONOCYTES # BLD AUTO: 0.34 10*3/MM3 (ref 0.1–0.9)
MONOCYTES NFR BLD AUTO: 6.7 % (ref 5–12)
NEUTROPHILS # BLD AUTO: 3.06 10*3/MM3 (ref 1.7–7)
NEUTROPHILS NFR BLD AUTO: 60.7 % (ref 42.7–76)
NRBC BLD AUTO-RTO: 0 /100 WBC (ref 0–0.2)
PLATELET # BLD AUTO: 164 10*3/MM3 (ref 140–450)
PMV BLD AUTO: 11.6 FL (ref 6–12)
RBC # BLD AUTO: 4.74 10*6/MM3 (ref 3.77–5.28)
WBC NRBC COR # BLD: 5.04 10*3/MM3 (ref 3.4–10.8)

## 2020-02-07 PROCEDURE — 36415 COLL VENOUS BLD VENIPUNCTURE: CPT

## 2020-02-07 PROCEDURE — 99214 OFFICE O/P EST MOD 30 MIN: CPT | Performed by: INTERNAL MEDICINE

## 2020-02-07 PROCEDURE — 85025 COMPLETE CBC W/AUTO DIFF WBC: CPT

## 2020-02-07 NOTE — PROGRESS NOTES
Subjective     CHIEF COMPLAINT:      Chief Complaint   Patient presents with   • Follow-up     discuss doppler results       HISTORY OF PRESENT ILLNESS:     Chris Cox is a 74 y.o. female patient who returns today for follow up on her right lower extremity deep vein thrombosis. She is on Eliquis 5 mg twice daily. She is tolerating it well. She is not having problem with bleeding or bruising.     Patient reports occasional discomfort in the right knee area.          REVIEW OF SYSTEMS:  Review of Systems   Constitutional: Negative for chills, fever and unexpected weight change.   HENT: Negative for mouth sores, nosebleeds, sore throat and voice change.    Eyes: Negative for visual disturbance.   Respiratory: Negative for cough and shortness of breath.    Cardiovascular: Positive for leg swelling. Negative for chest pain.   Gastrointestinal: Negative for abdominal pain, blood in stool, constipation, diarrhea, nausea and vomiting.   Genitourinary: Negative for dysuria, frequency and hematuria.   Musculoskeletal: Negative for arthralgias, back pain and joint swelling.   Skin: Negative for rash.   Neurological: Negative for dizziness, numbness and headaches.   Hematological: Negative for adenopathy. Bruises/bleeds easily.   Psychiatric/Behavioral: Negative for dysphoric mood. The patient is not nervous/anxious.      I verified the ROS obtained by the MA.      Past Medical History:   Diagnosis Date   • Abdominal pain    • ADHD    • Arthritis     primary both knees   • Bronchitis 08/2019   • Disease of thyroid gland    • Diverticulosis of intestine    • DVT (deep venous thrombosis) (CMS/[facility]) 10/2019    right lower extremity   • Fractures     MULTIPLE BONE FRACTURES-TOOK FOSAMAX 3.5 YRS   • Hyperlipidemia    • Hypertension    • Irregular heart beat    • Kidney stone    • Lumbar spine pain    • Migraines    • Mono exposure     AGE 35   • Neuralgia neuritis, sciatic nerve    • Obstructive sleep apnea     20 YRS AGO-NOT  CURRENTLY   • Osteoporosis    • Seizures (CMS/HCC)     As a baby   • Tuberculosis     As a child   • Vertigo        Past Surgical History:   Procedure Laterality Date   • BREAST SURGERY      Benign fibroadnoma removed from the left breast   • HAND SURGERY Bilateral 2012    RECONSTRUCTION ON BOTH HANDS   • LITHOTRIPSY      renal   • TONGUE SURGERY     • TOTAL KNEE ARTHROPLASTY Right 10/22/2019    Procedure: TOTAL KNEE ARTHROPLASTY RIGHT;  Surgeon: Aneesh Okeefe II, MD;  Location: [facility];  Service: Orthopedics   • WRIST SURGERY         Cancer-related family history includes Cancer in her father and paternal grandfather.  Social History     Tobacco Use   • Smoking status: Never Smoker   • Smokeless tobacco: Never Used   Substance Use Topics   • Alcohol use: Yes     Comment: wine       MEDICATIONS:    Current Outpatient Medications:   •  apixaban (ELIQUIS) 5 MG tablet tablet, Take 1 tablet by mouth Every 12 (Twelve) Hours., Disp: 60 tablet, Rfl: 1  •  ascorbic acid (VITAMIN C) 500 MG/5ML liquid, Take 500 mg by mouth Daily., Disp: , Rfl:   •  B Complex Vitamins (VITAMIN B COMPLEX) capsule capsule, Take  by mouth Daily., Disp: , Rfl:   •  escitalopram (LEXAPRO) 10 MG tablet, TAKE 1 TABLET BY MOUTH DAILY, Disp: 30 tablet, Rfl: 0  •  vitamin A 59774 UNIT capsule, Take 10,000 Units by mouth Daily. ON HOLD, Disp: , Rfl:   •  VITAMIN D, CHOLECALCIFEROL, PO, Take 1 tablet by mouth daily., Disp: , Rfl:   •  VITAMIN E PO, Take  by mouth., Disp: , Rfl:   •  VITAMIN K PO, Take 1 tablet by mouth Daily. ON HOLD, Disp: , Rfl:   •  doxycycline (MONODOX) 100 MG capsule, Take 1 capsule by mouth 2 (Two) Times a Day., Disp: 20 capsule, Rfl: 0  •  HYDROcodone-acetaminophen (NORCO) 5-325 MG per tablet, Take 1-2 tablets by mouth Every 4 (Four)-6(Six) Hours As Needed for pain., Disp: 40 tablet, Rfl: 0  •  nitrofurantoin, macrocrystal-monohydrate, (MACROBID) 100 MG capsule, Take 1 capsule by mouth 2 (Two) Times a Day., Disp:  "20 capsule, Rfl: 0    ALLERGIES:  Allergies   Allergen Reactions   • Erythromycin Anaphylaxis   • Sulfa Antibiotics Rash   • Milk-Related Compounds GI Intolerance   • Eggs Or Egg-Derived Products Unknown - Low Severity     PER ALLERGY TESTING RESULTS   • Cephalexin Rash   • Cephalosporins Rash     STOMACH ISSUES   • Molds & Smuts Rash     ITCHING         Objective   VITAL SIGNS:     Vitals:    02/07/20 1100   BP: 149/80   Pulse: 62   Resp: 16   Temp: 98.3 °F (36.8 °C)   TempSrc: Oral   SpO2: 96%   Weight: 70 kg (154 lb 6.4 oz)   Height: 163 cm (64.17\")   PainSc: 0-No pain     Body mass index is 26.36 kg/m².     Wt Readings from Last 3 Encounters:   02/07/20 70 kg (154 lb 6.4 oz)   12/05/19 70.7 kg (155 lb 12.8 oz)   11/03/19 73 kg (161 lb)       PHYSICAL EXAMINATION:  GENERAL:  The patient appears in good general condition, not in acute distress.  SKIN: Warm and dry. No skin rashes. No ecchymosis.  HEAD:  Normocephalic.  EYES:  No Jaundice. No Pallor. Pupils equal. EOMI.  CHEST: Normal respiratory effort.   CARDIAC:  No edema.  EXTREMITIES:  Some prominent veins in the right leg but no signs of superficial thrombophlebitis. No Calf tenderness.  NEUROLOGICAL:  No Focal neurological deficits.     DIAGNOSTIC DATA:     Results from last 7 days   Lab Units 02/07/20  1049   WBC 10*3/mm3 5.04   NEUTROS ABS 10*3/mm3 3.06   HEMOGLOBIN g/dL 13.5   HEMATOCRIT % 42.3   PLATELETS 10*3/mm3 164     Venous Doppler right lower extremity on 2/3/2020:  · Chronic right lower extremity deep vein thrombosis noted in the gastrocnemius/soleal.  · All other right sided veins appeared normal.    Assessment/Plan   Acute right lower extremity deep vein thrombosis involving the gastrocnemius/soleal vein, diagnosed on 11/1/2019.    · Her symptoms started after she had right total knee replacement on 10/22/2019.    · She has no prior history of deep vein thrombosis.    · Her family history is negative for VTE.  · Since it was a provoked episode, " thrombophilia workup was not recommended.   · Venous doppler on 2/3/2020 after 3 months of anticoagulation showed a chronic thrombus in the gastrocnemius/soleal vein.  · The patient is not having symptoms at present.  · Since the thrombus is now chronic and since it is in a small calf vein, I recommended switching to ASA 81 mg daily after she completes her current supply of Eliquis.      PLAN:    1.  Stop Eliquis in about 2 weeks.  2.  The day after she stops Eliquis, I asked her to start ASA 81 mg a day and to continue with ASA long-term.  3.  The patient will need prophylactic anticoagulation in the future during periods of increased risk like in-patient hospital stays and in the post-operative period.   4.  For prophylaxis when the patient travels, I asked to take Eliquis 2.5 mg (1/2 a 5 mg tablet) BID x 2 days and will hold ASA on the days she is taking Eliquis.     I did not schedule her for a routine follow up at our office. We will see her in the future if the need arises.     Sunitha Walter MD  02/07/20

## 2020-02-13 ENCOUNTER — OFFICE VISIT (OUTPATIENT)
Dept: FAMILY MEDICINE CLINIC | Facility: CLINIC | Age: 75
End: 2020-02-13

## 2020-02-13 VITALS
RESPIRATION RATE: 16 BRPM | HEART RATE: 65 BPM | OXYGEN SATURATION: 98 % | DIASTOLIC BLOOD PRESSURE: 62 MMHG | WEIGHT: 154.2 LBS | HEIGHT: 64 IN | TEMPERATURE: 97.6 F | BODY MASS INDEX: 26.32 KG/M2 | SYSTOLIC BLOOD PRESSURE: 112 MMHG

## 2020-02-13 DIAGNOSIS — L03.211 CELLULITIS OF FACE: Primary | ICD-10-CM

## 2020-02-13 DIAGNOSIS — L30.9 DERMATITIS: ICD-10-CM

## 2020-02-13 DIAGNOSIS — F32.5 MAJOR DEPRESSIVE DISORDER WITH SINGLE EPISODE, IN FULL REMISSION (HCC): ICD-10-CM

## 2020-02-13 PROCEDURE — 99213 OFFICE O/P EST LOW 20 MIN: CPT | Performed by: INTERNAL MEDICINE

## 2020-02-13 RX ORDER — BETAMETHASONE DIPROPIONATE 0.5 MG/G
CREAM TOPICAL 2 TIMES DAILY
Qty: 15 G | Refills: 0 | Status: SHIPPED | OUTPATIENT
Start: 2020-02-13 | End: 2020-06-24

## 2020-02-13 RX ORDER — ESCITALOPRAM OXALATE 10 MG/1
10 TABLET ORAL DAILY
Qty: 30 TABLET | Refills: 5 | Status: SHIPPED | OUTPATIENT
Start: 2020-02-13 | End: 2021-03-25

## 2020-02-13 RX ORDER — AMOXICILLIN AND CLAVULANATE POTASSIUM 875; 125 MG/1; MG/1
1 TABLET, FILM COATED ORAL 2 TIMES DAILY
Qty: 14 TABLET | Refills: 1 | Status: SHIPPED | OUTPATIENT
Start: 2020-02-13 | End: 2020-03-09

## 2020-03-01 PROBLEM — L30.9 DERMATITIS: Status: ACTIVE | Noted: 2020-03-01

## 2020-03-01 PROBLEM — L03.211 CELLULITIS OF FACE: Status: ACTIVE | Noted: 2020-03-01

## 2020-03-01 PROBLEM — F32.5 MAJOR DEPRESSIVE DISORDER WITH SINGLE EPISODE, IN FULL REMISSION: Status: ACTIVE | Noted: 2020-03-01

## 2020-03-01 NOTE — PROGRESS NOTES
Subjective   Chris Cox is a 74 y.o. female. Patient is here today for   Chief Complaint   Patient presents with   • Follow-up     med refill, had knee replacement 10/22/2019 deveoped blood clot in right calf    • Rash     on clavical and face x couple years           Vitals:    02/13/20 1257   BP: 112/62   Pulse: 65   Resp: 16   Temp: 97.6 °F (36.4 °C)   SpO2: 98%     Body mass index is 26.47 kg/m².      Past Medical History:   Diagnosis Date   • Abdominal pain    • ADHD    • Arthritis     primary both knees   • Bronchitis 08/2019   • Disease of thyroid gland    • Diverticulosis of intestine    • DVT (deep venous thrombosis) (CMS/HCC) 10/2019    right lower extremity   • Fractures     MULTIPLE BONE FRACTURES-TOOK FOSAMAX 3.5 YRS   • Hyperlipidemia    • Hypertension    • Irregular heart beat    • Kidney stone    • Lumbar spine pain    • Migraines    • Mono exposure     AGE 35   • Neuralgia neuritis, sciatic nerve    • Obstructive sleep apnea     20 YRS AGO-NOT CURRENTLY   • Osteoporosis    • Seizures (CMS/[facility])     As a baby   • Tuberculosis     As a child   • Vertigo       Allergies   Allergen Reactions   • Erythromycin Anaphylaxis   • Sulfa Antibiotics Rash   • Milk-Related Compounds GI Intolerance   • Eggs Or Egg-Derived Products Unknown - Low Severity     PER ALLERGY TESTING RESULTS   • Cephalexin Rash   • Cephalosporins Rash     STOMACH ISSUES   • Molds & Smuts Rash     ITCHING      Social History     Socioeconomic History   • Marital status:      Spouse name: Marky   • Number of children: Not on file   • Years of education: College   • Highest education level: Not on file   Occupational History   • Occupation: Pediatric Occupational therapist     Comment: [employer]   Tobacco Use   • Smoking status: Never Smoker   • Smokeless tobacco: Never Used   Substance and Sexual Activity   • Alcohol use: Yes     Comment: wine   • Drug use: No   • Sexual activity: Defer        Current Outpatient  Medications:   •  ascorbic acid (VITAMIN C) 500 MG/5ML liquid, Take 500 mg by mouth Daily., Disp: , Rfl:   •  escitalopram (LEXAPRO) 10 MG tablet, Take 1 tablet by mouth Daily., Disp: 30 tablet, Rfl: 5  •  vitamin A 74289 UNIT capsule, Take 10,000 Units by mouth Daily. ON HOLD, Disp: , Rfl:   •  VITAMIN D, CHOLECALCIFEROL, PO, Take 1 tablet by mouth daily., Disp: , Rfl:   •  VITAMIN E PO, Take  by mouth., Disp: , Rfl:   •  VITAMIN K PO, Take 1 tablet by mouth Daily. ON HOLD, Disp: , Rfl:   •  amoxicillin-clavulanate (AUGMENTIN) 875-125 MG per tablet, Take 1 tablet by mouth 2 (Two) Times a Day., Disp: 14 tablet, Rfl: 1  •  B Complex Vitamins (VITAMIN B COMPLEX) capsule capsule, Take  by mouth Daily., Disp: , Rfl:   •  betamethasone dipropionate (DIPROLENE) 0.05 % cream, Apply  topically to the appropriate area as directed 2 (Two) Times a Day., Disp: 15 g, Rfl: 0  •  doxycycline (MONODOX) 100 MG capsule, Take 1 capsule by mouth 2 (Two) Times a Day., Disp: 20 capsule, Rfl: 0  •  HYDROcodone-acetaminophen (NORCO) 5-325 MG per tablet, Take 1-2 tablets by mouth Every 4 (Four)-6(Six) Hours As Needed for pain., Disp: 40 tablet, Rfl: 0  •  nitrofurantoin, macrocrystal-monohydrate, (MACROBID) 100 MG capsule, Take 1 capsule by mouth 2 (Two) Times a Day., Disp: 20 capsule, Rfl: 0     Objective     She has developed a rash on her face and left clavicular area.  It is erythematous it does not itch.    She is Status post knee replacement on October 22 last year and she developed blood clot in her right calf is a complication.  She is pleased with results following surgery however.       Review of Systems   Constitutional: Negative.    HENT: Negative.    Respiratory: Negative.    Cardiovascular: Negative.    Skin:        Left-sided facial and clavicular erythema.   Psychiatric/Behavioral: Negative.        Physical Exam   Constitutional: She is oriented to person, place, and time. She appears well-developed and well-nourished.    Pleasant, neatly groomed, BMI 26.   HENT:   Head: Normocephalic and atraumatic.   Cardiovascular: Normal rate.   Pulmonary/Chest: Effort normal and breath sounds normal.   Neurological: She is alert and oriented to person, place, and time.   Skin:   She has some left-sided facial erythema and a little bit of erythema on her left clavicular area as well.    Psychiatric: She has a normal mood and affect. Her behavior is normal.   Nursing note and vitals reviewed.        Problem List Items Addressed This Visit        Musculoskeletal and Integument    Dermatitis    Relevant Medications    betamethasone dipropionate (DIPROLENE) 0.05 % cream       Other    Cellulitis of face - Primary    Major depressive disorder with single episode, in full remission (CMS/[facility])    Relevant Medications    escitalopram (LEXAPRO) 10 MG tablet            PLAN  She feels her depression is well controlled on S-Citalopram.  We will continue with citalopram.    She has a cellulitis of the face and perhaps a dermatitis.  I gave her a prescription for Augmentin and betamethasone.    Follow-up 6 months or so.  No follow-ups on file.

## 2020-03-04 RX ORDER — ESCITALOPRAM OXALATE 10 MG/1
10 TABLET ORAL DAILY
Qty: 30 TABLET | Refills: 5 | OUTPATIENT
Start: 2020-03-04

## 2020-03-09 ENCOUNTER — OFFICE VISIT (OUTPATIENT)
Dept: FAMILY MEDICINE CLINIC | Facility: CLINIC | Age: 75
End: 2020-03-09

## 2020-03-09 VITALS
TEMPERATURE: 98.2 F | OXYGEN SATURATION: 98 % | HEIGHT: 64 IN | WEIGHT: 153 LBS | HEART RATE: 70 BPM | SYSTOLIC BLOOD PRESSURE: 108 MMHG | DIASTOLIC BLOOD PRESSURE: 66 MMHG | BODY MASS INDEX: 26.12 KG/M2 | RESPIRATION RATE: 16 BRPM

## 2020-03-09 DIAGNOSIS — R30.0 DYSURIA: Primary | ICD-10-CM

## 2020-03-09 DIAGNOSIS — N30.90 CYSTITIS: ICD-10-CM

## 2020-03-09 LAB
BILIRUB BLD-MCNC: ABNORMAL MG/DL
CLARITY, POC: CLEAR
COLOR UR: YELLOW
GLUCOSE UR STRIP-MCNC: NEGATIVE MG/DL
KETONES UR QL: NEGATIVE
LEUKOCYTE EST, POC: NEGATIVE
NITRITE UR-MCNC: NEGATIVE MG/ML
PH UR: 6 [PH] (ref 5–8)
PROT UR STRIP-MCNC: ABNORMAL MG/DL
RBC # UR STRIP: NEGATIVE /UL
SP GR UR: 1.02 (ref 1–1.03)
UROBILINOGEN UR QL: NORMAL

## 2020-03-09 PROCEDURE — 81003 URINALYSIS AUTO W/O SCOPE: CPT | Performed by: NURSE PRACTITIONER

## 2020-03-09 PROCEDURE — 99213 OFFICE O/P EST LOW 20 MIN: CPT | Performed by: NURSE PRACTITIONER

## 2020-03-09 RX ORDER — NITROFURANTOIN 25; 75 MG/1; MG/1
100 CAPSULE ORAL 2 TIMES DAILY
Qty: 10 CAPSULE | Refills: 0 | Status: SHIPPED | OUTPATIENT
Start: 2020-03-09 | End: 2020-03-14

## 2020-03-09 NOTE — PROGRESS NOTES
Subjective     Chris Cox is a 74 y.o.. female.     Urinary Tract Infection    This is a new problem. Episode onset: 3 days. The problem has been unchanged. There has been no fever. Associated symptoms include flank pain, frequency and urgency. Pertinent negatives include no hematuria, nausea or vomiting. She has tried nothing for the symptoms. The treatment provided no relief.       The following portions of the patient's history were reviewed and updated as appropriate: allergies, current medications, past family history, past medical history, past social history, past surgical history and problem list.    Past Medical History:   Diagnosis Date   • Abdominal pain    • ADHD    • Arthritis     primary both knees   • Bronchitis 08/2019   • Disease of thyroid gland    • Diverticulosis of intestine    • DVT (deep venous thrombosis) (CMS/HCC) 10/2019    right lower extremity   • Fractures     MULTIPLE BONE FRACTURES-TOOK FOSAMAX 3.5 YRS   • Hyperlipidemia    • Hypertension    • Irregular heart beat    • Kidney stone    • Lumbar spine pain    • Migraines    • Mono exposure     AGE 35   • Neuralgia neuritis, sciatic nerve    • Obstructive sleep apnea     20 YRS AGO-NOT CURRENTLY   • Osteoporosis    • Seizures (CMS/[facility])     As a baby   • Tuberculosis     As a child   • Vertigo        Past Surgical History:   Procedure Laterality Date   • BREAST SURGERY      Benign fibroadnoma removed from the left breast   • HAND SURGERY Bilateral 2012    RECONSTRUCTION ON BOTH HANDS   • LITHOTRIPSY      renal   • TONGUE SURGERY     • TOTAL KNEE ARTHROPLASTY Right 10/22/2019    Procedure: TOTAL KNEE ARTHROPLASTY RIGHT;  Surgeon: Aneesh Okeefe II, MD;  Location: [facility];  Service: Orthopedics   • WRIST SURGERY         Review of Systems   Constitutional: Negative for fever.   Gastrointestinal: Negative for nausea and vomiting.   Genitourinary: Positive for dysuria, flank pain, frequency and urgency. Negative for  "hematuria.       Allergies   Allergen Reactions   • Erythromycin Anaphylaxis   • Sulfa Antibiotics Rash   • Milk-Related Compounds GI Intolerance   • Eggs Or Egg-Derived Products Unknown - Low Severity     PER ALLERGY TESTING RESULTS   • Cephalexin Rash   • Cephalosporins Rash     STOMACH ISSUES   • Molds & Smuts Rash     ITCHING       Objective     Vitals:    03/09/20 0935   BP: 108/66   Pulse: 70   Resp: 16   Temp: 98.2 °F (36.8 °C)   SpO2: 98%   Weight: 69.4 kg (153 lb)   Height: 162 cm (63.78\")     Body mass index is 26.44 kg/m².    Physical Exam   Constitutional: She is oriented to person, place, and time. She appears well-developed and well-nourished.   HENT:   Head: Normocephalic and atraumatic.   Eyes: Pupils are equal, round, and reactive to light.   Cardiovascular: Normal rate and regular rhythm.   Pulmonary/Chest: Effort normal and breath sounds normal.   Musculoskeletal: Normal range of motion.   Neurological: She is alert and oriented to person, place, and time.   Vitals reviewed.        Current Outpatient Medications:   •  ascorbic acid (VITAMIN C) 500 MG/5ML liquid, Take 500 mg by mouth Daily., Disp: , Rfl:   •  B Complex Vitamins (VITAMIN B COMPLEX) capsule capsule, Take  by mouth Daily., Disp: , Rfl:   •  betamethasone dipropionate (DIPROLENE) 0.05 % cream, Apply  topically to the appropriate area as directed 2 (Two) Times a Day., Disp: 15 g, Rfl: 0  •  escitalopram (LEXAPRO) 10 MG tablet, Take 1 tablet by mouth Daily., Disp: 30 tablet, Rfl: 5  •  HYDROcodone-acetaminophen (NORCO) 5-325 MG per tablet, Take 1-2 tablets by mouth Every 4 (Four)-6(Six) Hours As Needed for pain., Disp: 40 tablet, Rfl: 0  •  nitrofurantoin, macrocrystal-monohydrate, (MACROBID) 100 MG capsule, Take 1 capsule by mouth 2 (Two) Times a Day for 5 days., Disp: 10 capsule, Rfl: 0  •  vitamin A 29599 UNIT capsule, Take 10,000 Units by mouth Daily. ON HOLD, Disp: , Rfl:   •  VITAMIN D, CHOLECALCIFEROL, PO, Take 1 tablet by mouth " daily., Disp: , Rfl:   •  VITAMIN E PO, Take  by mouth., Disp: , Rfl:   •  VITAMIN K PO, Take 1 tablet by mouth Daily. ON HOLD, Disp: , Rfl:     Lab Results (last 24 hours)     Procedure Component Value Units Date/Time    POC Urinalysis Dipstick, Automated [[id number]]  (Abnormal) Collected:  03/09/20 1107    Specimen:  Urine Updated:  03/09/20 1109     Color Yellow     Clarity, UA Clear     Specific Gravity  1.025     pH, Urine 6.0     Leukocytes Negative     Nitrite, UA Negative     Protein, POC 30 mg/dL mg/dL      Glucose, UA Negative mg/dL      Ketones, UA Negative     Urobilinogen, UA Normal     Bilirubin Small (1+)     Blood, UA Negative          Assessment/Plan   Chris was seen today for urinary tract infection.    Diagnoses and all orders for this visit:    Dysuria  -     POC Urinalysis Dipstick, Automated    Cystitis  -     Cancel: Urine Culture - Urine, Urine, Clean Catch    Other orders  -     nitrofurantoin, macrocrystal-monohydrate, (MACROBID) 100 MG capsule; Take 1 capsule by mouth 2 (Two) Times a Day for 5 days.        Patient Instructions   Drink plenty of fluids--water      Return if symptoms worsen or fail to improve.

## 2020-03-17 ENCOUNTER — TELEPHONE (OUTPATIENT)
Dept: FAMILY MEDICINE CLINIC | Facility: CLINIC | Age: 75
End: 2020-03-17

## 2020-03-17 NOTE — TELEPHONE ENCOUNTER
SATTERLY,APRN -QUANTA[employer] IT WAS POSITIVE FOR SIGNIFICANT IN BOTH LEGS- NEEDS A WORK UP FOR PVD    THANKS NADER

## 2020-03-19 NOTE — TELEPHONE ENCOUNTER
"Per Dr. Marquez \"I can't find this in her chart. Work up is important but not urgent. I would like note from Teresa (NP) and study result first.\"  lmtcb to get information or have pt call to fax paperwork.   "

## 2020-03-20 DIAGNOSIS — N30.90 CYSTITIS: Primary | ICD-10-CM

## 2020-03-20 RX ORDER — CIPROFLOXACIN 250 MG/1
250 TABLET, FILM COATED ORAL 2 TIMES DAILY
Qty: 10 TABLET | Refills: 0 | Status: SHIPPED | OUTPATIENT
Start: 2020-03-20 | End: 2020-03-25

## 2020-03-30 ENCOUNTER — OFFICE VISIT (OUTPATIENT)
Dept: FAMILY MEDICINE CLINIC | Facility: CLINIC | Age: 75
End: 2020-03-30

## 2020-03-30 VITALS
DIASTOLIC BLOOD PRESSURE: 82 MMHG | SYSTOLIC BLOOD PRESSURE: 130 MMHG | BODY MASS INDEX: 26.32 KG/M2 | HEART RATE: 68 BPM | TEMPERATURE: 97.7 F | HEIGHT: 64 IN | RESPIRATION RATE: 18 BRPM | WEIGHT: 154.2 LBS | OXYGEN SATURATION: 98 %

## 2020-03-30 DIAGNOSIS — M54.10 RADICULOPATHY WITH LOWER EXTREMITY SYMPTOMS: ICD-10-CM

## 2020-03-30 DIAGNOSIS — I87.8 VENOUS STASIS: ICD-10-CM

## 2020-03-30 DIAGNOSIS — T73.3XXA FATIGUE DUE TO EXCESSIVE EXERTION, INITIAL ENCOUNTER: ICD-10-CM

## 2020-03-30 DIAGNOSIS — E07.9 DISEASE OF THYROID GLAND: ICD-10-CM

## 2020-03-30 DIAGNOSIS — R31.29 MICROSCOPIC HEMATURIA: ICD-10-CM

## 2020-03-30 DIAGNOSIS — I82.533: ICD-10-CM

## 2020-03-30 DIAGNOSIS — R06.09 DYSPNEA ON EXERTION: ICD-10-CM

## 2020-03-30 DIAGNOSIS — M25.475 BILATERAL SWELLING OF FEET AND ANKLES: Primary | ICD-10-CM

## 2020-03-30 DIAGNOSIS — I82.561 CHRONIC DEEP VEIN THROMBOSIS (DVT) OF CALF MUSCLE VEIN OF RIGHT LOWER EXTREMITY (HCC): ICD-10-CM

## 2020-03-30 DIAGNOSIS — M25.474 BILATERAL SWELLING OF FEET AND ANKLES: Primary | ICD-10-CM

## 2020-03-30 DIAGNOSIS — M25.472 BILATERAL SWELLING OF FEET AND ANKLES: Primary | ICD-10-CM

## 2020-03-30 DIAGNOSIS — G60.9 IDIOPATHIC PERIPHERAL NEUROPATHY: ICD-10-CM

## 2020-03-30 DIAGNOSIS — M25.471 BILATERAL SWELLING OF FEET AND ANKLES: Primary | ICD-10-CM

## 2020-03-30 DIAGNOSIS — R94.6 ABNORMAL RESULTS OF THYROID FUNCTION STUDIES: ICD-10-CM

## 2020-03-30 PROCEDURE — 99214 OFFICE O/P EST MOD 30 MIN: CPT | Performed by: INTERNAL MEDICINE

## 2020-03-30 NOTE — PROGRESS NOTES
Subjective   Chris Cox is a 74 y.o. female. Patient is here today for   Chief Complaint   Patient presents with   • Poor Circulation     lower bilteral legs.           Vitals:    03/30/20 0801   BP: 130/82   Pulse: 68   Resp: 18   Temp: 97.7 °F (36.5 °C)   SpO2: 98%     Body mass index is 26.65 kg/m².      Past Medical History:   Diagnosis Date   • Abdominal pain    • ADHD    • Arthritis     primary both knees   • Bronchitis 08/2019   • Disease of thyroid gland    • Diverticulosis of intestine    • DVT (deep venous thrombosis) (CMS/[facility]) 10/2019    right lower extremity   • Fractures     MULTIPLE BONE FRACTURES-TOOK FOSAMAX 3.5 YRS   • Hyperlipidemia    • Hypertension    • Irregular heart beat    • Kidney stone    • Lumbar spine pain    • Migraines    • Mono exposure     AGE 35   • Neuralgia neuritis, sciatic nerve    • Obstructive sleep apnea     20 YRS AGO-NOT CURRENTLY   • Osteoporosis    • Seizures (CMS/[facility])     As a baby   • Tuberculosis     As a child   • Vertigo       Allergies   Allergen Reactions   • Erythromycin Anaphylaxis   • Sulfa Antibiotics Rash   • Milk-Related Compounds GI Intolerance   • Eggs Or Egg-Derived Products Unknown - Low Severity     PER ALLERGY TESTING RESULTS   • Cephalexin Rash   • Cephalosporins Rash     STOMACH ISSUES   • Molds & Smuts Rash     ITCHING      Social History     Socioeconomic History   • Marital status:      Spouse name: Marky   • Number of children: Not on file   • Years of education: College   • Highest education level: Not on file   Occupational History   • Occupation: Pediatric Occupational therapist     Comment: [employer]   Tobacco Use   • Smoking status: Never Smoker   • Smokeless tobacco: Never Used   Substance and Sexual Activity   • Alcohol use: Yes     Comment: wine   • Drug use: No   • Sexual activity: Defer        Current Outpatient Medications:   •  ascorbic acid (VITAMIN C) 500 MG/5ML liquid, Take 500 mg by mouth Daily., Disp: , Rfl:    •  B Complex Vitamins (VITAMIN B COMPLEX) capsule capsule, Take  by mouth Daily., Disp: , Rfl:   •  escitalopram (LEXAPRO) 10 MG tablet, Take 1 tablet by mouth Daily., Disp: 30 tablet, Rfl: 5  •  vitamin A 46733 UNIT capsule, Take 10,000 Units by mouth Daily. ON HOLD, Disp: , Rfl:   •  VITAMIN D, CHOLECALCIFEROL, PO, Take 1 tablet by mouth daily., Disp: , Rfl:   •  VITAMIN E PO, Take  by mouth., Disp: , Rfl:   •  VITAMIN K PO, Take 1 tablet by mouth Daily. ON HOLD, Disp: , Rfl:   •  betamethasone dipropionate (DIPROLENE) 0.05 % cream, Apply  topically to the appropriate area as directed 2 (Two) Times a Day., Disp: 15 g, Rfl: 0  •  HYDROcodone-acetaminophen (NORCO) 5-325 MG per tablet, Take 1-2 tablets by mouth Every 4 (Four)-6(Six) Hours As Needed for pain., Disp: 40 tablet, Rfl: 0     Objective     This patient has venous stasis.  She generally has some swelling in both of her legs from the knee distally.  She is in the habit of wearing knee-high support stockings.    Recently, she has noticed that her bilateral lower extremity edema has gotten worse.  She notes some dyspnea on exertion and likewise, fatigue on exertion.    She denies chest pain, palpitations, orthopnea.           Review of Systems   Constitutional: Positive for fatigue. Negative for fever.   HENT: Negative.    Respiratory:        She has shortness of breath when she exerts herself which she attributes to decreased conditioning.   Endocrine: Negative for polydipsia and polyuria.   Genitourinary: Negative for frequency.   Skin: Negative.        Physical Exam   Constitutional: She is oriented to person, place, and time. She appears well-developed and well-nourished.   Pleasant, neatly groomed, in no distress.   HENT:   Head: Normocephalic and atraumatic.   Cardiovascular: Normal rate, regular rhythm and normal heart sounds. Exam reveals no friction rub.   No murmur heard.  Pulmonary/Chest: Effort normal and breath sounds normal. No stridor. No  respiratory distress. She has no wheezes. She has no rales.   Musculoskeletal: Normal range of motion.   She has bilateral lower extremity edema from the knees distally.  She has no palpable cords, Homans sign negative bilaterally.   Neurological: She is alert and oriented to person, place, and time.   Psychiatric: She has a normal mood and affect. Her behavior is normal.   Nursing note and vitals reviewed.        Problem List Items Addressed This Visit        Cardiovascular and Mediastinum    Venous stasis    Chronic deep vein thrombosis (DVT) of calf muscle vein of right lower extremity       Endocrine    Disease of thyroid gland       Nervous and Auditory    Radiculopathy with lower extremity symptoms    Idiopathic peripheral neuropathy       Genitourinary    Microscopic hematuria       Other    Bilateral swelling of feet and ankles - Primary    Relevant Orders    BNP    Comprehensive Metabolic Panel    TSH    Urinalysis With Microscopic If Indicated (No Culture) - Urine, Clean Catch    Duplex Venous Lower Extremity - Bilateral CAR    Fatigue due to excessive exertion      Other Visit Diagnoses     Dyspnea on exertion         Relevant Orders    BNP    Abnormal results of thyroid function studies         Relevant Orders    TSH    Chronic embolism and thrombosis of popliteal vein, bilateral (CMS/HCC)         Relevant Orders    Duplex Venous Lower Extremity - Bilateral CAR            PLAN  She and I reviewed her chart.  She has a history of obstructive sleep apnea.  She believes that this is gone away now.    I think most likely that the bilateral lower extremity edema is secondary to her chronic venous stasis changes.  She did have a DVT in her right lower extremity which was discovered with a venous ultrasound in December.    I think is not likely that she has a cardiac issue which accounts for her bilateral lower extremity edema although she does have a history of obstructive sleep apnea and is not using a CPAP.   It is possible but not likely that she has developed pulmonary hypertension.  I will check a BNP, TSH, CBC, comprehensive metabolic panel, urine analysis will be done to rule out proteinuria.    Also, I will arrange for her to have bilateral lower extremity venous Dopplers to rule out acute versus chronic DVTs.      No follow-ups on file.

## 2020-03-31 ENCOUNTER — HOSPITAL ENCOUNTER (OUTPATIENT)
Dept: CARDIOLOGY | Facility: HOSPITAL | Age: 75
End: 2020-03-31

## 2020-03-31 ENCOUNTER — APPOINTMENT (OUTPATIENT)
Dept: CARDIOLOGY | Facility: HOSPITAL | Age: 75
End: 2020-03-31

## 2020-03-31 LAB — SPECIMEN STATUS: NORMAL

## 2020-04-08 LAB
ALBUMIN SERPL-MCNC: NORMAL G/DL
ALP SERPL-CCNC: NORMAL U/L
ALT SERPL-CCNC: NORMAL U/L
AST SERPL-CCNC: NORMAL U/L
BILIRUB SERPL-MCNC: NORMAL MG/DL
BNP SERPL-MCNC: 61.4 PG/ML (ref 0–100)
BUN SERPL-MCNC: NORMAL MG/DL
CALCIUM SERPL-MCNC: NORMAL MG/DL
CHLORIDE SERPL-SCNC: NORMAL MMOL/L
CO2 SERPL-SCNC: NORMAL MMOL/L
CREAT SERPL-MCNC: NORMAL MG/DL
GLUCOSE SERPL-MCNC: NORMAL MG/DL
GLUCOSE UR QL: NORMAL
KETONES UR QL STRIP: NORMAL
PH UR STRIP: NORMAL [PH]
POTASSIUM SERPL-SCNC: NORMAL MMOL/L
PROT SERPL-MCNC: NORMAL G/DL
PROT UR QL STRIP: NORMAL
REQUEST PROBLEM: NORMAL
SODIUM SERPL-SCNC: NORMAL MMOL/L
SP GR UR: NORMAL
TSH SERPL DL<=0.005 MIU/L-ACNC: NORMAL UIU/ML

## 2020-06-08 DIAGNOSIS — R60.1 GENERALIZED EDEMA: ICD-10-CM

## 2020-06-08 DIAGNOSIS — R60.9 EDEMA, UNSPECIFIED TYPE: Primary | ICD-10-CM

## 2020-06-24 ENCOUNTER — OFFICE VISIT (OUTPATIENT)
Dept: FAMILY MEDICINE CLINIC | Facility: CLINIC | Age: 75
End: 2020-06-24

## 2020-06-24 VITALS
OXYGEN SATURATION: 99 % | HEART RATE: 59 BPM | BODY MASS INDEX: 26.53 KG/M2 | SYSTOLIC BLOOD PRESSURE: 126 MMHG | TEMPERATURE: 97.1 F | RESPIRATION RATE: 18 BRPM | DIASTOLIC BLOOD PRESSURE: 70 MMHG | HEIGHT: 64 IN | WEIGHT: 155.4 LBS

## 2020-06-24 DIAGNOSIS — R42 VERTIGO: Primary | ICD-10-CM

## 2020-06-24 DIAGNOSIS — R35.0 URINE FREQUENCY: ICD-10-CM

## 2020-06-24 DIAGNOSIS — E07.9 DISORDER OF THYROID, UNSPECIFIED: ICD-10-CM

## 2020-06-24 DIAGNOSIS — R42 DIZZINESS: ICD-10-CM

## 2020-06-24 LAB
ALBUMIN SERPL-MCNC: 4.4 G/DL (ref 3.5–5.2)
ALBUMIN/GLOB SERPL: 2 G/DL
ALP SERPL-CCNC: 77 U/L (ref 39–117)
ALT SERPL-CCNC: 9 U/L (ref 1–33)
AST SERPL-CCNC: 13 U/L (ref 1–32)
BASOPHILS # BLD AUTO: 0.03 10*3/MM3 (ref 0–0.2)
BASOPHILS NFR BLD AUTO: 0.6 % (ref 0–1.5)
BILIRUB BLD-MCNC: NEGATIVE MG/DL
BILIRUB SERPL-MCNC: 0.3 MG/DL (ref 0.2–1.2)
BUN SERPL-MCNC: 18 MG/DL (ref 8–23)
BUN/CREAT SERPL: 26.9 (ref 7–25)
CALCIUM SERPL-MCNC: 9.4 MG/DL (ref 8.6–10.5)
CHLORIDE SERPL-SCNC: 103 MMOL/L (ref 98–107)
CLARITY, POC: CLEAR
CO2 SERPL-SCNC: 30.9 MMOL/L (ref 22–29)
COLOR UR: NORMAL
CREAT SERPL-MCNC: 0.67 MG/DL (ref 0.57–1)
EOSINOPHIL # BLD AUTO: 0.07 10*3/MM3 (ref 0–0.4)
EOSINOPHIL NFR BLD AUTO: 1.5 % (ref 0.3–6.2)
ERYTHROCYTE [DISTWIDTH] IN BLOOD BY AUTOMATED COUNT: 13.6 % (ref 12.3–15.4)
GLOBULIN SER CALC-MCNC: 2.2 GM/DL
GLUCOSE SERPL-MCNC: 108 MG/DL (ref 65–99)
GLUCOSE UR STRIP-MCNC: NEGATIVE MG/DL
HCT VFR BLD AUTO: 40.7 % (ref 34–46.6)
HGB BLD-MCNC: 13.5 G/DL (ref 12–15.9)
IMM GRANULOCYTES # BLD AUTO: 0.01 10*3/MM3 (ref 0–0.05)
IMM GRANULOCYTES NFR BLD AUTO: 0.2 % (ref 0–0.5)
KETONES UR QL: NEGATIVE
LEUKOCYTE EST, POC: NEGATIVE
LYMPHOCYTES # BLD AUTO: 1.28 10*3/MM3 (ref 0.7–3.1)
LYMPHOCYTES NFR BLD AUTO: 26.9 % (ref 19.6–45.3)
MCH RBC QN AUTO: 29.5 PG (ref 26.6–33)
MCHC RBC AUTO-ENTMCNC: 33.2 G/DL (ref 31.5–35.7)
MCV RBC AUTO: 88.9 FL (ref 79–97)
MONOCYTES # BLD AUTO: 0.29 10*3/MM3 (ref 0.1–0.9)
MONOCYTES NFR BLD AUTO: 6.1 % (ref 5–12)
NEUTROPHILS # BLD AUTO: 3.08 10*3/MM3 (ref 1.7–7)
NEUTROPHILS NFR BLD AUTO: 64.7 % (ref 42.7–76)
NITRITE UR-MCNC: NEGATIVE MG/ML
NRBC BLD AUTO-RTO: 0 /100 WBC (ref 0–0.2)
PH UR: 7 [PH] (ref 5–8)
PLATELET # BLD AUTO: 213 10*3/MM3 (ref 140–450)
POTASSIUM SERPL-SCNC: 3.8 MMOL/L (ref 3.5–5.2)
PROT SERPL-MCNC: 6.6 G/DL (ref 6–8.5)
PROT UR STRIP-MCNC: NEGATIVE MG/DL
RBC # BLD AUTO: 4.58 10*6/MM3 (ref 3.77–5.28)
RBC # UR STRIP: NEGATIVE /UL
SODIUM SERPL-SCNC: 141 MMOL/L (ref 136–145)
SP GR UR: 1.01 (ref 1–1.03)
T4 FREE SERPL-MCNC: 0.86 NG/DL (ref 0.93–1.7)
TSH SERPL DL<=0.005 MIU/L-ACNC: 2.58 UIU/ML (ref 0.27–4.2)
UROBILINOGEN UR QL: NORMAL
WBC # BLD AUTO: 4.76 10*3/MM3 (ref 3.4–10.8)

## 2020-06-24 PROCEDURE — 81003 URINALYSIS AUTO W/O SCOPE: CPT | Performed by: NURSE PRACTITIONER

## 2020-06-24 PROCEDURE — 99214 OFFICE O/P EST MOD 30 MIN: CPT | Performed by: NURSE PRACTITIONER

## 2020-06-24 RX ORDER — MECLIZINE HCL 12.5 MG/1
12.5 TABLET ORAL 2 TIMES DAILY PRN
Qty: 12 TABLET | Refills: 0 | Status: SHIPPED | OUTPATIENT
Start: 2020-06-24 | End: 2021-06-04

## 2020-06-24 NOTE — PATIENT INSTRUCTIONS
Pt informed to increase her water intake. Pt informed of u/a results and informed of labs being done today and results would be called back to her when available. Pt informed of dx and rx written.

## 2020-06-24 NOTE — PROGRESS NOTES
Subjective     Chris Cox is a 74 y.o.. female.     Dizziness   This is a new problem. Episode onset: 5 days. The problem occurs intermittently. The problem has been gradually worsening. Pertinent negatives include no abdominal pain, chest pain, coughing, fatigue, fever, headaches, nausea or vomiting.       The following portions of the patient's history were reviewed and updated as appropriate: allergies, current medications, past family history, past medical history, past social history, past surgical history and problem list.    Past Medical History:   Diagnosis Date   • Abdominal pain    • ADHD    • Arthritis     primary both knees   • Bronchitis 08/2019   • Disease of thyroid gland    • Diverticulosis of intestine    • DVT (deep venous thrombosis) (CMS/[facility]) 10/2019    right lower extremity   • Fractures     MULTIPLE BONE FRACTURES-TOOK FOSAMAX 3.5 YRS   • Hyperlipidemia    • Hypertension    • Irregular heart beat    • Kidney stone    • Lumbar spine pain    • Migraines    • Mono exposure     AGE 35   • Neuralgia neuritis, sciatic nerve    • Obstructive sleep apnea     20 YRS AGO-NOT CURRENTLY   • Osteoporosis    • Seizures (CMS/[facility])     As a baby   • Tuberculosis     As a child   • Vertigo        Past Surgical History:   Procedure Laterality Date   • BREAST SURGERY      Benign fibroadnoma removed from the left breast   • HAND SURGERY Bilateral 2012    RECONSTRUCTION ON BOTH HANDS   • LITHOTRIPSY      renal   • TONGUE SURGERY     • TOTAL KNEE ARTHROPLASTY Right 10/22/2019    Procedure: TOTAL KNEE ARTHROPLASTY RIGHT;  Surgeon: Aneesh Okeefe II, MD;  Location: [facility];  Service: Orthopedics   • WRIST SURGERY         Review of Systems   Constitutional: Negative for fatigue and fever.   Respiratory: Negative.  Negative for cough and shortness of breath.    Cardiovascular: Positive for leg swelling (ongoing, not new). Negative for chest pain and palpitations.   Gastrointestinal: Negative for  "abdominal pain, constipation, diarrhea, nausea and vomiting.   Genitourinary: Positive for frequency. Negative for dysuria, flank pain, hematuria and urgency.   Neurological: Positive for dizziness and light-headedness. Negative for speech difficulty and headaches.       Allergies   Allergen Reactions   • Erythromycin Anaphylaxis   • Sulfa Antibiotics Rash   • Milk-Related Compounds GI Intolerance   • Eggs Or Egg-Derived Products Unknown - Low Severity     PER ALLERGY TESTING RESULTS   • Cephalexin Rash   • Cephalosporins Rash     STOMACH ISSUES   • Molds & Smuts Rash     ITCHING       Objective     Vitals:    06/24/20 1120 06/24/20 1144   BP: 142/72 126/70   Pulse: 59    Resp: 18    Temp: 97.1 °F (36.2 °C)    SpO2: 99%    Weight: 70.5 kg (155 lb 6.4 oz)    Height: 162 cm (63.78\")      Body mass index is 26.86 kg/m².    Physical Exam   Constitutional: She is oriented to person, place, and time. She appears well-developed.   HENT:   Head: Normocephalic.   Right Ear: Tympanic membrane normal. Tympanic membrane is not erythematous. No middle ear effusion.   Left Ear: Tympanic membrane normal. Tympanic membrane is not erythematous.  No middle ear effusion.   Eyes: Pupils are equal, round, and reactive to light.   Cardiovascular: Normal rate and regular rhythm.   No murmur heard.  Radial and DP pulses wnl  Lower extremity edema from ankle to below knee   Pulmonary/Chest: Effort normal and breath sounds normal.   Musculoskeletal: Normal range of motion.   Neurological: She is alert and oriented to person, place, and time. No cranial nerve deficit. Gait normal.   Symmetrical facial movements  Good pushes/   Psychiatric: Her speech is normal.   Vitals reviewed.        Current Outpatient Medications:   •  ascorbic acid (VITAMIN C) 500 MG/5ML liquid, Take 500 mg by mouth Daily., Disp: , Rfl:   •  B Complex Vitamins (VITAMIN B COMPLEX) capsule capsule, Take  by mouth Daily., Disp: , Rfl:   •  escitalopram (LEXAPRO) 10 MG " tablet, Take 1 tablet by mouth Daily., Disp: 30 tablet, Rfl: 5  •  vitamin A 63399 UNIT capsule, Take 10,000 Units by mouth Daily. ON HOLD, Disp: , Rfl:   •  VITAMIN D, CHOLECALCIFEROL, PO, Take 1 tablet by mouth daily., Disp: , Rfl:   •  VITAMIN E PO, Take  by mouth., Disp: , Rfl:   •  VITAMIN K PO, Take 1 tablet by mouth Daily. ON HOLD, Disp: , Rfl:   •  meclizine (ANTIVERT) 12.5 MG tablet, Take 1 tablet by mouth 2 (Two) Times a Day As Needed for Dizziness or Nausea., Disp: 12 tablet, Rfl: 0    Lab Results (last 24 hours)     Procedure Component Value Units Date/Time    POC Urinalysis Dipstick, Automated [[id number]]  (Normal) Collected:  06/24/20 1157    Specimen:  Urine Updated:  06/24/20 1157     Color Straw     Clarity, UA Clear     Specific Gravity  1.015     pH, Urine 7.0     Leukocytes Negative     Nitrite, UA Negative     Protein, POC Negative mg/dL      Glucose, UA Negative mg/dL      Ketones, UA Negative     Urobilinogen, UA Normal     Bilirubin Negative     Blood, UA Negative          Assessment/Plan   Chris was seen today for dizziness.    Diagnoses and all orders for this visit:    Vertigo  -     meclizine (ANTIVERT) 12.5 MG tablet; Take 1 tablet by mouth 2 (Two) Times a Day As Needed for Dizziness or Nausea.    Dizziness  -     CBC & Differential  -     Comprehensive metabolic panel  -     TSH  -     T4, Free    Urine frequency  -     POC Urinalysis Dipstick, Automated    Disorder of thyroid, unspecified   -     TSH  -     T4, Free        Patient Instructions   Pt informed to increase her water intake. Pt informed of u/a results and informed of labs being done today and results would be called back to her when available. Pt informed of dx and rx written.       Return if symptoms worsen or fail to improve.

## 2020-06-29 ENCOUNTER — TELEPHONE (OUTPATIENT)
Dept: FAMILY MEDICINE CLINIC | Facility: CLINIC | Age: 75
End: 2020-06-29

## 2020-06-29 NOTE — TELEPHONE ENCOUNTER
Called and spoke with pt's  regarding lab results. Pt's  states pt feeling better. Pt to rto/call if having any further issues.

## 2020-07-14 ENCOUNTER — OFFICE VISIT (OUTPATIENT)
Dept: FAMILY MEDICINE CLINIC | Facility: CLINIC | Age: 75
End: 2020-07-14

## 2020-07-14 VITALS
RESPIRATION RATE: 18 BRPM | SYSTOLIC BLOOD PRESSURE: 126 MMHG | BODY MASS INDEX: 26.98 KG/M2 | TEMPERATURE: 97.1 F | HEIGHT: 64 IN | WEIGHT: 158 LBS | OXYGEN SATURATION: 98 % | HEART RATE: 58 BPM | DIASTOLIC BLOOD PRESSURE: 78 MMHG

## 2020-07-14 DIAGNOSIS — R42 VERTIGO: Primary | ICD-10-CM

## 2020-07-14 PROCEDURE — 99214 OFFICE O/P EST MOD 30 MIN: CPT | Performed by: INTERNAL MEDICINE

## 2020-07-14 NOTE — PROGRESS NOTES
Subjective   Chris Cox is a 74 y.o. female. Patient is here today for   Chief Complaint   Patient presents with   • Dizziness          Vitals:    07/14/20 0956   BP: 126/78   Pulse: 58   Resp: 18   Temp: 97.1 °F (36.2 °C)   SpO2: 98%     Body mass index is 27.31 kg/m².      Past Medical History:   Diagnosis Date   • Abdominal pain    • ADHD    • Arthritis     primary both knees   • Bronchitis 08/2019   • Disease of thyroid gland    • Diverticulosis of intestine    • DVT (deep venous thrombosis) (CMS/[facility]) 10/2019    right lower extremity   • Fractures     MULTIPLE BONE FRACTURES-TOOK FOSAMAX 3.5 YRS   • Hyperlipidemia    • Hypertension    • Irregular heart beat    • Kidney stone    • Lumbar spine pain    • Migraines    • Mono exposure     AGE 35   • Neuralgia neuritis, sciatic nerve    • Obstructive sleep apnea     20 YRS AGO-NOT CURRENTLY   • Osteoporosis    • Seizures (CMS/[facility])     As a baby   • Tuberculosis     As a child   • Vertigo       Allergies   Allergen Reactions   • Erythromycin Anaphylaxis   • Sulfa Antibiotics Rash   • Milk-Related Compounds GI Intolerance   • Eggs Or Egg-Derived Products Unknown - Low Severity     PER ALLERGY TESTING RESULTS   • Cephalexin Rash   • Cephalosporins Rash     STOMACH ISSUES   • Molds & Smuts Rash     ITCHING      Social History     Socioeconomic History   • Marital status:      Spouse name: Marky   • Number of children: Not on file   • Years of education: College   • Highest education level: Not on file   Occupational History   • Occupation: Pediatric Occupational therapist     Comment: [employer]   Tobacco Use   • Smoking status: Never Smoker   • Smokeless tobacco: Never Used   Substance and Sexual Activity   • Alcohol use: Yes     Comment: wine   • Drug use: No   • Sexual activity: Defer        Current Outpatient Medications:   •  ascorbic acid (VITAMIN C) 500 MG/5ML liquid, Take 500 mg by mouth Daily., Disp: , Rfl:   •  B Complex Vitamins (VITAMIN B  COMPLEX) capsule capsule, Take  by mouth Daily., Disp: , Rfl:   •  escitalopram (LEXAPRO) 10 MG tablet, Take 1 tablet by mouth Daily., Disp: 30 tablet, Rfl: 5  •  meclizine (ANTIVERT) 12.5 MG tablet, Take 1 tablet by mouth 2 (Two) Times a Day As Needed for Dizziness or Nausea., Disp: 12 tablet, Rfl: 0  •  vitamin A 09590 UNIT capsule, Take 10,000 Units by mouth Daily. ON HOLD, Disp: , Rfl:   •  VITAMIN D, CHOLECALCIFEROL, PO, Take 1 tablet by mouth daily., Disp: , Rfl:   •  VITAMIN E PO, Take  by mouth., Disp: , Rfl:   •  VITAMIN K PO, Take 1 tablet by mouth Daily. ON HOLD, Disp: , Rfl:      Objective     .  Week, she has had dizziness which is precipitated with the slightest turn of the head.    She denies any syncope or presyncope.    She has not actually fallen.    She has no associated palpitations, or nausea.       Review of Systems   HENT: Negative.    Respiratory: Negative.    Cardiovascular: Negative.    Neurological: Positive for dizziness. Negative for tremors, seizures and weakness.       Physical Exam   Constitutional: She is oriented to person, place, and time. She appears well-developed and well-nourished. No distress.   Pleasant, neatly groomed, in no distress.   HENT:   Head: Normocephalic and atraumatic.   External auditory canals and tympanic membranes were clear without any effusions.  No injection.  No exudate.   Cardiovascular: Normal rate, regular rhythm and normal heart sounds.   Pulmonary/Chest: Effort normal and breath sounds normal.   Neurological: She is alert and oriented to person, place, and time.   Skin: She is not diaphoretic.   Psychiatric: She has a normal mood and affect. Her behavior is normal. Thought content normal.   Nursing note and vitals reviewed.        Problem List Items Addressed This Visit        Other    Vertigo - Primary    Relevant Orders    Ambulatory Referral to Physical Therapy Evaluate and treat            PLAN  She has what appears to be a clear-cut case of  vertigo although I did not see any nystagmus.  I like her to see physical therapy for them to make an assessment of her vertigo and to treat if necessary.    She will let me know if she fails to improve or gets worse in the meantime.  No follow-ups on file.

## 2020-07-15 ENCOUNTER — TELEPHONE (OUTPATIENT)
Dept: FAMILY MEDICINE CLINIC | Facility: CLINIC | Age: 75
End: 2020-07-15

## 2020-07-15 NOTE — TELEPHONE ENCOUNTER
PT CALLED REQUESTING TO SPEAK TO HARI REGARDING A NEW REFERRAL TO SEE A VERTIGO SPECIALIST AT [facility].     PLEASE ADVISE     PT CALL BACK   [phone]

## 2020-07-17 ENCOUNTER — HOSPITAL ENCOUNTER (OUTPATIENT)
Dept: PHYSICAL THERAPY | Facility: HOSPITAL | Age: 75
Setting detail: THERAPIES SERIES
Discharge: HOME | End: 2020-07-17

## 2020-07-17 DIAGNOSIS — R42 DIZZINESS: ICD-10-CM

## 2020-07-17 DIAGNOSIS — H81.12 BPPV (BENIGN PAROXYSMAL POSITIONAL VERTIGO), LEFT: Primary | ICD-10-CM

## 2020-07-17 DIAGNOSIS — H81.92 DISORDER OF VESTIBULAR FUNCTION OF LEFT EAR: ICD-10-CM

## 2020-07-17 PROCEDURE — 97162 PT EVAL MOD COMPLEX 30 MIN: CPT | Performed by: PHYSICAL THERAPIST

## 2020-07-17 PROCEDURE — 95992 CANALITH REPOSITIONING PROC: CPT | Performed by: PHYSICAL THERAPIST

## 2020-07-17 NOTE — THERAPY EVALUATION
Outpatient Physical Therapy Vestibular Initial Evaluation  [facility]     Patient Name: Chris Cox  : 1945  MRN: [id number]  Today's Date: 2020      Visit Date: 2020    Patient Active Problem List   Diagnosis   • Abdominal bruit   • Blues   • DD (diverticular disease)   • Menopausal and perimenopausal disorder   • Neuralgia neuritis, sciatic nerve   • Right knee pain   • Primary osteoarthritis of both knees   • Radiculopathy with lower extremity symptoms   • Idiopathic peripheral neuropathy   • Sprain of collateral ligament of right knee   • ADD (attention deficit disorder)   • Functional diarrhea   • Microscopic hematuria   • Chronic pain of left knee   • Bleeding diathesis (CMS/HCC)   • Bilateral edema of lower extremity   • Bilateral swelling of feet and ankles   • Hyperglycemia   • Venous stasis   • Attention deficit disorder (ADD) without hyperactivity   • Lesion of skin of scalp   • Pre-operative clearance   • Total knee replacement status   • Chronic deep vein thrombosis (DVT) of calf muscle vein of right lower extremity (CMS/HCC)   • Cellulitis of face   • Dermatitis   • Major depressive disorder with single episode, in full remission (CMS/HCC)   • Fatigue due to excessive exertion   • Disease of thyroid gland   • Vertigo        Past Medical History:   Diagnosis Date   • Abdominal pain    • ADHD    • Arthritis     primary both knees   • Bronchitis 2019   • Disease of thyroid gland    • Diverticulosis of intestine    • DVT (deep venous thrombosis) (CMS/HCC) 10/2019    right lower extremity   • Fractures     MULTIPLE BONE FRACTURES-TOOK FOSAMAX 3.5 YRS   • Hyperlipidemia    • Hypertension    • Irregular heart beat    • Kidney stone    • Lumbar spine pain    • Migraines    • Mono exposure     AGE 35   • Neuralgia neuritis, sciatic nerve    • Obstructive sleep apnea     20 YRS AGO-NOT CURRENTLY   • Osteoporosis    • Seizures (CMS/HCC)     As a baby   • Tuberculosis     As a child    • Vertigo         Past Surgical History:   Procedure Laterality Date   • BREAST SURGERY      Benign fibroadnoma removed from the left breast   • HAND SURGERY Bilateral 2012    RECONSTRUCTION ON BOTH HANDS   • LITHOTRIPSY      renal   • TONGUE SURGERY     • TOTAL KNEE ARTHROPLASTY Right 10/22/2019    Procedure: TOTAL KNEE ARTHROPLASTY RIGHT;  Surgeon: Aneesh Okeefe II, MD;  Location: [facility];  Service: Orthopedics   • WRIST SURGERY           Visit Dx:     ICD-10-CM ICD-9-CM   1. BPPV (benign paroxysmal positional vertigo), left H81.12 386.11   2. Dizziness R42 780.4   3. Disorder of vestibular function of left ear H81.92 386.9       Patient History     Row Name 07/17/20 1300             History    Chief Complaint  Dizziness  -GR      Date Current Problem(s) Began  07/10/20  -GR      Brief Description of Current Complaint  C/o 1 week history of vertigo specifically with turning her head all directions. She is ambulating with a QC due to a recent TKA (doing PT at [facility]).  She states she was doing yardwork which she thinks may have provoked her.  She feels linear movement, not to much spinning. She denies tinnitus; her vision is worsening due to cataracts, she is acutely aware of shrill noises.  She did have a fall in June, denies head trauma.  -GR      Patient/Caregiver Goals  Relief from dizziness  -GR      Occupation/sports/leisure activities  pediatric OT  -GR         Pain     Pain Location  -- chronic neck back and knee  -GR         Fall Risk Assessment    Any falls in the past year:  Yes  -GR      Number of falls reported in the last 12 months  5  -GR      Factors that contributed to the fall:  Lost balance  -GR         Services    Do you plan to receive Home Health services in the near future  No  -GR         Daily Activities    Primary Language  English  -GR      How does patient learn best?  Listening;Reading;Demonstration  -GR      Pt Participated in POC and Goals  Yes  -GR         Safety     Are you being hurt, hit, or frightened by anyone at home or in your life?  No  -GR      Are you being neglected by a caregiver  No  -GR        User Key  (r) = Recorded By, (t) = Taken By, (c) = Cosigned By    Initials Name Provider Type    Marco Thurman, PT Physical Therapist          Vestibular Eval     Row Name 07/17/20 1300             Occulomotor Exam Fixation Present    Occular ROM  Normal  -GR      Spontaneous Nystagmus  Absent  -GR      Gaze-induced Nystagmus  Absent  -GR      Smooth Pursuit  Saccadic  -GR      Saccades  Overshoots  -GR      Convergence  Abnormal  -GR         Vestibulo-Occular Reflex (VOR)    VOR to Fast Head Movement/Head Thrust Test  Positive left  -GR         Positional Testing    Positional Testing  Without infrared goggles  -GR      Vertebrobasilar Artery Screen - Right  Negative  -GR      Vertebrobasilar Artery Screen - Left  Negative  -GR      Philip-Hallpike Right  No nystagmus  -GR      Philip-Hallpike Left  Upbeat, left rotatory nystagmus  -GR      Horizontal Roll Test Right  No nystagmus  -GR      Horizontal Roll Test Left  No nystagmus  -GR        User Key  (r) = Recorded By, (t) = Taken By, (c) = Cosigned By    Initials Name Provider Type    Marco Thurman, PT Physical Therapist                      Therapy Education  Education Details: Role of outpatient PT, vestibular anatomy, differential diagnosis, tri-part balance system, expectations, post-maneuver recommendations, follow up plans.  Given: Symptoms/condition management  Program: New  How Provided: Verbal  Provided to: Patient  Level of Understanding: Teach back education performed, Verbalized      OP Exercises     Row Name 07/17/20 1400             Exercise 1    Exercise Name 1  CRM Epley L  -GR      Cueing 1  Demo  -GR      Sets 1  1  -GR      Reps 1  2  -GR      Additional Comments  retest positive  -GR        User Key  (r) = Recorded By, (t) = Taken By, (c) = Cosigned By    Initials Name Provider Type    GR  Marco Miner, PT Physical Therapist                      PT OP Goals     Row Name 07/17/20 1400          PT Short Term Goals    STG Date to Achieve  08/01/20  -GR     STG 1  Patient will be independent with static habituation PRN for in home safety.  -GR     STG 1 Progress  New  -GR     STG 2  Patient will present negative for left BPPV of the posterior canal.  -GR     STG 2 Progress  New  -GR        Long Term Goals    LTG Date to Achieve  08/31/20  -GR     LTG 1  Patient will present negative for BPPV of all canals.  -GR     LTG 1 Progress  New  -GR     LTG 2  Patient will be independent with dynamic adaptation techniques for community reintegration.  -GR     LTG 2 Progress  New  -GR     LTG 3  Patient will score </= 24/100 on the dizziness handicap inventory to indicate improved perceived positional tolerance.  -GR     LTG 3 Progress  New  -GR        Time Calculation    PT Goal Re-Cert Due Date  10/15/20  -GR       User Key  (r) = Recorded By, (t) = Taken By, (c) = Cosigned By    Initials Name Provider Type    GR Marco Miner, PT Physical Therapist          PT Assessment/Plan     Row Name 07/17/20 1429          PT Assessment    Functional Limitations  Limitation in home management;Limitations in community activities;Performance in leisure activities;Performance in self-care ADL  -GR     Impairments  Balance  -GR     Assessment Comments  75 y.o. female referred to outpatient physical therapy for vestibular evaluation.  Signs and symptoms are consistent with L posterior canalithiasis (BPPV).  Oculomotor exam reveals saccadic smooth pursuit and abnormal convergence. Pertinent comorbidities and personal factors that may affect progress include, but are not limited to, chronic neck pain, previous mild vertigo, current limited mobility from TKA, multiple falls with head trauma and possible concussion.  This condition is evolving. Recommend skilled PT to address functional deficits. Thank you for this referral.   -GR     Please refer to paper survey for additional self-reported information  Yes  -GR     Rehab Potential  Good  -GR     Patient/caregiver participated in establishment of treatment plan and goals  Yes  -GR     Patient would benefit from skilled therapy intervention  Yes  -GR        PT Plan    PT Frequency  1x/week  -GR     Predicted Duration of Therapy Intervention (Therapy Eval)  6 visits  -GR     Planned CPT's?  PT EVAL MOD COMPLELITY: 66362;PT RE-EVAL: 88906;PT THER ACT EA 15 MIN: 66741;PT THER PROC EA 15 MIN: 32168;PT NEUROMUSC RE-EDUCATION EA 15 MIN: 85594;PT CANALITH REPOSITIONIN  -GR     Physical Therapy Interventions (Optional Details)  vestibular training;home exercise program;postural re-education;patient/family education;neuromuscular re-education  -GR     PT Plan Comments  Recheck BPPV and tx as appropriate. Consider convergence exercises.  -GR       User Key  (r) = Recorded By, (t) = Taken By, (c) = Cosigned By    Initials Name Provider Type    GR Marco Miner, PT Physical Therapist           Outcome Measure Options: Dizziness Handicap Inventory(44)         Time Calculation:   Start Time: 1331  Stop Time: 1415  Time Calculation (min): 44 min  Total Timed Code Minutes- PT: 0 minute(s)   Therapy Charges for Today     Code Description Service Date Service Provider Modifiers Qty    35580355269 HC PT CANALITH REPOSITIONING PER DAY 2020 Marco Miner, PT GP 1    [id number]  PT EVAL MOD COMPLEXITY 2 2020 Marco Miner, PT GP 1          PT G-Codes  Outcome Measure Options: Dizziness Handicap Inventory(44)         Marco Miner PT  2020

## 2020-07-21 ENCOUNTER — HOSPITAL ENCOUNTER (OUTPATIENT)
Dept: PHYSICAL THERAPY | Facility: HOSPITAL | Age: 75
Setting detail: THERAPIES SERIES
Discharge: HOME | End: 2020-07-21

## 2020-07-21 PROCEDURE — 95992 CANALITH REPOSITIONING PROC: CPT | Performed by: PHYSICAL THERAPIST

## 2020-07-21 NOTE — THERAPY TREATMENT NOTE
Outpatient Physical Therapy Vestibular Treatment Note  [facility]     Patient Name: Chris Cox  : 1945  MRN: [id number]  Today's Date: 2020      Visit Date: 2020    Visit Dx:   No diagnosis found.    Patient Active Problem List   Diagnosis   • Abdominal bruit   • Blues   • DD (diverticular disease)   • Menopausal and perimenopausal disorder   • Neuralgia neuritis, sciatic nerve   • Right knee pain   • Primary osteoarthritis of both knees   • Radiculopathy with lower extremity symptoms   • Idiopathic peripheral neuropathy   • Sprain of collateral ligament of right knee   • ADD (attention deficit disorder)   • Functional diarrhea   • Microscopic hematuria   • Chronic pain of left knee   • Bleeding diathesis (CMS/HCC)   • Bilateral edema of lower extremity   • Bilateral swelling of feet and ankles   • Hyperglycemia   • Venous stasis   • Attention deficit disorder (ADD) without hyperactivity   • Lesion of skin of scalp   • Pre-operative clearance   • Total knee replacement status   • Chronic deep vein thrombosis (DVT) of calf muscle vein of right lower extremity (CMS/HCC)   • Cellulitis of face   • Dermatitis   • Major depressive disorder with single episode, in full remission (CMS/HCC)   • Fatigue due to excessive exertion   • Disease of thyroid gland   • Vertigo                       PT Assessment/Plan     Row Name 20 1621          PT Assessment    Assessment Comments  Patient returns for 1st follow up. She is positive for cupulothiasis on this date requiring semont maneuver x 2 before eventual epley. I suspect this will require additional treatment to correct.  -GR        PT Plan    PT Plan Comments  Repeat recheck and tx.  -GR       User Key  (r) = Recorded By, (t) = Taken By, (c) = Cosigned By    Initials Name Provider Type    Marco Thurman, PT Physical Therapist             OP Exercises     Row Name 20 1500             Subjective Comments    Subjective Comments   Reports of overall improvements. Does not some symptoms with extending the neck (reaching into a cabinet and looking up steps). Feels limited ROM of the neck turning to the L.  -GR         Subjective Pain    Able to rate subjective pain?  yes  -GR      Pre-Treatment Pain Level  0  -GR      Post-Treatment Pain Level  0  -GR      Subjective Pain Comment  not relevant to session does have chronic back pain  -GR         Exercise 1    Exercise Name 1  CRM Epley L  -GR      Cueing 1  Demo  -GR      Sets 1  1  -GR      Reps 1  1  -GR      Additional Comments  did not retest  -GR         Exercise 2    Exercise Name 2  Semont L  -GR      Cueing 2  Demo  -GR      Sets 2  1  -GR      Reps 2  2  -GR      Additional Comments  performed 1st  -GR        User Key  (r) = Recorded By, (t) = Taken By, (c) = Cosigned By    Initials Name Provider Type    Marco Thurman, PT Physical Therapist                      PT OP Goals     Row Name 07/21/20 1600          PT Short Term Goals    STG Date to Achieve  08/01/20  -GR     STG 1  Patient will be independent with static habituation PRN for in home safety.  -GR     STG 1 Progress  Ongoing  -GR     STG 2  Patient will present negative for left BPPV of the posterior canal.  -GR     STG 2 Progress  Ongoing  -GR     STG 2 Progress Comments  L PCC and cupulothiasis  -GR        Long Term Goals    LTG Date to Achieve  08/31/20  -GR     LTG 1  Patient will present negative for BPPV of all canals.  -GR     LTG 1 Progress  Ongoing  -GR     LTG 2  Patient will be independent with dynamic adaptation techniques for community reintegration.  -GR     LTG 2 Progress  Ongoing  -GR     LTG 3  Patient will score </= 24/100 on the dizziness handicap inventory to indicate improved perceived positional tolerance.  -GR     LTG 3 Progress  Ongoing  -GR       User Key  (r) = Recorded By, (t) = Taken By, (c) = Cosigned By    Initials Name Provider Type    Marco Thurman, PT Physical Therapist           Therapy Education  Education Details: educated on new findings and reasoning for correction maneuvers              Time Calculation:   Start Time: 1555  Stop Time: 1634  Time Calculation (min): 39 min  Total Timed Code Minutes- PT: 0 minute(s)   Therapy Charges for Today     Code Description Service Date Service Provider Modifiers Qty    44957047449 HC PT CANALITH REPOSITIONING PER DAY 7/21/2020 Marco Miner, PT GP 1                   Marco Miner, PT  7/21/2020

## 2020-07-30 ENCOUNTER — HOSPITAL ENCOUNTER (OUTPATIENT)
Dept: PHYSICAL THERAPY | Facility: HOSPITAL | Age: 75
Setting detail: THERAPIES SERIES
Discharge: HOME | End: 2020-07-30

## 2020-07-30 DIAGNOSIS — H81.92 DISORDER OF VESTIBULAR FUNCTION OF LEFT EAR: ICD-10-CM

## 2020-07-30 DIAGNOSIS — H81.12 BPPV (BENIGN PAROXYSMAL POSITIONAL VERTIGO), LEFT: Primary | ICD-10-CM

## 2020-07-30 DIAGNOSIS — R42 DIZZINESS: ICD-10-CM

## 2020-07-30 PROCEDURE — 97530 THERAPEUTIC ACTIVITIES: CPT | Performed by: PHYSICAL THERAPIST

## 2020-07-30 NOTE — THERAPY TREATMENT NOTE
Outpatient Physical Therapy Vestibular Treatment Note  [facility]     Patient Name: Chris Cox  : 1945  MRN: [id number]  Today's Date: 2020      Visit Date: 2020    Visit Dx:     ICD-10-CM ICD-9-CM   1. BPPV (benign paroxysmal positional vertigo), left H81.12 386.11   2. Dizziness R42 780.4   3. Disorder of vestibular function of left ear H81.92 386.9       Patient Active Problem List   Diagnosis   • Abdominal bruit   • Blues   • DD (diverticular disease)   • Menopausal and perimenopausal disorder   • Neuralgia neuritis, sciatic nerve   • Right knee pain   • Primary osteoarthritis of both knees   • Radiculopathy with lower extremity symptoms   • Idiopathic peripheral neuropathy   • Sprain of collateral ligament of right knee   • ADD (attention deficit disorder)   • Functional diarrhea   • Microscopic hematuria   • Chronic pain of left knee   • Bleeding diathesis (CMS/HCC)   • Bilateral edema of lower extremity   • Bilateral swelling of feet and ankles   • Hyperglycemia   • Venous stasis   • Attention deficit disorder (ADD) without hyperactivity   • Lesion of skin of scalp   • Pre-operative clearance   • Total knee replacement status   • Chronic deep vein thrombosis (DVT) of calf muscle vein of right lower extremity (CMS/HCC)   • Cellulitis of face   • Dermatitis   • Major depressive disorder with single episode, in full remission (CMS/HCC)   • Fatigue due to excessive exertion   • Disease of thyroid gland   • Vertigo       Vestibular Eval     Row Name 20 1100             Positional Testing    Positional Testing  Without infrared goggles  -GR      Vertebrobasilar Artery Screen - Right  Negative  -GR      Vertebrobasilar Artery Screen - Left  Negative  -GR      Philip-Hallpike Right  No nystagmus  -GR      Philip-Hallpike Left  No nystagmus  -GR      Horizontal Roll Test Right  No nystagmus  -GR      Horizontal Roll Test Left  No nystagmus  -GR        User Key  (r) = Recorded By, (t) =  Taken By, (c) = Cosigned By    Initials Name Provider Type    Marco Thurman, PT Physical Therapist                      PT Assessment/Plan     Row Name 07/30/20 1158          PT Assessment    Assessment Comments  Patient returns today negative for BPPV. Her symptoms have resolved and we will hold on formal intervention for now. She was educated on home epley to trial if symptoms do come back.  -GR        PT Plan    PT Plan Comments  Hold pending symptom return.  -GR       User Key  (r) = Recorded By, (t) = Taken By, (c) = Cosigned By    Initials Name Provider Type    Marco Thurman, PT Physical Therapist             OP Exercises     Row Name 07/30/20 1100             Subjective Comments    Subjective Comments  States her vertigo is essentially gone now. SHe did see her craniosacral therapist.  -GR         Total Minutes    75746 - PT Therapeutic Activity Minutes  8  -GR         Exercise 3    Exercise Name 3  Home epley education and reivew  -GR        User Key  (r) = Recorded By, (t) = Taken By, (c) = Cosigned By    Initials Name Provider Type    Marco Thurman, PT Physical Therapist                      PT OP Goals     Row Name 07/30/20 1100          PT Short Term Goals    STG Date to Achieve  08/01/20  -GR     STG 1  Patient will be independent with static habituation PRN for in home safety.  -GR     STG 1 Progress  Met  -GR     STG 2  Patient will present negative for left BPPV of the posterior canal.  -GR     STG 2 Progress  Met  -GR        Long Term Goals    LTG Date to Achieve  08/31/20  -GR     LTG 1  Patient will present negative for BPPV of all canals.  -GR     LTG 1 Progress  Met  -GR     LTG 2  Patient will be independent with dynamic adaptation techniques for community reintegration.  -GR     LTG 2 Progress  Ongoing  -GR     LTG 3  Patient will score </= 24/100 on the dizziness handicap inventory to indicate improved perceived positional tolerance.  -GR     LTG 3 Progress  Ongoing  -GR        User Key  (r) = Recorded By, (t) = Taken By, (c) = Cosigned By    Initials Name Provider Type    GR Marco Miner, PT Physical Therapist          Therapy Education  Education Details: see flow sheet              Time Calculation:   Start Time: 1130  Stop Time: 1145  Time Calculation (min): 15 min  Total Timed Code Minutes- PT: 8 minute(s)   Therapy Charges for Today     Code Description Service Date Service Provider Modifiers Qty    [id number]  PT THERAPEUTIC ACT EA 15 MIN 7/30/2020 Marco Miner, PT GP 1                   Marco Miner, PT  7/30/2020

## 2020-09-18 ENCOUNTER — TELEPHONE (OUTPATIENT)
Dept: FAMILY MEDICINE CLINIC | Facility: CLINIC | Age: 75
End: 2020-09-18

## 2020-09-18 NOTE — TELEPHONE ENCOUNTER
PATIENT CALLED AND WANTED TO LET DR. ARNDT TO KNOW SHE HAS TESTED COVID POSITIVE. SHE STATES SHE WENT TO THE DENTIST FOR A CLEANING. SHE HAS MOUTH SORES AND WHEN HER DENTIST SAW THE MOUTH SORE SHE SUGGESTED TO GET TESTED. SHE WENT LAST EVENING TO GET TEST AND WAS POSITIVE.     PATIENT CALL BACK NUMBER [phone]

## 2020-09-24 ENCOUNTER — TELEPHONE (OUTPATIENT)
Dept: FAMILY MEDICINE CLINIC | Facility: CLINIC | Age: 75
End: 2020-09-24

## 2020-09-24 NOTE — TELEPHONE ENCOUNTER
Patient is calling to state she tested positive for Covid last Friday.  She states that last night she started wheezing. The health department advised her to call Dr. Marquez for advice.  Patient states she has 2 jackets on and a blanket from chest down over her feet because of chills.  She states she has not wheezed since she got up today.  She states she sleeps almost sitting up.     Please advise.    Patient call back [phone]     [id number] [address] AT [facility] - [phone]  - [phone]   [phone]

## 2020-09-25 NOTE — TELEPHONE ENCOUNTER
Hub to read:    No answer and could not get a voicemail. Pt need to go to the ER if she is having trouble with breathing and wheezing asap.

## 2020-11-02 ENCOUNTER — OFFICE VISIT (OUTPATIENT)
Dept: FAMILY MEDICINE CLINIC | Facility: CLINIC | Age: 75
End: 2020-11-02

## 2020-11-02 VITALS
BODY MASS INDEX: 26.7 KG/M2 | HEART RATE: 70 BPM | TEMPERATURE: 97.1 F | RESPIRATION RATE: 16 BRPM | WEIGHT: 156.4 LBS | HEIGHT: 64 IN | OXYGEN SATURATION: 98 % | DIASTOLIC BLOOD PRESSURE: 70 MMHG | SYSTOLIC BLOOD PRESSURE: 153 MMHG

## 2020-11-02 DIAGNOSIS — L30.9 DERMATITIS: Primary | ICD-10-CM

## 2020-11-02 PROCEDURE — 99213 OFFICE O/P EST LOW 20 MIN: CPT | Performed by: NURSE PRACTITIONER

## 2020-11-02 RX ORDER — TRIAMCINOLONE ACETONIDE 0.25 MG/G
OINTMENT TOPICAL
Qty: 1 TUBE | Refills: 0 | Status: SHIPPED | OUTPATIENT
Start: 2020-11-02 | End: 2021-06-04

## 2020-11-02 NOTE — PROGRESS NOTES
Subjective     Chris Cox is a 75 y.o.. female.     Pt stating she had Covid  dx on 9/17/20    Rash  This is a new problem. Episode onset: 3 days. The problem has been gradually worsening since onset. Location: upper chest. The rash is characterized by itchiness and redness. She was exposed to nothing. Pertinent negatives include no fever.       The following portions of the patient's history were reviewed and updated as appropriate: allergies, current medications, past family history, past medical history, past social history, past surgical history and problem list.    Past Medical History:   Diagnosis Date   • Abdominal pain    • ADHD    • Arthritis     primary both knees   • Bronchitis 08/2019   • Disease of thyroid gland    • Diverticulosis of intestine    • DVT (deep venous thrombosis) (CMS/HCC) 10/2019    right lower extremity   • Fractures     MULTIPLE BONE FRACTURES-TOOK FOSAMAX 3.5 YRS   • Hyperlipidemia    • Hypertension    • Irregular heart beat    • Kidney stone    • Lumbar spine pain    • Migraines    • Mono exposure     AGE 35   • Neuralgia neuritis, sciatic nerve    • Obstructive sleep apnea     20 YRS AGO-NOT CURRENTLY   • Osteoporosis    • Seizures (CMS/[facility], located [facility])     As a baby   • Tuberculosis     As a child   • Vertigo        Past Surgical History:   Procedure Laterality Date   • BREAST SURGERY      Benign fibroadnoma removed from the left breast   • HAND SURGERY Bilateral 2012    RECONSTRUCTION ON BOTH HANDS   • LITHOTRIPSY      renal   • TONGUE SURGERY     • TOTAL KNEE ARTHROPLASTY Right 10/22/2019    Procedure: TOTAL KNEE ARTHROPLASTY RIGHT;  Surgeon: Aneesh Okeefe II, MD;  Location: [facility];  Service: Orthopedics   • WRIST SURGERY         Review of Systems   Constitutional: Negative for fever.   HENT: Negative.    Respiratory: Negative.    Cardiovascular: Negative.    Gastrointestinal: Negative.    Skin: Positive for rash.   Neurological: Negative.        Allergies   Allergen  "Reactions   • Erythromycin Anaphylaxis   • Sulfa Antibiotics Rash   • Milk-Related Compounds GI Intolerance   • Eggs Or Egg-Derived Products Unknown - Low Severity     PER ALLERGY TESTING RESULTS   • Cephalexin Rash   • Cephalosporins Rash     STOMACH ISSUES   • Molds & Smuts Rash     ITCHING       Objective     Vitals:    11/02/20 1542   BP: 153/70   BP Location: Left arm   Patient Position: Sitting   Cuff Size: Adult   Pulse: 70   Resp: 16   Temp: 97.1 °F (36.2 °C)   SpO2: 98%   Weight: 70.9 kg (156 lb 6.4 oz)   Height: 162 cm (63.78\")     Body mass index is 27.03 kg/m².    Physical Exam  Vitals signs reviewed.   HENT:      Head: Normocephalic.   Eyes:      Pupils: Pupils are equal, round, and reactive to light.   Cardiovascular:      Rate and Rhythm: Normal rate and regular rhythm.   Pulmonary:      Effort: Pulmonary effort is normal.      Breath sounds: Normal breath sounds.   Musculoskeletal: Normal range of motion.   Skin:     Comments: Left upper chest: red macular papular rash, no vesicles noted, no drainage noted   Neurological:      Mental Status: She is alert and oriented to person, place, and time.   Psychiatric:         Behavior: Behavior normal.           Current Outpatient Medications:   •  ascorbic acid (VITAMIN C) 500 MG/5ML liquid, Take 500 mg by mouth Daily., Disp: , Rfl:   •  B Complex Vitamins (VITAMIN B COMPLEX) capsule capsule, Take  by mouth Daily., Disp: , Rfl:   •  escitalopram (LEXAPRO) 10 MG tablet, Take 1 tablet by mouth Daily., Disp: 30 tablet, Rfl: 5  •  meclizine (ANTIVERT) 12.5 MG tablet, Take 1 tablet by mouth 2 (Two) Times a Day As Needed for Dizziness or Nausea., Disp: 12 tablet, Rfl: 0  •  vitamin A 13232 UNIT capsule, Take 10,000 Units by mouth Daily. ON HOLD, Disp: , Rfl:   •  VITAMIN D, CHOLECALCIFEROL, PO, Take 1 tablet by mouth daily., Disp: , Rfl:   •  VITAMIN E PO, Take  by mouth., Disp: , Rfl:   •  VITAMIN K PO, Take 1 tablet by mouth Daily. ON HOLD, Disp: , Rfl:   •  " triamcinolone (KENALOG) 0.025 % ointment, Apply to affected area twice a day  For 10-14 days, Disp: 1 tube, Rfl: 0    Recent Results (from the past 2016 hour(s))   COVID-19,[facility] ROUTINE, NP/OP SWAB IN TRANSPORT MEDIA OR ESWAB 72 HR TAT - Swab, Nasopharynx    Collection Time: 09/17/20  5:58 PM    Specimen: Nasopharynx; Swab   Result Value Ref Range    SARS-CoV-2, DALJIT Detected (A) Not Detected       Duplex Venous Lower Extremity - Right CAR  · Chronic right lower extremity deep vein thrombosis noted in the   gastrocnemius/soleal.  · All other right sided veins appeared normal.         Assessment/Plan   Diagnoses and all orders for this visit:    1. Dermatitis (Primary)  -     triamcinolone (KENALOG) 0.025 % ointment; Apply to affected area twice a day  For 10-14 days  Dispense: 1 tube; Refill: 0        Patient Instructions   Keep area clean and dry, wash with dial soap or equivalent twice a day. Keep area open to air.       Return if symptoms worsen or fail to improve.

## 2021-01-20 ENCOUNTER — OFFICE VISIT (OUTPATIENT)
Dept: FAMILY MEDICINE CLINIC | Facility: CLINIC | Age: 76
End: 2021-01-20

## 2021-01-20 VITALS
BODY MASS INDEX: 26.91 KG/M2 | WEIGHT: 157.6 LBS | SYSTOLIC BLOOD PRESSURE: 136 MMHG | TEMPERATURE: 96.9 F | RESPIRATION RATE: 18 BRPM | HEIGHT: 64 IN | OXYGEN SATURATION: 99 % | HEART RATE: 61 BPM | DIASTOLIC BLOOD PRESSURE: 80 MMHG

## 2021-01-20 DIAGNOSIS — Z78.0 POSTMENOPAUSAL: ICD-10-CM

## 2021-01-20 DIAGNOSIS — Z12.31 ENCOUNTER FOR SCREENING MAMMOGRAM FOR MALIGNANT NEOPLASM OF BREAST: ICD-10-CM

## 2021-01-20 DIAGNOSIS — R42 VERTIGO: ICD-10-CM

## 2021-01-20 DIAGNOSIS — R41.3 MEMORY LOSS: Primary | ICD-10-CM

## 2021-01-20 PROCEDURE — G0439 PPPS, SUBSEQ VISIT: HCPCS | Performed by: INTERNAL MEDICINE

## 2021-01-20 PROCEDURE — 1159F MED LIST DOCD IN RCRD: CPT | Performed by: INTERNAL MEDICINE

## 2021-01-20 PROCEDURE — 1170F FXNL STATUS ASSESSED: CPT | Performed by: INTERNAL MEDICINE

## 2021-01-20 PROCEDURE — 1126F AMNT PAIN NOTED NONE PRSNT: CPT | Performed by: INTERNAL MEDICINE

## 2021-02-02 ENCOUNTER — HOSPITAL ENCOUNTER (OUTPATIENT)
Dept: PHYSICAL THERAPY | Facility: HOSPITAL | Age: 76
Setting detail: THERAPIES SERIES
Discharge: HOME | End: 2021-02-02

## 2021-02-02 DIAGNOSIS — R42 VERTIGO: ICD-10-CM

## 2021-02-02 DIAGNOSIS — R42 DIZZINESS: Primary | ICD-10-CM

## 2021-02-02 PROCEDURE — 97161 PT EVAL LOW COMPLEX 20 MIN: CPT | Performed by: PHYSICAL THERAPIST

## 2021-02-02 NOTE — THERAPY EVALUATION
Outpatient Physical Therapy Vestibular Initial Evaluation  [facility]     Patient Name: Chris Cox  : 1945  MRN: [id number]  Today's Date: 2021      Visit Date: 2021    Patient Active Problem List   Diagnosis   • Abdominal bruit   • Blues   • DD (diverticular disease)   • Menopausal and perimenopausal disorder   • Neuralgia neuritis, sciatic nerve   • Right knee pain   • Primary osteoarthritis of both knees   • Radiculopathy with lower extremity symptoms   • Idiopathic peripheral neuropathy   • Sprain of collateral ligament of right knee   • ADD (attention deficit disorder)   • Functional diarrhea   • Microscopic hematuria   • Chronic pain of left knee   • Bleeding diathesis (CMS/HCC)   • Bilateral edema of lower extremity   • Bilateral swelling of feet and ankles   • Hyperglycemia   • Venous stasis   • Attention deficit disorder (ADD) without hyperactivity   • Lesion of skin of scalp   • Pre-operative clearance   • Total knee replacement status   • Chronic deep vein thrombosis (DVT) of calf muscle vein of right lower extremity (CMS/HCC)   • Cellulitis of face   • Dermatitis   • Major depressive disorder with single episode, in full remission (CMS/HCC)   • Fatigue due to excessive exertion   • Disease of thyroid gland   • Vertigo        Past Medical History:   Diagnosis Date   • Abdominal pain    • ADHD    • Arthritis     primary both knees   • Bronchitis 2019   • Disease of thyroid gland    • Diverticulosis of intestine    • DVT (deep venous thrombosis) (CMS/HCC) 10/2019    right lower extremity   • Fractures     MULTIPLE BONE FRACTURES-TOOK FOSAMAX 3.5 YRS   • Hyperlipidemia    • Hypertension    • Irregular heart beat    • Kidney stone    • Lumbar spine pain    • Migraines    • Mono exposure     AGE 35   • Neuralgia neuritis, sciatic nerve    • Obstructive sleep apnea     20 YRS AGO-NOT CURRENTLY   • Osteoporosis    • Seizures (CMS/HCC)     As a baby   • Tuberculosis     As a child  "  • Vertigo         Past Surgical History:   Procedure Laterality Date   • BREAST SURGERY      Benign fibroadnoma removed from the left breast   • HAND SURGERY Bilateral 2012    RECONSTRUCTION ON BOTH HANDS   • LITHOTRIPSY      renal   • TONGUE SURGERY     • TOTAL KNEE ARTHROPLASTY Right 10/22/2019    Procedure: TOTAL KNEE ARTHROPLASTY RIGHT;  Surgeon: Aneesh Okeefe II, MD;  Location: [facility];  Service: Orthopedics   • WRIST SURGERY           Visit Dx:     ICD-10-CM ICD-9-CM   1. Dizziness  R42 780.4   2. Vertigo  R42 780.4       Patient History     Row Name 02/02/21 1000             History    Chief Complaint  Dizziness  -GR      Date Current Problem(s) Began  01/20/21  -GR      Brief Description of Current Complaint  Has had onset of \"bubbling\" in the R ear. She did have some dizziness as well but this is mostly resolved. She is on aomoxicillin for an ear infection. She has been to the [facility] where she was prescribed the medication. She denies pain but occasionally feels off balance and limits her ability to get up at times.  -GR      Occupation/sports/leisure activities  pediatric OT  -GR         Fall Risk Assessment    Any falls in the past year:  No  -GR      Number of falls reported in the last 12 months  near falls  -GR         Services    Do you plan to receive Home Health services in the near future  No  -GR         Daily Activities    Primary Language  English  -GR      How does patient learn best?  Listening;Reading;Demonstration  -GR      Pt Participated in POC and Goals  Yes  -GR         Safety    Are you being hurt, hit, or frightened by anyone at home or in your life?  No  -GR      Are you being neglected by a caregiver  No  -GR      Have you had any of the following issues with  N/A  -GR        User Key  (r) = Recorded By, (t) = Taken By, (c) = Cosigned By    Initials Name Provider Type    Marco Thurman, PT Physical Therapist          Vestibular Rojelio     Row Name " 02/02/21 1000             Occulomotor Exam Fixation Present    Occular ROM  Normal  -GR      Spontaneous Nystagmus  Absent  -GR      Gaze-induced Nystagmus  Absent  -GR      Smooth Pursuit  Normal  -GR      Saccades  Intact  -GR      Convergence  Normal  -GR         Vestibulo-Occular Reflex (VOR)    VOR to Fast Head Movement/Head Thrust Test  Normal  -GR         Positional Testing    Positional Testing  Without infrared goggles  -GR      Vertebrobasilar Artery Screen - Right  Negative  -GR      Vertebrobasilar Artery Screen - Left  Negative  -GR      Fanshawe-Hallpike Right  No nystagmus  -GR      Fanshawe-Hallpike Left  No nystagmus  -GR      Horizontal Roll Test Right  No nystagmus  -GR      Horizontal Roll Test Left  No nystagmus  -GR         High-level Balance    High-level Balance Other  modified CTSIB 100/120  -GR        User Key  (r) = Recorded By, (t) = Taken By, (c) = Cosigned By    Initials Name Provider Type    Marco Thurman, PT Physical Therapist                      Therapy Education  Education Details: reeducated on vestibular system, today's findings and negative testing; recommend home rhomberg with eyes open and closed                      PT OP Goals     Row Name 02/02/21 1000          PT Short Term Goals    STG Date to Achieve  03/04/21  -GR     STG 1  Remain negative for BPPV all canals.  -GR     STG 1 Progress  New  -GR        Long Term Goals    LTG Date to Achieve  04/03/21  -GR     LTG 1  Independent with dynamic adaptation techniques for community safety as needed.  -GR     LTG 1 Progress  New  -GR        Time Calculation    PT Goal Re-Cert Due Date  05/03/21  -GR       User Key  (r) = Recorded By, (t) = Taken By, (c) = Cosigned By    Initials Name Provider Type    Marco Thurman, PT Physical Therapist          PT Assessment/Plan     Row Name 02/02/21 1000          PT Assessment    Functional Limitations  Limitations in community activities;Performance in leisure activities  -GR      Impairments  Balance  -GR     Assessment Comments  76 y/o F seeking vestibular consultation for onset of pressure in the R ear 21. She is improving with antibiotic.  BPPV screen is negative and occulomotor exam is unimpressive. Modified CTSIB did suggest mild vestibular weakness to which she is proficient in practicing rhomberg challenges independently at [employer]. No further skilled PT is recommended today however she is encouraged to return if her symptoms regress. Thank you for this referral.  -GR     Please refer to paper survey for additional self-reported information  No  -GR     Rehab Potential  Good  -GR     Patient/caregiver participated in establishment of treatment plan and goals  Yes  -GR     Patient would benefit from skilled therapy intervention  Yes  -GR        PT Plan    PT Frequency  1x/week  -GR     Predicted Duration of Therapy Intervention (PT)  PRN  -GR     Planned CPT's?  PT EVAL LOW COMPLEXITY: 17647;PT RE-EVAL: 73977;PT THER PROC EA 15 MIN: 83384;PT THER ACT EA 15 MIN: 27698;PT NEUROMUSC RE-EDUCATION EA 15 MIN: 80266;PT GAIT TRAINING EA 15 MIN: 10653;PT CANALITH REPOSITIONIN  -GR     Physical Therapy Interventions (Optional Details)  vestibular training  -GR     PT Plan Comments  Recheck BPPV if returns and tx as indicated.   -GR       User Key  (r) = Recorded By, (t) = Taken By, (c) = Cosigned By    Initials Name Provider Type    GR Marco Miner, PT Physical Therapist                     Time Calculation:   Start Time: 1010  Stop Time: 1040  Time Calculation (min): 30 min  Total Timed Code Minutes- PT: 0 minute(s)   Therapy Charges for Today     Code Description Service Date Service Provider Modifiers Qty    [id number]  PT EVAL LOW COMPLEXITY 2 2021 Marco Miner, PT GP 1                    Marco Miner, PT  2021

## 2021-02-04 PROBLEM — Z00.00 MEDICARE ANNUAL WELLNESS VISIT, SUBSEQUENT: Status: ACTIVE | Noted: 2021-02-04

## 2021-02-25 ENCOUNTER — TELEPHONE (OUTPATIENT)
Dept: FAMILY MEDICINE CLINIC | Facility: CLINIC | Age: 76
End: 2021-02-25

## 2021-02-25 NOTE — TELEPHONE ENCOUNTER
Caller: Chris Cox    Relationship to patient: Self    Best call back number: [phone]     Concerns or Questions if Applicable: Patient is calling to request Dr. Marquez's MA to call her ASAP.  She states she has some important questions regarding a memory referral.    Please advise.

## 2021-02-26 NOTE — TELEPHONE ENCOUNTER
Referral was placed 01/20/2021. Do you have any information to provide to patient regarding referral?

## 2021-03-03 ENCOUNTER — TELEPHONE (OUTPATIENT)
Dept: FAMILY MEDICINE CLINIC | Facility: CLINIC | Age: 76
End: 2021-03-03

## 2021-03-03 NOTE — TELEPHONE ENCOUNTER
Caller: Chris Cox A    Relationship to patient: Self    Best call back number: [phone]    Patient is needing: PATIENT WANTS TO KNOW WHAT TESTS  TOLD REFERRED PSYCHOLOGIST PATIENT WAS NEEDING BECAUSE SHE IS HAVING A HARD TIME GETTING IN. CALLBACK REQUESTED

## 2021-03-04 NOTE — TELEPHONE ENCOUNTER
Dr. Marquez would like for pt to have a neuropsychology evaluation.   He placed referral back in January. Would you be able to help pt get in with someone?

## 2021-03-09 DIAGNOSIS — Z23 IMMUNIZATION DUE: ICD-10-CM

## 2021-03-25 ENCOUNTER — HOSPITAL ENCOUNTER (OUTPATIENT)
Dept: MAMMOGRAPHY | Facility: HOSPITAL | Age: 76
End: 2021-03-25

## 2021-03-25 ENCOUNTER — APPOINTMENT (OUTPATIENT)
Dept: BONE DENSITY | Facility: HOSPITAL | Age: 76
End: 2021-03-25

## 2021-03-25 RX ORDER — ESCITALOPRAM OXALATE 10 MG/1
10 TABLET ORAL DAILY
Qty: 30 TABLET | Refills: 5 | Status: SHIPPED | OUTPATIENT
Start: 2021-03-25 | End: 2021-09-15

## 2021-05-18 ENCOUNTER — HOSPITAL ENCOUNTER (OUTPATIENT)
Dept: MAMMOGRAPHY | Facility: HOSPITAL | Age: 76
Discharge: HOME | End: 2021-05-18

## 2021-05-18 ENCOUNTER — HOSPITAL ENCOUNTER (OUTPATIENT)
Dept: BONE DENSITY | Facility: HOSPITAL | Age: 76
Discharge: HOME | End: 2021-05-18

## 2021-05-18 DIAGNOSIS — Z12.31 ENCOUNTER FOR SCREENING MAMMOGRAM FOR MALIGNANT NEOPLASM OF BREAST: ICD-10-CM

## 2021-05-18 PROCEDURE — 77063 BREAST TOMOSYNTHESIS BI: CPT

## 2021-05-18 PROCEDURE — 77067 SCR MAMMO BI INCL CAD: CPT

## 2021-05-18 PROCEDURE — 77080 DXA BONE DENSITY AXIAL: CPT

## 2021-06-04 ENCOUNTER — OFFICE VISIT (OUTPATIENT)
Dept: FAMILY MEDICINE CLINIC | Facility: CLINIC | Age: 76
End: 2021-06-04

## 2021-06-04 VITALS
HEIGHT: 64 IN | TEMPERATURE: 97.4 F | WEIGHT: 153 LBS | BODY MASS INDEX: 26.12 KG/M2 | OXYGEN SATURATION: 98 % | HEART RATE: 65 BPM | DIASTOLIC BLOOD PRESSURE: 70 MMHG | SYSTOLIC BLOOD PRESSURE: 110 MMHG

## 2021-06-04 DIAGNOSIS — R41.3 MEMORY LOSS: Primary | ICD-10-CM

## 2021-06-04 PROCEDURE — 99213 OFFICE O/P EST LOW 20 MIN: CPT | Performed by: INTERNAL MEDICINE

## 2021-06-04 RX ORDER — DOXYCYCLINE 100 MG/1
100 CAPSULE ORAL
COMMUNITY
Start: 2021-05-28 | End: 2021-06-07

## 2021-06-04 NOTE — PROGRESS NOTES
Subjective   Chris Cox is a 75 y.o. female. Patient is here today for   Chief Complaint   Patient presents with   • Memory Loss          Vitals:    06/04/21 1551   BP: 110/70   Pulse: 65   Temp: 97.4 °F (36.3 °C)   SpO2: 98%     Body mass index is 26.44 kg/m².      Past Medical History:   Diagnosis Date   • Abdominal pain    • ADHD    • Arthritis     primary both knees   • Bronchitis 08/2019   • Disease of thyroid gland    • Diverticulosis of intestine    • DVT (deep venous thrombosis) (CMS/[facility]) 10/2019    right lower extremity   • Fractures     MULTIPLE BONE FRACTURES-TOOK FOSAMAX 3.5 YRS   • Hyperlipidemia    • Hypertension    • Irregular heart beat    • Kidney stone    • Lumbar spine pain    • Migraines    • Mono exposure     AGE 35   • Neuralgia neuritis, sciatic nerve    • Obstructive sleep apnea     20 YRS AGO-NOT CURRENTLY   • Osteoporosis    • Seizures (CMS/[facility])     As a baby   • Tuberculosis     As a child   • Vertigo       Allergies   Allergen Reactions   • Erythromycin Anaphylaxis   • Sulfa Antibiotics Rash   • Milk-Related Compounds GI Intolerance   • Eggs Or Egg-Derived Products Unknown - Low Severity     PER ALLERGY TESTING RESULTS   • Cephalexin Rash   • Cephalosporins Rash     STOMACH ISSUES   • Molds & Smuts Rash     ITCHING      Social History     Socioeconomic History   • Marital status:      Spouse name: Marky   • Number of children: Not on file   • Years of education: College   • Highest education level: Not on file   Tobacco Use   • Smoking status: Never Smoker   • Smokeless tobacco: Never Used   Substance and Sexual Activity   • Alcohol use: Yes     Comment: wine   • Drug use: No   • Sexual activity: Defer        Current Outpatient Medications:   •  ascorbic acid (VITAMIN C) 500 MG/5ML liquid, Take 500 mg by mouth Daily., Disp: , Rfl:   •  B Complex Vitamins (VITAMIN B COMPLEX) capsule capsule, Take  by mouth Daily., Disp: , Rfl:   •  doxycycline (MONODOX) 100 MG capsule, Take 100  mg by mouth., Disp: , Rfl:   •  escitalopram (LEXAPRO) 10 MG tablet, TAKE 1 TABLET BY MOUTH DAILY, Disp: 30 tablet, Rfl: 5  •  mupirocin (BACTROBAN) 2 % ointment, Apply to affected area 3 times daily., Disp: , Rfl:   •  vitamin A 98935 UNIT capsule, Take 10,000 Units by mouth Daily. ON HOLD, Disp: , Rfl:   •  VITAMIN D, CHOLECALCIFEROL, PO, Take 1 tablet by mouth daily., Disp: , Rfl:   •  VITAMIN E PO, Take  by mouth., Disp: , Rfl:   •  VITAMIN K PO, Take 1 tablet by mouth Daily. ON HOLD, Disp: , Rfl:      Objective     She has been concerned about memory loss for a little while now.    I had arranged for her to have neuropsychological testing but she had an unpleasant experience at that office.  So she has not had any neuropsychological evaluation in the meantime.    A friend had a recommendation that she see the group in [place] and she gave me a name and address that she wished me to make a referral for neuropsychological evaluation.    She tells me she feels well besides.       Review of Systems   Constitutional: Negative.    HENT: Negative.    Respiratory: Negative.    Cardiovascular: Negative.    Musculoskeletal: Negative.    Hematological: Negative.    Psychiatric/Behavioral:        She remains concerned about memory loss.           Physical Exam  Vitals and nursing note reviewed.   Constitutional:       Appearance: Normal appearance.      Comments: Pleasant, neatly groomed, in no distress.   Neck:      Vascular: No carotid bruit.   Cardiovascular:      Rate and Rhythm: Regular rhythm.      Heart sounds: Normal heart sounds. No murmur heard.   No gallop.    Pulmonary:      Effort: No respiratory distress.      Breath sounds: No wheezing or rales.   Neurological:      Mental Status: She is alert and oriented to person, place, and time.      Motor: No weakness.      Gait: Gait normal.   Psychiatric:         Mood and Affect: Mood normal.         Behavior: Behavior normal.         Thought Content: Thought  content normal.         Judgment: Judgment normal.           Problems Addressed this Visit     None      Visit Diagnoses     Memory loss    -  Primary    Relevant Orders    NeuroPsych Testing      Diagnoses       Codes Comments    Memory loss    -  Primary ICD-10-CM: R41.3  ICD-9-CM: 780.93             PLAN  She remains concerned about memory loss of course.    Her mother had Alzheimer's dementia and she remains concerned that this may be a problem for her in the future.    I think it would be a great idea for her to have a neuropsychological evaluation.  I regret that she had an unpleasant experience when she sent out to get a neuropsychological evaluation but I have made a referral for her to see a group recommended by friend.    I would like to see her back to discuss the results of her evaluation when the results are available.  No follow-ups on file.

## 2021-07-12 ENCOUNTER — TELEPHONE (OUTPATIENT)
Dept: FAMILY MEDICINE CLINIC | Facility: CLINIC | Age: 76
End: 2021-07-12

## 2021-07-12 NOTE — TELEPHONE ENCOUNTER
Caller: Chris Cox    Relationship: Self    Best call back number: [phone]    What is the medical concern/diagnosis:     What specialty or service is being requested: MEMORY TEST    What is the provider, practice or medical service name: [facility]    What is the office location: [place], [place]    What is the office phone number:     Any additional details:

## 2021-07-12 NOTE — TELEPHONE ENCOUNTER
Caller: Chris Cox    Relationship: Self    Best call back number: [phone]     Caller requesting test results: PATIENT    What test was performed: DEXA SCAN    When was the test performeD: 05/18/21      Additional notes: PATIENT IS CALLING SHE STATES SHE HAD A DEXA SCAN DONE BACK IN MAY AND NEVER HEARD ANY RESULTS. SHE HAS NEVER BEEN ABLE TO GET THE [employer] TO WORK AND WOULD LIKE CALLBACK TO DISCUSS THOSE RESULTS.

## 2021-07-14 ENCOUNTER — HOSPITAL ENCOUNTER (EMERGENCY)
Facility: HOSPITAL | Age: 76
Discharge: LEFT BEFORE DISCHARGE | End: 2021-07-14

## 2021-07-14 VITALS — RESPIRATION RATE: 16 BRPM | OXYGEN SATURATION: 97 % | HEART RATE: 67 BPM | TEMPERATURE: 98.2 F

## 2021-07-14 PROCEDURE — 99211 OFF/OP EST MAY X REQ PHY/QHP: CPT

## 2021-07-14 NOTE — ED NOTES
"Pt c/o swelling and \"puffiness\" to left side of mouth. She also reports, \"a mouth full of sores\".    Mask placed on patient in triage. Triage staff wore appropriate PPE during interaction with patient.        Brittany Mahoney RN  07/14/21 0937    "

## 2021-07-16 NOTE — TELEPHONE ENCOUNTER
Called pt to get more details on why referral is needed, no answer. Pt has an appt coming up on 7/21/21 to discuss her other concern, I advised on her VM to call office back if this is something she needs before her f/u or dr. Marquez can address referral at appt.

## 2021-07-21 ENCOUNTER — OFFICE VISIT (OUTPATIENT)
Dept: FAMILY MEDICINE CLINIC | Facility: CLINIC | Age: 76
End: 2021-07-21

## 2021-07-21 VITALS
SYSTOLIC BLOOD PRESSURE: 124 MMHG | BODY MASS INDEX: 27.11 KG/M2 | HEART RATE: 72 BPM | DIASTOLIC BLOOD PRESSURE: 68 MMHG | WEIGHT: 158.8 LBS | OXYGEN SATURATION: 97 % | HEIGHT: 64 IN | TEMPERATURE: 97.8 F | RESPIRATION RATE: 18 BRPM

## 2021-07-21 DIAGNOSIS — M85.851 OSTEOPENIA OF BOTH HIPS: ICD-10-CM

## 2021-07-21 DIAGNOSIS — R41.3 MEMORY LOSS: Primary | ICD-10-CM

## 2021-07-21 DIAGNOSIS — M85.852 OSTEOPENIA OF BOTH HIPS: ICD-10-CM

## 2021-07-21 PROCEDURE — 99214 OFFICE O/P EST MOD 30 MIN: CPT | Performed by: INTERNAL MEDICINE

## 2021-07-21 NOTE — PROGRESS NOTES
Subjective   Chris Cox is a 76 y.o. female. Patient is here today for   Chief Complaint   Patient presents with   • Results     bone densty results          Vitals:    07/21/21 1305   BP: 124/68   Pulse: 72   Resp: 18   Temp: 97.8 °F (36.6 °C)   SpO2: 97%     Body mass index is 27.24 kg/m².      Past Medical History:   Diagnosis Date   • Abdominal pain    • ADHD    • Arthritis     primary both knees   • Bronchitis 08/2019   • Disease of thyroid gland    • Diverticulosis of intestine    • DVT (deep venous thrombosis) (CMS/[facility]) 10/2019    right lower extremity   • Fractures     MULTIPLE BONE FRACTURES-TOOK FOSAMAX 3.5 YRS   • Hyperlipidemia    • Hypertension    • Irregular heart beat    • Kidney stone    • Lumbar spine pain    • Migraines    • Mono exposure     AGE 35   • Neuralgia neuritis, sciatic nerve    • Obstructive sleep apnea     20 YRS AGO-NOT CURRENTLY   • Seizures (CMS/[facility])     As a baby   • Tuberculosis     As a child   • Vertigo       Allergies   Allergen Reactions   • Erythromycin Anaphylaxis   • Sulfa Antibiotics Rash   • Milk-Related Compounds GI Intolerance   • Eggs Or Egg-Derived Products Unknown - Low Severity     PER ALLERGY TESTING RESULTS   • Cephalexin Rash   • Cephalosporins Rash     STOMACH ISSUES   • Molds & Smuts Rash     ITCHING      Social History     Socioeconomic History   • Marital status:      Spouse name: Marky   • Number of children: Not on file   • Years of education: College   • Highest education level: Not on file   Tobacco Use   • Smoking status: Never Smoker   • Smokeless tobacco: Never Used   Substance and Sexual Activity   • Alcohol use: Yes     Comment: wine   • Drug use: No   • Sexual activity: Defer        Current Outpatient Medications:   •  amoxicillin (AMOXIL) 500 MG capsule, Take 1 capsule by mouth 3 (Three) Times a Day for 7 days., Disp: 21 capsule, Rfl: 0  •  ascorbic acid (VITAMIN C) 500 MG/5ML liquid, Take 500 mg by mouth Daily., Disp: , Rfl:   •  B  "Complex Vitamins (VITAMIN B COMPLEX) capsule capsule, Take  by mouth Daily., Disp: , Rfl:   •  escitalopram (LEXAPRO) 10 MG tablet, TAKE 1 TABLET BY MOUTH DAILY, Disp: 30 tablet, Rfl: 5  •  mupirocin (BACTROBAN) 2 % ointment, Apply to affected area 3 times daily., Disp: , Rfl:   •  vitamin A 70290 UNIT capsule, Take 10,000 Units by mouth Daily. ON HOLD, Disp: , Rfl:   •  VITAMIN D, CHOLECALCIFEROL, PO, Take 1 tablet by mouth daily., Disp: , Rfl:   •  VITAMIN E PO, Take  by mouth., Disp: , Rfl:   •  VITAMIN K PO, Take 1 tablet by mouth Daily. ON HOLD, Disp: , Rfl:      Objective     She is here to follow-up on a couple test that I have gotten her recently.    Also, she has been concerned about her gradually worsening memory loss.  Her mother had Alzheimer's dementia.    I had asked her to go to [facility] for a neuropsychological assessment but she had an unpleasant experience there.    She would like to go to \"[facility]\" in [place] to have this assessment done.       Review of Systems   Constitutional: Negative.    HENT: Negative.    Respiratory: Negative.    Cardiovascular: Negative.    Musculoskeletal: Negative.    Psychiatric/Behavioral: Negative.        Physical Exam  Vitals and nursing note reviewed.   Constitutional:       Appearance: Normal appearance. She is not ill-appearing or diaphoretic.      Comments: Pleasant, neatly groomed, no distress.   Cardiovascular:      Rate and Rhythm: Regular rhythm.      Heart sounds: Normal heart sounds. No murmur heard.   No gallop.    Pulmonary:      Effort: No respiratory distress.      Breath sounds: Normal breath sounds. No wheezing or rales.   Neurological:      Mental Status: She is alert and oriented to person, place, and time.   Psychiatric:         Mood and Affect: Mood normal.         Behavior: Behavior normal.         Thought Content: Thought content normal.         Judgment: Judgment normal.           Problems Addressed this Visit        " "Musculoskeletal and Injuries    Osteopenia of both hips       Neuro    Memory loss - Primary    Relevant Orders    Ambulatory Referral to Neuropsychology      Diagnoses       Codes Comments    Memory loss    -  Primary ICD-10-CM: R41.3  ICD-9-CM: 780.93     Osteopenia of both hips     ICD-10-CM: M85.851, M85.852  ICD-9-CM: 733.90             PLAN  She and I reviewed her bone density from May.  She has osteopenia with a T score of -1.5 in both hips.  I will get a bone density in 2 years to reassess this.    The time being she does not need any medication to manage this she should be taking supplemental calcium and vitamin D however.    She is concerned about memory loss especially in light of her family history with her mother having Alzheimer's dementia.  Really would like her to have neuropsychological evaluation I have referred her per her request to \"[facility]".    Like to have her back to discuss the results when they are available.      No follow-ups on file.  "

## 2021-08-13 ENCOUNTER — OFFICE VISIT (OUTPATIENT)
Dept: FAMILY MEDICINE CLINIC | Facility: CLINIC | Age: 76
End: 2021-08-13

## 2021-08-13 VITALS
WEIGHT: 154.6 LBS | DIASTOLIC BLOOD PRESSURE: 70 MMHG | SYSTOLIC BLOOD PRESSURE: 118 MMHG | TEMPERATURE: 96.4 F | HEIGHT: 64 IN | HEART RATE: 68 BPM | BODY MASS INDEX: 26.4 KG/M2 | OXYGEN SATURATION: 97 % | RESPIRATION RATE: 18 BRPM

## 2021-08-13 DIAGNOSIS — R35.0 URINARY FREQUENCY: ICD-10-CM

## 2021-08-13 DIAGNOSIS — R41.3 MEMORY DIFFICULTY: Primary | ICD-10-CM

## 2021-08-13 LAB
BILIRUB BLD-MCNC: ABNORMAL MG/DL
CLARITY, POC: ABNORMAL
COLOR UR: ABNORMAL
GLUCOSE UR STRIP-MCNC: NEGATIVE MG/DL
KETONES UR QL: NEGATIVE
LEUKOCYTE EST, POC: NEGATIVE
NITRITE UR-MCNC: NEGATIVE MG/ML
PH UR: 5.5 [PH] (ref 5–8)
PROT UR STRIP-MCNC: ABNORMAL MG/DL
RBC # UR STRIP: NEGATIVE /UL
SP GR UR: 1.03 (ref 1–1.03)
UROBILINOGEN UR QL: NORMAL

## 2021-08-13 PROCEDURE — 81003 URINALYSIS AUTO W/O SCOPE: CPT | Performed by: NURSE PRACTITIONER

## 2021-08-13 PROCEDURE — 99213 OFFICE O/P EST LOW 20 MIN: CPT | Performed by: NURSE PRACTITIONER

## 2021-08-13 NOTE — PATIENT INSTRUCTIONS
Blood pressure wnl, no issues at this time    Memory issues/urinary frequency: pt to start exercise routine, exercise 3-5 times a week, for more than 30 minutes at a time; continue to eat a heart healthy diet and drink plenty of water through out day; pt informed of u/a results and importance of increasing water intake. Pt informed to continue playing [employer] and word finds.

## 2021-08-13 NOTE — PROGRESS NOTES
Subjective     Chris Cox is a 76 y.o.. female.     Pt here today with c/o elevated b/p of 145/78 in July when she was at the  [facility] for dental abscess. Pt stating she normally has lower blood pressure and that result scared her. Pt denies checking her b/p at home.     Pt also with c/o Memory issues. Pt stating she is having issues with word recall when talking. Pt denies exercising. Pt states she does play [employer] and word find daily. Pt stating she eats healthy, but knows she does not drink enough water.    Pt c/o urinary frequency and urgency recently. Pt denies dysuria, fever, n/v.       The following portions of the patient's history were reviewed and updated as appropriate: allergies, current medications, past family history, past medical history, past social history, past surgical history and problem list.    Past Medical History:   Diagnosis Date   • Abdominal pain    • ADHD    • Arthritis     primary both knees   • Bronchitis 08/2019   • Disease of thyroid gland    • Diverticulosis of intestine    • DVT (deep venous thrombosis) (CMS/HCC) 10/2019    right lower extremity   • Fractures     MULTIPLE BONE FRACTURES-TOOK FOSAMAX 3.5 YRS   • Hyperlipidemia    • Hypertension    • Irregular heart beat    • Kidney stone    • Lumbar spine pain    • Migraines    • Mono exposure     AGE 35   • Neuralgia neuritis, sciatic nerve    • Obstructive sleep apnea     20 YRS AGO-NOT CURRENTLY   • Seizures (CMS/[facility])     As a baby   • Tuberculosis     As a child   • Vertigo        Past Surgical History:   Procedure Laterality Date   • BREAST BIOPSY Left     2019 stereotactic benign   • BREAST EXCISIONAL BIOPSY Left     1971 - fibroadenoma   • BREAST SURGERY      Benign fibroadnoma removed from the left breast   • HAND SURGERY Bilateral 2012    RECONSTRUCTION ON BOTH HANDS   • LITHOTRIPSY      renal   • TONGUE SURGERY     • TOTAL KNEE ARTHROPLASTY Right 10/22/2019    Procedure: TOTAL KNEE ARTHROPLASTY RIGHT;  Surgeon:  "Aneesh Okeefe II, MD;  Location: [facility] OR;  Service: Orthopedics   • WRIST SURGERY         Review of Systems   Constitutional: Negative.  Negative for fever.   Respiratory: Negative.    Cardiovascular: Negative.    Gastrointestinal: Negative.  Negative for nausea and vomiting.   Genitourinary: Positive for frequency and urgency. Negative for dysuria.        Leakage d/t frequency/urgency   Neurological: Negative for dizziness, light-headedness and headaches.   Psychiatric/Behavioral: Negative for confusion.        Memory issues       Allergies   Allergen Reactions   • Erythromycin Anaphylaxis   • Sulfa Antibiotics Rash   • Milk-Related Compounds GI Intolerance   • Eggs Or Egg-Derived Products Unknown - Low Severity     PER ALLERGY TESTING RESULTS   • Cephalexin Rash   • Cephalosporins Rash     STOMACH ISSUES   • Molds & Smuts Rash     ITCHING       Objective     Vitals:    08/13/21 0836   BP: 118/70   BP Location: Left arm   Patient Position: Sitting   Pulse: 68   Resp: 18   Temp: 96.4 °F (35.8 °C)   TempSrc: Oral   SpO2: 97%   Weight: 70.1 kg (154 lb 9.6 oz)   Height: 162.6 cm (64.02\")     Body mass index is 26.52 kg/m².    Physical Exam  Vitals reviewed.   HENT:      Head: Normocephalic.   Eyes:      Pupils: Pupils are equal, round, and reactive to light.   Cardiovascular:      Rate and Rhythm: Normal rate and regular rhythm.      Pulses: Normal pulses.   Pulmonary:      Effort: Pulmonary effort is normal.      Breath sounds: Normal breath sounds.   Musculoskeletal:         General: Normal range of motion.      Right lower leg: Edema (trace) present.      Left lower leg: Edema (trace) present.   Skin:     General: Skin is warm and dry.   Neurological:      Mental Status: She is alert and oriented to person, place, and time.   Psychiatric:         Behavior: Behavior normal.           Current Outpatient Medications:   •  ascorbic acid (VITAMIN C) 500 MG/5ML liquid, Take 500 mg by mouth Daily., Disp: " , Rfl:   •  B Complex Vitamins (VITAMIN B COMPLEX) capsule capsule, Take  by mouth Daily., Disp: , Rfl:   •  escitalopram (LEXAPRO) 10 MG tablet, TAKE 1 TABLET BY MOUTH DAILY, Disp: 30 tablet, Rfl: 5  •  mupirocin (BACTROBAN) 2 % ointment, Apply to affected area 3 times daily., Disp: , Rfl:   •  vitamin A 71313 UNIT capsule, Take 10,000 Units by mouth Daily. ON HOLD, Disp: , Rfl:   •  VITAMIN D, CHOLECALCIFEROL, PO, Take 1 tablet by mouth daily., Disp: , Rfl:   •  VITAMIN E PO, Take  by mouth., Disp: , Rfl:   •  VITAMIN K PO, Take 1 tablet by mouth Daily. ON HOLD, Disp: , Rfl:     Recent Results (from the past 336 hour(s))   POC Urinalysis Dipstick, Automated    Collection Time: 08/13/21  9:34 AM    Specimen: Urine   Result Value Ref Range    Color Dark Yellow Yellow, Straw, Dark Yellow, Lillian    Clarity, UA Slightly Cloudy (A) Clear    Specific Gravity  1.030 1.005 - 1.030    pH, Urine 5.5 5.0 - 8.0    Leukocytes Negative Negative    Nitrite, UA Negative Negative    Protein, POC 30 mg/dL (A) Negative mg/dL    Glucose, UA Negative Negative, 1000 mg/dL (3+) mg/dL    Ketones, UA Negative Negative    Urobilinogen, UA Normal Normal    Bilirubin Small (1+) (A) Negative    Blood, UA Negative Negative           Assessment/Plan   Diagnoses and all orders for this visit:    1. Memory difficulty (Primary)    2. Urinary frequency  -     POC Urinalysis Dipstick, Automated        Patient Instructions   Blood pressure wnl, no issues at this time    Memory issues/urinary frequency: pt to start exercise routine, exercise 3-5 times a week, for more than 30 minutes at a time; continue to eat a heart healthy diet and drink plenty of water through out day; pt informed of u/a results and importance of increasing water intake. Pt informed to continue playing [employer] and word finds.           Return if symptoms worsen or fail to improve, for should have fasting labs and then follow up with Dr. Marquez in 1-2 months.

## 2021-09-01 ENCOUNTER — DOCUMENTATION (OUTPATIENT)
Dept: PHYSICAL THERAPY | Facility: HOSPITAL | Age: 76
End: 2021-09-01

## 2021-09-14 ENCOUNTER — OFFICE VISIT (OUTPATIENT)
Dept: FAMILY MEDICINE CLINIC | Facility: CLINIC | Age: 76
End: 2021-09-14

## 2021-09-14 VITALS
TEMPERATURE: 96.9 F | SYSTOLIC BLOOD PRESSURE: 146 MMHG | BODY MASS INDEX: 35.85 KG/M2 | HEIGHT: 55 IN | RESPIRATION RATE: 18 BRPM | DIASTOLIC BLOOD PRESSURE: 82 MMHG | WEIGHT: 154.9 LBS | OXYGEN SATURATION: 98 % | HEART RATE: 63 BPM

## 2021-09-14 DIAGNOSIS — I82.561 CHRONIC DEEP VEIN THROMBOSIS (DVT) OF CALF MUSCLE VEIN OF RIGHT LOWER EXTREMITY (HCC): Primary | ICD-10-CM

## 2021-09-14 DIAGNOSIS — M79.89 RIGHT LEG SWELLING: ICD-10-CM

## 2021-09-14 DIAGNOSIS — R60.9 DEPENDENT EDEMA: ICD-10-CM

## 2021-09-14 DIAGNOSIS — M79.604 RIGHT LEG PAIN: ICD-10-CM

## 2021-09-14 PROCEDURE — 99213 OFFICE O/P EST LOW 20 MIN: CPT | Performed by: NURSE PRACTITIONER

## 2021-09-14 NOTE — PROGRESS NOTES
Subjective     Chris Cox is a 76 y.o.. female.     Pt here today c/o lower extremity swelling. Pt stating both swell but right more than left. Pt stating she has right calf soreness as well. Pt with history of right knee surgery on 10/19/2019. Pt stating since then she has been having arturo. Lower extremity swelling. Pt having doppler U/S on 10/31/19 showing right gastrocnemius dvt; was rechechecked on 12/5/19 with results of chronic right gastrocnemius dvt. Pt was seen by Dr. Walter, hematologist who started pt at that time on Eliquis 5 mg bid x 3 months.       The following portions of the patient's history were reviewed and updated as appropriate: allergies, current medications, past family history, past medical history, past social history, past surgical history and problem list.    Past Medical History:   Diagnosis Date   • Abdominal pain    • ADHD    • Arthritis     primary both knees   • Bronchitis 08/2019   • Disease of thyroid gland    • Diverticulosis of intestine    • DVT (deep venous thrombosis) (CMS/HCC) 10/2019    right lower extremity   • Fractures     MULTIPLE BONE FRACTURES-TOOK FOSAMAX 3.5 YRS   • Hyperlipidemia    • Hypertension    • Irregular heart beat    • Kidney stone    • Lumbar spine pain    • Migraines    • Mono exposure     AGE 35   • Neuralgia neuritis, sciatic nerve    • Obstructive sleep apnea     20 YRS AGO-NOT CURRENTLY   • Seizures (CMS/HCC)     As a baby   • Tuberculosis     As a child   • Vertigo        Past Surgical History:   Procedure Laterality Date   • BREAST BIOPSY Left     2019 stereotactic benign   • BREAST EXCISIONAL BIOPSY Left     1971 - fibroadenoma   • BREAST SURGERY      Benign fibroadnoma removed from the left breast   • HAND SURGERY Bilateral 2012    RECONSTRUCTION ON BOTH HANDS   • LITHOTRIPSY      renal   • TONGUE SURGERY     • TOTAL KNEE ARTHROPLASTY Right 10/22/2019    Procedure: TOTAL KNEE ARTHROPLASTY RIGHT;  Surgeon: Aneesh Okeefe II, MD;   "Location: [facility] OR;  Service: Orthopedics   • WRIST SURGERY         Review of Systems   Constitutional: Negative.  Negative for fever.   Respiratory: Negative.  Negative for cough and shortness of breath.    Cardiovascular: Negative.  Negative for chest pain.   Musculoskeletal:        Right lower extremity swelling and soreness ongoing   Neurological: Negative for headaches.       Allergies   Allergen Reactions   • Erythromycin Anaphylaxis   • Sulfa Antibiotics Rash   • Milk-Related Compounds GI Intolerance   • Eggs Or Egg-Derived Products Unknown - Low Severity     PER ALLERGY TESTING RESULTS   • Cephalexin Rash   • Cephalosporins Rash     STOMACH ISSUES   • Molds & Smuts Rash     ITCHING       Objective     Vitals:    09/14/21 1056   BP: 146/82   BP Location: Left arm   Patient Position: Sitting   Pulse: 63   Resp: 18   Temp: 96.9 °F (36.1 °C)   TempSrc: Oral   SpO2: 98%   Weight: 70.3 kg (154 lb 14.4 oz)   Height: 62.6 cm (24.65\")     Body mass index is 179.29 kg/m².    Physical Exam  Vitals reviewed.   HENT:      Head: Normocephalic.   Eyes:      Pupils: Pupils are equal, round, and reactive to light.   Cardiovascular:      Rate and Rhythm: Normal rate and regular rhythm.   Pulmonary:      Effort: Pulmonary effort is normal.      Breath sounds: Normal breath sounds.   Musculoskeletal:         General: Tenderness (right lower calf) present. Normal range of motion.      Right lower leg: Edema (minor) present.      Left lower leg: Edema (trace) present.   Neurological:      Mental Status: She is alert and oriented to person, place, and time.   Psychiatric:         Behavior: Behavior normal.           Current Outpatient Medications:   •  ascorbic acid (VITAMIN C) 500 MG/5ML liquid, Take 500 mg by mouth Daily., Disp: , Rfl:   •  B Complex Vitamins (VITAMIN B COMPLEX) capsule capsule, Take  by mouth Daily., Disp: , Rfl:   •  escitalopram (LEXAPRO) 10 MG tablet, TAKE 1 TABLET BY MOUTH DAILY, Disp: 30 tablet, Rfl: " 5  •  mupirocin (BACTROBAN) 2 % ointment, Apply to affected area 3 times daily., Disp: , Rfl:   •  vitamin A 07576 UNIT capsule, Take 10,000 Units by mouth Daily. ON HOLD, Disp: , Rfl:   •  VITAMIN D, CHOLECALCIFEROL, PO, Take 1 tablet by mouth daily., Disp: , Rfl:   •  VITAMIN E PO, Take  by mouth., Disp: , Rfl:   •  VITAMIN K PO, Take 1 tablet by mouth Daily. ON HOLD, Disp: , Rfl:         Assessment/Plan   Diagnoses and all orders for this visit:    1. Chronic deep vein thrombosis (DVT) of calf muscle vein of right lower extremity (CMS/HCC) (Primary)  -     Duplex Venous Lower Extremity - Right CAR; Future    2. Right leg pain  -     Duplex Venous Lower Extremity - Right CAR; Future    3. Right leg swelling  -     Duplex Venous Lower Extremity - Right CAR; Future    4. Dependent edema  Comments:  arturo.        Patient Instructions   Chronic right  Gastrocnemius dvt/right lower leg swelling/right lower extremity pain: ordering a right doppler for re-eval    Dependent edema: Recommend monitoring diet and keeping sodium intake down and drinking plenty of water. Recommend keeping legs elevated for 20 minutes at a time, several times through out day.      Return if symptoms worsen or fail to improve.

## 2021-09-14 NOTE — PATIENT INSTRUCTIONS
Chronic right  Gastrocnemius dvt/right lower leg swelling/right lower extremity pain: ordering a right doppler for re-eval    Dependent edema: Recommend monitoring diet and keeping sodium intake down and drinking plenty of water. Recommend keeping legs elevated for 20 minutes at a time, several times through out day.

## 2021-09-15 RX ORDER — ESCITALOPRAM OXALATE 10 MG/1
10 TABLET ORAL DAILY
Qty: 30 TABLET | Refills: 5 | Status: SHIPPED | OUTPATIENT
Start: 2021-09-15 | End: 2021-10-06

## 2021-09-16 ENCOUNTER — TELEPHONE (OUTPATIENT)
Dept: ONCOLOGY | Facility: CLINIC | Age: 76
End: 2021-09-16

## 2021-09-16 NOTE — TELEPHONE ENCOUNTER
Pt and  calling concerned that doppler ordered by PCP's office 2 days ago has not been able to be performed.  D/W Clinical RN Ryanne and pt is able to request from PCP to include THANH, Zahraa or Neal since it will be under [facility] still.  Pt and  informed and will contact PCP.

## 2021-09-16 NOTE — TELEPHONE ENCOUNTER
Provider: ENA    Caller: MARLI    Relationship to Patient: SPOUSE    Phone Number: [phone]    Reason for Call: MARLI STATED PT HAS BLOODCLOT ON CALF AND IS IN PAIN. PLEASE CALL MARLI BACK TO ADVISE

## 2021-09-17 ENCOUNTER — HOSPITAL ENCOUNTER (OUTPATIENT)
Dept: CARDIOLOGY | Facility: HOSPITAL | Age: 76
Discharge: HOME | End: 2021-09-17
Admitting: NURSE PRACTITIONER

## 2021-09-17 DIAGNOSIS — M79.604 RIGHT LEG PAIN: ICD-10-CM

## 2021-09-17 DIAGNOSIS — I82.561 CHRONIC DEEP VEIN THROMBOSIS (DVT) OF CALF MUSCLE VEIN OF RIGHT LOWER EXTREMITY (HCC): ICD-10-CM

## 2021-09-17 DIAGNOSIS — M79.89 RIGHT LEG SWELLING: ICD-10-CM

## 2021-09-17 LAB
BH CV LOW VAS RIGHT SOLEAL VESSEL: 1
BH CV LOWER VASCULAR LEFT COMMON FEMORAL AUGMENT: NORMAL
BH CV LOWER VASCULAR LEFT COMMON FEMORAL COMPETENT: NORMAL
BH CV LOWER VASCULAR LEFT COMMON FEMORAL COMPRESS: NORMAL
BH CV LOWER VASCULAR LEFT COMMON FEMORAL PHASIC: NORMAL
BH CV LOWER VASCULAR LEFT COMMON FEMORAL SPONT: NORMAL
BH CV LOWER VASCULAR RIGHT COMMON FEMORAL AUGMENT: NORMAL
BH CV LOWER VASCULAR RIGHT COMMON FEMORAL COMPETENT: NORMAL
BH CV LOWER VASCULAR RIGHT COMMON FEMORAL COMPRESS: NORMAL
BH CV LOWER VASCULAR RIGHT COMMON FEMORAL PHASIC: NORMAL
BH CV LOWER VASCULAR RIGHT COMMON FEMORAL SPONT: NORMAL
BH CV LOWER VASCULAR RIGHT DISTAL FEMORAL COMPRESS: NORMAL
BH CV LOWER VASCULAR RIGHT GASTRONEMIUS COMPRESS: NORMAL
BH CV LOWER VASCULAR RIGHT GREATER SAPH AK COMPRESS: NORMAL
BH CV LOWER VASCULAR RIGHT GREATER SAPH BK COMPRESS: NORMAL
BH CV LOWER VASCULAR RIGHT LESSER SAPH COMPRESS: NORMAL
BH CV LOWER VASCULAR RIGHT MID FEMORAL AUGMENT: NORMAL
BH CV LOWER VASCULAR RIGHT MID FEMORAL COMPETENT: NORMAL
BH CV LOWER VASCULAR RIGHT MID FEMORAL COMPRESS: NORMAL
BH CV LOWER VASCULAR RIGHT MID FEMORAL PHASIC: NORMAL
BH CV LOWER VASCULAR RIGHT MID FEMORAL SPONT: NORMAL
BH CV LOWER VASCULAR RIGHT PERONEAL COMPRESS: NORMAL
BH CV LOWER VASCULAR RIGHT POPLITEAL AUGMENT: NORMAL
BH CV LOWER VASCULAR RIGHT POPLITEAL COMPETENT: NORMAL
BH CV LOWER VASCULAR RIGHT POPLITEAL COMPRESS: NORMAL
BH CV LOWER VASCULAR RIGHT POPLITEAL PHASIC: NORMAL
BH CV LOWER VASCULAR RIGHT POPLITEAL SPONT: NORMAL
BH CV LOWER VASCULAR RIGHT POSTERIOR TIBIAL COMPRESS: NORMAL
BH CV LOWER VASCULAR RIGHT PROFUNDA FEMORAL COMPRESS: NORMAL
BH CV LOWER VASCULAR RIGHT PROXIMAL FEMORAL COMPRESS: NORMAL
BH CV LOWER VASCULAR RIGHT SAPHENOFEMORAL JUNCTION COMPRESS: NORMAL
BH CV LOWER VASCULAR RIGHT SOLEAL COMPRESS: NORMAL
BH CV LOWER VASCULAR RIGHT SOLEAL THROMBUS: NORMAL
MAXIMAL PREDICTED HEART RATE: 144 BPM
STRESS TARGET HR: 122 BPM

## 2021-09-17 PROCEDURE — 93971 EXTREMITY STUDY: CPT

## 2021-09-20 ENCOUNTER — TELEPHONE (OUTPATIENT)
Dept: FAMILY MEDICINE CLINIC | Facility: CLINIC | Age: 76
End: 2021-09-20

## 2021-09-20 NOTE — TELEPHONE ENCOUNTER
Caller: Chris Cox    Relationship: Self    Best call back number: [phone]     Caller requesting test results: - ON  VERBAL    What test was performed: DOPLAR    When was the test performed: LAST Friday 09/17/21    Where was the test performed: [facility]    Additional notes:      PLEASE CONTACT PATIENT AND  REGARDING RESULTS AS SOON AS POSSIBLE.

## 2021-09-28 DIAGNOSIS — Z13.220 LIPID SCREENING: ICD-10-CM

## 2021-09-28 DIAGNOSIS — R73.9 HYPERGLYCEMIA: Primary | ICD-10-CM

## 2021-09-28 DIAGNOSIS — E07.9 DISEASE OF THYROID GLAND: ICD-10-CM

## 2021-09-29 ENCOUNTER — IMMUNIZATION (OUTPATIENT)
Dept: VACCINE CLINIC | Facility: HOSPITAL | Age: 76
End: 2021-09-29

## 2021-09-29 PROCEDURE — 0003A: CPT | Performed by: INTERNAL MEDICINE

## 2021-09-29 PROCEDURE — 0001A: CPT | Performed by: INTERNAL MEDICINE

## 2021-09-29 PROCEDURE — 91300 HC SARSCOV02 VAC 30MCG/0.3ML IM: CPT | Performed by: INTERNAL MEDICINE

## 2021-09-30 LAB
ALBUMIN SERPL-MCNC: 4.3 G/DL (ref 3.5–5.2)
ALBUMIN/GLOB SERPL: 2.4 G/DL
ALP SERPL-CCNC: 62 U/L (ref 39–117)
ALT SERPL-CCNC: 9 U/L (ref 1–33)
APPEARANCE UR: CLEAR
AST SERPL-CCNC: 14 U/L (ref 1–32)
BACTERIA #/AREA URNS HPF: NORMAL /HPF
BASOPHILS # BLD AUTO: 0.03 10*3/MM3 (ref 0–0.2)
BASOPHILS NFR BLD AUTO: 0.7 % (ref 0–1.5)
BILIRUB SERPL-MCNC: 0.4 MG/DL (ref 0–1.2)
BILIRUB UR QL STRIP: NEGATIVE
BUN SERPL-MCNC: 18 MG/DL (ref 8–23)
BUN/CREAT SERPL: 26.1 (ref 7–25)
CALCIUM SERPL-MCNC: 9.1 MG/DL (ref 8.6–10.5)
CASTS URNS MICRO: NORMAL
CHLORIDE SERPL-SCNC: 102 MMOL/L (ref 98–107)
CHOLEST SERPL-MCNC: 183 MG/DL (ref 0–200)
CO2 SERPL-SCNC: 28.5 MMOL/L (ref 22–29)
COLOR UR: YELLOW
CREAT SERPL-MCNC: 0.69 MG/DL (ref 0.57–1)
EOSINOPHIL # BLD AUTO: 0.12 10*3/MM3 (ref 0–0.4)
EOSINOPHIL NFR BLD AUTO: 2.9 % (ref 0.3–6.2)
EPI CELLS #/AREA URNS HPF: NORMAL /HPF
ERYTHROCYTE [DISTWIDTH] IN BLOOD BY AUTOMATED COUNT: 12.9 % (ref 12.3–15.4)
GLOBULIN SER CALC-MCNC: 1.8 GM/DL
GLUCOSE SERPL-MCNC: 84 MG/DL (ref 65–99)
GLUCOSE UR QL: NEGATIVE
HBA1C MFR BLD: 5.3 % (ref 4.8–5.6)
HCT VFR BLD AUTO: 39.8 % (ref 34–46.6)
HDLC SERPL-MCNC: 53 MG/DL (ref 40–60)
HGB BLD-MCNC: 13.2 G/DL (ref 12–15.9)
HGB UR QL STRIP: NEGATIVE
IMM GRANULOCYTES # BLD AUTO: 0.01 10*3/MM3 (ref 0–0.05)
IMM GRANULOCYTES NFR BLD AUTO: 0.2 % (ref 0–0.5)
KETONES UR QL STRIP: NEGATIVE
LDLC SERPL CALC-MCNC: 116 MG/DL (ref 0–100)
LDLC/HDLC SERPL: 2.17 {RATIO}
LEUKOCYTE ESTERASE UR QL STRIP: NEGATIVE
LYMPHOCYTES # BLD AUTO: 1.17 10*3/MM3 (ref 0.7–3.1)
LYMPHOCYTES NFR BLD AUTO: 28.4 % (ref 19.6–45.3)
MCH RBC QN AUTO: 29.6 PG (ref 26.6–33)
MCHC RBC AUTO-ENTMCNC: 33.2 G/DL (ref 31.5–35.7)
MCV RBC AUTO: 89.2 FL (ref 79–97)
MONOCYTES # BLD AUTO: 0.37 10*3/MM3 (ref 0.1–0.9)
MONOCYTES NFR BLD AUTO: 9 % (ref 5–12)
NEUTROPHILS # BLD AUTO: 2.42 10*3/MM3 (ref 1.7–7)
NEUTROPHILS NFR BLD AUTO: 58.8 % (ref 42.7–76)
NITRITE UR QL STRIP: NEGATIVE
NRBC BLD AUTO-RTO: 0 /100 WBC (ref 0–0.2)
PH UR STRIP: 6.5 [PH] (ref 5–8)
PLATELET # BLD AUTO: 206 10*3/MM3 (ref 140–450)
POTASSIUM SERPL-SCNC: 3.6 MMOL/L (ref 3.5–5.2)
PROT SERPL-MCNC: 6.1 G/DL (ref 6–8.5)
PROT UR QL STRIP: NEGATIVE
RBC # BLD AUTO: 4.46 10*6/MM3 (ref 3.77–5.28)
RBC #/AREA URNS HPF: NORMAL /HPF
SODIUM SERPL-SCNC: 138 MMOL/L (ref 136–145)
SP GR UR: <1.005 (ref 1–1.03)
T4 FREE SERPL-MCNC: 1 NG/DL (ref 0.93–1.7)
TRIGL SERPL-MCNC: 74 MG/DL (ref 0–150)
TSH SERPL DL<=0.005 MIU/L-ACNC: 2.42 UIU/ML (ref 0.27–4.2)
UROBILINOGEN UR STRIP-MCNC: ABNORMAL MG/DL
VLDLC SERPL CALC-MCNC: 14 MG/DL (ref 5–40)
WBC # BLD AUTO: 4.12 10*3/MM3 (ref 3.4–10.8)
WBC #/AREA URNS HPF: NORMAL /HPF

## 2021-10-05 ENCOUNTER — TELEPHONE (OUTPATIENT)
Dept: FAMILY MEDICINE CLINIC | Facility: CLINIC | Age: 76
End: 2021-10-05

## 2021-10-05 NOTE — TELEPHONE ENCOUNTER
Caller: Kenny Chris ROWAN    Relationship: Self    Best call back number: [phone]     Caller requesting test results: DOPPLER RESULTS    What test was performed: DOPPLER    When was the test performed: 9/17/21    Where was the test performed: [facility]     Additional notes: NEEDS THEM FAXED TO PT OR WILLING TO PICK THEM UP.    ROXANE ESTRADA AT [facility] NEEDS ASAP

## 2021-10-06 ENCOUNTER — OFFICE VISIT (OUTPATIENT)
Dept: FAMILY MEDICINE CLINIC | Facility: CLINIC | Age: 76
End: 2021-10-06

## 2021-10-06 VITALS
SYSTOLIC BLOOD PRESSURE: 128 MMHG | TEMPERATURE: 96.9 F | HEART RATE: 62 BPM | DIASTOLIC BLOOD PRESSURE: 76 MMHG | BODY MASS INDEX: 36.1 KG/M2 | WEIGHT: 156 LBS | OXYGEN SATURATION: 97 % | RESPIRATION RATE: 18 BRPM | HEIGHT: 55 IN

## 2021-10-06 DIAGNOSIS — R41.3 MEMORY LOSS: ICD-10-CM

## 2021-10-06 DIAGNOSIS — F32.5 MAJOR DEPRESSIVE DISORDER WITH SINGLE EPISODE, IN FULL REMISSION (HCC): Primary | ICD-10-CM

## 2021-10-06 PROCEDURE — 99214 OFFICE O/P EST MOD 30 MIN: CPT | Performed by: INTERNAL MEDICINE

## 2021-10-06 RX ORDER — DONEPEZIL HYDROCHLORIDE 5 MG/1
5 TABLET, FILM COATED ORAL NIGHTLY
Qty: 30 TABLET | Refills: 3 | Status: SHIPPED | OUTPATIENT
Start: 2021-10-06 | End: 2022-01-25

## 2021-10-06 RX ORDER — ESCITALOPRAM OXALATE 10 MG/1
10 TABLET ORAL DAILY
Qty: 90 TABLET | Refills: 3 | Status: SHIPPED | OUTPATIENT
Start: 2021-10-06 | End: 2021-11-10 | Stop reason: SDUPTHER

## 2021-10-06 NOTE — PROGRESS NOTES
Subjective   Chris Cox is a 76 y.o. female. Patient is here today for   Chief Complaint   Patient presents with   • Follow-up     lab and doppler test results           Vitals:    10/06/21 0939   BP: 128/76   Pulse: 62   Resp: 18   Temp: 96.9 °F (36.1 °C)   SpO2: 97%     Body mass index is 180.57 kg/m².      Past Medical History:   Diagnosis Date   • Abdominal pain    • ADHD    • Arthritis     primary both knees   • Bronchitis 08/2019   • Disease of thyroid gland    • Diverticulosis of intestine    • DVT (deep venous thrombosis) ([facility]) 10/2019    right lower extremity   • Fractures     MULTIPLE BONE FRACTURES-TOOK FOSAMAX 3.5 YRS   • Hyperlipidemia    • Hypertension    • Irregular heart beat    • Kidney stone    • Lumbar spine pain    • Migraines    • Mono exposure     AGE 35   • Neuralgia neuritis, sciatic nerve    • Obstructive sleep apnea     20 YRS AGO-NOT CURRENTLY   • Seizures ([facility])     As a baby   • Tuberculosis     As a child   • Vertigo       Allergies   Allergen Reactions   • Erythromycin Anaphylaxis   • Sulfa Antibiotics Rash   • Milk-Related Compounds GI Intolerance   • Eggs Or Egg-Derived Products Unknown - Low Severity     PER ALLERGY TESTING RESULTS   • Cephalexin Rash   • Cephalosporins Rash     STOMACH ISSUES   • Molds & Smuts Rash     ITCHING      Social History     Socioeconomic History   • Marital status:      Spouse name: Marky   • Number of children: Not on file   • Years of education: College   • Highest education level: Not on file   Tobacco Use   • Smoking status: Never Smoker   • Smokeless tobacco: Never Used   Vaping Use   • Vaping Use: Never used   Substance and Sexual Activity   • Alcohol use: Yes     Comment: wine   • Drug use: No   • Sexual activity: Defer        Current Outpatient Medications:   •  ascorbic acid (VITAMIN C) 500 MG/5ML liquid, Take 500 mg by mouth Daily., Disp: , Rfl:   •  B Complex Vitamins (VITAMIN B COMPLEX) capsule capsule, Take  by mouth Daily., Disp: ,  Rfl:   •  escitalopram (LEXAPRO) 10 MG tablet, Take 1 tablet by mouth Daily., Disp: 90 tablet, Rfl: 3  •  mupirocin (BACTROBAN) 2 % ointment, Apply to affected area 3 times daily., Disp: , Rfl:   •  vitamin A 49063 UNIT capsule, Take 10,000 Units by mouth Daily. ON HOLD, Disp: , Rfl:   •  VITAMIN D, CHOLECALCIFEROL, PO, Take 1 tablet by mouth daily., Disp: , Rfl:   •  VITAMIN E PO, Take  by mouth., Disp: , Rfl:   •  VITAMIN K PO, Take 1 tablet by mouth Daily. ON HOLD, Disp: , Rfl:   •  donepezil (Aricept) 5 MG tablet, Take 1 tablet by mouth Every Night., Disp: 30 tablet, Rfl: 3     Objective     She is here to follow-up on [facility] for ultrasound of her right lower extremity because she had had a complaint of pain.  She was found to have a chronic soleus vein thrombosis but otherwise no abnormality.    She has been unable to get a neuropsychological evaluation as I have asked.    She remains concerned about her memory and I know that she does because she tells me otherwise memory issues are pretty subtle finding in her, but she and her  are concerned that her memory loss is becoming significant.    She tells me that she feels well.       Review of Systems   Constitutional: Negative.    HENT: Negative.    Respiratory: Negative.    Cardiovascular: Negative.    Musculoskeletal: Negative.    Psychiatric/Behavioral: Negative.        Physical Exam  Vitals and nursing note reviewed.   Constitutional:       Appearance: Normal appearance.      Comments: Pleasant, neatly groomed, no distress.   Cardiovascular:      Rate and Rhythm: Regular rhythm.      Heart sounds: Normal heart sounds. No murmur heard.   No gallop.    Pulmonary:      Effort: No respiratory distress.      Breath sounds: Normal breath sounds. No wheezing or rales.   Neurological:      Mental Status: She is alert and oriented to person, place, and time.   Psychiatric:         Mood and Affect: Mood normal.         Behavior: Behavior normal.          Thought Content: Thought content normal.         Judgment: Judgment normal.           Problems Addressed this Visit        Mental Health    Major depressive disorder with single episode, in full remission (HCC) - Primary    Relevant Medications    escitalopram (LEXAPRO) 10 MG tablet    donepezil (Aricept) 5 MG tablet       Neuro    Memory loss      Diagnoses       Codes Comments    Major depressive disorder with single episode, in full remission (HCC)    -  Primary ICD-10-CM: F32.5  ICD-9-CM: 296.26     Memory loss     ICD-10-CM: R41.3  ICD-9-CM: 780.93             PLAN  She continues to be on S-Citalopram for her depression she feels that is working pretty well for her.    She is a delight to converse with per usual.  She is concerned about her memory and I have tried to arrange for her to get neuropsychological evaluation and she had chosen a person in [place] that she expected to get neuropsychological evaluation with is well but this did not work out.  She has not had such an evaluation.  Her mother had Alzheimer's disease.    This patient's memory loss is very subtle although I know she has difficulty because she tells me that she does.  I wrote her prescription for Aricept 5 mg 1 p.o. daily.  I cautioned her that this might make her nauseated and then if it did she should stop it and call me and let me know.    I would like to see her back in 2 to 3 months to see how she is getting along.  No follow-ups on file.

## 2021-11-10 RX ORDER — ESCITALOPRAM OXALATE 10 MG/1
10 TABLET ORAL DAILY
Qty: 90 TABLET | Refills: 3 | Status: SHIPPED | OUTPATIENT
Start: 2021-11-10 | End: 2022-10-26

## 2021-12-03 ENCOUNTER — OFFICE VISIT (OUTPATIENT)
Dept: FAMILY MEDICINE CLINIC | Facility: CLINIC | Age: 76
End: 2021-12-03

## 2021-12-03 VITALS
DIASTOLIC BLOOD PRESSURE: 72 MMHG | HEIGHT: 55 IN | HEART RATE: 76 BPM | BODY MASS INDEX: 37.03 KG/M2 | WEIGHT: 160 LBS | TEMPERATURE: 96.9 F | RESPIRATION RATE: 18 BRPM | OXYGEN SATURATION: 98 % | SYSTOLIC BLOOD PRESSURE: 128 MMHG

## 2021-12-03 DIAGNOSIS — M53.3 SI (SACROILIAC) JOINT DYSFUNCTION: Primary | ICD-10-CM

## 2021-12-03 DIAGNOSIS — S39.012A STRAIN OF LUMBAR REGION, INITIAL ENCOUNTER: ICD-10-CM

## 2021-12-03 DIAGNOSIS — R35.0 URINARY FREQUENCY: ICD-10-CM

## 2021-12-03 DIAGNOSIS — H65.03 BILATERAL ACUTE SEROUS OTITIS MEDIA, RECURRENCE NOT SPECIFIED: ICD-10-CM

## 2021-12-03 LAB
CLARITY, POC: ABNORMAL
COLOR UR: ABNORMAL
EXPIRATION DATE: ABNORMAL
GLUCOSE UR STRIP-MCNC: NEGATIVE MG/DL
KETONES UR QL: ABNORMAL
LEUKOCYTE EST, POC: NEGATIVE
Lab: ABNORMAL
NITRITE UR-MCNC: NEGATIVE MG/ML
PH UR: 7 [PH] (ref 5–8)
PROT UR STRIP-MCNC: ABNORMAL MG/DL
RBC # UR STRIP: NEGATIVE /UL
SP GR UR: 1.02 (ref 1–1.03)

## 2021-12-03 PROCEDURE — 99213 OFFICE O/P EST LOW 20 MIN: CPT | Performed by: NURSE PRACTITIONER

## 2021-12-03 PROCEDURE — 81003 URINALYSIS AUTO W/O SCOPE: CPT | Performed by: NURSE PRACTITIONER

## 2021-12-03 RX ORDER — BACLOFEN 5 MG/1
5 TABLET ORAL NIGHTLY PRN
Qty: 14 TABLET | Refills: 0 | Status: SHIPPED | OUTPATIENT
Start: 2021-12-03 | End: 2021-12-15

## 2021-12-03 NOTE — PROGRESS NOTES
Subjective     Chris Cox is a 76 y.o.. female.     Pt stating she has been moving boxes to try to clean up her storage areas recently.    Urinary Frequency   This is a new problem. Episode onset: 2 months. The problem has been waxing and waning. There has been no fever. Associated symptoms include frequency and urgency. Pertinent negatives include no chills, hematuria, nausea or vomiting.   Back Pain  This is a new problem. Episode onset: 4 days. The problem is unchanged. The quality of the pain is described as aching. The pain does not radiate. Exacerbated by: lifting. Associated symptoms include headaches. Pertinent negatives include no fever, numbness or tingling.   Earache   There is pain in the right ear. This is a new problem. The current episode started today. The problem has been unchanged. There has been no fever. Associated symptoms include headaches and rhinorrhea. Pertinent negatives include no coughing, sore throat or vomiting.       The following portions of the patient's history were reviewed and updated as appropriate: allergies, current medications, past family history, past medical history, past social history, past surgical history and problem list.    Past Medical History:   Diagnosis Date   • Abdominal pain    • ADHD    • Arthritis     primary both knees   • Bronchitis 08/2019   • Disease of thyroid gland    • Diverticulosis of intestine    • DVT (deep venous thrombosis) ([facility]) 10/2019    right lower extremity   • Fractures     MULTIPLE BONE FRACTURES-TOOK FOSAMAX 3.5 YRS   • Hyperlipidemia    • Hypertension    • Irregular heart beat    • Kidney stone    • Lumbar spine pain    • Migraines    • Mono exposure     AGE 35   • Neuralgia neuritis, sciatic nerve    • Obstructive sleep apnea     20 YRS AGO-NOT CURRENTLY   • Seizures (HCC)     As a baby   • Tuberculosis     As a child   • Vertigo        Past Surgical History:   Procedure Laterality Date   • BREAST BIOPSY Left     2019 stereotactic  "benign   • BREAST EXCISIONAL BIOPSY Left     1971 - fibroadenoma   • BREAST SURGERY      Benign fibroadnoma removed from the left breast   • HAND SURGERY Bilateral 2012    RECONSTRUCTION ON BOTH HANDS   • LITHOTRIPSY      renal   • TONGUE SURGERY     • TOTAL KNEE ARTHROPLASTY Right 10/22/2019    Procedure: TOTAL KNEE ARTHROPLASTY RIGHT;  Surgeon: Aneesh Okeefe II, MD;  Location: [facility];  Service: Orthopedics   • WRIST SURGERY         Review of Systems   Constitutional: Negative for chills and fever.   HENT: Positive for ear pain (CLOGGED) and rhinorrhea. Negative for congestion, postnasal drip and sore throat.    Respiratory: Negative for cough and shortness of breath.    Gastrointestinal: Negative for nausea and vomiting.   Genitourinary: Positive for frequency and urgency. Negative for hematuria.   Musculoskeletal: Positive for back pain.   Neurological: Positive for headaches. Negative for tingling and numbness.       Allergies   Allergen Reactions   • Erythromycin Anaphylaxis   • Sulfa Antibiotics Rash   • Milk-Related Compounds GI Intolerance   • Eggs Or Egg-Derived Products Unknown - Low Severity     PER ALLERGY TESTING RESULTS   • Cephalexin Rash   • Cephalosporins Rash     STOMACH ISSUES   • Molds & Smuts Rash     ITCHING       Objective     Vitals:    12/03/21 1439   BP: 128/72   Pulse: 76   Resp: 18   Temp: 96.9 °F (36.1 °C)   TempSrc: Temporal   SpO2: 98%   Weight: 72.6 kg (160 lb)   Height: 62.6 cm (24.65\")     Body mass index is 185.2 kg/m².    Physical Exam  Vitals reviewed.   HENT:      Head: Normocephalic.      Right Ear: A middle ear effusion (clear fluid noted) is present. Tympanic membrane is not erythematous.      Left Ear: A middle ear effusion (clear fluid noted) is present. Tympanic membrane is not erythematous.   Eyes:      Pupils: Pupils are equal, round, and reactive to light.   Cardiovascular:      Rate and Rhythm: Normal rate and regular rhythm.   Pulmonary:      " Effort: Pulmonary effort is normal.      Breath sounds: Normal breath sounds.   Musculoskeletal:      Lumbar back: Tenderness present. No swelling, edema, signs of trauma or bony tenderness. Decreased range of motion. Negative right straight leg raise test and negative left straight leg raise test.      Comments: Right SI tender to palpation; Left lumbar musculature tender to palpation   Neurological:      Mental Status: She is alert and oriented to person, place, and time.   Psychiatric:         Behavior: Behavior normal.           Current Outpatient Medications:   •  ascorbic acid (VITAMIN C) 500 MG/5ML liquid, Take 500 mg by mouth Daily., Disp: , Rfl:   •  B Complex Vitamins (VITAMIN B COMPLEX) capsule capsule, Take  by mouth Daily., Disp: , Rfl:   •  donepezil (Aricept) 5 MG tablet, Take 1 tablet by mouth Every Night., Disp: 30 tablet, Rfl: 3  •  escitalopram (LEXAPRO) 10 MG tablet, Take 1 tablet by mouth Daily., Disp: 90 tablet, Rfl: 3  •  mupirocin (BACTROBAN) 2 % ointment, Apply to affected area 3 times daily., Disp: , Rfl:   •  vitamin A 77218 UNIT capsule, Take 10,000 Units by mouth Daily. ON HOLD, Disp: , Rfl:   •  VITAMIN D, CHOLECALCIFEROL, PO, Take 1 tablet by mouth daily., Disp: , Rfl:   •  VITAMIN E PO, Take  by mouth., Disp: , Rfl:   •  VITAMIN K PO, Take 1 tablet by mouth Daily. ON HOLD, Disp: , Rfl:   •  Baclofen 5 MG tablet, Take 5 mg by mouth At Night As Needed (muscle spasms)., Disp: 14 tablet, Rfl: 0    Recent Results (from the past 168 hour(s))   POC Urinalysis Dipstick, Automated    Collection Time: 12/03/21  2:52 PM    Specimen: Urine   Result Value Ref Range    Color Other (A) Yellow, Straw, Dark Yellow, Lillian    Clarity, UA Cloudy (A) Clear    Specific Gravity  1.025 1.005 - 1.030    pH, Urine 7.0 5.0 - 8.0    Leukocytes Negative Negative    Nitrite, UA Negative Negative    Protein, POC Trace (A) Negative mg/dL    Glucose, UA Negative Negative, 1000 mg/dL (3+) mg/dL    Ketones, UA Trace  (A) Negative    Blood, UA Negative Negative    Lot Number N/[id number]     Expiration Date N/A          Assessment/Plan   Diagnoses and all orders for this visit:    1. SI (sacroiliac) joint dysfunction (Primary)  Comments:  right  Orders:  -     Ambulatory Referral to Physical Therapy Evaluate and treat    2. Strain of lumbar region, initial encounter  Comments:  left  Orders:  -     Baclofen 5 MG tablet; Take 5 mg by mouth At Night As Needed (muscle spasms).  Dispense: 14 tablet; Refill: 0    3. Bilateral acute serous otitis media, recurrence not specified    4. Urinary frequency  -     POC Urinalysis Dipstick, Automated        Patient Instructions   SI joint dysfunction/lumbar strain: May use cold compress/ice pack 10-15 minutes at a time several times a day, May use warm compress/heating pad 10-15 minutes at at time several times a day, May use over the counter biofreeze as needed (wash off from skin before using heating pad); referring to physical therapy; bacolofen 5 mg 1 tab at night prn for muscle spasms/muscle aches.     Serous otitis media: recommend otc flonase 1 spray to each nostril twice a day for 2 weeks.    Urinary frequency: increase water intake      Return if symptoms worsen or fail to improve.

## 2021-12-06 NOTE — PATIENT INSTRUCTIONS
SI joint dysfunction/lumbar strain: May use cold compress/ice pack 10-15 minutes at a time several times a day, May use warm compress/heating pad 10-15 minutes at at time several times a day, May use over the counter biofreeze as needed (wash off from skin before using heating pad); referring to physical therapy; bacolofen 5 mg 1 tab at night prn for muscle spasms/muscle aches.     Serous otitis media: recommend otc flonase 1 spray to each nostril twice a day for 2 weeks.    Urinary frequency: increase water intake

## 2021-12-15 DIAGNOSIS — S39.012A STRAIN OF LUMBAR REGION, INITIAL ENCOUNTER: ICD-10-CM

## 2021-12-15 RX ORDER — BACLOFEN 5 MG/1
TABLET ORAL
Qty: 14 TABLET | Refills: 0 | OUTPATIENT
Start: 2021-12-15

## 2021-12-15 RX ORDER — BACLOFEN 5 MG/1
TABLET ORAL
Qty: 14 TABLET | Refills: 0 | Status: ON HOLD | OUTPATIENT
Start: 2021-12-15 | End: 2022-04-11

## 2021-12-15 NOTE — TELEPHONE ENCOUNTER
Rx Refill Note  Requested Prescriptions     Pending Prescriptions Disp Refills   • Baclofen 5 MG tablet [Pharmacy Med Name: BACLOFEN 5MG TABLETS] 14 tablet 0     Sig: TAKE 1 TABLET BY MOUTH AT NIGHT AS NEEDED FOR MUSCLE SPASMS      Last office visit with prescribing clinician: 12/3/2021      Next office visit with prescribing clinician: Visit date not found            Marielena Reyna MA  12/15/21, 07:58 EST

## 2022-01-03 ENCOUNTER — OFFICE VISIT (OUTPATIENT)
Dept: FAMILY MEDICINE CLINIC | Facility: CLINIC | Age: 77
End: 2022-01-03

## 2022-01-03 VITALS
HEIGHT: 55 IN | RESPIRATION RATE: 18 BRPM | HEART RATE: 75 BPM | DIASTOLIC BLOOD PRESSURE: 80 MMHG | SYSTOLIC BLOOD PRESSURE: 126 MMHG | OXYGEN SATURATION: 100 % | BODY MASS INDEX: 37.26 KG/M2 | WEIGHT: 161 LBS | TEMPERATURE: 98 F

## 2022-01-03 DIAGNOSIS — G89.29 CHRONIC PAIN OF RIGHT HIP: Primary | ICD-10-CM

## 2022-01-03 DIAGNOSIS — Z12.11 COLON CANCER SCREENING: ICD-10-CM

## 2022-01-03 DIAGNOSIS — M25.551 CHRONIC PAIN OF RIGHT HIP: Primary | ICD-10-CM

## 2022-01-03 PROCEDURE — 99214 OFFICE O/P EST MOD 30 MIN: CPT | Performed by: INTERNAL MEDICINE

## 2022-01-03 NOTE — PROGRESS NOTES
Subjective   Chris Cox is a 76 y.o. female. Patient is here today for   Chief Complaint   Patient presents with   • Follow-up     discuss med, right hip pain last 3 months          Vitals:    01/03/22 0852   BP: 126/80   Pulse: 75   Resp: 18   Temp: 98 °F (36.7 °C)   SpO2: 100%     Body mass index is 186.35 kg/m².      Past Medical History:   Diagnosis Date   • Abdominal pain    • ADHD    • Arthritis     primary both knees   • Bronchitis 08/2019   • Disease of thyroid gland    • Diverticulosis of intestine    • DVT (deep venous thrombosis) ([facility]) 10/2019    right lower extremity   • Fractures     MULTIPLE BONE FRACTURES-TOOK FOSAMAX 3.5 YRS   • Hyperlipidemia    • Hypertension    • Irregular heart beat    • Kidney stone    • Lumbar spine pain    • Migraines    • Mono exposure     AGE 35   • Neuralgia neuritis, sciatic nerve    • Obstructive sleep apnea     20 YRS AGO-NOT CURRENTLY   • Seizures ([facility])     As a baby   • Tuberculosis     As a child   • Vertigo       Allergies   Allergen Reactions   • Erythromycin Anaphylaxis   • Sulfa Antibiotics Rash   • Milk-Related Compounds GI Intolerance   • Eggs Or Egg-Derived Products Unknown - Low Severity     PER ALLERGY TESTING RESULTS   • Cephalexin Rash   • Cephalosporins Rash     STOMACH ISSUES   • Molds & Smuts Rash     ITCHING      Social History     Socioeconomic History   • Marital status:      Spouse name: Marky   • Years of education: College   Tobacco Use   • Smoking status: Never Smoker   • Smokeless tobacco: Never Used   Vaping Use   • Vaping Use: Never used   Substance and Sexual Activity   • Alcohol use: Yes     Comment: wine   • Drug use: No   • Sexual activity: Defer        Current Outpatient Medications:   •  ascorbic acid (VITAMIN C) 500 MG/5ML liquid, Take 500 mg by mouth Daily., Disp: , Rfl:   •  B Complex Vitamins (VITAMIN B COMPLEX) capsule capsule, Take  by mouth Daily., Disp: , Rfl:   •  Baclofen 5 MG tablet, TAKE 1 TABLET BY MOUTH AT NIGHT  AS NEEDED FOR MUSCLE SPASMS, Disp: 14 tablet, Rfl: 0  •  donepezil (Aricept) 5 MG tablet, Take 1 tablet by mouth Every Night., Disp: 30 tablet, Rfl: 3  •  escitalopram (LEXAPRO) 10 MG tablet, Take 1 tablet by mouth Daily., Disp: 90 tablet, Rfl: 3  •  mupirocin (BACTROBAN) 2 % ointment, Apply to affected area 3 times daily., Disp: , Rfl:   •  vitamin A 39969 UNIT capsule, Take 10,000 Units by mouth Daily. ON HOLD, Disp: , Rfl:   •  VITAMIN D, CHOLECALCIFEROL, PO, Take 1 tablet by mouth daily., Disp: , Rfl:   •  VITAMIN E PO, Take  by mouth., Disp: , Rfl:   •  VITAMIN K PO, Take 1 tablet by mouth Daily. ON HOLD, Disp: , Rfl:      Objective     He complains of right hip pain for the last 3 months.    She asked me to make an appointment for her to be seen by Dr. PLASENCIA regarding her hip pain.    She is not inclined to get a flu vaccine.  A friend of hers got Guillain-Barré following a flu vaccination years ago and that is scared her from getting this vaccine since.           Review of Systems   Constitutional: Negative.    HENT: Negative.    Respiratory: Negative.    Cardiovascular: Negative.    Musculoskeletal:        She complains of right hip pain as described in the HPI.   Psychiatric/Behavioral: Negative.        Physical Exam  Vitals and nursing note reviewed.   Constitutional:       Appearance: Normal appearance.      Comments: Pleasant, neatly groomed, in no distress.   Cardiovascular:      Rate and Rhythm: Regular rhythm.      Heart sounds: Normal heart sounds. No murmur heard.  No gallop.    Pulmonary:      Effort: No respiratory distress.      Breath sounds: Normal breath sounds. No wheezing or rales.   Neurological:      Mental Status: She is alert and oriented to person, place, and time.   Psychiatric:         Mood and Affect: Mood normal.         Behavior: Behavior normal.         Thought Content: Thought content normal.           Problems Addressed this Visit     None      Visit Diagnoses     Chronic  pain of right hip    -  Primary    Relevant Orders    Ambulatory Referral to Orthopedic Surgery (Completed)    Colon cancer screening        Relevant Orders    Ambulatory Referral to General Surgery      Diagnoses       Codes Comments    Chronic pain of right hip    -  Primary ICD-10-CM: M25.551, G89.29  ICD-9-CM: 719.45, 338.29     Colon cancer screening     ICD-10-CM: Z12.11  ICD-9-CM: V76.51             PLAN  She and I reviewed her chart.  She last had a colonoscopy with Dr. Wilver Owen.  She was asked to follow-up in about 3 years for a follow-up colonoscopy.  She is overdue for this and I have made a referral for this today.    I referred her to orthopedic surgery per her request.    Refused to get flu and pneumococcal vaccinations today.    I asked her to follow-up in about 6 months.  Fasting labs prior to that visit should include: Lipid profile, comprehensive metabolic panel, CBC, analysis.  No follow-ups on file.

## 2022-01-25 RX ORDER — DONEPEZIL HYDROCHLORIDE 5 MG/1
5 TABLET, FILM COATED ORAL NIGHTLY
Qty: 30 TABLET | Refills: 3 | Status: SHIPPED | OUTPATIENT
Start: 2022-01-25 | End: 2022-04-05 | Stop reason: SDUPTHER

## 2022-02-17 ENCOUNTER — PREP FOR SURGERY (OUTPATIENT)
Dept: SURGERY | Facility: SURGERY CENTER | Age: 77
End: 2022-02-17

## 2022-02-17 DIAGNOSIS — Z12.11 COLON CANCER SCREENING: Primary | ICD-10-CM

## 2022-03-14 ENCOUNTER — OFFICE VISIT (OUTPATIENT)
Dept: FAMILY MEDICINE CLINIC | Facility: CLINIC | Age: 77
End: 2022-03-14

## 2022-03-14 VITALS
TEMPERATURE: 97 F | DIASTOLIC BLOOD PRESSURE: 68 MMHG | HEIGHT: 55 IN | HEART RATE: 74 BPM | BODY MASS INDEX: 37.26 KG/M2 | WEIGHT: 161 LBS | OXYGEN SATURATION: 98 % | SYSTOLIC BLOOD PRESSURE: 124 MMHG | RESPIRATION RATE: 18 BRPM

## 2022-03-14 DIAGNOSIS — R53.83 FATIGUE, UNSPECIFIED TYPE: Primary | ICD-10-CM

## 2022-03-14 DIAGNOSIS — R41.3 MEMORY LOSS: ICD-10-CM

## 2022-03-14 PROCEDURE — 99214 OFFICE O/P EST MOD 30 MIN: CPT | Performed by: INTERNAL MEDICINE

## 2022-03-14 RX ORDER — MEMANTINE HYDROCHLORIDE 5 MG/1
10 TABLET ORAL DAILY
Qty: 60 TABLET | Refills: 3 | Status: SHIPPED | OUTPATIENT
Start: 2022-03-14 | End: 2022-04-05 | Stop reason: SDUPTHER

## 2022-03-14 RX ORDER — DICLOFENAC SODIUM 75 MG/1
TABLET, DELAYED RELEASE ORAL
Status: ON HOLD | COMMUNITY
End: 2022-04-11

## 2022-03-20 NOTE — PROGRESS NOTES
Subjective   Chris Cox is a 76 y.o. female. Patient is here today for   Chief Complaint   Patient presents with   • Follow-up     Discuss hip and knee surgery,and discuss a med for her memory           Vitals:    03/14/22 1531   BP: 124/68   Pulse: 74   Resp: 18   Temp: 97 °F (36.1 °C)   SpO2: 98%     Body mass index is 189.99 kg/m².      Past Medical History:   Diagnosis Date   • Abdominal pain    • ADHD    • Arthritis     primary both knees   • Bronchitis 08/2019   • Disease of thyroid gland    • Diverticulosis of intestine    • DVT (deep venous thrombosis) ([facility]) 10/2019    right lower extremity   • Fractures     MULTIPLE BONE FRACTURES-TOOK FOSAMAX 3.5 YRS   • Hyperlipidemia    • Hypertension    • Irregular heart beat    • Kidney stone    • Lumbar spine pain    • Migraines    • Mono exposure     AGE 35   • Neuralgia neuritis, sciatic nerve    • Obstructive sleep apnea     20 YRS AGO-NOT CURRENTLY   • Seizures ([facility])     As a baby   • Tuberculosis     As a child   • Vertigo       Allergies   Allergen Reactions   • Erythromycin Anaphylaxis   • Sulfa Antibiotics Rash   • Milk-Related Compounds GI Intolerance   • Eggs Or Egg-Derived Products Unknown - Low Severity     PER ALLERGY TESTING RESULTS   • Cephalexin Rash   • Cephalosporins Rash     STOMACH ISSUES   • Molds & Smuts Rash     ITCHING      Social History     Socioeconomic History   • Marital status:      Spouse name: Marky   • Years of education: College   Tobacco Use   • Smoking status: Never Smoker   • Smokeless tobacco: Never Used   Vaping Use   • Vaping Use: Never used   Substance and Sexual Activity   • Alcohol use: Yes     Comment: wine   • Drug use: No   • Sexual activity: Defer        Current Outpatient Medications:   •  ascorbic acid (VITAMIN C) 500 MG/5ML liquid, Take 500 mg by mouth Daily., Disp: , Rfl:   •  B Complex Vitamins (VITAMIN B COMPLEX) capsule capsule, Take  by mouth Daily., Disp: , Rfl:   •  Baclofen 5 MG tablet, TAKE 1 TABLET  BY MOUTH AT NIGHT AS NEEDED FOR MUSCLE SPASMS, Disp: 14 tablet, Rfl: 0  •  donepezil (ARICEPT) 5 MG tablet, TAKE 1 TABLET BY MOUTH EVERY NIGHT, Disp: 30 tablet, Rfl: 3  •  escitalopram (LEXAPRO) 10 MG tablet, Take 1 tablet by mouth Daily., Disp: 90 tablet, Rfl: 3  •  mupirocin (BACTROBAN) 2 % ointment, Apply to affected area 3 times daily., Disp: , Rfl:   •  vitamin A 88942 UNIT capsule, Take 10,000 Units by mouth Daily. ON HOLD, Disp: , Rfl:   •  VITAMIN D, CHOLECALCIFEROL, PO, Take 1 tablet by mouth daily., Disp: , Rfl:   •  VITAMIN E PO, Take  by mouth., Disp: , Rfl:   •  VITAMIN K PO, Take 1 tablet by mouth Daily. ON HOLD, Disp: , Rfl:   •  diclofenac (VOLTAREN) 75 MG EC tablet, diclofenac sodium 75 mg tablet,delayed release  TAKE 1 TABLET BY MOUTH TWICE DAILY, Disp: , Rfl:   •  memantine (Namenda) 5 MG tablet, Take 2 tablets by mouth Daily., Disp: 60 tablet, Rfl: 3     Objective     She was seen by orthopedic surgery regarding her bilateral knee pain who diagnosed her with osteoarthritis of both of her knees.  She will be given cortisone injections for significant flareups,, a referral for some physical therapy, NSAIDs, hyaluronic acid injections following her orthopedic surgery evaluation in February 22.    She is concerned that her memory is deteriorating.    She was to have a neuropsychological evaluation but this was prohibitively expensive and was never done.    Her mother had Alzheimer's disease.  Of course the family history is concerning for her.               Review of Systems   Constitutional: Positive for fatigue.   HENT: Negative.    Eyes: Negative.    Respiratory: Negative.    Genitourinary: Negative.    Musculoskeletal: Negative.    Psychiatric/Behavioral: Negative for dysphoric mood.        She is concerned that she is having slowly worsening memory loss.       Physical Exam  Vitals and nursing note reviewed.   Constitutional:       Appearance: Normal appearance. She is not ill-appearing.   Neck:       Vascular: No carotid bruit.   Cardiovascular:      Rate and Rhythm: Regular rhythm.      Heart sounds: Normal heart sounds. No murmur heard.    No gallop.   Pulmonary:      Effort: No respiratory distress.      Breath sounds: Normal breath sounds. No wheezing or rales.   Neurological:      Mental Status: She is alert and oriented to person, place, and time.      Sensory: No sensory deficit.      Motor: No weakness.      Coordination: Coordination normal.      Gait: Gait normal.   Psychiatric:         Mood and Affect: Mood normal.         Behavior: Behavior normal.         Thought Content: Thought content normal.           Problems Addressed this Visit        Neuro    Memory loss      Other Visit Diagnoses     Fatigue, unspecified type    -  Primary    Relevant Orders    CBC & Differential    Comprehensive metabolic panel    TSH Rfx On Abnormal To Free T4      Diagnoses       Codes Comments    Fatigue, unspecified type    -  Primary ICD-10-CM: R53.83  ICD-9-CM: 780.79     Memory loss     ICD-10-CM: R41.3  ICD-9-CM: 780.93             PLAN  She is already taking donepezil.  I added memantine today.    She does complain of fatigue today.  I think it is a matter of deconditioning.  However, I will check a CBC, comprehensive metabolic panel and TSH with reflex free T4.    I like to have her back in 4 to 6 weeks.    Her next visit should be for a Medicare wellness visit.  No follow-ups on file.

## 2022-03-30 LAB
BASOPHILS # BLD AUTO: 0 X10E3/UL (ref 0–0.2)
BASOPHILS NFR BLD AUTO: 1 %
EOSINOPHIL # BLD AUTO: 0.2 X10E3/UL (ref 0–0.4)
EOSINOPHIL NFR BLD AUTO: 3 %
ERYTHROCYTE [DISTWIDTH] IN BLOOD BY AUTOMATED COUNT: 13.5 % (ref 11.7–15.4)
HCT VFR BLD AUTO: 40.6 % (ref 34–46.6)
HGB BLD-MCNC: 13.4 G/DL (ref 11.1–15.9)
IMM GRANULOCYTES # BLD AUTO: 0 X10E3/UL (ref 0–0.1)
IMM GRANULOCYTES NFR BLD AUTO: 0 %
LYMPHOCYTES # BLD AUTO: 1.3 X10E3/UL (ref 0.7–3.1)
LYMPHOCYTES NFR BLD AUTO: 22 %
MCH RBC QN AUTO: 29.8 PG (ref 26.6–33)
MCHC RBC AUTO-ENTMCNC: 33 G/DL (ref 31.5–35.7)
MCV RBC AUTO: 90 FL (ref 79–97)
MONOCYTES # BLD AUTO: 0.4 X10E3/UL (ref 0.1–0.9)
MONOCYTES NFR BLD AUTO: 7 %
NEUTROPHILS # BLD AUTO: 3.8 X10E3/UL (ref 1.4–7)
NEUTROPHILS NFR BLD AUTO: 67 %
PLATELET # BLD AUTO: 235 X10E3/UL (ref 150–450)
RBC # BLD AUTO: 4.49 X10E6/UL (ref 3.77–5.28)
TSH SERPL DL<=0.005 MIU/L-ACNC: 2.88 UIU/ML (ref 0.45–4.5)
WBC # BLD AUTO: 5.7 X10E3/UL (ref 3.4–10.8)

## 2022-04-01 ENCOUNTER — OFFICE VISIT (OUTPATIENT)
Dept: FAMILY MEDICINE CLINIC | Facility: CLINIC | Age: 77
End: 2022-04-01

## 2022-04-05 ENCOUNTER — OFFICE VISIT (OUTPATIENT)
Dept: FAMILY MEDICINE CLINIC | Facility: CLINIC | Age: 77
End: 2022-04-05

## 2022-04-05 VITALS
OXYGEN SATURATION: 98 % | DIASTOLIC BLOOD PRESSURE: 72 MMHG | BODY MASS INDEX: 25.68 KG/M2 | TEMPERATURE: 96 F | SYSTOLIC BLOOD PRESSURE: 130 MMHG | WEIGHT: 159.8 LBS | RESPIRATION RATE: 18 BRPM | HEIGHT: 66 IN | HEART RATE: 70 BPM

## 2022-04-05 DIAGNOSIS — R35.0 URINARY FREQUENCY: Primary | ICD-10-CM

## 2022-04-05 DIAGNOSIS — R41.3 MEMORY LOSS: ICD-10-CM

## 2022-04-05 LAB
BILIRUB BLD-MCNC: NEGATIVE MG/DL
CLARITY, POC: CLEAR
COLOR UR: YELLOW
EXPIRATION DATE: NORMAL
GLUCOSE UR STRIP-MCNC: NEGATIVE MG/DL
KETONES UR QL: NEGATIVE
LEUKOCYTE EST, POC: NEGATIVE
Lab: NORMAL
NITRITE UR-MCNC: NEGATIVE MG/ML
PH UR: 5 [PH] (ref 5–8)
PROT UR STRIP-MCNC: NEGATIVE MG/DL
RBC # UR STRIP: NEGATIVE /UL
SP GR UR: 1.01 (ref 1–1.03)
UROBILINOGEN UR QL: NORMAL

## 2022-04-05 PROCEDURE — 81003 URINALYSIS AUTO W/O SCOPE: CPT | Performed by: INTERNAL MEDICINE

## 2022-04-05 PROCEDURE — 99214 OFFICE O/P EST MOD 30 MIN: CPT | Performed by: INTERNAL MEDICINE

## 2022-04-05 RX ORDER — MEMANTINE HYDROCHLORIDE 5 MG/1
10 TABLET ORAL DAILY
Qty: 60 TABLET | Refills: 3 | Status: SHIPPED | OUTPATIENT
Start: 2022-04-05 | End: 2022-04-07 | Stop reason: SDUPTHER

## 2022-04-05 RX ORDER — DONEPEZIL HYDROCHLORIDE 5 MG/1
5 TABLET, FILM COATED ORAL NIGHTLY
Qty: 30 TABLET | Refills: 3 | Status: SHIPPED | OUTPATIENT
Start: 2022-04-05 | End: 2022-04-07 | Stop reason: SDUPTHER

## 2022-04-05 NOTE — PROGRESS NOTES
Subjective   Chris Cox is a 76 y.o. female. Patient is here today for   Chief Complaint   Patient presents with   • Results     PT HERE FOR FOLLOW UP ON LABS          Vitals:    04/05/22 0915   BP: 130/72   Pulse: 70   Resp: 18   Temp: 96 °F (35.6 °C)   SpO2: 98%     Body mass index is 25.79 kg/m².      Past Medical History:   Diagnosis Date   • Abdominal pain    • ADHD    • Arthritis     primary both knees   • Bronchitis 08/2019   • Disease of thyroid gland    • Diverticulosis of intestine    • DVT (deep venous thrombosis) ([facility]) 10/2019    right lower extremity   • Fractures     MULTIPLE BONE FRACTURES-TOOK FOSAMAX 3.5 YRS   • Hyperlipidemia    • Hypertension    • Irregular heart beat    • Kidney stone    • Lumbar spine pain    • Migraines    • Mono exposure     AGE 35   • Neuralgia neuritis, sciatic nerve    • Obstructive sleep apnea     20 YRS AGO-NOT CURRENTLY   • Seizures ([facility])     As a baby   • Tuberculosis     As a child   • Vertigo       Allergies   Allergen Reactions   • Erythromycin Anaphylaxis   • Sulfa Antibiotics Rash   • Milk-Related Compounds GI Intolerance   • Eggs Or Egg-Derived Products Unknown - Low Severity     PER ALLERGY TESTING RESULTS   • Cephalexin Rash   • Cephalosporins Rash     STOMACH ISSUES   • Molds & Smuts Rash     ITCHING      Social History     Socioeconomic History   • Marital status:      Spouse name: Marky   • Years of education: College   Tobacco Use   • Smoking status: Never Smoker   • Smokeless tobacco: Never Used   Vaping Use   • Vaping Use: Never used   Substance and Sexual Activity   • Alcohol use: Yes     Comment: wine   • Drug use: No   • Sexual activity: Defer        Current Outpatient Medications:   •  ascorbic acid (VITAMIN C) 500 MG/5ML liquid, Take 500 mg by mouth Daily., Disp: , Rfl:   •  B Complex Vitamins (VITAMIN B COMPLEX) capsule capsule, Take  by mouth Daily., Disp: , Rfl:   •  Baclofen 5 MG tablet, TAKE 1 TABLET BY MOUTH AT NIGHT AS NEEDED FOR  MUSCLE SPASMS, Disp: 14 tablet, Rfl: 0  •  diclofenac (VOLTAREN) 75 MG EC tablet, diclofenac sodium 75 mg tablet,delayed release  TAKE 1 TABLET BY MOUTH TWICE DAILY, Disp: , Rfl:   •  donepezil (ARICEPT) 5 MG tablet, Take 1 tablet by mouth Every Night., Disp: 30 tablet, Rfl: 3  •  escitalopram (LEXAPRO) 10 MG tablet, Take 1 tablet by mouth Daily., Disp: 90 tablet, Rfl: 3  •  memantine (Namenda) 5 MG tablet, Take 2 tablets by mouth Daily., Disp: 60 tablet, Rfl: 3  •  mupirocin (BACTROBAN) 2 % ointment, Apply to affected area 3 times daily., Disp: , Rfl:   •  vitamin A 84515 UNIT capsule, Take 10,000 Units by mouth Daily. ON HOLD, Disp: , Rfl:   •  VITAMIN D, CHOLECALCIFEROL, PO, Take 1 tablet by mouth daily., Disp: , Rfl:   •  VITAMIN E PO, Take  by mouth., Disp: , Rfl:   •  VITAMIN K PO, Take 1 tablet by mouth Daily. ON HOLD, Disp: , Rfl:      Objective     She is here today to follow-up on labs.    She has no complaints.    She has a minimal cognitive impairment.  She continues with Aricept and memantine and she needs a refill of these prescriptions.           Review of Systems   Constitutional: Negative.    HENT: Negative.    Respiratory: Negative.    Cardiovascular: Negative.    Genitourinary: Positive for frequency. Negative for difficulty urinating and dysuria.        She notes that she has to get up to go to the restroom once or twice at night and every 2-3 hours during the day.  She notes no dysuria.   Musculoskeletal: Negative.    Psychiatric/Behavioral: Negative.        Physical Exam  Vitals and nursing note reviewed.   Constitutional:       Appearance: Normal appearance. She is normal weight.      Comments: Pleasant, neatly groomed, in no distress.   Cardiovascular:      Rate and Rhythm: Regular rhythm.      Heart sounds: Normal heart sounds. No murmur heard.    No gallop.   Pulmonary:      Effort: No respiratory distress.      Breath sounds: Normal breath sounds. No wheezing or rales.   Neurological:       Mental Status: She is alert and oriented to person, place, and time.   Psychiatric:         Mood and Affect: Mood normal.         Thought Content: Thought content normal.           Problems Addressed this Visit        Neuro    Memory loss      Other Visit Diagnoses     Urinary frequency    -  Primary    Relevant Orders    POCT urinalysis dipstick, automated (Completed)      Diagnoses       Codes Comments    Urinary frequency    -  Primary ICD-10-CM: R35.0  ICD-9-CM: 788.41     Memory loss     ICD-10-CM: R41.3  ICD-9-CM: 780.93             PLAN  She and I reviewed her labs.  Also, we did a urinalysis today.  There were no abnormalities.    She has memory loss but probably is more correct this a minimal cognitive impairment.  She is doing well on Aricept and Namenda.  I had wanted her to go get a neuropsychological evaluation but that was difficult to arrange and probably is not necessary at this point.  Nevertheless, her memory seems to be adequate.  She supplements her visit today with some notes and questions which she has been careful to document in a notebook that she has.    She is learning to cope with her mild cognitive impairment.    His urinary frequency and I suspect that she has an overactive bladder.  I suggested that she might try medication to treat those symptoms if she finds them annoying enough.  She does not find them sufficiently annoying to treat at this time so were going to let that go.    I asked her to follow-up in 4 months for an annual wellness visit.  No follow-ups on file.

## 2022-04-07 RX ORDER — DONEPEZIL HYDROCHLORIDE 5 MG/1
5 TABLET, FILM COATED ORAL NIGHTLY
Qty: 90 TABLET | Refills: 1 | Status: SHIPPED | OUTPATIENT
Start: 2022-04-07 | End: 2022-05-25

## 2022-04-07 RX ORDER — MEMANTINE HYDROCHLORIDE 5 MG/1
10 TABLET ORAL DAILY
Qty: 180 TABLET | Refills: 1 | Status: SHIPPED | OUTPATIENT
Start: 2022-04-07 | End: 2022-09-12

## 2022-04-11 ENCOUNTER — HOSPITAL ENCOUNTER (OUTPATIENT)
Facility: SURGERY CENTER | Age: 77
Setting detail: HOSPITAL OUTPATIENT SURGERY
Discharge: HOME | End: 2022-04-11
Attending: SURGERY | Admitting: SURGERY

## 2022-04-11 ENCOUNTER — ANESTHESIA (OUTPATIENT)
Dept: SURGERY | Facility: SURGERY CENTER | Age: 77
End: 2022-04-11

## 2022-04-11 ENCOUNTER — ANESTHESIA EVENT (OUTPATIENT)
Dept: SURGERY | Facility: SURGERY CENTER | Age: 77
End: 2022-04-11

## 2022-04-11 VITALS
TEMPERATURE: 97.7 F | BODY MASS INDEX: 25.44 KG/M2 | RESPIRATION RATE: 16 BRPM | HEART RATE: 57 BPM | OXYGEN SATURATION: 98 % | SYSTOLIC BLOOD PRESSURE: 118 MMHG | DIASTOLIC BLOOD PRESSURE: 56 MMHG | WEIGHT: 157.6 LBS

## 2022-04-11 PROCEDURE — S0260 H&P FOR SURGERY: HCPCS | Performed by: SURGERY

## 2022-04-11 PROCEDURE — G0105 COLORECTAL SCRN; HI RISK IND: HCPCS | Performed by: SURGERY

## 2022-04-11 PROCEDURE — 25010000002 PROPOFOL 10 MG/ML EMULSION: Performed by: ANESTHESIOLOGY

## 2022-04-11 RX ORDER — SODIUM CHLORIDE 0.9 % (FLUSH) 0.9 %
10 SYRINGE (ML) INJECTION AS NEEDED
Status: DISCONTINUED | OUTPATIENT
Start: 2022-04-11 | End: 2022-04-11 | Stop reason: HOSPADM

## 2022-04-11 RX ORDER — LIDOCAINE HYDROCHLORIDE 10 MG/ML
0.5 INJECTION, SOLUTION INFILTRATION; PERINEURAL ONCE AS NEEDED
Status: DISCONTINUED | OUTPATIENT
Start: 2022-04-11 | End: 2022-04-11 | Stop reason: HOSPADM

## 2022-04-11 RX ORDER — SODIUM CHLORIDE, SODIUM LACTATE, POTASSIUM CHLORIDE, CALCIUM CHLORIDE 600; 310; 30; 20 MG/100ML; MG/100ML; MG/100ML; MG/100ML
1000 INJECTION, SOLUTION INTRAVENOUS CONTINUOUS
Status: DISCONTINUED | OUTPATIENT
Start: 2022-04-11 | End: 2022-04-11 | Stop reason: HOSPADM

## 2022-04-11 RX ORDER — LIDOCAINE HYDROCHLORIDE 20 MG/ML
INJECTION, SOLUTION INFILTRATION; PERINEURAL AS NEEDED
Status: DISCONTINUED | OUTPATIENT
Start: 2022-04-11 | End: 2022-04-11 | Stop reason: SURG

## 2022-04-11 RX ORDER — PROPOFOL 10 MG/ML
VIAL (ML) INTRAVENOUS AS NEEDED
Status: DISCONTINUED | OUTPATIENT
Start: 2022-04-11 | End: 2022-04-11 | Stop reason: SURG

## 2022-04-11 RX ADMIN — PROPOFOL 200 MCG/KG/MIN: 10 INJECTION, EMULSION INTRAVENOUS at 13:11

## 2022-04-11 RX ADMIN — LIDOCAINE HYDROCHLORIDE 40 MG: 20 INJECTION, SOLUTION INFILTRATION; PERINEURAL at 13:11

## 2022-04-11 RX ADMIN — PROPOFOL 100 MG: 10 INJECTION, EMULSION INTRAVENOUS at 13:11

## 2022-04-11 RX ADMIN — SODIUM CHLORIDE, SODIUM LACTATE, POTASSIUM CHLORIDE, AND CALCIUM CHLORIDE: .6; .31; .03; .02 INJECTION, SOLUTION INTRAVENOUS at 13:12

## 2022-04-11 NOTE — ANESTHESIA PREPROCEDURE EVALUATION
Anesthesia Evaluation     Patient summary reviewed and Nursing notes reviewed   NPO Solid Status: > 8 hours  NPO Liquid Status: > 2 hours           Airway   Mallampati: I  TM distance: >3 FB  Neck ROM: full  No difficulty expected  Dental - normal exam     Pulmonary - negative pulmonary ROS and normal exam   (-) sleep apnea  Cardiovascular - normal exam  Exercise tolerance: good (4-7 METS)    (+) DVT resolved, hyperlipidemia,   (-) hypertension      Neuro/Psych  (+) headaches, psychiatric history,    (-) seizures  GI/Hepatic/Renal/Endo    (+)   thyroid problem   (-) no renal disease    Musculoskeletal     Abdominal  - normal exam    Bowel sounds: normal.   Substance History - negative use     OB/GYN negative ob/gyn ROS         Other   arthritis,                      Anesthesia Plan    ASA 2     MAC       Anesthetic plan, all risks, benefits, and alternatives have been provided, discussed and informed consent has been obtained with: patient.        CODE STATUS:

## 2022-04-11 NOTE — H&P
Date: 2022     Patient: Chris Cox    : 1945   [id number]     CC:  Screening Colonoscopy, with family history of colon cancer    History:   The patient is a 76 y.o. female of Raghavendra Marquez MD  who presents to discuss screening colonoscopy with family history of colon cancer. The patient currently has no complaints.  Patient denies any history of nausea, abdominal pain, weight loss, change in bowel habits or rectal bleeding.  Patient denies melena, hematochezia or BRBPR.  No family history of ulcerative colitis, Crohn's disease or familial polyposis.    The following portions of the patient's history were reviewed and updated as appropriate: allergies, current medications, past family history, past medical history, past social history, past surgical history and problem list.    Past Medical History:   Diagnosis Date   • Abdominal pain    • ADHD    • Arthritis     primary both knees   • Bronchitis 2019   • Disease of thyroid gland    • Diverticulosis of intestine    • DVT (deep venous thrombosis) (HCC) 10/2019    right lower extremity   • Fractures     MULTIPLE BONE FRACTURES-TOOK FOSAMAX 3.5 YRS   • Hyperlipidemia    • Hypertension    • Irregular heart beat    • Kidney stone    • Lumbar spine pain    • Migraines    • Mono exposure     AGE 35   • Neuralgia neuritis, sciatic nerve    • Obstructive sleep apnea     20 YRS AGO-NOT CURRENTLY   • Seizures (HCC)     As a baby   • Tuberculosis     As a child   • Vertigo       Past Surgical History:   Procedure Laterality Date   • BREAST BIOPSY Left      stereotactic benign   • BREAST EXCISIONAL BIOPSY Left      - fibroadenoma   • BREAST SURGERY      Benign fibroadnoma removed from the left breast   • HAND SURGERY Bilateral     RECONSTRUCTION ON BOTH HANDS   • LITHOTRIPSY      renal   • TONGUE SURGERY     • TOTAL KNEE ARTHROPLASTY Right 10/22/2019    Procedure: TOTAL KNEE ARTHROPLASTY RIGHT;  Surgeon: Aneesh Okeefe II, MD;   Location: [facility] MAIN OR;  Service: Orthopedics   • WRIST SURGERY       Medications:   Medications Prior to Admission   Medication Sig Dispense Refill Last Dose   • ascorbic acid (VITAMIN C) 500 MG/5ML liquid Take 500 mg by mouth Daily.   Past Week at Unknown time   • B Complex Vitamins (VITAMIN B COMPLEX) capsule capsule Take  by mouth Daily.   Past Week at Unknown time   • donepezil (ARICEPT) 5 MG tablet Take 1 tablet by mouth Every Night. 90 tablet 1 Past Week at Unknown time   • escitalopram (LEXAPRO) 10 MG tablet Take 1 tablet by mouth Daily. 90 tablet 3 4/10/2022 at Unknown time   • memantine (Namenda) 5 MG tablet Take 2 tablets by mouth Daily. 180 tablet 1 Past Week at Unknown time   • vitamin A 35015 UNIT capsule Take 10,000 Units by mouth Daily. ON HOLD   Past Week at Unknown time   • VITAMIN D, CHOLECALCIFEROL, PO Take 1 tablet by mouth daily.   Past Week at Unknown time   • VITAMIN E PO Take  by mouth.   Past Week at Unknown time   • mupirocin (BACTROBAN) 2 % ointment Apply to affected area 3 times daily.        Allergies: Erythromycin, Sulfa antibiotics, Milk-related compounds, Eggs or egg-derived products, Cephalexin, Cephalosporins, and Molds & smuts   Social History:  Social History     Socioeconomic History   • Marital status:      Spouse name: Marky   • Years of education: College   Tobacco Use   • Smoking status: Never Smoker   • Smokeless tobacco: Never Used   Vaping Use   • Vaping Use: Never used   Substance and Sexual Activity   • Alcohol use: Yes     Comment: wine   • Drug use: No   • Sexual activity: Defer      Family History   Problem Relation Age of Onset   • Hypertension Mother    • Other Mother    • Transient ischemic attack Mother    • Heart disease Mother    • Cancer Father         colon   • Diabetes Father    • Hypertension Father    • Other Father         Renal artery aneurysm   • Heart disease Father    • Stroke Father    • Diabetes Paternal Grandmother    • Cancer Paternal  Grandfather         Review of Systems:   General: Patient reports good health  Eyes: No eye problems  Ears, nose, mouth and throat: No rhinitis, no hearing problems, no chronic cough  Cardiovascular/heart: Denies palpitations, syncope or chest pain  Respiratory/lung: Denies shortness of breath, hemoptysis, or dyspnea on exertion   Genital/urinary: No frequency, hematuria or dysuria  Hematological/lymphatic: Denies anemia or other problems  Musculoskeletal: No joint pain, no defects  Skin: No psoriasis or other skin issues  Neurological: No seizures or other neurological problems  Psychiatric: None  Endocrine: Negative  Gastro-intestinal: No constipation, no diarrhea, no melena, no hematochezia    /66 (BP Location: Left arm, Patient Position: Lying)   Pulse 66   Temp 96.9 °F (36.1 °C) (Temporal)   Resp 16   Wt 71.5 kg (157 lb 9.6 oz)   SpO2 97%   BMI 25.44 kg/m²     Physical Examination:  General: Alert and oriented x3 in no acute distress  HEENT: Normal cephalic, atraumatic, PERRLA, EOMI, sclera anicteric, moist mucous membranes, neck is supple, no JVD, no carotid bruits, no thyromegaly no adenopathy  Chest: CTA and percussion  CVA: RRR, normal S1-S2, no murmurs, no gallops or rubs  Abdomen: Positive BS, soft, nondistended, nontender, no rebound, no guarding, no hernias, no organomegaly and no masses  Extremities: Full range of motion, no clubbing, no cyanosis or edema  Neurovascular: Grossly intact  Debilities: None  Emotional Behavior: Appropriate     Impression:  76 y.o. female for screening colonoscopy with family history of colon cancer.    Plan:  Patient is presenting for screening colonoscopy with family history of colon cancer.  I have recommended that the patient undergo a screening colonoscopy in accordance of American Cancer Society's guidelines.  I have discussed this procedure in detail with the patient.  I have discussed the risks, benefits and alternatives.  I have discussed the risk of  anesthesia, bleeding and perforation.  Patient understands these risks, benefits and alternatives and wishes to proceed.      Leonor Xavier MD  General, Minimally Invasive and Endoscopic Surgery  [facility] [place] [address]    [address]  [address] [address]    P: [phone]  F: [phone]    Cc:  Raghavendra Marquez MD

## 2022-04-11 NOTE — ANESTHESIA POSTPROCEDURE EVALUATION
Patient: Chris Cox    Procedure Summary     Date: 04/11/22 Room / Location: [facility] OR [facility] / [facility]    Anesthesia Start: 1308 Anesthesia Stop: 1345    Procedure: COLONOSCOPY (N/A ) Diagnosis:       Colon cancer screening      (Colon cancer screening [Z12.11])    Surgeons: Leonor Xavier MD Provider: Morgan Bowie MD    Anesthesia Type: MAC ASA Status: 2          Anesthesia Type: MAC    Vitals  Vitals Value Taken Time   /56 04/11/22 1405   Temp 36.5 °C (97.7 °F) 04/11/22 1344   Pulse 57 04/11/22 1405   Resp 16 04/11/22 1405   SpO2 98 % 04/11/22 1405           Post Anesthesia Care and Evaluation    Patient location during evaluation: bedside  Patient participation: complete - patient participated  Level of consciousness: awake  Pain management: adequate  Airway patency: patent  Anesthetic complications: No anesthetic complications  PONV Status: none  Cardiovascular status: acceptable  Respiratory status: acceptable  Hydration status: acceptable  Post Neuraxial Block status: Motor and sensory function returned to baseline

## 2022-04-11 NOTE — OP NOTE
Operative Note:    Pre-op dx: Screening Colonoscopy, family history of colon cancer    Post-op dx: diverticulosis     Procedure: Colonoscopy    Surgeon: Leonor Xavier MD    Anesthesia: MAC    EBL: none    Specimen:  * No orders in the log *    Complications: none    Procedure:    Colonoscopy:  A digital rectal examination was performed which revealed no rectal masses.  Scope was introduced into the rectum and advanced into the sigmoid, descending colon, splenic flexure, transverse colon, hepatic flexure, ascending colon and into the cecum.  Cecum was identified by the ileocecal valve and appendiceal orifice.  Diverticulosis throughout the sigmoid colon.  There was no evidence of any polyps, masses, AV malformation or inflammation.  The bowel preparation was excellent. The scope was slowly withdrawn and a second look was performed.  Upon the second look, there were no new findings.  The colon was desufflated and the procedure was terminated.   Patient was transferred to recovery room in stable condition.      Assessment/Plan:  Repeat colonoscopy in 5 years.  Family history of colon cancer  Diverticulosis    Leonor Xavier MD  General, Minimally Invasive and Endoscopic Surgery  [facility]    [address] [facility] [address] [place] [address]   [address]    [address]  [place], [place] [id number] [place] [address]    P: [phone]  F: [phone]    Cc:  Raghavendra Marquez MD

## 2022-04-18 ENCOUNTER — TELEPHONE (OUTPATIENT)
Dept: SURGERY | Facility: CLINIC | Age: 77
End: 2022-04-18

## 2022-04-18 NOTE — TELEPHONE ENCOUNTER
Spoke to pt and let her know that her colonoscopy showed no evidence of any polyps, masses, AV malformation or inflammation , but did have  Diverticulosis throughout the sigmoid colon and to repeat colonoscopy in 5 years, due to family history of colon cancer. Pt understood

## 2022-05-10 ENCOUNTER — OFFICE VISIT (OUTPATIENT)
Dept: FAMILY MEDICINE CLINIC | Facility: CLINIC | Age: 77
End: 2022-05-10

## 2022-05-10 VITALS
HEART RATE: 68 BPM | HEIGHT: 66 IN | BODY MASS INDEX: 25.62 KG/M2 | WEIGHT: 159.4 LBS | TEMPERATURE: 96.4 F | OXYGEN SATURATION: 99 % | RESPIRATION RATE: 18 BRPM | SYSTOLIC BLOOD PRESSURE: 131 MMHG | DIASTOLIC BLOOD PRESSURE: 71 MMHG

## 2022-05-10 DIAGNOSIS — K57.90 DD (DIVERTICULAR DISEASE): Primary | ICD-10-CM

## 2022-05-10 DIAGNOSIS — K59.1 FUNCTIONAL DIARRHEA: ICD-10-CM

## 2022-05-10 PROCEDURE — 99213 OFFICE O/P EST LOW 20 MIN: CPT | Performed by: NURSE PRACTITIONER

## 2022-05-10 RX ORDER — CHOLESTYRAMINE 4 G/9G
1 POWDER, FOR SUSPENSION ORAL 2 TIMES DAILY WITH MEALS
Qty: 28 PACKET | Refills: 0 | Status: SHIPPED | OUTPATIENT
Start: 2022-05-10 | End: 2022-05-23

## 2022-05-10 RX ORDER — DIPHENOXYLATE HYDROCHLORIDE AND ATROPINE SULFATE 2.5; .025 MG/1; MG/1
1 TABLET ORAL 4 TIMES DAILY PRN
Qty: 28 TABLET | Refills: 0 | Status: SHIPPED | OUTPATIENT
Start: 2022-05-10 | End: 2023-03-15

## 2022-05-10 RX ORDER — DIPHENOXYLATE HYDROCHLORIDE AND ATROPINE SULFATE 2.5; .025 MG/1; MG/1
1 TABLET ORAL 4 TIMES DAILY PRN
COMMUNITY
End: 2022-05-10 | Stop reason: SDUPTHER

## 2022-05-10 NOTE — PATIENT INSTRUCTIONS
Kapil reviewed and appropriate; pt to eat a high fiber diet and if unable to start taking an otc fiber supplement; Increase fluids; monitor diet and keep diary to see what may be causing diarrhea bouts. Pt verb. Understanding.

## 2022-05-10 NOTE — PROGRESS NOTES
Subjective     Chris Cox is a 76 y.o.. female.     Pt here today with c/o re-occurring chronic diarrhea for past 4 days. Pt stating she is drinking some liquids and eating some fruits and vegetables. Pt recently having colonoscopy by Dr. Xavier with results of diverticulosis throughout the sigmoid colon, recommended repeat in 5 years.     Diarrhea   This is a recurrent problem. Episode onset: 4 days. The problem has been unchanged. Diarrhea characteristics: yellow. Associated symptoms include bloating. Pertinent negatives include no abdominal pain, fever, headaches or vomiting. Treatments tried: old script of lomotil.       The following portions of the patient's history were reviewed and updated as appropriate: allergies, current medications, past family history, past medical history, past social history, past surgical history and problem list.    Past Medical History:   Diagnosis Date   • Abdominal pain    • ADHD    • Arthritis     primary both knees   • Bronchitis 08/2019   • Disease of thyroid gland    • Diverticulosis of intestine    • DVT (deep venous thrombosis) ([facility]) 10/2019    right lower extremity   • Fractures     MULTIPLE BONE FRACTURES-TOOK FOSAMAX 3.5 YRS   • Hyperlipidemia    • Hypertension    • Irregular heart beat    • Kidney stone    • Lumbar spine pain    • Migraines    • Mono exposure     AGE 35   • Neuralgia neuritis, sciatic nerve    • Obstructive sleep apnea     20 YRS AGO-NOT CURRENTLY   • Seizures (HCC)     As a baby   • Tuberculosis     As a child   • Vertigo        Past Surgical History:   Procedure Laterality Date   • BREAST BIOPSY Left     2019 stereotactic benign   • BREAST EXCISIONAL BIOPSY Left     1971 - fibroadenoma   • BREAST SURGERY      Benign fibroadnoma removed from the left breast   • COLONOSCOPY N/A 4/11/2022    Procedure: COLONOSCOPY;  Surgeon: Leonor Xavier MD;  Location: [facility] MAIN OR;  Service: Gastroenterology;  Laterality: N/A;  Diverticulosis   • HAND  "SURGERY Bilateral 2012    RECONSTRUCTION ON BOTH HANDS   • LITHOTRIPSY      renal   • TONGUE SURGERY     • TOTAL KNEE ARTHROPLASTY Right 10/22/2019    Procedure: TOTAL KNEE ARTHROPLASTY RIGHT;  Surgeon: Aneesh Okeefe II, MD;  Location: [facility] OR;  Service: Orthopedics   • WRIST SURGERY         Review of Systems   Constitutional: Negative.  Negative for fever.   Respiratory: Negative.    Cardiovascular: Negative.    Gastrointestinal: Positive for abdominal distention, bloating and diarrhea. Negative for abdominal pain, nausea and vomiting.   Neurological: Negative for headaches.       Allergies   Allergen Reactions   • Erythromycin Anaphylaxis   • Sulfa Antibiotics Rash   • Milk-Related Compounds GI Intolerance   • Eggs Or Egg-Derived Products Unknown - Low Severity     PER ALLERGY TESTING RESULTS   • Cephalexin Rash   • Cephalosporins Rash     STOMACH ISSUES   • Molds & Smuts Rash     ITCHING       Objective     Vitals:    05/10/22 0808   BP: 131/71   BP Location: Right arm   Patient Position: Sitting   Pulse: 68   Resp: 18   Temp: 96.4 °F (35.8 °C)   TempSrc: Oral   SpO2: 99%   Weight: 72.3 kg (159 lb 6.4 oz)   Height: 167.6 cm (65.98\")     Body mass index is 25.74 kg/m².    Physical Exam  Vitals reviewed.   HENT:      Head: Normocephalic.   Eyes:      Pupils: Pupils are equal, round, and reactive to light.   Cardiovascular:      Rate and Rhythm: Normal rate and regular rhythm.   Pulmonary:      Effort: Pulmonary effort is normal.      Breath sounds: Normal breath sounds.   Abdominal:      General: Bowel sounds are normal. There is no distension.      Palpations: Abdomen is soft. There is no hepatomegaly or splenomegaly.      Tenderness: There is no abdominal tenderness. There is no guarding or rebound. Negative signs include McBurney's sign.      Hernia: No hernia is present.   Musculoskeletal:         General: Normal range of motion.   Neurological:      Mental Status: She is alert and oriented to " person, place, and time.   Psychiatric:         Behavior: Behavior normal.           Current Outpatient Medications:   •  ascorbic acid (VITAMIN C) 500 MG/5ML liquid, Take 500 mg by mouth Daily., Disp: , Rfl:   •  B Complex Vitamins (VITAMIN B COMPLEX) capsule capsule, Take  by mouth Daily., Disp: , Rfl:   •  diphenoxylate-atropine (LOMOTIL) 2.5-0.025 MG per tablet, Take 1 tablet by mouth 4 (Four) Times a Day As Needed for Diarrhea., Disp: 28 tablet, Rfl: 0  •  donepezil (ARICEPT) 5 MG tablet, Take 1 tablet by mouth Every Night., Disp: 90 tablet, Rfl: 1  •  escitalopram (LEXAPRO) 10 MG tablet, Take 1 tablet by mouth Daily., Disp: 90 tablet, Rfl: 3  •  memantine (Namenda) 5 MG tablet, Take 2 tablets by mouth Daily., Disp: 180 tablet, Rfl: 1  •  mupirocin (BACTROBAN) 2 % ointment, Apply to affected area 3 times daily., Disp: , Rfl:   •  vitamin A 91256 UNIT capsule, Take 10,000 Units by mouth Daily. ON HOLD, Disp: , Rfl:   •  VITAMIN D, CHOLECALCIFEROL, PO, Take 1 tablet by mouth daily., Disp: , Rfl:   •  VITAMIN E PO, Take  by mouth., Disp: , Rfl:   •  cholestyramine (Questran) 4 g packet, Take 1 packet by mouth 2 (Two) Times a Day With Meals for 14 days., Disp: 28 packet, Rfl: 0        Diagnoses and all orders for this visit:    1. DD (diverticular disease) (Primary)    2. Functional diarrhea  -     cholestyramine (Questran) 4 g packet; Take 1 packet by mouth 2 (Two) Times a Day With Meals for 14 days.  Dispense: 28 packet; Refill: 0  -     diphenoxylate-atropine (LOMOTIL) 2.5-0.025 MG per tablet; Take 1 tablet by mouth 4 (Four) Times a Day As Needed for Diarrhea.  Dispense: 28 tablet; Refill: 0        Patient Instructions   Kapil reviewed and appropriate; pt to eat a high fiber diet and if unable to start taking an otc fiber supplement; Increase fluids; monitor diet and keep diary to see what may be causing diarrhea bouts. Pt verb. Understanding.       Return if symptoms worsen or fail to improve.

## 2022-05-21 DIAGNOSIS — K59.1 FUNCTIONAL DIARRHEA: ICD-10-CM

## 2022-05-23 RX ORDER — CHOLESTYRAMINE 4 G/9G
POWDER, FOR SUSPENSION ORAL
Qty: 28 EACH | Refills: 0 | Status: SHIPPED | OUTPATIENT
Start: 2022-05-23 | End: 2022-06-06

## 2022-05-23 NOTE — TELEPHONE ENCOUNTER
This is a patient of Dr. Marquez's        Dr. Marquez,      Please review and refill if appropriate. Let me know if anything additional is needed.      Thanks,  Jerson      Next OV 07/12/2022  Last OV 05/10/2022 karl/Elda

## 2022-05-25 RX ORDER — DONEPEZIL HYDROCHLORIDE 5 MG/1
5 TABLET, FILM COATED ORAL NIGHTLY
Qty: 30 TABLET | Refills: 1 | Status: SHIPPED | OUTPATIENT
Start: 2022-05-25 | End: 2022-09-12

## 2022-06-04 DIAGNOSIS — K59.1 FUNCTIONAL DIARRHEA: ICD-10-CM

## 2022-06-06 RX ORDER — CHOLESTYRAMINE 4 G/9G
POWDER, FOR SUSPENSION ORAL
Qty: 28 EACH | Refills: 0 | Status: SHIPPED | OUTPATIENT
Start: 2022-06-06 | End: 2022-06-20

## 2022-06-18 DIAGNOSIS — K59.1 FUNCTIONAL DIARRHEA: ICD-10-CM

## 2022-06-20 RX ORDER — CHOLESTYRAMINE 4 G/9G
POWDER, FOR SUSPENSION ORAL
Qty: 28 EACH | Refills: 0 | Status: SHIPPED | OUTPATIENT
Start: 2022-06-20 | End: 2022-07-06

## 2022-07-02 DIAGNOSIS — K59.1 FUNCTIONAL DIARRHEA: ICD-10-CM

## 2022-07-05 DIAGNOSIS — R73.9 HYPERGLYCEMIA: Primary | ICD-10-CM

## 2022-07-06 RX ORDER — CHOLESTYRAMINE 4 G/9G
POWDER, FOR SUSPENSION ORAL
Qty: 28 EACH | Refills: 2 | Status: SHIPPED | OUTPATIENT
Start: 2022-07-06 | End: 2023-03-15

## 2022-07-12 ENCOUNTER — OFFICE VISIT (OUTPATIENT)
Dept: FAMILY MEDICINE CLINIC | Facility: CLINIC | Age: 77
End: 2022-07-12

## 2022-07-12 VITALS
TEMPERATURE: 97.8 F | HEIGHT: 66 IN | RESPIRATION RATE: 18 BRPM | HEART RATE: 76 BPM | DIASTOLIC BLOOD PRESSURE: 80 MMHG | WEIGHT: 160 LBS | SYSTOLIC BLOOD PRESSURE: 132 MMHG | OXYGEN SATURATION: 98 % | BODY MASS INDEX: 25.71 KG/M2

## 2022-07-12 DIAGNOSIS — R41.3 MEMORY LOSS: ICD-10-CM

## 2022-07-12 DIAGNOSIS — F32.5 MAJOR DEPRESSIVE DISORDER WITH SINGLE EPISODE, IN FULL REMISSION: ICD-10-CM

## 2022-07-12 DIAGNOSIS — I82.561 CHRONIC DEEP VEIN THROMBOSIS (DVT) OF CALF MUSCLE VEIN OF RIGHT LOWER EXTREMITY: ICD-10-CM

## 2022-07-12 DIAGNOSIS — R60.0 BILATERAL EDEMA OF LOWER EXTREMITY: Primary | ICD-10-CM

## 2022-07-12 PROCEDURE — 99214 OFFICE O/P EST MOD 30 MIN: CPT | Performed by: INTERNAL MEDICINE

## 2022-07-12 NOTE — PROGRESS NOTES
Subjective   Chris Cox is a 76 y.o. female. Patient is here today for   Chief Complaint   Patient presents with   • Follow-up     6 mo follow up           Vitals:    07/12/22 0901   BP: 132/80   Pulse: 76   Resp: 18   Temp: 97.8 °F (36.6 °C)   SpO2: 98%     Body mass index is 25.84 kg/m².      Past Medical History:   Diagnosis Date   • Abdominal pain    • ADHD    • Arthritis     primary both knees   • Bronchitis 08/2019   • Disease of thyroid gland    • Diverticulosis of intestine    • DVT (deep venous thrombosis) ([facility]) 10/2019    right lower extremity   • Fractures     MULTIPLE BONE FRACTURES-TOOK FOSAMAX 3.5 YRS   • Hyperlipidemia    • Hypertension    • Irregular heart beat    • Kidney stone    • Lumbar spine pain    • Migraines    • Mono exposure     AGE 35   • Neuralgia neuritis, sciatic nerve    • Obstructive sleep apnea     20 YRS AGO-NOT CURRENTLY   • Seizures ([facility])     As a baby   • Tuberculosis     As a child   • Vertigo       Allergies   Allergen Reactions   • Erythromycin Anaphylaxis   • Sulfa Antibiotics Rash   • Milk-Related Compounds GI Intolerance   • Eggs Or Egg-Derived Products Unknown - Low Severity     PER ALLERGY TESTING RESULTS   • Cephalexin Rash   • Cephalosporins Rash     STOMACH ISSUES   • Molds & Smuts Rash     ITCHING      Social History     Socioeconomic History   • Marital status:      Spouse name: Marky   • Years of education: College   Tobacco Use   • Smoking status: Never Smoker   • Smokeless tobacco: Never Used   Vaping Use   • Vaping Use: Never used   Substance and Sexual Activity   • Alcohol use: Yes     Comment: wine   • Drug use: No   • Sexual activity: Defer        Current Outpatient Medications:   •  ascorbic acid (VITAMIN C) 500 MG/5ML liquid, Take 500 mg by mouth Daily., Disp: , Rfl:   •  B Complex Vitamins (VITAMIN B COMPLEX) capsule capsule, Take  by mouth Daily., Disp: , Rfl:   •  cholestyramine (QUESTRAN) 4 g packet, MIX AND DRINK 1 PACKET BY MOUTH TWICE DAILY  WITH MEALS FOR 14 DAYS, Disp: 28 each, Rfl: 2  •  diphenoxylate-atropine (LOMOTIL) 2.5-0.025 MG per tablet, Take 1 tablet by mouth 4 (Four) Times a Day As Needed for Diarrhea., Disp: 28 tablet, Rfl: 0  •  donepezil (ARICEPT) 5 MG tablet, TAKE 1 TABLET BY MOUTH EVERY NIGHT, Disp: 30 tablet, Rfl: 1  •  escitalopram (LEXAPRO) 10 MG tablet, Take 1 tablet by mouth Daily., Disp: 90 tablet, Rfl: 3  •  memantine (Namenda) 5 MG tablet, Take 2 tablets by mouth Daily., Disp: 180 tablet, Rfl: 1  •  mupirocin (BACTROBAN) 2 % ointment, Apply to affected area 3 times daily., Disp: , Rfl:   •  vitamin A 21387 UNIT capsule, Take 10,000 Units by mouth Daily. ON HOLD, Disp: , Rfl:   •  VITAMIN D, CHOLECALCIFEROL, PO, Take 1 tablet by mouth daily., Disp: , Rfl:   •  VITAMIN E PO, Take  by mouth., Disp: , Rfl:      Objective     She is here to follow-up for routine visit.    She has chronic bilateral lower extremity edema.  She wears knee-high compression stockings for this.    She notes that she eats a lot of dairy.  She loves cheese.    She has had some difficulty with her memory and she got turned around coming to our office today though she made it with 5 minutes despaired before her appointment time.  She drove her self.           Review of Systems   Constitutional: Negative.    HENT: Negative.    Respiratory: Negative.    Cardiovascular: Negative.    Musculoskeletal: Negative.    Psychiatric/Behavioral: Negative.        Physical Exam  Vitals and nursing note reviewed.   Constitutional:       Appearance: Normal appearance.      Comments: Pleasant, neatly groomed, no distress.  BMI 25.   Neck:      Vascular: No carotid bruit.   Cardiovascular:      Rate and Rhythm: Regular rhythm.      Heart sounds: Normal heart sounds. No murmur heard.    No gallop.   Pulmonary:      Effort: No respiratory distress.      Breath sounds: Normal breath sounds. No wheezing or rales.   Neurological:      Mental Status: She is alert and oriented to  person, place, and time.   Psychiatric:         Mood and Affect: Mood normal.         Behavior: Behavior normal.         Thought Content: Thought content normal.           Problems Addressed this Visit        Coag and Thromboembolic    Chronic deep vein thrombosis (DVT) of calf muscle vein of right lower extremity ([facility])       Mental Health    Major depressive disorder with single episode, in full remission ([facility])       Neuro    Memory loss       Symptoms and Signs    Bilateral edema of lower extremity - Primary      Diagnoses       Codes Comments    Bilateral edema of lower extremity    -  Primary ICD-10-CM: R60.0  ICD-9-CM: 782.3     Memory loss     ICD-10-CM: R41.3  ICD-9-CM: 780.93     Chronic deep vein thrombosis (DVT) of calf muscle vein of right lower extremity ([facility])     ICD-10-CM: I82.561  ICD-9-CM: 453.52     Major depressive disorder with single episode, in full remission ([facility])     ICD-10-CM: F32.5  ICD-9-CM: 296.26             PLAN  Her memory seems stable.  She will continue to take donepezil 5 mg daily and memantine 5 mg daily.        She has chronic deep vein thrombosis and her right calf.  She complains of bilateral lower extremity edema.  She had a lower extremity venous Doppler in 2019 and 2018 regarding this complaint.  I do not think we need to do anything about that today though I asked her to continue to wear her knee-high stockings.    She feels her depression is well controlled on S-Citalopram 10 mg once daily.    I asked her to follow-up in about 6 months for a Medicare wellness visit.    Labs prior to that visit should include: Lipid profile, comprehensive metabolic panel, CBC, urinalysis, and vitamin D level.  She has is a vitamin D deficiency.  No follow-ups on file.

## 2022-07-13 LAB
ALBUMIN SERPL-MCNC: 4.1 G/DL (ref 3.7–4.7)
ALBUMIN/GLOB SERPL: 2.1 {RATIO} (ref 1.2–2.2)
ALP SERPL-CCNC: 70 IU/L (ref 44–121)
ALT SERPL-CCNC: 16 IU/L (ref 0–32)
AST SERPL-CCNC: 19 IU/L (ref 0–40)
BILIRUB SERPL-MCNC: NORMAL MG/DL
BUN SERPL-MCNC: 13 MG/DL (ref 8–27)
BUN/CREAT SERPL: 19 (ref 12–28)
CALCIUM SERPL-MCNC: 8.9 MG/DL (ref 8.7–10.3)
CHLORIDE SERPL-SCNC: 104 MMOL/L (ref 96–106)
CO2 SERPL-SCNC: 27 MMOL/L (ref 20–29)
CREAT SERPL-MCNC: 0.7 MG/DL (ref 0.57–1)
EGFRCR SERPLBLD CKD-EPI 2021: 90 ML/MIN/1.73
GLOBULIN SER CALC-MCNC: 2 G/DL (ref 1.5–4.5)
GLUCOSE SERPL-MCNC: 83 MG/DL (ref 65–99)
HBA1C MFR BLD: 5.4 % (ref 4.8–5.6)
POTASSIUM SERPL-SCNC: 4 MMOL/L (ref 3.5–5.2)
PROT SERPL-MCNC: 6.1 G/DL (ref 6–8.5)
REQUEST PROBLEM: NORMAL
SODIUM SERPL-SCNC: 142 MMOL/L (ref 134–144)
SPECIMEN STATUS: NORMAL

## 2022-08-03 ENCOUNTER — OFFICE VISIT (OUTPATIENT)
Dept: FAMILY MEDICINE CLINIC | Facility: CLINIC | Age: 77
End: 2022-08-03

## 2022-08-03 VITALS
RESPIRATION RATE: 18 BRPM | DIASTOLIC BLOOD PRESSURE: 80 MMHG | SYSTOLIC BLOOD PRESSURE: 130 MMHG | WEIGHT: 159 LBS | HEART RATE: 76 BPM | HEIGHT: 66 IN | OXYGEN SATURATION: 98 % | TEMPERATURE: 97 F | BODY MASS INDEX: 25.55 KG/M2

## 2022-08-03 DIAGNOSIS — R73.9 HYPERGLYCEMIA: Primary | ICD-10-CM

## 2022-08-03 DIAGNOSIS — R32 URINARY INCONTINENCE, UNSPECIFIED TYPE: ICD-10-CM

## 2022-08-03 PROCEDURE — 99214 OFFICE O/P EST MOD 30 MIN: CPT | Performed by: INTERNAL MEDICINE

## 2022-08-03 NOTE — PROGRESS NOTES
Subjective   Chris Cox is a 77 y.o. female. Patient is here today for   Chief Complaint   Patient presents with   • Leg Swelling     Discuss labs and referral for bladder problems    • Depression          Vitals:    08/03/22 1357   BP: 130/80   Pulse: 76   Resp: 18   Temp: 97 °F (36.1 °C)   SpO2: 98%     Body mass index is 25.68 kg/m².      Past Medical History:   Diagnosis Date   • Abdominal pain    • ADHD    • Arthritis     primary both knees   • Bronchitis 08/2019   • Disease of thyroid gland    • Diverticulosis of intestine    • DVT (deep venous thrombosis) (Formerly [facility]) 10/2019    right lower extremity   • Fractures     MULTIPLE BONE FRACTURES-TOOK FOSAMAX 3.5 YRS   • Hyperlipidemia    • Hypertension    • Irregular heart beat    • Kidney stone    • Lumbar spine pain    • Migraines    • Mono exposure     AGE 35   • Neuralgia neuritis, sciatic nerve    • Obstructive sleep apnea     20 YRS AGO-NOT CURRENTLY   • Seizures (Formerly [facility])     As a baby   • Tuberculosis     As a child   • Vertigo       Allergies   Allergen Reactions   • Erythromycin Anaphylaxis   • Sulfa Antibiotics Rash   • Milk-Related Compounds GI Intolerance   • Eggs Or Egg-Derived Products Unknown - Low Severity     PER ALLERGY TESTING RESULTS   • Cephalexin Rash   • Cephalosporins Rash     STOMACH ISSUES   • Molds & Smuts Rash     ITCHING      Social History     Socioeconomic History   • Marital status:      Spouse name: Marky   • Years of education: College   Tobacco Use   • Smoking status: Never Smoker   • Smokeless tobacco: Never Used   Vaping Use   • Vaping Use: Never used   Substance and Sexual Activity   • Alcohol use: Yes     Comment: wine   • Drug use: No   • Sexual activity: Defer        Current Outpatient Medications:   •  ascorbic acid (VITAMIN C) 500 MG/5ML liquid, Take 500 mg by mouth Daily., Disp: , Rfl:   •  B Complex Vitamins (VITAMIN B COMPLEX) capsule capsule, Take  by mouth Daily., Disp: , Rfl:   •  cholestyramine (QUESTRAN) 4 g  packet, MIX AND DRINK 1 PACKET BY MOUTH TWICE DAILY WITH MEALS FOR 14 DAYS, Disp: 28 each, Rfl: 2  •  diphenoxylate-atropine (LOMOTIL) 2.5-0.025 MG per tablet, Take 1 tablet by mouth 4 (Four) Times a Day As Needed for Diarrhea., Disp: 28 tablet, Rfl: 0  •  donepezil (ARICEPT) 5 MG tablet, TAKE 1 TABLET BY MOUTH EVERY NIGHT, Disp: 30 tablet, Rfl: 1  •  escitalopram (LEXAPRO) 10 MG tablet, Take 1 tablet by mouth Daily., Disp: 90 tablet, Rfl: 3  •  memantine (Namenda) 5 MG tablet, Take 2 tablets by mouth Daily., Disp: 180 tablet, Rfl: 1  •  mupirocin (BACTROBAN) 2 % ointment, Apply to affected area 3 times daily., Disp: , Rfl:   •  vitamin A 79809 UNIT capsule, Take 10,000 Units by mouth Daily. ON HOLD, Disp: , Rfl:   •  VITAMIN D, CHOLECALCIFEROL, PO, Take 1 tablet by mouth daily., Disp: , Rfl:   •  VITAMIN E PO, Take  by mouth., Disp: , Rfl:      Objective     She has developed urinary incontinence.  She had seen Dr. Roberts for this but Dr. Roberts's practice is changed and as a consequence she is unable to follow-up with her.    She asked me to make referral for her to see another specialist in urogynecology.    She has bilateral lower extremity edema which is a chronic issue for her secondary to venous insufficiency.  This has not gotten worse in the meantime.    A friend of hers from [place] recently passed away and this is understandably left her feeling a bit down.           Review of Systems   Constitutional: Negative.    HENT: Negative.    Respiratory: Negative.    Cardiovascular: Negative.    Genitourinary: Negative for difficulty urinating, dysuria and hematuria.        She has some trouble with urinary incontinence.   Musculoskeletal: Negative.    Psychiatric/Behavioral: Positive for dysphoric mood.       Physical Exam  Vitals and nursing note reviewed.   Constitutional:       General: She is not in acute distress.     Appearance: Normal appearance. She is normal weight. She is not ill-appearing, toxic-appearing  or diaphoretic.      Comments: Pleasant, neatly groomed, no distress.   Cardiovascular:      Rate and Rhythm: Regular rhythm.      Heart sounds: Normal heart sounds. No murmur heard.    No gallop.   Pulmonary:      Effort: No respiratory distress.      Breath sounds: Normal breath sounds. No wheezing or rales.   Musculoskeletal:      Comments: She has some swelling in both of her legs from the mid calf distally.    She has some obvious varicosities in both of her lower legs.   Neurological:      Mental Status: She is alert and oriented to person, place, and time.   Psychiatric:         Mood and Affect: Mood normal.         Thought Content: Thought content normal.           Problems Addressed this Visit        Endocrine and Metabolic    Hyperglycemia - Primary    Relevant Orders    POC Glycosylated Hemoglobin (Hb A1C) (Completed)      Other Visit Diagnoses     Urinary incontinence, unspecified type        Relevant Orders    Ambulatory Referral to Gynecologic Urology      Diagnoses       Codes Comments    Hyperglycemia    -  Primary ICD-10-CM: R73.9  ICD-9-CM: 790.29     Urinary incontinence, unspecified type     ICD-10-CM: R32  ICD-9-CM: 788.30             PLAN  She feels that her dysphoric mood from her friend's recent death is relatively well managed for the time being though she will let me know if she changes her mind.    She has hyperglycemia.  We reviewed her hemoglobin A1c which was 5% today.  I will continue to monitor that.    She has urinary incontinence and I have referred her to urogynecology for their assessment.    She has an appointment to follow-up with me in November.  No follow-ups on file.

## 2022-08-12 ENCOUNTER — TELEPHONE (OUTPATIENT)
Dept: FAMILY MEDICINE CLINIC | Facility: CLINIC | Age: 77
End: 2022-08-12

## 2022-08-12 NOTE — TELEPHONE ENCOUNTER
Caller: Chris Cox    Relationship: Self    Best call back number:[phone]     PATIENT IS RETURNING THE RETURN CALL THAT SHE MISSED. HUB TRIED TO WARM TRANSFER BUT WAS UNSUCCESSFUL.

## 2022-08-12 NOTE — TELEPHONE ENCOUNTER
Caller: Chris Cox A    Relationship to patient: Self    Best call back number:[phone]    Patient is needing: PATIENT MISSED A CALL FROM THE OFFICE AND WAS CALLING BACK TO SEE WHAT IT WAS ABOUT.HUB WAS UNABLE TO WARM TRANSFER AND THERE WERE NO MESSAGES OR NOTES OF ANYONE TRYING TO CALL.PATIENT REQUESTING A CALLBACK.

## 2022-08-17 ENCOUNTER — OFFICE VISIT (OUTPATIENT)
Dept: FAMILY MEDICINE CLINIC | Facility: CLINIC | Age: 77
End: 2022-08-17

## 2022-08-17 VITALS
OXYGEN SATURATION: 98 % | TEMPERATURE: 97 F | HEIGHT: 66 IN | DIASTOLIC BLOOD PRESSURE: 80 MMHG | WEIGHT: 160 LBS | HEART RATE: 67 BPM | SYSTOLIC BLOOD PRESSURE: 142 MMHG | RESPIRATION RATE: 18 BRPM | BODY MASS INDEX: 25.71 KG/M2

## 2022-08-17 DIAGNOSIS — R41.3 MEMORY LOSS: ICD-10-CM

## 2022-08-17 DIAGNOSIS — F32.5 MAJOR DEPRESSIVE DISORDER WITH SINGLE EPISODE, IN FULL REMISSION: Primary | ICD-10-CM

## 2022-08-17 PROCEDURE — 99214 OFFICE O/P EST MOD 30 MIN: CPT | Performed by: INTERNAL MEDICINE

## 2022-08-23 ENCOUNTER — TELEPHONE (OUTPATIENT)
Dept: FAMILY MEDICINE CLINIC | Facility: CLINIC | Age: 77
End: 2022-08-23

## 2022-08-23 NOTE — TELEPHONE ENCOUNTER
Caller: Chris Cox    Relationship: Self    Best call back number: [phone]       What test was performed: ANYTIME     When was the test performed: A MONTH AGO     Where was the test performed: IN OFFICE     Additional notes: PATIENT IS WANTING TO GET A COPY OF HER RESULTS. PATIENT IS WANTING TO PICK THE RESULTS UP ON 8/24/22    PLEASE CALL AND ADVISE

## 2022-09-12 RX ORDER — DONEPEZIL HYDROCHLORIDE 5 MG/1
TABLET, FILM COATED ORAL
Qty: 90 TABLET | Refills: 3 | Status: SHIPPED | OUTPATIENT
Start: 2022-09-12

## 2022-09-12 RX ORDER — MEMANTINE HYDROCHLORIDE 5 MG/1
TABLET ORAL
Qty: 180 TABLET | Refills: 3 | Status: SHIPPED | OUTPATIENT
Start: 2022-09-12

## 2022-09-23 ENCOUNTER — OFFICE VISIT (OUTPATIENT)
Dept: FAMILY MEDICINE CLINIC | Facility: CLINIC | Age: 77
End: 2022-09-23

## 2022-09-23 VITALS
WEIGHT: 159.2 LBS | OXYGEN SATURATION: 98 % | HEART RATE: 66 BPM | TEMPERATURE: 98.3 F | HEIGHT: 66 IN | SYSTOLIC BLOOD PRESSURE: 127 MMHG | DIASTOLIC BLOOD PRESSURE: 64 MMHG | BODY MASS INDEX: 25.58 KG/M2

## 2022-09-23 DIAGNOSIS — G89.18 POSTOPERATIVE PAIN: Primary | ICD-10-CM

## 2022-09-23 DIAGNOSIS — R03.0 ELEVATED BLOOD PRESSURE READING WITHOUT DIAGNOSIS OF HYPERTENSION: ICD-10-CM

## 2022-09-23 PROCEDURE — 99213 OFFICE O/P EST LOW 20 MIN: CPT | Performed by: STUDENT IN AN ORGANIZED HEALTH CARE EDUCATION/TRAINING PROGRAM

## 2022-09-23 RX ORDER — OXYCODONE AND ACETAMINOPHEN 7.5; 325 MG/1; MG/1
TABLET ORAL
COMMUNITY
Start: 2022-09-16 | End: 2023-03-15

## 2022-09-23 RX ORDER — ASPIRIN 81 MG/1
TABLET ORAL EVERY 12 HOURS SCHEDULED
COMMUNITY

## 2022-09-23 RX ORDER — TRAMADOL HYDROCHLORIDE 50 MG/1
TABLET ORAL
COMMUNITY
Start: 2022-09-16 | End: 2023-03-15

## 2022-09-23 NOTE — PROGRESS NOTES
"Chief Complaint  Hospital Follow Up Visit (Hip surgery)    Subjective        Chris Cox presents to [facility]  History of Present Illness  For ER follow-up.  Patient blood pressure was 149/80 in the ER and was asked to follow-up with PCP for blood pressure.  Patient had surgery done last week and was given pain medication and patient was taking round-the-clock pain medication which seems to be responsible for acute confusion and that is the reason patient went to ER.  Patient was accompanied with her  and her  informed that now she is taking pain medication as needed only when there is a severe pain.  Mental status wise patient has improved a lot since they have stopped taking pain medication.  Her blood pressure in the office today is good.  Patient stated she is doing physical therapy twice in a week.  Denies having any new issue today.  Review of system is negative for fever, headache, chest pain, shortness of breath, palpitation, nausea, vomiting, any recent change in bladder habits.        Objective   Vital Signs:  /64   Pulse 66   Temp 98.3 °F (36.8 °C)   Ht 167.6 cm (65.98\")   Wt 72.2 kg (159 lb 3.2 oz)   SpO2 98%   BMI 25.71 kg/m²   Estimated body mass index is 25.71 kg/m² as calculated from the following:    Height as of this encounter: 167.6 cm (65.98\").    Weight as of this encounter: 72.2 kg (159 lb 3.2 oz).          Physical Exam  HENT:      Head: Normocephalic and atraumatic.      Mouth/Throat:      Mouth: Mucous membranes are moist.      Pharynx: Oropharynx is clear.   Eyes:      Extraocular Movements: Extraocular movements intact.      Conjunctiva/sclera: Conjunctivae normal.      Pupils: Pupils are equal, round, and reactive to light.   Cardiovascular:      Rate and Rhythm: Normal rate and regular rhythm.   Pulmonary:      Effort: Pulmonary effort is normal.      Breath sounds: Normal breath sounds.   Abdominal:      General: Bowel sounds " are normal.      Palpations: Abdomen is soft.   Musculoskeletal:      Cervical back: Neck supple.      Comments: Ambulating with the help of walker   Skin:     General: Skin is warm.      Capillary Refill: Capillary refill takes less than 2 seconds.   Neurological:      Mental Status: She is alert and oriented to person, place, and time. Mental status is at baseline.   Psychiatric:         Mood and Affect: Mood normal.        Result Review :                Assessment and Plan   Diagnoses and all orders for this visit:    1. Postoperative pain (Primary)  Comments:  Discussed with the patient and her  about side effects of opiate medication which include dizziness, increased fall risk, confusion.  Advised to take NSAIDs for mild to moderate pain and for severe pain she can take oxycodone as needed.  Patient pain is not very severe today so she has not taken any pain medication.    2. Elevated blood pressure reading without diagnosis of hypertension  Comments:  Recheck blood pressure 106/60, patient is not on any blood pressure medication, denies having any dizziness or any other symptoms, patient is doing okay.             Follow Up   No follow-ups on file.  Patient was given instructions and counseling regarding her condition or for health maintenance advice. Please see specific information pulled into the AVS if appropriate.

## 2022-10-14 DIAGNOSIS — R32 URINARY INCONTINENCE, UNSPECIFIED TYPE: ICD-10-CM

## 2022-10-14 DIAGNOSIS — T73.3XXA FATIGUE DUE TO EXCESSIVE EXERTION, INITIAL ENCOUNTER: Primary | ICD-10-CM

## 2022-10-14 DIAGNOSIS — Z13.220 LIPID SCREENING: ICD-10-CM

## 2022-10-26 RX ORDER — ESCITALOPRAM OXALATE 10 MG/1
TABLET ORAL
Qty: 90 TABLET | Refills: 3 | Status: SHIPPED | OUTPATIENT
Start: 2022-10-26

## 2022-11-10 ENCOUNTER — OFFICE VISIT (OUTPATIENT)
Dept: FAMILY MEDICINE CLINIC | Facility: CLINIC | Age: 77
End: 2022-11-10

## 2022-11-10 VITALS
HEIGHT: 66 IN | WEIGHT: 155 LBS | TEMPERATURE: 98.2 F | OXYGEN SATURATION: 97 % | SYSTOLIC BLOOD PRESSURE: 136 MMHG | HEART RATE: 89 BPM | DIASTOLIC BLOOD PRESSURE: 78 MMHG | BODY MASS INDEX: 24.91 KG/M2

## 2022-11-10 DIAGNOSIS — Z00.00 MEDICARE ANNUAL WELLNESS VISIT, SUBSEQUENT: ICD-10-CM

## 2022-11-10 DIAGNOSIS — G31.84 MINIMAL COGNITIVE IMPAIRMENT: ICD-10-CM

## 2022-11-10 DIAGNOSIS — F98.8 ATTENTION DEFICIT DISORDER (ADD) WITHOUT HYPERACTIVITY: Primary | ICD-10-CM

## 2022-11-10 PROCEDURE — G0439 PPPS, SUBSEQ VISIT: HCPCS | Performed by: INTERNAL MEDICINE

## 2022-11-10 PROCEDURE — 99214 OFFICE O/P EST MOD 30 MIN: CPT | Performed by: INTERNAL MEDICINE

## 2022-11-10 NOTE — PROGRESS NOTES
The ABCs of the Annual Wellness Visit  Subsequent Medicare Wellness Visit    Chief Complaint   Patient presents with   • Medicare Wellness-subsequent      Subjective    History of Present Illness:  Chris Cox is a 77 y.o. female who presents for a Subsequent Medicare Wellness Visit.    The following portions of the patient's history were reviewed and   updated as appropriate: allergies, current medications, past family history, past medical history, past social history, past surgical history and problem list.    Compared to one year ago, the patient feels her physical   health is the same.    Compared to one year ago, the patient feels her mental   health is the same.    Recent Hospitalizations:  She was admitted within the past 365 days at [facility].       Current Medical Providers:  Patient Care Team:  Raghavendra Marquez MD as PCP - General  Raghavendra Marquez MD as PCP - Family Medicine  Sunitha Walter MD as Consulting Physician (Hematology and Oncology)    Outpatient Medications Prior to Visit   Medication Sig Dispense Refill   • ascorbic acid (VITAMIN C) 500 MG/5ML liquid Take 500 mg by mouth Daily.     • aspirin 81 MG EC tablet Every 12 (Twelve) Hours.     • B Complex Vitamins (VITAMIN B COMPLEX) capsule capsule Take  by mouth Daily.     • cholestyramine (QUESTRAN) 4 g packet MIX AND DRINK 1 PACKET BY MOUTH TWICE DAILY WITH MEALS FOR 14 DAYS 28 each 2   • diphenoxylate-atropine (LOMOTIL) 2.5-0.025 MG per tablet Take 1 tablet by mouth 4 (Four) Times a Day As Needed for Diarrhea. 28 tablet 0   • donepezil (ARICEPT) 5 MG tablet TAKE 1 TABLET EVERY NIGHT 90 tablet 3   • escitalopram (LEXAPRO) 10 MG tablet TAKE 1 TABLET DAILY 90 tablet 3   • memantine (NAMENDA) 5 MG tablet TAKE 2 TABLETS DAILY 180 tablet 3   • mupirocin (BACTROBAN) 2 % ointment Apply to affected area 3 times daily.     • oxyCODONE-acetaminophen (PERCOCET) 7.5-325 MG per tablet TAKE 1 TO 2 TABLETS BY MOUTH EVERY 6 HOURS FOR SEVERE PAIN      • traMADol (ULTRAM) 50 MG tablet TAKE 1 TO 2 TABLETS BY MOUTH EVERY 6 HOURS FOR MODERATE PAIN     • vitamin A 21829 UNIT capsule Take 10,000 Units by mouth Daily. ON HOLD     • VITAMIN D, CHOLECALCIFEROL, PO Take 1 tablet by mouth daily.     • VITAMIN E PO Take  by mouth.       No facility-administered medications prior to visit.       Opioid medication/s are on active medication list.  and I have evaluated her active treatment plan and pain score trends (see table).  Vitals:    11/10/22 1450   PainSc: 0-No pain     I have reviewed the chart for potential of high risk medication and harmful drug interactions in the elderly.            Aspirin is on active medication list. Aspirin use is indicated based on review of current medical condition/s. Pros and cons of this therapy have been discussed today. Benefits of this medication outweigh potential harm.  Patient has been encouraged to continue taking this medication.  .      Patient Active Problem List   Diagnosis   • Abdominal bruit   • Blues   • DD (diverticular disease)   • Menopausal and perimenopausal disorder   • Neuralgia neuritis, sciatic nerve   • Right knee pain   • Primary osteoarthritis of both knees   • Radiculopathy with lower extremity symptoms   • Idiopathic peripheral neuropathy   • Sprain of collateral ligament of right knee   • ADD (attention deficit disorder)   • Functional diarrhea   • Microscopic hematuria   • Chronic pain of left knee   • Bleeding diathesis (HCC)   • Bilateral edema of lower extremity   • Bilateral swelling of feet and ankles   • Hyperglycemia   • Venous stasis   • Attention deficit disorder (ADD) without hyperactivity   • Lesion of skin of scalp   • Pre-operative clearance   • Total knee replacement status   • Chronic deep vein thrombosis (DVT) of calf muscle vein of right lower extremity (HCC)   • Cellulitis of face   • Dermatitis   • Major depressive disorder with single episode, in full remission ([facility])   • Fatigue due to  "excessive exertion   • Disease of thyroid gland   • Vertigo   • Medicare annual wellness visit, subsequent   • Osteopenia of both hips   • Memory loss   • Minimal cognitive impairment     Advance Care Planning  Advance Directive is not on file.  ACP discussion was held with the patient during this visit. Patient has an advance directive (not in EMR), copy requested.    Review of Systems   Constitutional: Negative.    HENT: Negative.    Respiratory: Negative.    Cardiovascular: Negative.    Musculoskeletal: Negative.    Psychiatric/Behavioral: Negative.         Objective    Vitals:    11/10/22 1450   BP: 136/78   Pulse: 89   Temp: 98.2 °F (36.8 °C)   SpO2: 97%   Weight: 70.3 kg (155 lb)   Height: 167.6 cm (65.98\")   PainSc: 0-No pain     Estimated body mass index is 25.03 kg/m² as calculated from the following:    Height as of this encounter: 167.6 cm (65.98\").    Weight as of this encounter: 70.3 kg (155 lb).    BMI is >= 25 and <30. (Overweight) The following options were offered after discussion;: weight loss educational material (shared in after visit summary)      Does the patient have evidence of cognitive impairment? Yes    Physical Exam  Lab Results   Component Value Date    CHLPL 199 11/04/2022    TRIG 88 11/04/2022    HDL 58 11/04/2022     (H) 11/04/2022    VLDL 16 11/04/2022            HEALTH RISK ASSESSMENT    Smoking Status:  Social History     Tobacco Use   Smoking Status Never   Smokeless Tobacco Never     Alcohol Consumption:  Social History     Substance and Sexual Activity   Alcohol Use Yes    Comment: wine     Fall Risk Screen:    NATE Fall Risk Assessment was completed, and patient is at LOW risk for falls.Assessment completed on:11/10/2022    Depression Screening:  PHQ-2/PHQ-9 Depression Screening 11/10/2022   Retired PHQ-9 Total Score -   Retired Total Score -   Little Interest or Pleasure in Doing Things 0-->not at all   Feeling Down, Depressed or Hopeless 0-->not at all   Trouble " Falling or Staying Asleep, or Sleeping Too Much 0-->not at all   Feeling Tired or Having Little Energy 0-->not at all   Poor Appetite or Overeating 0-->not at all   Feeling Bad about Yourself - or that You are a Failure or Have Let Yourself or Your Family Down 0-->not at all   Trouble Concentrating on Things, Such as Reading the Newspaper or Watching Television 1-->several days   Moving or Speaking So Slowly that Other People Could Have Noticed? Or the Opposite - Being So Fidgety 0-->not at all   Thoughts that You Would be Better Off Dead or of Hurting Yourself in Some Way 0-->not at all   PHQ-9: Brief Depression Severity Measure Score 1   If You Checked Off Any Problems, How Difficult Have These Problems Made It For You to Do Your Work, Take Care of Things at Home, or Get Along with Other People? not difficult at all       Health Habits and Functional and Cognitive Screening:  Functional & Cognitive Status 11/10/2022   Do you have difficulty preparing food and eating? No   Do you have difficulty bathing yourself, getting dressed or grooming yourself? No   Do you have difficulty using the toilet? No   Do you have difficulty moving around from place to place? No   Do you have trouble with steps or getting out of a bed or a chair? No   Current Diet Well Balanced Diet   Dental Exam Not up to date   Eye Exam Up to date   Exercise (times per week) 3 times per week   Current Exercises Include Yard Work;Walking;House Cleaning   Current Exercise Activities Include -   Do you need help using the phone?  No   Are you deaf or do you have serious difficulty hearing?  No   Do you need help with transportation? No   Do you need help shopping? No   Do you need help preparing meals?  No   Do you need help with housework?  No   Do you need help with laundry? No   Do you need help taking your medications? No   Do you need help managing money? No   Do you ever drive or ride in a car without wearing a seat belt? No   Have you felt  unusual stress, anger or loneliness in the last month? No   Who do you live with? Spouse   If you need help, do you have trouble finding someone available to you? No   Have you been bothered in the last four weeks by sexual problems? No   Do you have difficulty concentrating, remembering or making decisions? Yes       Age-appropriate Screening Schedule:  Refer to the list below for future screening recommendations based on patient's age, sex and/or medical conditions. Orders for these recommended tests are listed in the plan section. The patient has been provided with a written plan.    Health Maintenance   Topic Date Due   • TDAP/TD VACCINES (1 - Tdap) Never done   • ZOSTER VACCINE (1 of 2) Never done   • INFLUENZA VACCINE  03/31/2023 (Originally 8/1/2022)   • MAMMOGRAM  05/18/2023   • DXA SCAN  05/18/2023   • LIPID PANEL  11/04/2023              Assessment & Plan   CMS Preventative Services Quick Reference  Risk Factors Identified During Encounter  Dementia/Memory   The above risks/problems have been discussed with the patient.  Follow up actions/plans if indicated are seen below in the Assessment/Plan Section.  Pertinent information has been shared with the patient in the After Visit Summary.    Diagnoses and all orders for this visit:    1. Attention deficit disorder (ADD) without hyperactivity (Primary)    2. Minimal cognitive impairment    3. Medicare annual wellness visit, subsequent      She and I reviewed her labs.  All seemed well.    She is on Aricept and memantine for minimal cognitive impairment.  This does not seem to have progressed very quickly or very far since she first began having symptoms along these lines.  I had arranged for her to get neuropsychological evaluation at that time but she was never able to get this.    She feels her mood is pretty well controlled on S-Citalopram.    She feels that her ADD is pretty well controlled although she is not taking medication as directed to those  symptoms.    I asked her to follow-up in about 4 months.  Follow Up:   No follow-ups on file.     An After Visit Summary and PPPS were made available to the patient.

## 2022-12-27 ENCOUNTER — OFFICE VISIT (OUTPATIENT)
Dept: FAMILY MEDICINE CLINIC | Facility: CLINIC | Age: 77
End: 2022-12-27

## 2022-12-27 VITALS
HEIGHT: 64 IN | WEIGHT: 160 LBS | OXYGEN SATURATION: 99 % | DIASTOLIC BLOOD PRESSURE: 72 MMHG | BODY MASS INDEX: 27.31 KG/M2 | TEMPERATURE: 96.7 F | SYSTOLIC BLOOD PRESSURE: 132 MMHG | HEART RATE: 59 BPM

## 2022-12-27 DIAGNOSIS — I82.561 CHRONIC DEEP VEIN THROMBOSIS (DVT) OF CALF MUSCLE VEIN OF RIGHT LOWER EXTREMITY: ICD-10-CM

## 2022-12-27 DIAGNOSIS — M79.89 PAIN AND SWELLING OF LOWER EXTREMITY, UNSPECIFIED LATERALITY: Primary | ICD-10-CM

## 2022-12-27 DIAGNOSIS — M79.606 PAIN AND SWELLING OF LOWER EXTREMITY, UNSPECIFIED LATERALITY: Primary | ICD-10-CM

## 2022-12-27 PROCEDURE — 99214 OFFICE O/P EST MOD 30 MIN: CPT | Performed by: STUDENT IN AN ORGANIZED HEALTH CARE EDUCATION/TRAINING PROGRAM

## 2022-12-27 RX ORDER — FUROSEMIDE 20 MG/1
20 TABLET ORAL 2 TIMES DAILY PRN
Qty: 10 TABLET | Refills: 0 | Status: SHIPPED | OUTPATIENT
Start: 2022-12-27 | End: 2023-01-04

## 2022-12-27 RX ORDER — ESTRADIOL 0.1 MG/G
CREAM VAGINAL
COMMUNITY
Start: 2022-12-08

## 2022-12-27 NOTE — PROGRESS NOTES
"Chief Complaint  Leg Swelling (Started this morning, sleep in recliner, feet swelling as well)    Subjective    {Problem List  Visit Diagnosis   Encounters  Notes  Medications  Labs  Result Review Imaging  Media :23}    Chris Cox presents to [facility]  History of Present Illness  For worsening of leg swelling bilateral.  Patient stated she has chronic leg swelling but she has noticed worsening since yesterday.  Patient stated she thinks she was sleeping whole night by sitting on a chair as her recliner was not working properly.  Patient has a history of chronic DVT in the right lower extremities for that she was on blood thinner for 3 months and then her blood thinner was stopped as that was considered as a provoked DVT and she was advised to continue with aspirin.  Patient followed Dr. Miller around 2019 for above problem.  Patient stated since then she was religiously taking aspirin but recently she is not taking aspirin.  Denies having any shortness of breath or the chest pain right now  Review of system is negative for fever, headache, chest pain, shortness of breath, palpitation, nausea, vomiting, any recent change in bladder habits.    Leg Swelling        Objective   Vital Signs:  /72 (BP Location: Left arm, Patient Position: Sitting)   Pulse 59   Temp 96.7 °F (35.9 °C)   Ht 162.6 cm (64\")   Wt 72.6 kg (160 lb)   SpO2 99%   BMI 27.46 kg/m²   Estimated body mass index is 27.46 kg/m² as calculated from the following:    Height as of this encounter: 162.6 cm (64\").    Weight as of this encounter: 72.6 kg (160 lb).          Physical Exam  HENT:      Head: Normocephalic and atraumatic.      Mouth/Throat:      Mouth: Mucous membranes are moist.      Pharynx: Oropharynx is clear.   Eyes:      Extraocular Movements: Extraocular movements intact.      Conjunctiva/sclera: Conjunctivae normal.      Pupils: Pupils are equal, round, and reactive to light. "   Cardiovascular:      Rate and Rhythm: Normal rate and regular rhythm.   Pulmonary:      Effort: Pulmonary effort is normal.      Breath sounds: Normal breath sounds.   Abdominal:      General: Bowel sounds are normal.      Palpations: Abdomen is soft.   Musculoskeletal:         General: Swelling present. Normal range of motion.      Cervical back: Neck supple.   Skin:     General: Skin is warm.      Capillary Refill: Capillary refill takes less than 2 seconds.      Comments: Bilateral pitting edema present both lower extremities    Neurological:      General: No focal deficit present.      Mental Status: She is alert and oriented to person, place, and time. Mental status is at baseline.   Psychiatric:         Mood and Affect: Mood normal.        Result Review :                Assessment and Plan   Diagnoses and all orders for this visit:    1. Pain and swelling of lower extremity, unspecified laterality (Primary)  -     Duplex Venous Lower Extremity - Bilateral CAR; Future  -     furosemide (Lasix) 20 MG tablet; Take 1 tablet by mouth 2 (Two) Times a Day As Needed (b/l leg swelling).  Dispense: 10 tablet; Refill: 0    2. Chronic deep vein thrombosis (DVT) of calf muscle vein of right lower extremity (HCC)  -     Duplex Venous Lower Extremity - Bilateral CAR; Future    For worsening bilateral leg swelling  Duplex venous lower extremity ordered to rule out any new clots   Advised patient to continue aspirin 81 mg for now  Can also take Lasix 20 mg 2 times a day as needed for leg swelling and leg elevation and compression stocking  Also advised patient to eat bananas as Lasix can cause low potassium.  Patient agrees with the plan and showed verbalized understanding         Follow Up   No follow-ups on file.  Patient was given instructions and counseling regarding her condition or for health maintenance advice. Please see specific information pulled into the AVS if appropriate.

## 2023-01-03 ENCOUNTER — HOSPITAL ENCOUNTER (OUTPATIENT)
Dept: CARDIOLOGY | Facility: HOSPITAL | Age: 78
Discharge: HOME | End: 2023-01-03
Admitting: STUDENT IN AN ORGANIZED HEALTH CARE EDUCATION/TRAINING PROGRAM
Payer: MEDICARE

## 2023-01-03 DIAGNOSIS — I82.561 CHRONIC DEEP VEIN THROMBOSIS (DVT) OF CALF MUSCLE VEIN OF RIGHT LOWER EXTREMITY: ICD-10-CM

## 2023-01-03 DIAGNOSIS — M79.89 PAIN AND SWELLING OF LOWER EXTREMITY, UNSPECIFIED LATERALITY: ICD-10-CM

## 2023-01-03 DIAGNOSIS — M79.606 PAIN AND SWELLING OF LOWER EXTREMITY, UNSPECIFIED LATERALITY: ICD-10-CM

## 2023-01-03 LAB
BH CV LOW VAS LEFT SOLEAL VESSEL: 1
BH CV LOW VAS RIGHT SOLEAL VESSEL: 1
BH CV LOWER VASCULAR LEFT COMMON FEMORAL AUGMENT: NORMAL
BH CV LOWER VASCULAR LEFT COMMON FEMORAL COMPETENT: NORMAL
BH CV LOWER VASCULAR LEFT COMMON FEMORAL COMPRESS: NORMAL
BH CV LOWER VASCULAR LEFT COMMON FEMORAL PHASIC: NORMAL
BH CV LOWER VASCULAR LEFT COMMON FEMORAL SPONT: NORMAL
BH CV LOWER VASCULAR LEFT DISTAL FEMORAL COMPRESS: NORMAL
BH CV LOWER VASCULAR LEFT GASTRONEMIUS COMPRESS: NORMAL
BH CV LOWER VASCULAR LEFT GREATER SAPH AK COMPRESS: NORMAL
BH CV LOWER VASCULAR LEFT GREATER SAPH BK COMPRESS: NORMAL
BH CV LOWER VASCULAR LEFT LESSER SAPH COMPRESS: NORMAL
BH CV LOWER VASCULAR LEFT MID FEMORAL AUGMENT: NORMAL
BH CV LOWER VASCULAR LEFT MID FEMORAL COMPETENT: NORMAL
BH CV LOWER VASCULAR LEFT MID FEMORAL COMPRESS: NORMAL
BH CV LOWER VASCULAR LEFT MID FEMORAL PHASIC: NORMAL
BH CV LOWER VASCULAR LEFT MID FEMORAL SPONT: NORMAL
BH CV LOWER VASCULAR LEFT PERONEAL COMPRESS: NORMAL
BH CV LOWER VASCULAR LEFT POPLITEAL AUGMENT: NORMAL
BH CV LOWER VASCULAR LEFT POPLITEAL COMPETENT: NORMAL
BH CV LOWER VASCULAR LEFT POPLITEAL COMPRESS: NORMAL
BH CV LOWER VASCULAR LEFT POPLITEAL PHASIC: NORMAL
BH CV LOWER VASCULAR LEFT POPLITEAL SPONT: NORMAL
BH CV LOWER VASCULAR LEFT POSTERIOR TIBIAL COMPRESS: NORMAL
BH CV LOWER VASCULAR LEFT PROFUNDA FEMORAL COMPRESS: NORMAL
BH CV LOWER VASCULAR LEFT PROXIMAL FEMORAL COMPRESS: NORMAL
BH CV LOWER VASCULAR LEFT SAPHENOFEMORAL JUNCTION COMPRESS: NORMAL
BH CV LOWER VASCULAR LEFT SOLEAL COMPRESS: NORMAL
BH CV LOWER VASCULAR LEFT SOLEAL THROMBUS: NORMAL
BH CV LOWER VASCULAR RIGHT COMMON FEMORAL AUGMENT: NORMAL
BH CV LOWER VASCULAR RIGHT COMMON FEMORAL COMPETENT: NORMAL
BH CV LOWER VASCULAR RIGHT COMMON FEMORAL COMPRESS: NORMAL
BH CV LOWER VASCULAR RIGHT COMMON FEMORAL PHASIC: NORMAL
BH CV LOWER VASCULAR RIGHT COMMON FEMORAL SPONT: NORMAL
BH CV LOWER VASCULAR RIGHT DISTAL FEMORAL COMPRESS: NORMAL
BH CV LOWER VASCULAR RIGHT GASTRONEMIUS COMPRESS: NORMAL
BH CV LOWER VASCULAR RIGHT GREATER SAPH AK COMPRESS: NORMAL
BH CV LOWER VASCULAR RIGHT GREATER SAPH BK COMPRESS: NORMAL
BH CV LOWER VASCULAR RIGHT LESSER SAPH COMPRESS: NORMAL
BH CV LOWER VASCULAR RIGHT MID FEMORAL AUGMENT: NORMAL
BH CV LOWER VASCULAR RIGHT MID FEMORAL COMPETENT: NORMAL
BH CV LOWER VASCULAR RIGHT MID FEMORAL COMPRESS: NORMAL
BH CV LOWER VASCULAR RIGHT MID FEMORAL PHASIC: NORMAL
BH CV LOWER VASCULAR RIGHT MID FEMORAL SPONT: NORMAL
BH CV LOWER VASCULAR RIGHT PERONEAL COMPRESS: NORMAL
BH CV LOWER VASCULAR RIGHT POPLITEAL AUGMENT: NORMAL
BH CV LOWER VASCULAR RIGHT POPLITEAL COMPETENT: NORMAL
BH CV LOWER VASCULAR RIGHT POPLITEAL COMPRESS: NORMAL
BH CV LOWER VASCULAR RIGHT POPLITEAL PHASIC: NORMAL
BH CV LOWER VASCULAR RIGHT POPLITEAL SPONT: NORMAL
BH CV LOWER VASCULAR RIGHT POSTERIOR TIBIAL COMPRESS: NORMAL
BH CV LOWER VASCULAR RIGHT PROFUNDA FEMORAL COMPRESS: NORMAL
BH CV LOWER VASCULAR RIGHT PROXIMAL FEMORAL COMPRESS: NORMAL
BH CV LOWER VASCULAR RIGHT SAPHENOFEMORAL JUNCTION COMPRESS: NORMAL
BH CV LOWER VASCULAR RIGHT SOLEAL COMPRESS: NORMAL
BH CV LOWER VASCULAR RIGHT SOLEAL THROMBUS: NORMAL
BH CV POP FLUID COLLECT LEFT: 1
MAXIMAL PREDICTED HEART RATE: 143 BPM
STRESS TARGET HR: 122 BPM

## 2023-01-03 PROCEDURE — 93970 EXTREMITY STUDY: CPT

## 2023-01-04 DIAGNOSIS — M79.606 PAIN AND SWELLING OF LOWER EXTREMITY, UNSPECIFIED LATERALITY: ICD-10-CM

## 2023-01-04 DIAGNOSIS — M79.89 PAIN AND SWELLING OF LOWER EXTREMITY, UNSPECIFIED LATERALITY: ICD-10-CM

## 2023-01-04 RX ORDER — FUROSEMIDE 20 MG/1
TABLET ORAL
Qty: 90 TABLET | Refills: 0 | Status: SHIPPED | OUTPATIENT
Start: 2023-01-04 | End: 2023-02-20

## 2023-01-05 ENCOUNTER — TELEPHONE (OUTPATIENT)
Dept: FAMILY MEDICINE CLINIC | Facility: CLINIC | Age: 78
End: 2023-01-05
Payer: MEDICARE

## 2023-01-05 DIAGNOSIS — M79.89 PAIN AND SWELLING OF LOWER EXTREMITY, UNSPECIFIED LATERALITY: ICD-10-CM

## 2023-01-05 DIAGNOSIS — M79.606 PAIN AND SWELLING OF LOWER EXTREMITY, UNSPECIFIED LATERALITY: ICD-10-CM

## 2023-01-05 DIAGNOSIS — I82.561 CHRONIC DEEP VEIN THROMBOSIS (DVT) OF CALF MUSCLE VEIN OF RIGHT LOWER EXTREMITY: Primary | ICD-10-CM

## 2023-01-05 NOTE — TELEPHONE ENCOUNTER
I RECEIVED EMAIL FROM PT'S  MARLI AGGARWAL ASKING ABOUT PT'S RESULTS ON THE DUPLEX VENOUS DOPPLER THAT WAS DONE ON 1-3-2023 AND RESULTS ARE IN PT'S CHART. PT'S  ALSO STATED IN HIS EMAIL TO ME THAT PT'S LEG SWELLING CONTINUES.     HE WAS ALSO ASKING IF PT NEEDS TO CONTINUE ON THE LASIX WHICH I SEE THAT DR STANLEY SENT A REFILL REQUEST TO HER PHARMACY ON 1-4-2023 SO I WILL RESPOND TO HIS EMAIL LETTING HIM KNOW THAT PT DOES NEED TO CONTINUE THE LASIX.     PT'S # [phone]

## 2023-01-09 ENCOUNTER — TELEPHONE (OUTPATIENT)
Dept: FAMILY MEDICINE CLINIC | Facility: CLINIC | Age: 78
End: 2023-01-09
Payer: MEDICARE

## 2023-01-09 NOTE — TELEPHONE ENCOUNTER
Caller: Chris Cox    Relationship to patient: Self    Best call back number: [phone]    Patient is needing: PATIENT'S  CALLING TO RESCHEDULE LABS FOR PATIENT. HUB UNBALE TO WARM TRANSFER. SATISHETN'S  REQUESTING CALLBACK TO GET RESCHEDULED.

## 2023-01-17 DIAGNOSIS — R03.0 ELEVATED BLOOD PRESSURE READING WITHOUT DIAGNOSIS OF HYPERTENSION: ICD-10-CM

## 2023-01-17 DIAGNOSIS — Z13.220 LIPID SCREENING: ICD-10-CM

## 2023-01-17 DIAGNOSIS — R73.9 HYPERGLYCEMIA: ICD-10-CM

## 2023-01-17 DIAGNOSIS — R53.83 FATIGUE, UNSPECIFIED TYPE: ICD-10-CM

## 2023-01-17 DIAGNOSIS — E07.9 DISEASE OF THYROID GLAND: Primary | ICD-10-CM

## 2023-01-20 LAB
ALBUMIN SERPL-MCNC: 4.6 G/DL (ref 3.5–5.2)
ALBUMIN/GLOB SERPL: 2.3 G/DL
ALP SERPL-CCNC: 81 U/L (ref 39–117)
ALT SERPL-CCNC: 14 U/L (ref 1–33)
AST SERPL-CCNC: 20 U/L (ref 1–32)
BASOPHILS # BLD AUTO: 0.04 10*3/MM3 (ref 0–0.2)
BASOPHILS NFR BLD AUTO: 0.7 % (ref 0–1.5)
BILIRUB SERPL-MCNC: 0.3 MG/DL (ref 0–1.2)
BUN SERPL-MCNC: 16 MG/DL (ref 8–23)
BUN/CREAT SERPL: 18 (ref 7–25)
CALCIUM SERPL-MCNC: 9.3 MG/DL (ref 8.6–10.5)
CHLORIDE SERPL-SCNC: 98 MMOL/L (ref 98–107)
CO2 SERPL-SCNC: 33.4 MMOL/L (ref 22–29)
CREAT SERPL-MCNC: 0.89 MG/DL (ref 0.57–1)
EGFRCR SERPLBLD CKD-EPI 2021: 66.9 ML/MIN/1.73
EOSINOPHIL # BLD AUTO: 0.09 10*3/MM3 (ref 0–0.4)
EOSINOPHIL NFR BLD AUTO: 1.7 % (ref 0.3–6.2)
ERYTHROCYTE [DISTWIDTH] IN BLOOD BY AUTOMATED COUNT: 13.1 % (ref 12.3–15.4)
GLOBULIN SER CALC-MCNC: 2 GM/DL
GLUCOSE SERPL-MCNC: 100 MG/DL (ref 65–99)
HCT VFR BLD AUTO: 40.8 % (ref 34–46.6)
HGB BLD-MCNC: 13.4 G/DL (ref 12–15.9)
IMM GRANULOCYTES # BLD AUTO: 0.01 10*3/MM3 (ref 0–0.05)
IMM GRANULOCYTES NFR BLD AUTO: 0.2 % (ref 0–0.5)
LYMPHOCYTES # BLD AUTO: 1.52 10*3/MM3 (ref 0.7–3.1)
LYMPHOCYTES NFR BLD AUTO: 28 % (ref 19.6–45.3)
MCH RBC QN AUTO: 29.6 PG (ref 26.6–33)
MCHC RBC AUTO-ENTMCNC: 32.8 G/DL (ref 31.5–35.7)
MCV RBC AUTO: 90.3 FL (ref 79–97)
MONOCYTES # BLD AUTO: 0.36 10*3/MM3 (ref 0.1–0.9)
MONOCYTES NFR BLD AUTO: 6.6 % (ref 5–12)
NEUTROPHILS # BLD AUTO: 3.41 10*3/MM3 (ref 1.7–7)
NEUTROPHILS NFR BLD AUTO: 62.8 % (ref 42.7–76)
NRBC BLD AUTO-RTO: 0 /100 WBC (ref 0–0.2)
PLATELET # BLD AUTO: 237 10*3/MM3 (ref 140–450)
POTASSIUM SERPL-SCNC: 4 MMOL/L (ref 3.5–5.2)
PROT SERPL-MCNC: 6.6 G/DL (ref 6–8.5)
RBC # BLD AUTO: 4.52 10*6/MM3 (ref 3.77–5.28)
SODIUM SERPL-SCNC: 141 MMOL/L (ref 136–145)
WBC # BLD AUTO: 5.43 10*3/MM3 (ref 3.4–10.8)

## 2023-02-20 DIAGNOSIS — M79.606 PAIN AND SWELLING OF LOWER EXTREMITY, UNSPECIFIED LATERALITY: ICD-10-CM

## 2023-02-20 DIAGNOSIS — M79.89 PAIN AND SWELLING OF LOWER EXTREMITY, UNSPECIFIED LATERALITY: ICD-10-CM

## 2023-02-20 RX ORDER — FUROSEMIDE 20 MG/1
TABLET ORAL
Qty: 180 TABLET | Refills: 2 | Status: SHIPPED | OUTPATIENT
Start: 2023-02-20

## 2023-02-20 RX ORDER — FUROSEMIDE 20 MG/1
TABLET ORAL
Qty: 90 TABLET | Refills: 0 | Status: SHIPPED | OUTPATIENT
Start: 2023-02-20 | End: 2023-02-20

## 2023-03-03 DIAGNOSIS — R60.0 BILATERAL EDEMA OF LOWER EXTREMITY: ICD-10-CM

## 2023-03-03 DIAGNOSIS — Z13.220 LIPID SCREENING: ICD-10-CM

## 2023-03-03 DIAGNOSIS — Z79.899 ENCOUNTER FOR LONG-TERM (CURRENT) USE OF OTHER MEDICATIONS: ICD-10-CM

## 2023-03-03 DIAGNOSIS — F98.8 ATTENTION DEFICIT DISORDER (ADD) WITHOUT HYPERACTIVITY: Primary | ICD-10-CM

## 2023-03-11 LAB
ALBUMIN SERPL-MCNC: 4.6 G/DL (ref 3.5–5.2)
ALBUMIN/GLOB SERPL: 2.4 G/DL
ALP SERPL-CCNC: 78 U/L (ref 39–117)
ALT SERPL-CCNC: 13 U/L (ref 1–33)
APPEARANCE UR: CLEAR
AST SERPL-CCNC: 22 U/L (ref 1–32)
BACTERIA #/AREA URNS HPF: NORMAL /HPF
BASOPHILS # BLD AUTO: 0.04 10*3/MM3 (ref 0–0.2)
BASOPHILS NFR BLD AUTO: 0.9 % (ref 0–1.5)
BILIRUB SERPL-MCNC: 0.4 MG/DL (ref 0–1.2)
BILIRUB UR QL STRIP: NEGATIVE
BUN SERPL-MCNC: 13 MG/DL (ref 8–23)
BUN/CREAT SERPL: 14.9 (ref 7–25)
CALCIUM SERPL-MCNC: 9.3 MG/DL (ref 8.6–10.5)
CASTS URNS MICRO: NORMAL
CHLORIDE SERPL-SCNC: 102 MMOL/L (ref 98–107)
CHOLEST SERPL-MCNC: 190 MG/DL (ref 0–200)
CHOLEST/HDLC SERPL: 2.71 {RATIO}
CO2 SERPL-SCNC: 30.9 MMOL/L (ref 22–29)
COLOR UR: YELLOW
CREAT SERPL-MCNC: 0.87 MG/DL (ref 0.57–1)
EGFRCR SERPLBLD CKD-EPI 2021: 68.7 ML/MIN/1.73
EOSINOPHIL # BLD AUTO: 0.1 10*3/MM3 (ref 0–0.4)
EOSINOPHIL NFR BLD AUTO: 2.3 % (ref 0.3–6.2)
EPI CELLS #/AREA URNS HPF: NORMAL /HPF
ERYTHROCYTE [DISTWIDTH] IN BLOOD BY AUTOMATED COUNT: 13.7 % (ref 12.3–15.4)
GLOBULIN SER CALC-MCNC: 1.9 GM/DL
GLUCOSE SERPL-MCNC: 88 MG/DL (ref 65–99)
GLUCOSE UR QL STRIP: NEGATIVE
HCT VFR BLD AUTO: 40.4 % (ref 34–46.6)
HDLC SERPL-MCNC: 70 MG/DL (ref 40–60)
HGB BLD-MCNC: 13.2 G/DL (ref 12–15.9)
HGB UR QL STRIP: NEGATIVE
IMM GRANULOCYTES # BLD AUTO: 0 10*3/MM3 (ref 0–0.05)
IMM GRANULOCYTES NFR BLD AUTO: 0 % (ref 0–0.5)
KETONES UR QL STRIP: NEGATIVE
LDLC SERPL CALC-MCNC: 104 MG/DL (ref 0–100)
LEUKOCYTE ESTERASE UR QL STRIP: ABNORMAL
LYMPHOCYTES # BLD AUTO: 1.22 10*3/MM3 (ref 0.7–3.1)
LYMPHOCYTES NFR BLD AUTO: 28.5 % (ref 19.6–45.3)
MCH RBC QN AUTO: 28.9 PG (ref 26.6–33)
MCHC RBC AUTO-ENTMCNC: 32.7 G/DL (ref 31.5–35.7)
MCV RBC AUTO: 88.6 FL (ref 79–97)
MONOCYTES # BLD AUTO: 0.28 10*3/MM3 (ref 0.1–0.9)
MONOCYTES NFR BLD AUTO: 6.5 % (ref 5–12)
NEUTROPHILS # BLD AUTO: 2.64 10*3/MM3 (ref 1.7–7)
NEUTROPHILS NFR BLD AUTO: 61.8 % (ref 42.7–76)
NITRITE UR QL STRIP: NEGATIVE
NRBC BLD AUTO-RTO: 0 /100 WBC (ref 0–0.2)
PH UR STRIP: 6 [PH] (ref 5–8)
PLATELET # BLD AUTO: 239 10*3/MM3 (ref 140–450)
POTASSIUM SERPL-SCNC: 3.7 MMOL/L (ref 3.5–5.2)
PROT SERPL-MCNC: 6.5 G/DL (ref 6–8.5)
PROT UR QL STRIP: NEGATIVE
RBC # BLD AUTO: 4.56 10*6/MM3 (ref 3.77–5.28)
RBC #/AREA URNS HPF: NORMAL /HPF
SODIUM SERPL-SCNC: 142 MMOL/L (ref 136–145)
SP GR UR STRIP: 1.01 (ref 1–1.03)
TRIGL SERPL-MCNC: 91 MG/DL (ref 0–150)
UROBILINOGEN UR STRIP-MCNC: ABNORMAL MG/DL
VLDLC SERPL CALC-MCNC: 16 MG/DL (ref 5–40)
WBC # BLD AUTO: 4.28 10*3/MM3 (ref 3.4–10.8)
WBC #/AREA URNS HPF: NORMAL /HPF

## 2023-03-15 ENCOUNTER — OFFICE VISIT (OUTPATIENT)
Dept: FAMILY MEDICINE CLINIC | Facility: CLINIC | Age: 78
End: 2023-03-15
Payer: MEDICARE

## 2023-03-15 VITALS
SYSTOLIC BLOOD PRESSURE: 120 MMHG | HEART RATE: 72 BPM | DIASTOLIC BLOOD PRESSURE: 68 MMHG | BODY MASS INDEX: 27.29 KG/M2 | OXYGEN SATURATION: 97 % | TEMPERATURE: 98.7 F | HEIGHT: 63 IN | WEIGHT: 154 LBS

## 2023-03-15 DIAGNOSIS — R73.9 HYPERGLYCEMIA: ICD-10-CM

## 2023-03-15 DIAGNOSIS — R41.3 MEMORY LOSS: ICD-10-CM

## 2023-03-15 DIAGNOSIS — R60.0 BILATERAL EDEMA OF LOWER EXTREMITY: ICD-10-CM

## 2023-03-15 DIAGNOSIS — F32.5 MAJOR DEPRESSIVE DISORDER WITH SINGLE EPISODE, IN FULL REMISSION: ICD-10-CM

## 2023-03-15 DIAGNOSIS — I87.8 VENOUS STASIS: Primary | ICD-10-CM

## 2023-03-15 PROCEDURE — 99213 OFFICE O/P EST LOW 20 MIN: CPT | Performed by: INTERNAL MEDICINE

## 2023-03-15 NOTE — PROGRESS NOTES
Subjective   Chris Cox is a 77 y.o. female. Patient is here today for   Chief Complaint   Patient presents with   • Hyperglycemia     Trazodone did not work          Vitals:    03/15/23 1407   BP: 120/68   Pulse: 72   Temp: 98.7 °F (37.1 °C)   SpO2: 97%     Body mass index is 27.28 kg/m².      Past Medical History:   Diagnosis Date   • Abdominal pain    • ADHD    • Arthritis     primary both knees   • Bronchitis 08/2019   • Disease of thyroid gland    • Diverticulosis of intestine    • DVT (deep venous thrombosis) ([facility]) 10/2019    right lower extremity   • Fractures     MULTIPLE BONE FRACTURES-TOOK FOSAMAX 3.5 YRS   • Hyperlipidemia    • Hypertension    • Irregular heart beat    • Kidney stone    • Lumbar spine pain    • Migraines    • Mono exposure     AGE 35   • Neuralgia neuritis, sciatic nerve    • Obstructive sleep apnea     20 YRS AGO-NOT CURRENTLY   • Seizures ([facility])     As a baby   • Tuberculosis     As a child   • Vertigo       Allergies   Allergen Reactions   • Erythromycin Anaphylaxis   • Sulfa Antibiotics Rash   • Milk-Related Compounds GI Intolerance   • Eggs Or Egg-Derived Products Unknown - Low Severity     PER ALLERGY TESTING RESULTS   • Cephalexin Rash   • Cephalosporins Rash     STOMACH ISSUES   • Molds & Smuts Rash     ITCHING      Social History     Socioeconomic History   • Marital status:      Spouse name: Marky   • Years of education: College   Tobacco Use   • Smoking status: Never   • Smokeless tobacco: Never   Vaping Use   • Vaping Use: Never used   Substance and Sexual Activity   • Alcohol use: Yes     Comment: wine   • Drug use: No   • Sexual activity: Defer        Current Outpatient Medications:   •  ascorbic acid (VITAMIN C) 500 MG/5ML liquid, Take 5 mL by mouth Daily., Disp: , Rfl:   •  aspirin 81 MG EC tablet, Every 12 (Twelve) Hours., Disp: , Rfl:   •  donepezil (ARICEPT) 5 MG tablet, TAKE 1 TABLET EVERY NIGHT, Disp: 90 tablet, Rfl: 3  •  escitalopram (LEXAPRO) 10 MG  tablet, TAKE 1 TABLET DAILY, Disp: 90 tablet, Rfl: 3  •  estradiol (ESTRACE) 0.1 MG/GM vaginal cream, APPLY 1/2 GRAM INTRAVAGINALLY AT BEDTIME WITH FINGERTIP, Disp: , Rfl:   •  furosemide (LASIX) 20 MG tablet, TAKE 1 TABLET BY MOUTH TWICE DAILY AS NEEDED FOR LEG SWELLING, Disp: 180 tablet, Rfl: 2  •  memantine (NAMENDA) 5 MG tablet, TAKE 2 TABLETS DAILY, Disp: 180 tablet, Rfl: 3  •  vitamin A 87006 UNIT capsule, Take 1 capsule by mouth Daily. ON HOLD, Disp: , Rfl:   •  VITAMIN D, CHOLECALCIFEROL, PO, Take 1 tablet by mouth daily., Disp: , Rfl:   •  VITAMIN E PO, Take  by mouth., Disp: , Rfl:      Objective     History of Present Illness  She is here to follow-up on labs from last week.    When I saw her last I asked her to try taking trazodone 50 mg nightly.  She did not find that particularly useful.  She actually found it to be of no help at all.    Otherwise, she feels well.  Hyperglycemia         Review of Systems   Constitutional: Negative.    HENT: Negative.    Respiratory: Negative.    Cardiovascular: Negative.    Gastrointestinal: Negative.    Genitourinary: Negative.    Musculoskeletal: Negative.    Psychiatric/Behavioral: Positive for sleep disturbance.       Physical Exam  Vitals and nursing note reviewed.   Constitutional:       Appearance: Normal appearance.      Comments: Pleasant, neatly groomed, no distress.   Cardiovascular:      Rate and Rhythm: Regular rhythm.      Heart sounds: Normal heart sounds. No murmur heard.    No gallop.   Pulmonary:      Effort: No respiratory distress.      Breath sounds: Normal breath sounds. No wheezing or rales.   Musculoskeletal:      Comments: She had bilateral lower extremity with edema.  This is pitting edema to just proximal to the ankle bilaterally.   Neurological:      Mental Status: She is alert and oriented to person, place, and time.   Psychiatric:         Mood and Affect: Mood normal.         Behavior: Behavior normal.         Thought Content: Thought  content normal.           Problems Addressed this Visit        Cardiac and Vasculature    Venous stasis - Primary       Endocrine and Metabolic    Hyperglycemia       Mental Health    Major depressive disorder with single episode, in full remission (HCC)       Neuro    Memory loss       Symptoms and Signs    Bilateral edema of lower extremity   Diagnoses       Codes Comments    Venous stasis    -  Primary ICD-10-CM: I87.8  ICD-9-CM: 459.81     Hyperglycemia     ICD-10-CM: R73.9  ICD-9-CM: 790.29     Major depressive disorder with single episode, in full remission (HCC)     ICD-10-CM: F32.5  ICD-9-CM: 296.26     Memory loss     ICD-10-CM: R41.3  ICD-9-CM: 780.93     Bilateral edema of lower extremity     ICD-10-CM: R60.0  ICD-9-CM: 782.3             PLAN  She has minimal cognitive impairment.  She has memory loss.  She is on donepezil and Namenda she will continue that.    She has chronic venous stasis.  I do not think is necessary that she continue to use the furosemide prescribed for her at her last visit 20 mg twice daily.  Although she did lose about 6 pounds of weight since this was prescribed in December, she has not noticed that she had any benefit.    She feels that her major depressive disorder is well controlled.    I asked her to follow-up with me as previously arranged and to 19th this year.  No follow-ups on file.  Answers for HPI/ROS submitted by the patient on 3/8/2023  Please describe your symptoms.: Questions in numerous areas of concern.  Have you had these symptoms before?: Yes  How long have you been having these symptoms?: Greater than 2 weeks  What is the primary reason for your visit?: Other

## 2023-06-15 DIAGNOSIS — R41.3 MEMORY DIFFICULTY: ICD-10-CM

## 2023-06-15 DIAGNOSIS — R03.0 ELEVATED BLOOD PRESSURE READING WITHOUT DIAGNOSIS OF HYPERTENSION: ICD-10-CM

## 2023-06-15 DIAGNOSIS — R73.9 HYPERGLYCEMIA: Primary | ICD-10-CM

## 2023-06-15 DIAGNOSIS — Z13.220 LIPID SCREENING: ICD-10-CM

## 2023-06-15 DIAGNOSIS — R53.83 FATIGUE, UNSPECIFIED TYPE: ICD-10-CM

## 2023-06-16 LAB
ALBUMIN SERPL-MCNC: 4.2 G/DL (ref 3.5–5.2)
ALBUMIN/GLOB SERPL: 2.3 G/DL
ALP SERPL-CCNC: 72 U/L (ref 39–117)
ALT SERPL-CCNC: 16 U/L (ref 1–33)
APPEARANCE UR: CLEAR
AST SERPL-CCNC: 17 U/L (ref 1–32)
BACTERIA #/AREA URNS HPF: NORMAL /HPF
BASOPHILS # BLD AUTO: 0.04 10*3/MM3 (ref 0–0.2)
BASOPHILS NFR BLD AUTO: 0.9 % (ref 0–1.5)
BILIRUB SERPL-MCNC: 0.3 MG/DL (ref 0–1.2)
BILIRUB UR QL STRIP: NEGATIVE
BUN SERPL-MCNC: 14 MG/DL (ref 8–23)
BUN/CREAT SERPL: 17.5 (ref 7–25)
CALCIUM SERPL-MCNC: 9.2 MG/DL (ref 8.6–10.5)
CASTS URNS MICRO: NORMAL
CHLORIDE SERPL-SCNC: 107 MMOL/L (ref 98–107)
CO2 SERPL-SCNC: 28 MMOL/L (ref 22–29)
COLOR UR: YELLOW
CREAT SERPL-MCNC: 0.8 MG/DL (ref 0.57–1)
EGFRCR SERPLBLD CKD-EPI 2021: 76 ML/MIN/1.73
EOSINOPHIL # BLD AUTO: 0.18 10*3/MM3 (ref 0–0.4)
EOSINOPHIL NFR BLD AUTO: 4.1 % (ref 0.3–6.2)
EPI CELLS #/AREA URNS HPF: NORMAL /HPF
ERYTHROCYTE [DISTWIDTH] IN BLOOD BY AUTOMATED COUNT: 13 % (ref 12.3–15.4)
GLOBULIN SER CALC-MCNC: 1.8 GM/DL
GLUCOSE SERPL-MCNC: 93 MG/DL (ref 65–99)
GLUCOSE UR QL STRIP: NEGATIVE
HCT VFR BLD AUTO: 37.8 % (ref 34–46.6)
HGB BLD-MCNC: 12.7 G/DL (ref 12–15.9)
HGB UR QL STRIP: NEGATIVE
IMM GRANULOCYTES # BLD AUTO: 0.01 10*3/MM3 (ref 0–0.05)
IMM GRANULOCYTES NFR BLD AUTO: 0.2 % (ref 0–0.5)
KETONES UR QL STRIP: NEGATIVE
LEUKOCYTE ESTERASE UR QL STRIP: ABNORMAL
LYMPHOCYTES # BLD AUTO: 1.29 10*3/MM3 (ref 0.7–3.1)
LYMPHOCYTES NFR BLD AUTO: 29.3 % (ref 19.6–45.3)
MCH RBC QN AUTO: 30 PG (ref 26.6–33)
MCHC RBC AUTO-ENTMCNC: 33.6 G/DL (ref 31.5–35.7)
MCV RBC AUTO: 89.2 FL (ref 79–97)
MONOCYTES # BLD AUTO: 0.32 10*3/MM3 (ref 0.1–0.9)
MONOCYTES NFR BLD AUTO: 7.3 % (ref 5–12)
NEUTROPHILS # BLD AUTO: 2.56 10*3/MM3 (ref 1.7–7)
NEUTROPHILS NFR BLD AUTO: 58.2 % (ref 42.7–76)
NITRITE UR QL STRIP: NEGATIVE
NRBC BLD AUTO-RTO: 0 /100 WBC (ref 0–0.2)
PH UR STRIP: 6 [PH] (ref 5–8)
PLATELET # BLD AUTO: 219 10*3/MM3 (ref 140–450)
POTASSIUM SERPL-SCNC: 4.9 MMOL/L (ref 3.5–5.2)
PROT SERPL-MCNC: 6 G/DL (ref 6–8.5)
PROT UR QL STRIP: NEGATIVE
RBC # BLD AUTO: 4.24 10*6/MM3 (ref 3.77–5.28)
RBC #/AREA URNS HPF: NORMAL /HPF
SODIUM SERPL-SCNC: 144 MMOL/L (ref 136–145)
SP GR UR STRIP: 1.01 (ref 1–1.03)
UROBILINOGEN UR STRIP-MCNC: ABNORMAL MG/DL
WBC # BLD AUTO: 4.4 10*3/MM3 (ref 3.4–10.8)
WBC #/AREA URNS HPF: NORMAL /HPF

## 2023-07-26 ENCOUNTER — TREATMENT (OUTPATIENT)
Dept: PHYSICAL THERAPY | Facility: CLINIC | Age: 78
End: 2023-07-26
Payer: MEDICARE

## 2023-07-26 DIAGNOSIS — M25.511 RIGHT SHOULDER PAIN, UNSPECIFIED CHRONICITY: Primary | ICD-10-CM

## 2023-07-26 PROCEDURE — 97110 THERAPEUTIC EXERCISES: CPT | Performed by: PHYSICAL THERAPIST

## 2023-07-26 NOTE — PROGRESS NOTES
"Physical Therapy Daily Treatment Note               [address]                                                                                                                                             [place] [address]    Patient: Chris Cox   : 1945  Referring practitioner: Raghavendra Marquez MD  Date of Initial Visit: Type: THERAPY  Noted: 2023  Today's Date: 2023  Patient seen for 5 sessions       Visit Diagnoses:    ICD-10-CM ICD-9-CM   1. Right shoulder pain, unspecified chronicity  M25.511 719.41       Subjective Evaluation    History of Present Illness    Subjective comment: Pt reports that her (R) shoulder is \"not good\" today.  She was doing something in the yard that irritated it .(pt was unclear on what the \"something \" was.)     Objective   See Exercise, Manual, and Modality Logs for complete treatment.       Assessment & Plan     Assessment    Assessment details: Pt completed treatment with mild c/o pain in (B) UE.  Pt c/o tingling in hands and fingers  When performing standing shoulder AROM flexion, and abd.  Pt was given vc to not raise her hands as high, and her pain went away..    Pt needs frequent vc to stay on task ,        Timed:         Manual Therapy:    0     mins  91531;     Therapeutic Exercise:    31     mins  35492;     Neuromuscular Zahira:    0    mins  71104;    Therapeutic Activity:     0     mins  90943;     Gait Trainin     mins  20911;     Ultrasound:     0     mins  54971;    E Stim                            0    mins   30015( [id number])  Work Adair/Cond      0    mins   24173        Timed Treatment:   31   mins   Total Treatment:     31   mins    Gerhard Mandujano PTA  [place] License: [id number]  "

## 2023-07-28 ENCOUNTER — TREATMENT (OUTPATIENT)
Dept: PHYSICAL THERAPY | Facility: CLINIC | Age: 78
End: 2023-07-28
Payer: MEDICARE

## 2023-07-28 DIAGNOSIS — M25.511 RIGHT SHOULDER PAIN, UNSPECIFIED CHRONICITY: Primary | ICD-10-CM

## 2023-07-28 PROCEDURE — 97110 THERAPEUTIC EXERCISES: CPT | Performed by: PHYSICAL THERAPIST

## 2023-07-28 NOTE — PROGRESS NOTES
"Physical Therapy Daily Treatment Note               [address]                                                                                                                                             [place] [address]    Patient: Chris Cox   : 1945  Referring practitioner: Raghavendra Marquez MD  Date of Initial Visit: Type: THERAPY  Noted: 2023  Today's Date: 2023  Patient seen for 6 sessions       Visit Diagnoses:    ICD-10-CM ICD-9-CM   1. Right shoulder pain, unspecified chronicity  M25.511 719.41       Subjective Evaluation    History of Present Illness    Subjective comment: Pt reports that her shoulder is feeling good.  \"I have not been doing any yard work\"     Objective   See Exercise, Manual, and Modality Logs for complete treatment.       Assessment & Plan     Assessment    Assessment details: Pt completed treatment with no c/o pain in (R) shoulder.  She was able to progress reps on several exercises.  Pt benefits from frequent vc for exercises performance.  Continue to progress per POC.        Timed:         Manual Therapy:    0     mins  45180;     Therapeutic Exercise:    30     mins  12648;     Neuromuscular Zahira:    0    mins  14315;    Therapeutic Activity:     0     mins  11983;     Gait Trainin     mins  29115;     Ultrasound:     0     mins  61226;    E Stim                            0    mins   64550( [id number])  Work Adair/Cond      0    mins   58134        Timed Treatment:   30   mins   Total Treatment:     30   mins    Gerhard Mandujano PTA  [place] License: [id number]  "

## 2023-08-01 ENCOUNTER — TREATMENT (OUTPATIENT)
Dept: PHYSICAL THERAPY | Facility: CLINIC | Age: 78
End: 2023-08-01
Payer: MEDICARE

## 2023-08-01 DIAGNOSIS — M25.511 RIGHT SHOULDER PAIN, UNSPECIFIED CHRONICITY: Primary | ICD-10-CM

## 2023-08-01 PROCEDURE — 97110 THERAPEUTIC EXERCISES: CPT | Performed by: PHYSICAL THERAPIST

## 2023-08-01 PROCEDURE — 97530 THERAPEUTIC ACTIVITIES: CPT | Performed by: PHYSICAL THERAPIST

## 2023-08-07 ENCOUNTER — TELEPHONE (OUTPATIENT)
Dept: FAMILY MEDICINE CLINIC | Facility: CLINIC | Age: 78
End: 2023-08-07

## 2023-08-07 NOTE — TELEPHONE ENCOUNTER
PATIENT'S  CALLED TO SAY HE WAS SORRY THAT SHE MISSED HER APPOINTMENT WITH DR. WINSLOW ON 8/4//23. SHE GOT LOST COMING TO  THE OFFICE.    CALL BACK NUMBER  [phone]

## 2023-08-11 ENCOUNTER — OFFICE VISIT (OUTPATIENT)
Dept: FAMILY MEDICINE CLINIC | Facility: CLINIC | Age: 78
End: 2023-08-11
Payer: MEDICARE

## 2023-08-11 VITALS
OXYGEN SATURATION: 98 % | RESPIRATION RATE: 16 BRPM | WEIGHT: 157 LBS | BODY MASS INDEX: 27.82 KG/M2 | HEART RATE: 69 BPM | SYSTOLIC BLOOD PRESSURE: 130 MMHG | HEIGHT: 63 IN | TEMPERATURE: 98 F | DIASTOLIC BLOOD PRESSURE: 60 MMHG

## 2023-08-11 DIAGNOSIS — K59.1 FUNCTIONAL DIARRHEA: Primary | ICD-10-CM

## 2023-08-11 PROCEDURE — 99213 OFFICE O/P EST LOW 20 MIN: CPT | Performed by: NURSE PRACTITIONER

## 2023-08-11 PROCEDURE — 1160F RVW MEDS BY RX/DR IN RCRD: CPT | Performed by: NURSE PRACTITIONER

## 2023-08-11 PROCEDURE — 1159F MED LIST DOCD IN RCRD: CPT | Performed by: NURSE PRACTITIONER

## 2023-08-11 RX ORDER — ESTRADIOL 0.1 MG/G
CREAM VAGINAL
COMMUNITY

## 2023-08-11 RX ORDER — DIPHENOXYLATE HYDROCHLORIDE AND ATROPINE SULFATE 2.5; .025 MG/1; MG/1
1 TABLET ORAL 4 TIMES DAILY PRN
Qty: 60 TABLET | Refills: 0 | Status: SHIPPED | OUTPATIENT
Start: 2023-08-11

## 2023-08-11 RX ORDER — FUROSEMIDE 20 MG/1
1 TABLET ORAL 2 TIMES DAILY PRN
COMMUNITY

## 2023-08-11 RX ORDER — PSYLLIUM HUSK 0.4 G
0.52 CAPSULE ORAL DAILY
Qty: 30 CAPSULE | Refills: 11
Start: 2023-08-11 | End: 2024-08-10

## 2023-08-11 NOTE — PROGRESS NOTES
Subjective     Chris Cox is a 78 y.o.. female.     Patient here today with c/o diarrhea. Patient with history of functional diarrhea; has been on Metamucil, which helps when she takes it regularly. Patient admits to eating a lot of ice cream recently and admits she had Amoxicillin 2000 mg x 1 on Wednesday for tooth procedure.     Diarrhea   This is a new problem. Episode onset: 1 week. The stool consistency is described as Mucous. The patient states that diarrhea does not awaken her from sleep. Pertinent negatives include no abdominal pain, bloating, coughing, fever, headaches, URI or vomiting. Treatments tried: lomotil, has helped; metamucil, has helped.     The following portions of the patient's history were reviewed and updated as appropriate: allergies, current medications, past family history, past medical history, past social history, past surgical history and problem list.    Past Medical History:   Diagnosis Date    Abdominal pain     ADHD     Arthritis     primary both knees    Bronchitis 08/2019    Disease of thyroid gland     Diverticulosis of intestine     DVT (deep venous thrombosis) 10/2019    right lower extremity    Fractures     MULTIPLE BONE FRACTURES-TOOK FOSAMAX 3.5 YRS    Hyperlipidemia     Hypertension     Irregular heart beat     Kidney stone     Lumbar spine pain     Migraines     Mono exposure     AGE 35    Neuralgia neuritis, sciatic nerve     Obstructive sleep apnea     20 YRS AGO-NOT CURRENTLY    Seizures     As a baby    Tuberculosis     As a child    Vertigo        Past Surgical History:   Procedure Laterality Date    BREAST BIOPSY Left     2019 stereotactic benign    BREAST EXCISIONAL BIOPSY Left     1971 - fibroadenoma    BREAST SURGERY      Benign fibroadnoma removed from the left breast    COLONOSCOPY N/A 4/11/2022    Procedure: COLONOSCOPY;  Surgeon: Leonor Xavier MD;  [place] [facility];  Service: Gastroenterology;  Laterality: N/A;  Diverticulosis    HAND SURGERY  "Bilateral 2012    RECONSTRUCTION ON BOTH HANDS    LITHOTRIPSY      renal    TONGUE SURGERY      TOTAL KNEE ARTHROPLASTY Right 10/22/2019    Procedure: TOTAL KNEE ARTHROPLASTY RIGHT;  Surgeon: Aneesh Okeefe II, MD;  Location: [facility];  Service: Orthopedics    WRIST SURGERY         Review of Systems   Constitutional:  Negative for fever.   HENT:  Negative for congestion, ear pain, postnasal drip, rhinorrhea and sore throat.    Respiratory:  Negative for cough and shortness of breath.    Cardiovascular:  Negative for chest pain.   Gastrointestinal:  Positive for abdominal distention (occassionally) and diarrhea (last friday multiple episodes, four times on saturday, 2-3 a day since). Negative for abdominal pain, bloating, nausea and vomiting.   Genitourinary:  Negative for difficulty urinating and dysuria.        OAB, on mybetriq    Neurological:  Negative for headaches.     Allergies   Allergen Reactions    Erythromycin Anaphylaxis    Sulfa Antibiotics Rash    Milk-Related Compounds GI Intolerance    Eggs Or Egg-Derived Products Unknown - Low Severity     PER ALLERGY TESTING RESULTS    Cephalexin Rash    Cephalosporins Rash     STOMACH ISSUES    Molds & Smuts Rash     ITCHING       Objective     Vitals:    08/11/23 1518   BP: 130/60   Pulse: 69   Resp: 16   Temp: 98 øF (36.7 øC)   SpO2: 98%   Weight: 71.2 kg (157 lb)   Height: 160 cm (62.99\")     Body mass index is 27.82 kg/mý.    Physical Exam  Vitals reviewed.   HENT:      Head: Normocephalic.   Eyes:      Pupils: Pupils are equal, round, and reactive to light.   Cardiovascular:      Rate and Rhythm: Normal rate and regular rhythm.   Pulmonary:      Effort: Pulmonary effort is normal.      Breath sounds: Normal breath sounds.   Abdominal:      General: Bowel sounds are normal. There is no distension.      Palpations: Abdomen is soft. There is no mass.      Tenderness: There is no abdominal tenderness. There is no guarding or rebound.      Hernia: " No hernia is present.   Musculoskeletal:         General: Normal range of motion.   Skin:     General: Skin is warm and dry.   Neurological:      Mental Status: She is alert and oriented to person, place, and time.   Psychiatric:         Behavior: Behavior normal.         Current Outpatient Medications:     ascorbic acid (VITAMIN C) 500 MG/5ML liquid, Take 5 mL by mouth Daily., Disp: , Rfl:     donepezil (ARICEPT) 5 MG tablet, TAKE 1 TABLET EVERY NIGHT, Disp: 90 tablet, Rfl: 3    escitalopram (LEXAPRO) 10 MG tablet, TAKE 1 TABLET DAILY, Disp: 90 tablet, Rfl: 3    estradiol (ESTRACE) 0.1 MG/GM vaginal cream, APPLY 1/2 GRAM INTRAVAGINALLY AT BEDTIME WITH FINGERTIP, Disp: , Rfl:     furosemide (LASIX) 20 MG tablet, Take 1 tablet by mouth 2 (Two) Times a Day As Needed., Disp: , Rfl:     memantine (NAMENDA) 5 MG tablet, TAKE 2 TABLETS DAILY, Disp: 180 tablet, Rfl: 3    Myrbetriq 50 MG tablet sustained-release 24 hour 24 hr tablet, , Disp: , Rfl:     triamcinolone (KENALOG) 0.1 % cream, , Disp: , Rfl:     vitamin A 36225 UNIT capsule, Take 1 capsule by mouth Daily. ON HOLD, Disp: , Rfl:     VITAMIN D, CHOLECALCIFEROL, PO, Take 1 tablet by mouth daily., Disp: , Rfl:     VITAMIN E PO, Take  by mouth., Disp: , Rfl:     diphenoxylate-atropine (Lomotil) 2.5-0.025 MG per tablet, Take 1 tablet by mouth 4 (Four) Times a Day As Needed for Diarrhea., Disp: 60 tablet, Rfl: 0    psyllium (Metamucil) 0.52 g capsule, Take 1 capsule by mouth Daily., Disp: 30 capsule, Rfl: 11    Recent Results (from the past 2016 hour(s))   CBC & Differential    Collection Time: 06/15/23 10:02 AM    Specimen: Blood   Result Value Ref Range    WBC 4.40 3.40 - 10.80 10*3/mm3    RBC 4.24 3.77 - 5.28 10*6/mm3    Hemoglobin 12.7 12.0 - 15.9 g/dL    Hematocrit 37.8 34.0 - 46.6 %    MCV 89.2 79.0 - 97.0 fL    MCH 30.0 26.6 - 33.0 pg    MCHC 33.6 31.5 - 35.7 g/dL    RDW 13.0 12.3 - 15.4 %    Platelets 219 140 - 450 10*3/mm3    Neutrophil Rel % 58.2 42.7 - 76.0 %     Lymphocyte Rel % 29.3 19.6 - 45.3 %    Monocyte Rel % 7.3 5.0 - 12.0 %    Eosinophil Rel % 4.1 0.3 - 6.2 %    Basophil Rel % 0.9 0.0 - 1.5 %    Neutrophils Absolute 2.56 1.70 - 7.00 10*3/mm3    Lymphocytes Absolute 1.29 0.70 - 3.10 10*3/mm3    Monocytes Absolute 0.32 0.10 - 0.90 10*3/mm3    Eosinophils Absolute 0.18 0.00 - 0.40 10*3/mm3    Basophils Absolute 0.04 0.00 - 0.20 10*3/mm3    Immature Granulocyte Rel % 0.2 0.0 - 0.5 %    Immature Grans Absolute 0.01 0.00 - 0.05 10*3/mm3    nRBC 0.0 0.0 - 0.2 /100 WBC   Comprehensive Metabolic Panel    Collection Time: 06/15/23 10:02 AM    Specimen: Blood   Result Value Ref Range    Glucose 93 65 - 99 mg/dL    BUN 14 8 - 23 mg/dL    Creatinine 0.80 0.57 - 1.00 mg/dL    EGFR Result 76.0 >60.0 mL/min/1.73    BUN/Creatinine Ratio 17.5 7.0 - 25.0    Sodium 144 136 - 145 mmol/L    Potassium 4.9 3.5 - 5.2 mmol/L    Chloride 107 98 - 107 mmol/L    Total CO2 28.0 22.0 - 29.0 mmol/L    Calcium 9.2 8.6 - 10.5 mg/dL    Total Protein 6.0 6.0 - 8.5 g/dL    Albumin 4.2 3.5 - 5.2 g/dL    Globulin 1.8 gm/dL    A/G Ratio 2.3 g/dL    Total Bilirubin 0.3 0.0 - 1.2 mg/dL    Alkaline Phosphatase 72 39 - 117 U/L    AST (SGOT) 17 1 - 32 U/L    ALT (SGPT) 16 1 - 33 U/L   Urinalysis With Microscopic - Urine, Clean Catch    Collection Time: 06/15/23 10:02 AM    Specimen: Urine, Clean Catch   Result Value Ref Range    Specific Gravity, UA 1.006 1.005 - 1.030    pH, UA 6.0 5.0 - 8.0    Color, UA Yellow     Appearance, UA Clear Clear    Leukocytes, UA Trace (A) Negative    Protein Negative Negative    Glucose, UA Negative Negative    Ketones Negative Negative    Blood, UA Negative Negative    Bilirubin, UA Negative Negative    Urobilinogen, UA Comment     Nitrite, UA Negative Negative   Microscopic Examination -    Collection Time: 06/15/23 10:02 AM   Result Value Ref Range    WBC, UA 0-2 /HPF    RBC, UA 0-2 /HPF    Epithelial Cells (non renal) 0-2 /HPF    Cast Type Comment     Bacteria, UA Comment  None Seen /HPF       Duplex Venous Lower Extremity - Bilateral CAR    Chronic right lower extremity deep vein thrombosis noted in the soleal.    Chronic left lower extremity deep vein thrombosis noted in the soleal.    Left popliteal fossa fluid collection.    All other veins appeared normal bilaterally.        Diagnoses and all orders for this visit:    1. Functional diarrhea (Primary)  -     diphenoxylate-atropine (Lomotil) 2.5-0.025 MG per tablet; Take 1 tablet by mouth 4 (Four) Times a Day As Needed for Diarrhea.  Dispense: 60 tablet; Refill: 0  -     psyllium (Metamucil) 0.52 g capsule; Take 1 capsule by mouth Daily.  Dispense: 30 capsule; Refill: 11        Patient Instructions   Kapil reviewed and approp., CSA signed today. Lomotil given from PRN usage only. Recommend to re-start metamucil daily. Patient and Patient's  agreeing to treatment plan. Patient to call/rto if no improvement by Wednesday and will do labs and stool studies. Patient and Patient's  verb. Understanding.       Return if symptoms worsen or fail to improve, for Dr. Marquez as needed/as recommended.

## 2023-08-11 NOTE — PATIENT INSTRUCTIONS
Kapil reviewed and approp., CSA signed today. Lomotil given from PRN usage only. Recommend to re-start metamucil daily. Patient and Patient's  agreeing to treatment plan. Patient to call/rto if no improvement by Wednesday and will do labs and stool studies. Patient and Patient's  verb. Understanding.

## 2023-08-14 ENCOUNTER — TREATMENT (OUTPATIENT)
Dept: PHYSICAL THERAPY | Facility: CLINIC | Age: 78
End: 2023-08-14
Payer: MEDICARE

## 2023-08-14 DIAGNOSIS — M25.511 RIGHT SHOULDER PAIN, UNSPECIFIED CHRONICITY: Primary | ICD-10-CM

## 2023-08-14 PROCEDURE — 97110 THERAPEUTIC EXERCISES: CPT | Performed by: PHYSICAL THERAPIST

## 2023-08-14 NOTE — PROGRESS NOTES
Physical Therapy Daily Treatment Note      [address], [address], [place] [address]    Patient: Chris Cox   : 1945  Referring practitioner: Raghavendra Marquez MD  Date of Initial Visit: Type: THERAPY  Noted: 2023  Today's Date: 2023  Patient seen for 8 sessions         Visit Diagnoses:     ICD-10-CM ICD-9-CM   1. Right shoulder pain, unspecified chronicity  M25.511 719.41         Subjective Evaluation    History of Present Illness    Subjective comment: My shoulder is ok as long as I avoid the [activities] that aggravate my shoulder.  I feel like it might not be long before I have to go see a doctor for my shoulder.     Objective   See Exercise, Manual, and Modality Logs for complete treatment.       Assessment & Plan     Assessment    Assessment details: Patient continues to require frequent verbal and tactile cueing and redirection to task. She is unable to count repetitions independently as she also loses attention to task.  She states that she continually has to avoid provoking activities such as pulling weeds due to worsening of (R) shoulder symptoms with performance of such activities.         Progress per Plan of Care         Timed:  Manual Therapy:         mins  26633;  Therapeutic Exercise:    41     mins  46825;     Neuromuscular Zahira:        mins  02542;    Therapeutic Activity:          mins  75418;     Gait Training:           mins  47897;     Ultrasound:          mins  18121;    Untimed:  Electrical Stimulation:         mins  45694 ( );  Mechanical Traction:         mins  44133;   Dry Needling              ___  mins   50381    Timed Treatment:   41   mins   Total Treatment:     41   mins    Gretel Campos PT, DPT, OCS  Physical Therapist  [place] License #[id number]  Electronically signed by: Gretel Campos PT, 23, 10:04 AM EDT

## 2023-08-17 ENCOUNTER — OFFICE VISIT (OUTPATIENT)
Dept: FAMILY MEDICINE CLINIC | Facility: CLINIC | Age: 78
End: 2023-08-17
Payer: MEDICARE

## 2023-08-17 VITALS
WEIGHT: 153 LBS | SYSTOLIC BLOOD PRESSURE: 130 MMHG | HEIGHT: 63 IN | HEART RATE: 58 BPM | TEMPERATURE: 98 F | BODY MASS INDEX: 27.11 KG/M2 | RESPIRATION RATE: 16 BRPM | DIASTOLIC BLOOD PRESSURE: 74 MMHG | OXYGEN SATURATION: 97 %

## 2023-08-17 DIAGNOSIS — R19.7 DIARRHEA, UNSPECIFIED TYPE: Primary | ICD-10-CM

## 2023-08-17 DIAGNOSIS — S46.811S STRAIN OF RIGHT TRAPEZIUS MUSCLE, SEQUELA: ICD-10-CM

## 2023-08-17 NOTE — PROGRESS NOTES
Subjective     Chris Cox is a 78 y.o.. female.     Patient here to follow up on diarrhea. Patient's  stating Patient's diarrhea had improved with metamucil but still having several episodes. Then yesterday he had her take pepto bismol once and she has not had any diarrhea this am.      Patient c/o right shoulder pain today. Patient has a history of right shoulder pain and is currently in physical therapy for past two months for it. Patient stating she feels like she has had little improvement with shoulder since starting physical therapy.       The following portions of the patient's history were reviewed and updated as appropriate: allergies, current medications, past family history, past medical history, past social history, past surgical history and problem list.    Past Medical History:   Diagnosis Date    Abdominal pain     ADHD     Arthritis     primary both knees    Bronchitis 08/2019    Disease of thyroid gland     Diverticulosis of intestine     DVT (deep venous thrombosis) 10/2019    right lower extremity    Fractures     MULTIPLE BONE FRACTURES-TOOK FOSAMAX 3.5 YRS    Hyperlipidemia     Hypertension     Irregular heart beat     Kidney stone     Lumbar spine pain     Migraines     Mono exposure     AGE 35    Neuralgia neuritis, sciatic nerve     Obstructive sleep apnea     20 YRS AGO-NOT CURRENTLY    Seizures     As a baby    Tuberculosis     As a child    Vertigo        Past Surgical History:   Procedure Laterality Date    BREAST BIOPSY Left     2019 stereotactic benign    BREAST EXCISIONAL BIOPSY Left     1971 - fibroadenoma    BREAST SURGERY      Benign fibroadnoma removed from the left breast    COLONOSCOPY N/A 4/11/2022    Procedure: COLONOSCOPY;  Surgeon: Leonor Xavier MD;  [place] [facility] [place];  Service: Gastroenterology;  Laterality: N/A;  Diverticulosis    HAND SURGERY Bilateral 2012    RECONSTRUCTION ON BOTH HANDS    LITHOTRIPSY      renal    TONGUE SURGERY      TOTAL KNEE  "ARTHROPLASTY Right 10/22/2019    Procedure: TOTAL KNEE ARTHROPLASTY RIGHT;  Surgeon: Aneesh Okeefe II, MD;  Location: [facility];  Service: Orthopedics    WRIST SURGERY         Review of Systems   Constitutional:  Negative for fever.   Gastrointestinal:  Positive for diarrhea and nausea (once in past week). Negative for abdominal pain, blood in stool and vomiting.   Musculoskeletal:  Positive for arthralgias (right shoulder).     Allergies   Allergen Reactions    Erythromycin Anaphylaxis    Sulfa Antibiotics Rash    Milk-Related Compounds GI Intolerance    Eggs Or Egg-Derived Products Unknown - Low Severity     PER ALLERGY TESTING RESULTS    Cephalexin Rash    Cephalosporins Rash     STOMACH ISSUES    Molds & Smuts Rash     ITCHING       Objective     Vitals:    08/17/23 0923   BP: 130/74   Pulse: 58   Resp: 16   Temp: 98 øF (36.7 øC)   SpO2: 97%   Weight: 69.4 kg (153 lb)   Height: 160 cm (62.99\")     Body mass index is 27.11 kg/mý.    Physical Exam  Vitals reviewed.   HENT:      Head: Normocephalic.   Eyes:      Pupils: Pupils are equal, round, and reactive to light.   Cardiovascular:      Rate and Rhythm: Normal rate and regular rhythm.   Pulmonary:      Effort: Pulmonary effort is normal.      Breath sounds: Normal breath sounds.   Abdominal:      General: Bowel sounds are normal. There is no distension.      Palpations: Abdomen is soft. There is no hepatomegaly or splenomegaly.      Tenderness: There is no abdominal tenderness. There is no guarding or rebound. Negative signs include McBurney's sign.      Hernia: No hernia is present.   Musculoskeletal:         General: Normal range of motion.      Comments: Right trapezius tight/tense and tender to palpation   Skin:     General: Skin is warm and dry.   Neurological:      Mental Status: She is alert.         Current Outpatient Medications:     ascorbic acid (VITAMIN C) 500 MG/5ML liquid, Take 5 mL by mouth Daily., Disp: , Rfl:     " diphenoxylate-atropine (Lomotil) 2.5-0.025 MG per tablet, Take 1 tablet by mouth 4 (Four) Times a Day As Needed for Diarrhea., Disp: 60 tablet, Rfl: 0    donepezil (ARICEPT) 5 MG tablet, TAKE 1 TABLET EVERY NIGHT, Disp: 90 tablet, Rfl: 3    escitalopram (LEXAPRO) 10 MG tablet, TAKE 1 TABLET DAILY, Disp: 90 tablet, Rfl: 3    estradiol (ESTRACE) 0.1 MG/GM vaginal cream, APPLY 1/2 GRAM INTRAVAGINALLY AT BEDTIME WITH FINGERTIP, Disp: , Rfl:     furosemide (LASIX) 20 MG tablet, Take 1 tablet by mouth 2 (Two) Times a Day As Needed., Disp: , Rfl:     memantine (NAMENDA) 5 MG tablet, TAKE 2 TABLETS DAILY, Disp: 180 tablet, Rfl: 3    Myrbetriq 50 MG tablet sustained-release 24 hour 24 hr tablet, , Disp: , Rfl:     psyllium (Metamucil) 0.52 g capsule, Take 1 capsule by mouth Daily., Disp: 30 capsule, Rfl: 11    triamcinolone (KENALOG) 0.1 % cream, , Disp: , Rfl:     vitamin A 75739 UNIT capsule, Take 1 capsule by mouth Daily. ON HOLD, Disp: , Rfl:     VITAMIN D, CHOLECALCIFEROL, PO, Take 1 tablet by mouth daily., Disp: , Rfl:     VITAMIN E PO, Take  by mouth., Disp: , Rfl:     Recent Results (from the past 2016 hour(s))   CBC & Differential    Collection Time: 06/15/23 10:02 AM    Specimen: Blood   Result Value Ref Range    WBC 4.40 3.40 - 10.80 10*3/mm3    RBC 4.24 3.77 - 5.28 10*6/mm3    Hemoglobin 12.7 12.0 - 15.9 g/dL    Hematocrit 37.8 34.0 - 46.6 %    MCV 89.2 79.0 - 97.0 fL    MCH 30.0 26.6 - 33.0 pg    MCHC 33.6 31.5 - 35.7 g/dL    RDW 13.0 12.3 - 15.4 %    Platelets 219 140 - 450 10*3/mm3    Neutrophil Rel % 58.2 42.7 - 76.0 %    Lymphocyte Rel % 29.3 19.6 - 45.3 %    Monocyte Rel % 7.3 5.0 - 12.0 %    Eosinophil Rel % 4.1 0.3 - 6.2 %    Basophil Rel % 0.9 0.0 - 1.5 %    Neutrophils Absolute 2.56 1.70 - 7.00 10*3/mm3    Lymphocytes Absolute 1.29 0.70 - 3.10 10*3/mm3    Monocytes Absolute 0.32 0.10 - 0.90 10*3/mm3    Eosinophils Absolute 0.18 0.00 - 0.40 10*3/mm3    Basophils Absolute 0.04 0.00 - 0.20 10*3/mm3     Immature Granulocyte Rel % 0.2 0.0 - 0.5 %    Immature Grans Absolute 0.01 0.00 - 0.05 10*3/mm3    nRBC 0.0 0.0 - 0.2 /100 WBC   Comprehensive Metabolic Panel    Collection Time: 06/15/23 10:02 AM    Specimen: Blood   Result Value Ref Range    Glucose 93 65 - 99 mg/dL    BUN 14 8 - 23 mg/dL    Creatinine 0.80 0.57 - 1.00 mg/dL    EGFR Result 76.0 >60.0 mL/min/1.73    BUN/Creatinine Ratio 17.5 7.0 - 25.0    Sodium 144 136 - 145 mmol/L    Potassium 4.9 3.5 - 5.2 mmol/L    Chloride 107 98 - 107 mmol/L    Total CO2 28.0 22.0 - 29.0 mmol/L    Calcium 9.2 8.6 - 10.5 mg/dL    Total Protein 6.0 6.0 - 8.5 g/dL    Albumin 4.2 3.5 - 5.2 g/dL    Globulin 1.8 gm/dL    A/G Ratio 2.3 g/dL    Total Bilirubin 0.3 0.0 - 1.2 mg/dL    Alkaline Phosphatase 72 39 - 117 U/L    AST (SGOT) 17 1 - 32 U/L    ALT (SGPT) 16 1 - 33 U/L   Urinalysis With Microscopic - Urine, Clean Catch    Collection Time: 06/15/23 10:02 AM    Specimen: Urine, Clean Catch   Result Value Ref Range    Specific Gravity, UA 1.006 1.005 - 1.030    pH, UA 6.0 5.0 - 8.0    Color, UA Yellow     Appearance, UA Clear Clear    Leukocytes, UA Trace (A) Negative    Protein Negative Negative    Glucose, UA Negative Negative    Ketones Negative Negative    Blood, UA Negative Negative    Bilirubin, UA Negative Negative    Urobilinogen, UA Comment     Nitrite, UA Negative Negative   Microscopic Examination -    Collection Time: 06/15/23 10:02 AM   Result Value Ref Range    WBC, UA 0-2 /HPF    RBC, UA 0-2 /HPF    Epithelial Cells (non renal) 0-2 /HPF    Cast Type Comment     Bacteria, UA Comment None Seen /HPF         Diagnoses and all orders for this visit:    1. Diarrhea, unspecified type (Primary)  -     H. Pylori Antigen, Stool - Stool, Per Rectum  -     Fecal Leukocytes - Stool, Per Rectum  -     Clostridioides difficile EIA - Stool, Per Rectum  -     Giardia / Cryptosporidium Screen - Stool, Per Rectum  -     Stool Culture (Reference Lab) - Stool, Per Rectum    2. Strain of  right trapezius muscle, sequela        Patient Instructions   Diarrhea: Drink plenty of fluids, continue metamucil daily, start Probiotics once a day, eat less dairy, less spicy, less greasy foods. Ordering stool studies for further evaluation. Continue to use lomotil prn and pepto bisomol prn.    Right trapezius strain: May use cold compress/ice pack 10-15 minutes at a time several times a day, May use warm compress/heating pad 10-15 minutes at at time several times a day, May use over the counter biofreeze or lidocaine patches as needed (wash off from skin before using heating pad).  Continue physical therapy.     Return if symptoms worsen or fail to improve.

## 2023-08-17 NOTE — PATIENT INSTRUCTIONS
Diarrhea: Drink plenty of fluids, continue metamucil daily, start Probiotics once a day, eat less dairy, less spicy, less greasy foods. Ordering stool studies for further evaluation. Continue to use lomotil prn and pepto bisomol prn.    Right trapezius strain: May use cold compress/ice pack 10-15 minutes at a time several times a day, May use warm compress/heating pad 10-15 minutes at at time several times a day, May use over the counter biofreeze or lidocaine patches as needed (wash off from skin before using heating pad).  Continue physical therapy.

## 2023-08-21 ENCOUNTER — TREATMENT (OUTPATIENT)
Dept: PHYSICAL THERAPY | Facility: CLINIC | Age: 78
End: 2023-08-21
Payer: MEDICARE

## 2023-08-21 DIAGNOSIS — M25.511 RIGHT SHOULDER PAIN, UNSPECIFIED CHRONICITY: Primary | ICD-10-CM

## 2023-08-21 PROCEDURE — 97110 THERAPEUTIC EXERCISES: CPT | Performed by: PHYSICAL THERAPIST

## 2023-08-21 PROCEDURE — 97530 THERAPEUTIC ACTIVITIES: CPT | Performed by: PHYSICAL THERAPIST

## 2023-08-21 NOTE — PROGRESS NOTES
Physical Therapy Daily Treatment Note      [address], [address], [place] [id number]    Patient: Chris Cox   : 1945  Referring practitioner: Raghavendra Marquez MD  Date of Initial Visit: Type: THERAPY  Noted: 2023  Today's Date: 2023  Patient seen for 9 sessions         Visit Diagnoses:     ICD-10-CM ICD-9-CM   1. Right shoulder pain, unspecified chronicity  M25.511 719.41         Subjective Evaluation    History of Present Illness    Subjective comment: My shoulder might be feeling a little better, but I am still avoiding the activities I know will aggravate it.     Objective   See Exercise, Manual, and Modality Logs for complete treatment.       Assessment & Plan       Assessment  Assessment details: Patient requests recommendations for orthopedic shoulder physicians this date. These are written down for her along with orthopedic physician practice group and phone number.  Discussed with patient PT recommendation that she be evaluated by an orthopedic doctor for her (R) shoulder due to lack of functional progress despite nearly 2 months of PT intervention.  She is agreeable to this. Encouraged persistent HEP compliance in the meantime.  All questions are answered to patient satisfaction.        Other - D/C skilled PT services at this time due to lack of functional improvement.           Timed:  Manual Therapy:         mins  55965;  Therapeutic Exercise:    32     mins  31566;     Neuromuscular Zahira:        mins  23314;    Therapeutic Activity:     9     mins  97007;     Gait Training:           mins  26801;     Ultrasound:          mins  23663;    Untimed:  Electrical Stimulation:         mins  84901 ( );  Mechanical Traction:         mins  00517;   Dry Needling              ___  mins   15443    Timed Treatment:   41   mins   Total Treatment:     41   mins    Gretel Campos PT, DPT, OCS  Physical Therapist  [place] License #[id number]  Electronically signed by: Gretel Campos PT,  08/21/23, 10:04 AM EDT

## 2023-08-29 ENCOUNTER — TELEPHONE (OUTPATIENT)
Dept: FAMILY MEDICINE CLINIC | Facility: CLINIC | Age: 78
End: 2023-08-29

## 2023-08-29 NOTE — TELEPHONE ENCOUNTER
Caller: Marky Cox    Relationship: Emergency Contact    Best call back number: [phone]*    What is the best time to reach you: ANYTIME    Who are you requesting to speak with (clinical staff, provider,  specific staff member): CLINICAL    What was the call regarding: PATIENT'S SPOUSE CALLING WANTING TO KNOW IF HE CAN COME BY THE OFFICE AND  ANOTHER STOOL TEST KIT. AR LILIA HAD ORDERED THE TEST, AND SOMETHING WENT WRONG DURING THE COLLECTION AND IT GOT DESTROYED, AND WILL NEED A NEW TEST KIT. MARKY REQUEST A CALL BACK TO ADVISE.    Is it okay if the provider responds through [facility]: NO. PLEASE CALL ON TELEPHONE

## 2023-09-01 ENCOUNTER — TELEPHONE (OUTPATIENT)
Dept: ORTHOPEDICS | Facility: OTHER | Age: 78
End: 2023-09-01
Payer: MEDICARE

## 2023-09-01 NOTE — TELEPHONE ENCOUNTER
PATIENT IS LOOKING FOR AN ORTHOPEDIC, SHE WANTS TO KNOW WHAT THE NAME IS OF THE FEMALE ORTHOPEDIST JULIEN RECOMMENDED.

## 2023-09-19 DIAGNOSIS — Z13.220 LIPID SCREENING: ICD-10-CM

## 2023-09-19 DIAGNOSIS — R73.9 HYPERGLYCEMIA: Primary | ICD-10-CM

## 2023-09-19 DIAGNOSIS — E55.9 VITAMIN D DEFICIENCY: ICD-10-CM

## 2023-09-21 LAB
25(OH)D3+25(OH)D2 SERPL-MCNC: 49.8 NG/ML (ref 30–100)
ALBUMIN SERPL-MCNC: 4.2 G/DL (ref 3.5–5.2)
ALBUMIN/GLOB SERPL: 2.2 G/DL
ALP SERPL-CCNC: 62 U/L (ref 39–117)
ALT SERPL-CCNC: 9 U/L (ref 1–33)
APPEARANCE UR: CLEAR
AST SERPL-CCNC: 13 U/L (ref 1–32)
BACTERIA #/AREA URNS HPF: NORMAL /HPF
BASOPHILS # BLD AUTO: 0.02 10*3/MM3 (ref 0–0.2)
BASOPHILS NFR BLD AUTO: 0.5 % (ref 0–1.5)
BILIRUB SERPL-MCNC: 0.4 MG/DL (ref 0–1.2)
BILIRUB UR QL STRIP: NEGATIVE
BUN SERPL-MCNC: 10 MG/DL (ref 8–23)
BUN/CREAT SERPL: 13.2 (ref 7–25)
CALCIUM SERPL-MCNC: 9.4 MG/DL (ref 8.6–10.5)
CASTS URNS MICRO: NORMAL
CHLORIDE SERPL-SCNC: 106 MMOL/L (ref 98–107)
CHOLEST SERPL-MCNC: 223 MG/DL (ref 0–200)
CHOLEST/HDLC SERPL: 3.6 {RATIO}
CO2 SERPL-SCNC: 28 MMOL/L (ref 22–29)
COLOR UR: YELLOW
CREAT SERPL-MCNC: 0.76 MG/DL (ref 0.57–1)
EGFRCR SERPLBLD CKD-EPI 2021: 80.3 ML/MIN/1.73
EOSINOPHIL # BLD AUTO: 0.09 10*3/MM3 (ref 0–0.4)
EOSINOPHIL NFR BLD AUTO: 2.3 % (ref 0.3–6.2)
EPI CELLS #/AREA URNS HPF: NORMAL /HPF
ERYTHROCYTE [DISTWIDTH] IN BLOOD BY AUTOMATED COUNT: 13.2 % (ref 12.3–15.4)
GLOBULIN SER CALC-MCNC: 1.9 GM/DL
GLUCOSE SERPL-MCNC: 84 MG/DL (ref 65–99)
GLUCOSE UR QL STRIP: NEGATIVE
HCT VFR BLD AUTO: 38.5 % (ref 34–46.6)
HDLC SERPL-MCNC: 62 MG/DL (ref 40–60)
HGB BLD-MCNC: 12.6 G/DL (ref 12–15.9)
HGB UR QL STRIP: NEGATIVE
IMM GRANULOCYTES # BLD AUTO: 0 10*3/MM3 (ref 0–0.05)
IMM GRANULOCYTES NFR BLD AUTO: 0 % (ref 0–0.5)
KETONES UR QL STRIP: NEGATIVE
LDLC SERPL CALC-MCNC: 149 MG/DL (ref 0–100)
LEUKOCYTE ESTERASE UR QL STRIP: NEGATIVE
LYMPHOCYTES # BLD AUTO: 1.19 10*3/MM3 (ref 0.7–3.1)
LYMPHOCYTES NFR BLD AUTO: 30 % (ref 19.6–45.3)
MCH RBC QN AUTO: 29.4 PG (ref 26.6–33)
MCHC RBC AUTO-ENTMCNC: 32.7 G/DL (ref 31.5–35.7)
MCV RBC AUTO: 90 FL (ref 79–97)
MONOCYTES # BLD AUTO: 0.28 10*3/MM3 (ref 0.1–0.9)
MONOCYTES NFR BLD AUTO: 7.1 % (ref 5–12)
NEUTROPHILS # BLD AUTO: 2.39 10*3/MM3 (ref 1.7–7)
NEUTROPHILS NFR BLD AUTO: 60.1 % (ref 42.7–76)
NITRITE UR QL STRIP: NEGATIVE
NRBC BLD AUTO-RTO: 0 /100 WBC (ref 0–0.2)
PH UR STRIP: 6.5 [PH] (ref 5–8)
PLATELET # BLD AUTO: 226 10*3/MM3 (ref 140–450)
POTASSIUM SERPL-SCNC: 4.2 MMOL/L (ref 3.5–5.2)
PROT SERPL-MCNC: 6.1 G/DL (ref 6–8.5)
PROT UR QL STRIP: NEGATIVE
RBC # BLD AUTO: 4.28 10*6/MM3 (ref 3.77–5.28)
RBC #/AREA URNS HPF: NORMAL /HPF
SODIUM SERPL-SCNC: 142 MMOL/L (ref 136–145)
SP GR UR STRIP: NORMAL (ref 1–1.03)
TRIGL SERPL-MCNC: 71 MG/DL (ref 0–150)
UROBILINOGEN UR STRIP-MCNC: NORMAL MG/DL
VLDLC SERPL CALC-MCNC: 12 MG/DL (ref 5–40)
WBC # BLD AUTO: 3.97 10*3/MM3 (ref 3.4–10.8)
WBC #/AREA URNS HPF: NORMAL /HPF

## 2023-09-27 ENCOUNTER — OFFICE VISIT (OUTPATIENT)
Dept: FAMILY MEDICINE CLINIC | Facility: CLINIC | Age: 78
End: 2023-09-27
Payer: MEDICARE

## 2023-09-27 VITALS
HEART RATE: 54 BPM | RESPIRATION RATE: 16 BRPM | HEIGHT: 63 IN | BODY MASS INDEX: 26.63 KG/M2 | DIASTOLIC BLOOD PRESSURE: 78 MMHG | OXYGEN SATURATION: 97 % | WEIGHT: 150.3 LBS | SYSTOLIC BLOOD PRESSURE: 124 MMHG | TEMPERATURE: 97.9 F

## 2023-09-27 DIAGNOSIS — F32.5 MAJOR DEPRESSIVE DISORDER WITH SINGLE EPISODE, IN FULL REMISSION: ICD-10-CM

## 2023-09-27 DIAGNOSIS — M25.512 CHRONIC PAIN OF BOTH SHOULDERS: Primary | ICD-10-CM

## 2023-09-27 DIAGNOSIS — G89.29 CHRONIC RIGHT SHOULDER PAIN: ICD-10-CM

## 2023-09-27 DIAGNOSIS — M25.511 CHRONIC PAIN OF BOTH SHOULDERS: Primary | ICD-10-CM

## 2023-09-27 DIAGNOSIS — M79.672 PAIN IN BOTH FEET: ICD-10-CM

## 2023-09-27 DIAGNOSIS — M79.671 PAIN IN BOTH FEET: ICD-10-CM

## 2023-09-27 DIAGNOSIS — G89.29 CHRONIC PAIN OF BOTH SHOULDERS: Primary | ICD-10-CM

## 2023-09-27 DIAGNOSIS — Z00.00 MEDICARE ANNUAL WELLNESS VISIT, SUBSEQUENT: ICD-10-CM

## 2023-09-27 DIAGNOSIS — M25.511 CHRONIC RIGHT SHOULDER PAIN: ICD-10-CM

## 2023-09-27 RX ORDER — AMOXICILLIN 875 MG/1
TABLET, COATED ORAL
COMMUNITY
Start: 2023-09-26 | End: 2023-10-10

## 2023-09-27 RX ORDER — CHLORHEXIDINE GLUCONATE ORAL RINSE 1.2 MG/ML
SOLUTION DENTAL
COMMUNITY
Start: 2023-09-26 | End: 2023-10-10

## 2023-09-27 NOTE — PROGRESS NOTES
The ABCs of the Annual Wellness Visit  Subsequent Medicare Wellness Visit    Subjective    Chris Cox is a 78 y.o. female who presents for a Subsequent Medicare Wellness Visit.    The following portions of the patient's history were reviewed and   updated as appropriate: allergies, current medications, past family history, past medical history, past social history, past surgical history, and problem list.    Compared to one year ago, the patient feels her physical   health is better.    Compared to one year ago, the patient feels her mental   health is better.    Recent Hospitalizations:  She was NOT admitted within the past 365 days at A hospital.       Current Medical Providers:  Patient Care Team:  Raghavendra Marquez MD as PCP - General  Raghavendra Marquez MD as PCP - Family Medicine  Sunitha Walter MD as Consulting Physician (Hematology and Oncology)    Outpatient Medications Prior to Visit   Medication Sig Dispense Refill    ascorbic acid (VITAMIN C) 500 MG/5ML liquid Take 5 mL by mouth Daily.      escitalopram (LEXAPRO) 10 MG tablet TAKE 1 TABLET DAILY 90 tablet 3    Myrbetriq 50 MG tablet sustained-release 24 hour 24 hr tablet       psyllium (Metamucil) 0.52 g capsule Take 1 capsule by mouth Daily. 30 capsule 11    amoxicillin (AMOXIL) 875 MG tablet  (Patient not taking: Reported on 10/10/2023)      chlorhexidine (PERIDEX) 0.12 % solution  (Patient not taking: Reported on 10/10/2023)      diphenoxylate-atropine (Lomotil) 2.5-0.025 MG per tablet Take 1 tablet by mouth 4 (Four) Times a Day As Needed for Diarrhea. (Patient not taking: Reported on 10/10/2023) 60 tablet 0    donepezil (ARICEPT) 5 MG tablet TAKE 1 TABLET EVERY NIGHT (Patient not taking: Reported on 10/10/2023) 90 tablet 3    memantine (NAMENDA) 5 MG tablet TAKE 2 TABLETS DAILY (Patient not taking: Reported on 10/10/2023) 180 tablet 3    vitamin A 39004 UNIT capsule Take 1 capsule by mouth Daily. ON HOLD (Patient not taking: Reported on  10/10/2023)      VITAMIN D, CHOLECALCIFEROL, PO Take 1 tablet by mouth daily. (Patient not taking: Reported on 10/10/2023)      VITAMIN E PO Take  by mouth. (Patient not taking: Reported on 10/10/2023)      estradiol (ESTRACE) 0.1 MG/GM vaginal cream APPLY 1/2 GRAM INTRAVAGINALLY AT BEDTIME WITH FINGERTIP      furosemide (LASIX) 20 MG tablet Take 1 tablet by mouth 2 (Two) Times a Day As Needed.      triamcinolone (KENALOG) 0.1 % cream        No facility-administered medications prior to visit.       No opioid medication identified on active medication list. I have reviewed chart for other potential  high risk medication/s and harmful drug interactions in the elderly.        Aspirin is not on active medication list.  Aspirin use is not indicated based on review of current medical condition/s. Risk of harm outweighs potential benefits.  .    Patient Active Problem List   Diagnosis    Abdominal bruit    Blues    DD (diverticular disease)    Menopausal and perimenopausal disorder    Neuralgia neuritis, sciatic nerve    Right knee pain    Primary osteoarthritis of both knees    Radiculopathy with lower extremity symptoms    Idiopathic peripheral neuropathy    Sprain of collateral ligament of right knee    ADD (attention deficit disorder)    Functional diarrhea    Microscopic hematuria    Chronic pain of left knee    Bleeding diathesis    Bilateral edema of lower extremity    Bilateral swelling of feet and ankles    Hyperglycemia    Venous stasis    Attention deficit disorder (ADD) without hyperactivity    Lesion of skin of scalp    Pre-operative clearance    Total knee replacement status    Chronic deep vein thrombosis (DVT) of calf muscle vein of right lower extremity    Cellulitis of face    Dermatitis    Major depressive disorder with single episode, in full remission    Fatigue due to excessive exertion    Disease of thyroid gland    Vertigo    Medicare annual wellness visit, subsequent    Osteopenia of both hips     "Memory loss    Minimal cognitive impairment     Advance Care Planning   Advance Care Planning     Advance Directive is not on file.  ACP discussion was held with the patient during this visit. Patient has an advance directive (not in EMR), copy requested.     Objective    Vitals:    23 1056   BP: 124/78   BP Location: Right arm   Patient Position: Sitting   Cuff Size: Adult   Pulse: 54   Resp: 16   Temp: 97.9 øF (36.6 øC)   TempSrc: Oral   SpO2: 97%   Weight: 68.2 kg (150 lb 4.8 oz)   Height: 160 cm (62.99\")     Estimated body mass index is 26.63 kg/mý as calculated from the following:    Height as of this encounter: 160 cm (62.99\").    Weight as of this encounter: 68.2 kg (150 lb 4.8 oz).           Does the patient have evidence of cognitive impairment? Yes    Lab Results   Component Value Date    CHLPL 223 (H) 2023    TRIG 71 2023    HDL 62 (H) 2023     (H) 2023    VLDL 12 2023        HEALTH RISK ASSESSMENT    Smoking Status:  Social History     Tobacco Use   Smoking Status Never    Passive exposure: Never   Smokeless Tobacco Never     Alcohol Consumption:  Social History     Substance and Sexual Activity   Alcohol Use Not Currently    Alcohol/week: 1.0 standard drink of alcohol    Types: 1 Glasses of wine per week    Comment: used medicinally     Fall Risk Screen:    NATE Fall Risk Assessment was completed, and patient is at MODERATE risk for falls. Assessment completed on:2023    Depression Screenin/27/2023    10:00 AM   PHQ-2/PHQ-9 Depression Screening   Little Interest or Pleasure in Doing Things 0-->not at all   Feeling Down, Depressed or Hopeless 0-->not at all   PHQ-9: Brief Depression Severity Measure Score 0       Health Habits and Functional and Cognitive Screenin/27/2023    10:00 AM   Functional & Cognitive Status   Do you have difficulty preparing food and eating? No   Do you have difficulty bathing yourself, getting dressed or " grooming yourself? No   Do you have difficulty using the toilet? No   Do you have difficulty moving around from place to place? No   Do you have trouble with steps or getting out of a bed or a chair? No   Current Diet Well Balanced Diet   Dental Exam Up to date   Eye Exam Up to date   Exercise (times per week) 0 times per week   Current Exercises Include Walking   Do you need help using the phone?  No   Are you deaf or do you have serious difficulty hearing?  No   Do you need help to go to places out of walking distance? No   Do you need help shopping? No   Do you need help preparing meals?  Yes   Do you need help with housework?  Yes   Do you need help with laundry? No   Do you need help taking your medications? No   Do you need help managing money? No   Do you ever drive or ride in a car without wearing a seat belt? No   Have you felt unusual stress, anger or loneliness in the last month? No   Who do you live with? Spouse   If you need help, do you have trouble finding someone available to you? No   Have you been bothered in the last four weeks by sexual problems? No   Do you have difficulty concentrating, remembering or making decisions? Yes       Age-appropriate Screening Schedule:  Refer to the list below for future screening recommendations based on patient's age, sex and/or medical conditions. Orders for these recommended tests are listed in the plan section. The patient has been provided with a written plan.    Health Maintenance   Topic Date Due    Pneumococcal Vaccine 65+ (1 - PCV) Never done    ANNUAL WELLNESS VISIT  01/20/2022    DXA SCAN  05/18/2023    INFLUENZA VACCINE  08/01/2023    COVID-19 Vaccine (7 - 2023-24 season) 09/01/2023    ZOSTER VACCINE (1 of 2) 06/19/2024 (Originally 7/15/1995)    LIPID PANEL  09/20/2024    COLORECTAL CANCER SCREENING  04/11/2032    TDAP/TD VACCINES (2 - Td or Tdap) 06/19/2033    HEPATITIS C SCREENING  Completed                  CMS Preventative Services Quick  "Reference  Risk Factors Identified During Encounter  Inactivity/Sedentary: Patient was advised to exercise at least 150 minutes a week per CDC recommendations.  The above risks/problems have been discussed with the patient.  Pertinent information has been shared with the patient in the After Visit Summary.  An After Visit Summary and PPPS were made available to the patient.    Follow Up:   Next Medicare Wellness visit to be scheduled in 1 year.       Additional E&M Note during same encounter follows:  Patient has multiple medical problems which are significant and separately identifiable that require additional work above and beyond the Medicare Wellness Visit.      Chief Complaint  Medicare Wellness-subsequent    Subjective        Pleasant as always, neatly groomed, no distress.    She is accompanied by her .          Chris Cox is also being seen today for depression in full remission, chronic pain in both shoulders, pain in both of her feet.  Review of Systems   Constitutional: Negative.    HENT: Negative.     Respiratory: Negative.     Cardiovascular: Negative.    Musculoskeletal: Negative.    Psychiatric/Behavioral: Negative.         Objective   Vital Signs:  /78 (BP Location: Right arm, Patient Position: Sitting, Cuff Size: Adult)   Pulse 54   Temp 97.9 øF (36.6 øC) (Oral)   Resp 16   Ht 160 cm (62.99\")   Wt 68.2 kg (150 lb 4.8 oz)   SpO2 97%   BMI 26.63 kg/mý     Physical Exam  Vitals and nursing note reviewed.   Constitutional:       Appearance: Normal appearance.      Comments: Pleasant, neatly groomed, no distress.   Cardiovascular:      Rate and Rhythm: Regular rhythm.      Heart sounds: Normal heart sounds.   Pulmonary:      Effort: No respiratory distress.      Breath sounds: Normal breath sounds. No wheezing or rales.   Musculoskeletal:      Comments: 2 complains of pain in both of her shoulders both of her feet.  She would like to have a referral to the dietary for foot pain. "   Neurological:      Mental Status: She is alert and oriented to person, place, and time.   Psychiatric:         Mood and Affect: Mood normal.         Behavior: Behavior normal.         Thought Content: Thought content normal.                         Assessment and Plan   Diagnoses and all orders for this visit:    1. Chronic pain of both shoulders (Primary)    2. Chronic right shoulder pain  -     Ambulatory Referral to Orthopedic Surgery    3. Pain in both feet  -     Ambulatory Referral to Podiatry    4. Major depressive disorder with single episode, in full remission    5. Medicare annual wellness visit, subsequent      I put a referral in for her to see orthopedic surgery regarding her pain in her right shoulder.    I referred her to podiatry regarding her bilateral foot pain.    She felt that her major depressive disorder is well controlled.    I would like to have her back in 3 to 4 months.       Follow Up   No follow-ups on file.  Patient was given instructions and counseling regarding her condition or for health maintenance advice. Please see specific information pulled into the AVS if appropriate.

## 2023-10-10 ENCOUNTER — OFFICE VISIT (OUTPATIENT)
Dept: ORTHOPEDIC SURGERY | Facility: CLINIC | Age: 78
End: 2023-10-10
Payer: MEDICARE

## 2023-10-10 VITALS — TEMPERATURE: 98.2 F | BODY MASS INDEX: 24.27 KG/M2 | HEIGHT: 66 IN | WEIGHT: 151 LBS

## 2023-10-10 DIAGNOSIS — M54.2 NECK PAIN: ICD-10-CM

## 2023-10-10 DIAGNOSIS — M75.41 IMPINGEMENT SYNDROME OF RIGHT SHOULDER: ICD-10-CM

## 2023-10-10 DIAGNOSIS — R52 PAIN: Primary | ICD-10-CM

## 2023-10-10 RX ADMIN — METHYLPREDNISOLONE ACETATE 80 MG: 80 INJECTION, SUSPENSION INTRA-ARTICULAR; INTRALESIONAL; INTRAMUSCULAR; SOFT TISSUE at 13:56

## 2023-10-10 RX ADMIN — LIDOCAINE HYDROCHLORIDE 2 ML: 10 INJECTION, SOLUTION EPIDURAL; INFILTRATION; INTRACAUDAL; PERINEURAL at 13:56

## 2023-10-10 NOTE — PROGRESS NOTES
New Shoulder      Patient: Chris Cox        YOB: 1945    Medical Record Number: [id number]        Chief Complaints: Right shoulder pain      History of Present Illness: This is a 78-year-old female presents complaining of right shoulder pain she is right-hand dominant been ongoing about 6 weeks she states her shoulder has worsened its mostly the back of the shoulder but also pain into the shoulder it hurts at night if she is on it she is a retired occupational therapist she does complain of some pain in the neck      Allergies:   Allergies   Allergen Reactions    Erythromycin Anaphylaxis    Sulfa Antibiotics Rash    Milk-Related Compounds GI Intolerance    Eggs Or Egg-Derived Products Unknown - Low Severity     PER ALLERGY TESTING RESULTS    Cephalexin Rash    Cephalosporins Rash     STOMACH ISSUES    Molds & Smuts Rash     ITCHING       Medications:   Home Medications:  Current Outpatient Medications on File Prior to Visit   Medication Sig    ascorbic acid (VITAMIN C) 500 MG/5ML liquid Take 5 mL by mouth Daily.    escitalopram (LEXAPRO) 10 MG tablet TAKE 1 TABLET DAILY    Myrbetriq 50 MG tablet sustained-release 24 hour 24 hr tablet     psyllium (Metamucil) 0.52 g capsule Take 1 capsule by mouth Daily.     No current facility-administered medications on file prior to visit.     Current Medications:  Scheduled Meds:  Continuous Infusions:No current facility-administered medications for this visit.    PRN Meds:.    Past Medical History:   Diagnosis Date    Abdominal pain     ADHD     Ankle sprain past    Arthritis     primary both knees    Bronchitis 08/2019    CTS (carpal tunnel syndrome) surgery c. early 2000's    Disease of thyroid gland     Dislocation, shoulder ?    Diverticulosis of intestine     DVT (deep venous thrombosis) 10/2019    right lower extremity    Fracture of wrist ?    Fracture, foot past    Fractures     MULTIPLE BONE FRACTURES-TOOK FOSAMAX 3.5 YRS    Hyperlipidemia      Hypertension     Irregular heart beat     Kidney stone     Knee swelling R. replaced c. 2019    Lumbar spine pain     Migraines     Mono exposure     AGE 35    Neck strain     Neuralgia neuritis, sciatic nerve     Obstructive sleep apnea     20 YRS AGO-NOT CURRENTLY    Rotator cuff syndrome Trapezius pain?    Scoliosis since childhood    Seizures     As a baby    Stress fracture ?    Tuberculosis     As a child    Vertigo     Wrist sprain         Past Surgical History:   Procedure Laterality Date    BREAST BIOPSY Left     2019 stereotactic benign    BREAST EXCISIONAL BIOPSY Left     1971 - fibroadenoma    BREAST SURGERY      Benign fibroadnoma removed from the left breast    COLONOSCOPY N/A 04/11/2022    Procedure: COLONOSCOPY;  Surgeon: Leonor Xavier MD;  [place] [facility] [place]  Service: Gastroenterology;  Laterality: N/A;  Diverticulosis    HAND SURGERY Bilateral 2012    RECONSTRUCTION ON BOTH HANDS    HIP SURGERY  c. 2019    JOINT REPLACEMENT  knee/hip    LITHOTRIPSY      renal    TONGUE SURGERY      TOTAL KNEE ARTHROPLASTY Right 10/22/2019    Procedure: TOTAL KNEE ARTHROPLASTY RIGHT;  Surgeon: Aneesh Okeefe II, MD;  Location: [facility] MAIN OR;  Service: Orthopedics    WRIST SURGERY          Social History     Occupational History    Occupation: Pediatric Occupational therapist     Comment: [employer]   Tobacco Use    Smoking status: Never     Passive exposure: Never    Smokeless tobacco: Never   Vaping Use    Vaping Use: Never used   Substance and Sexual Activity    Alcohol use: Not Currently     Alcohol/week: 1.0 standard drink of alcohol     Types: 1 Glasses of wine per week     Comment: used medicinally    Drug use: No    Sexual activity: Not Currently     Partners: Male     Comment: N/A      Social History     Social History Narrative    Not on file        Family History   Problem Relation Age of Onset    Hypertension Mother     Other Mother     Transient ischemic attack Mother      "Heart disease Mother     Cancer Father         colon    Diabetes Father     Hypertension Father     Other Father         Renal artery aneurysm    Heart disease Father     Stroke Father     Diabetes Paternal Grandmother     Cancer Paternal Grandfather              Review of Systems:     Review of Systems      Physical Exam: 78 y.o. female  General Appearance:    Alert, cooperative, in no acute distress                   Vitals:    10/10/23 1322   Temp: 98.2 øF (36.8 øC)   Weight: 68.5 kg (151 lb)   Height: 167.6 cm (66\")   PainSc:   9      Patient is alert and read x3 no acute distress appears her above-listed at height weight and age.  Affect is normal respiratory rate is normal unlabored. Heart rate regular rate rhythm, sclera, dentition and hearing are normal for the purpose of this exam.    Ortho Exam  Physical exam of the right shoulder reveals no overlying skin changes no lymphedema no lymphadenopathy.  Patient has active flexion 180 with mild symptoms abduction is similar external rotation is to 50 and internal rotation to the upper lumbar spine with mild symptoms.  Patient has good rotator cuff strength 4+ over 5 with isometric strength testing with pain.  Patient has a positive impingement and a positive Smith sign.  She does have some decrease in cervical range of motion in all directions with some pain radiating down the upper trap she appears neurologically intact patient has a normal elbow exam.  Good distal pulses are present  Patient has pain with overhead activity and a positive Neer sign and a positive empty can sign , a positive drop arm and a definitive painful arc   Large Joint Arthrocentesis: R subacromial bursa  Date/Time: 10/10/2023 1:56 PM  Consent given by: patient  Site marked: site marked  Timeout: Immediately prior to procedure a time out was called to verify the correct patient, procedure, equipment, support staff and site/side marked as required   Supporting Documentation  Indications: " pain   Procedure Details  Location: shoulder - R subacromial bursa  Preparation: Patient was prepped and draped in the usual sterile fashion  Needle gauge: 21G.  Approach: posterior  Medications administered: 80 mg methylPREDNISolone acetate 80 MG/ML; 2 mL lidocaine PF 1% 1 %  Patient tolerance: patient tolerated the procedure well with no immediate complications              Radiology:   AP, Scapular Y and Axillary Lateral of the right shoulder were ordered/reviewed to evauate shoulder pain.  I have no comparative films she has some sclerosis at the insertion of the cuff some acromioclavicular arthritis otherwise no acute bony pathology also did AP and lateral cervical spine she is hyperlordotic positioning she has some narrowing of her lower cervical spine disc bases  Imaging Results (Most Recent)       Procedure Component Value Units Date/Time    XR Spine Cervical 2 View [[id number]] Resulted: 10/10/23 1350     Updated: 10/10/23 1350    Impression:      Ordering physician's impression is located in the Encounter Note dated 10/10/23. X-ray performed in the DR room.      XR Shoulder 2+ View Right [[id number]] Resulted: 10/10/23 1305     Updated: 10/10/23 1305    Impression:      Ordering physician's impression is located in the Encounter Note dated 10/10/23. X-ray performed in the DR room.            Assessment/Plan: Right shoulder pain I think some of this is neck but some of the shoulder plan is to proceed with a subacromial injection as a diagnostic and therapeutic tool we are going to start with the shoulder have therapy address the shoulder and the neck we can have her see Brianna if we need to  Cortisone Injection. See procedure note.  Cortisone Injection for DIAGNOSTIC and THERAPUTIC purposes.

## 2023-10-11 ENCOUNTER — PATIENT ROUNDING (BHMG ONLY) (OUTPATIENT)
Dept: ORTHOPEDIC SURGERY | Facility: CLINIC | Age: 78
End: 2023-10-11
Payer: MEDICARE

## 2023-10-11 NOTE — PROGRESS NOTES
A [employer] Message has been sent to the patient for PATIENT ROUNDING with [facility]

## 2023-10-12 RX ORDER — METHYLPREDNISOLONE ACETATE 80 MG/ML
80 INJECTION, SUSPENSION INTRA-ARTICULAR; INTRALESIONAL; INTRAMUSCULAR; SOFT TISSUE
Status: COMPLETED | OUTPATIENT
Start: 2023-10-10 | End: 2023-10-10

## 2023-10-12 RX ORDER — LIDOCAINE HYDROCHLORIDE 10 MG/ML
2 INJECTION, SOLUTION EPIDURAL; INFILTRATION; INTRACAUDAL; PERINEURAL
Status: COMPLETED | OUTPATIENT
Start: 2023-10-10 | End: 2023-10-10

## 2023-11-03 ENCOUNTER — DOCUMENTATION (OUTPATIENT)
Dept: PHYSICAL THERAPY | Facility: CLINIC | Age: 78
End: 2023-11-03
Payer: MEDICARE

## 2023-11-03 DIAGNOSIS — M25.511 RIGHT SHOULDER PAIN, UNSPECIFIED CHRONICITY: Primary | ICD-10-CM

## 2023-11-03 NOTE — PROGRESS NOTES
Discharge Summary  Discharge Summary from Physical Therapy Report  [address], [address], [place] [address]    Patient Information  Chris Cox  1945    Dates  PT visit: 06/29/2023 - 08/21/2023  Number of Visits: 9     Discharge Status of Patient: See final Note dated 08/21/2023    Goals: Partially Met    Visit Diagnoses:    ICD-10-CM ICD-9-CM   1. Right shoulder pain, unspecified chronicity  M25.511 719.41       Discharge Plan: Continue with current home exercise program as instructed    Date of Discharge 11/03/2023        Gretel Campos PT, DPT, OCS  Physical Therapist  [place] #[id number]  Electronically Signed by: Gretel Campos PT, 11/03/23, 2:40 PM EDT

## 2023-11-06 NOTE — PROGRESS NOTES
Patient: Chris Cox  YOB: 1945  Date of Service: 11/6/2023    Chief Complaints: Right shoulder pain    Subjective:    History of Present Illness: Pt is seen in the office today with complaints of right shoulder pain I last saw her in October we thought it was more rotator cuff we injected her she states she is doing great she really has no pain at all she is happy with where she is        Allergies:   Allergies   Allergen Reactions    Erythromycin Anaphylaxis    Sulfa Antibiotics Rash    Milk-Related Compounds GI Intolerance    Eggs Or Egg-Derived Products Unknown - Low Severity     PER ALLERGY TESTING RESULTS    Cephalexin Rash    Cephalosporins Rash     STOMACH ISSUES    Molds & Smuts Rash     ITCHING       Medications:   Home Medications:  Current Outpatient Medications on File Prior to Visit   Medication Sig    ascorbic acid (VITAMIN C) 500 MG/5ML liquid Take 5 mL by mouth Daily.    escitalopram (LEXAPRO) 10 MG tablet TAKE 1 TABLET DAILY    Myrbetriq 50 MG tablet sustained-release 24 hour 24 hr tablet     psyllium (Metamucil) 0.52 g capsule Take 1 capsule by mouth Daily.     No current facility-administered medications on file prior to visit.     Current Medications:  Scheduled Meds:  Continuous Infusions:No current facility-administered medications for this visit.    PRN Meds:.    I have reviewed the patient's medical history in detail and updated the computerized patient record.  Review and summarization of old records include:    Past Medical History:   Diagnosis Date    Abdominal pain     ADHD     Ankle sprain past    Arthritis     primary both knees    Bronchitis 08/2019    CTS (carpal tunnel syndrome) surgery c. early 2000's    Disease of thyroid gland     Dislocation, shoulder ?    Diverticulosis of intestine     DVT (deep venous thrombosis) 10/2019    right lower extremity    Fracture of wrist ?    Fracture, foot past    Fractures     MULTIPLE BONE FRACTURES-TOOK FOSAMAX 3.5 YRS     Hyperlipidemia     Hypertension     Irregular heart beat     Kidney stone     Knee swelling R. replaced c. 2019    Lumbar spine pain     Migraines     Mono exposure     AGE 35    Neck strain     Neuralgia neuritis, sciatic nerve     Obstructive sleep apnea     20 YRS AGO-NOT CURRENTLY    Rotator cuff syndrome Trapezius pain?    Scoliosis since childhood    Seizures     As a baby    Stress fracture ?    Tuberculosis     As a child    Vertigo     Wrist sprain         Past Surgical History:   Procedure Laterality Date    BREAST BIOPSY Left     2019 stereotactic benign    BREAST EXCISIONAL BIOPSY Left     1971 - fibroadenoma    BREAST SURGERY      Benign fibroadnoma removed from the left breast    COLONOSCOPY N/A 04/11/2022    Procedure: COLONOSCOPY;  Surgeon: Leonor Xavier MD;  Location: [facility] [place];  Service: Gastroenterology;  Laterality: N/A;  Diverticulosis    HAND SURGERY Bilateral 2012    RECONSTRUCTION ON BOTH HANDS    HIP SURGERY  c. 2019    JOINT REPLACEMENT  knee/hip    LITHOTRIPSY      renal    TONGUE SURGERY      TOTAL KNEE ARTHROPLASTY Right 10/22/2019    Procedure: TOTAL KNEE ARTHROPLASTY RIGHT;  Surgeon: Aneesh Okeefe II, MD;  Location: [facility] MAIN OR;  Service: Orthopedics    WRIST SURGERY          Social History     Occupational History    Occupation: Pediatric Occupational therapist     Comment: [employer]   Tobacco Use    Smoking status: Never     Passive exposure: Never    Smokeless tobacco: Never   Vaping Use    Vaping Use: Never used   Substance and Sexual Activity    Alcohol use: Not Currently     Alcohol/week: 1.0 standard drink of alcohol     Types: 1 Glasses of wine per week     Comment: used medicinally    Drug use: No    Sexual activity: Not Currently     Partners: Male     Comment: N/A      Social History     Social History Narrative    Not on file        Family History   Problem Relation Age of Onset    Hypertension Mother     Other Mother     Transient ischemic  attack Mother     Heart disease Mother     Cancer Father         colon    Diabetes Father     Hypertension Father     Other Father         Renal artery aneurysm    Heart disease Father     Stroke Father     Diabetes Paternal Grandmother     Cancer Paternal Grandfather        ROS: 14 point review of systems was performed and was negative except for documented findings in HPI and today's encounter.     Allergies:   Allergies   Allergen Reactions    Erythromycin Anaphylaxis    Sulfa Antibiotics Rash    Milk-Related Compounds GI Intolerance    Eggs Or Egg-Derived Products Unknown - Low Severity     PER ALLERGY TESTING RESULTS    Cephalexin Rash    Cephalosporins Rash     STOMACH ISSUES    Molds & Smuts Rash     ITCHING     Constitutional:  Denies fever, shaking or chills   Eyes:  Denies change in visual acuity   HENT:  Denies nasal congestion or sore throat   Respiratory:  Denies cough or shortness of breath   Cardiovascular:  Denies chest pain or severe LE edema   GI:  Denies abdominal pain, nausea, vomiting, bloody stools or diarrhea   Musculoskeletal:  Numbness, tingling, or loss of motor function only as noted above in history of present illness.  : Denies painful urination or hematuria  Integument:  Denies rash, lesion or ulceration   Neurologic:  Denies headache or focal weakness  Endocrine:  Denies lymphadenopathy  Psych:  Denies confusion or change in mental status   Hem:  Denies active bleeding      Physical Exam: 78 y.o. female  Wt Readings from Last 3 Encounters:   10/10/23 68.5 kg (151 lb)   09/27/23 68.2 kg (150 lb 4.8 oz)   08/17/23 69.4 kg (153 lb)       There is no height or weight on file to calculate BMI.    There were no vitals filed for this visit.  Vital signs reviewed.   General Appearance:    Alert, cooperative, in no acute distress                    Ortho exam        Physical exam of the l right shoulder reveals no overlying skin changes no lymphedema no lymphadenopathy.  The patient can  actively flex to 180, abduction is similar external rotation is to 50, internal rotation to the upper lumbar spine.  Rotator cuff strength is 4+ to 5 over 5 with isometric strength testing without symptoms.  The patient has good cervical range of motion full and asymptomatic no radicular symptoms and a normal elbow exam.  Patient has good distal pulses          Assessment: Right shoulder pain really thought it was more rotator cuff she is done well with conservative management as long she is doing well she can follow-up as needed    Plan:   Follow up as indicated.  Ice, elevate, and rest as needed.  Discussed conservative measures of pain control including ice, bracing.  Also talked about the importance of strengthening and maintaining ideal body weight    Elisa Mason M.D.

## 2023-11-07 ENCOUNTER — OFFICE VISIT (OUTPATIENT)
Dept: ORTHOPEDIC SURGERY | Facility: CLINIC | Age: 78
End: 2023-11-07
Payer: MEDICARE

## 2023-11-07 VITALS — TEMPERATURE: 96.8 F | HEIGHT: 63 IN | BODY MASS INDEX: 27.34 KG/M2 | WEIGHT: 154.3 LBS

## 2023-11-07 DIAGNOSIS — M75.41 IMPINGEMENT SYNDROME OF RIGHT SHOULDER: Primary | ICD-10-CM

## 2023-11-07 PROCEDURE — 99212 OFFICE O/P EST SF 10 MIN: CPT | Performed by: ORTHOPAEDIC SURGERY

## 2023-11-07 PROCEDURE — 1160F RVW MEDS BY RX/DR IN RCRD: CPT | Performed by: ORTHOPAEDIC SURGERY

## 2023-11-07 PROCEDURE — 1159F MED LIST DOCD IN RCRD: CPT | Performed by: ORTHOPAEDIC SURGERY

## 2023-11-07 RX ORDER — MEMANTINE HYDROCHLORIDE 5 MG/1
5 TABLET ORAL 2 TIMES DAILY
COMMUNITY

## 2023-12-15 RX ORDER — MEMANTINE HYDROCHLORIDE 5 MG/1
10 TABLET ORAL DAILY
Qty: 180 TABLET | Refills: 3 | Status: SHIPPED | OUTPATIENT
Start: 2023-12-15

## 2023-12-26 ENCOUNTER — OFFICE VISIT (OUTPATIENT)
Dept: FAMILY MEDICINE CLINIC | Facility: CLINIC | Age: 78
End: 2023-12-26
Payer: MEDICARE

## 2023-12-26 VITALS
TEMPERATURE: 97 F | HEART RATE: 61 BPM | HEIGHT: 63 IN | DIASTOLIC BLOOD PRESSURE: 70 MMHG | OXYGEN SATURATION: 97 % | BODY MASS INDEX: 32.53 KG/M2 | WEIGHT: 183.6 LBS | RESPIRATION RATE: 16 BRPM | SYSTOLIC BLOOD PRESSURE: 122 MMHG

## 2023-12-26 DIAGNOSIS — E78.00 HYPERCHOLESTEROLEMIA: ICD-10-CM

## 2023-12-26 DIAGNOSIS — R41.3 MEMORY LOSS: ICD-10-CM

## 2023-12-26 DIAGNOSIS — R73.9 HYPERGLYCEMIA: Primary | ICD-10-CM

## 2023-12-26 DIAGNOSIS — F32.5 MAJOR DEPRESSIVE DISORDER WITH SINGLE EPISODE, IN FULL REMISSION: ICD-10-CM

## 2023-12-26 PROBLEM — K59.1 FUNCTIONAL DIARRHEA: Status: RESOLVED | Noted: 2017-08-22 | Resolved: 2023-12-26

## 2023-12-26 PROCEDURE — 99213 OFFICE O/P EST LOW 20 MIN: CPT | Performed by: INTERNAL MEDICINE

## 2023-12-26 RX ORDER — ROSUVASTATIN CALCIUM 10 MG/1
10 TABLET, COATED ORAL DAILY
Qty: 90 TABLET | Refills: 1 | Status: SHIPPED | OUTPATIENT
Start: 2023-12-26

## 2023-12-26 NOTE — PROGRESS NOTES
Subjective   Chris Cox is a 78 y.o. female. Patient is here today for   Chief Complaint   Patient presents with    Hyperglycemia          Vitals:    12/26/23 0946   BP: 122/70   Pulse: 61   Resp: 16   Temp: 97 °F (36.1 °C)   SpO2: 97%     Body mass index is 32.53 kg/m².      Past Medical History:   Diagnosis Date    Abdominal pain     ADHD     Ankle sprain past    Arthritis     primary both knees    Bronchitis 08/2019    CTS (carpal tunnel syndrome) surgery c. early 2000's    Disease of thyroid gland     Dislocation, shoulder ?    Diverticulosis of intestine     DVT (deep venous thrombosis) 10/2019    right lower extremity    Fracture of wrist ?    Fracture, foot past    Fractures     MULTIPLE BONE FRACTURES-TOOK FOSAMAX 3.5 YRS    Hyperlipidemia     Hypertension     Irregular heart beat     Kidney stone     Knee swelling R. replaced c. 2019    Lumbar spine pain     Medicare annual wellness visit, subsequent 02/04/2021    Migraines     Mono exposure     AGE 35    Neck strain     Neuralgia neuritis, sciatic nerve     Obstructive sleep apnea     20 YRS AGO-NOT CURRENTLY    Rotator cuff syndrome Trapezius pain?    Scoliosis since childhood    Seizures     As a baby    Stress fracture ?    Tuberculosis     As a child    Vertigo     Wrist sprain       Allergies   Allergen Reactions    Erythromycin Anaphylaxis    Sulfa Antibiotics Rash    Milk-Related Compounds GI Intolerance    Eggs Or Egg-Derived Products Unknown - Low Severity     PER ALLERGY TESTING RESULTS    Cephalexin Rash    Cephalosporins Rash     STOMACH ISSUES    Molds & Smuts Rash     ITCHING      Social History     Socioeconomic History    Marital status:      Spouse name: Marky    Years of education: College   Tobacco Use    Smoking status: Never     Passive exposure: Never    Smokeless tobacco: Never   Vaping Use    Vaping Use: Never used   Substance and Sexual Activity    Alcohol use: Not Currently     Alcohol/week: 1.0 standard drink of  alcohol     Types: 1 Glasses of wine per week     Comment: used medicinally    Drug use: No    Sexual activity: Not Currently     Partners: Male     Comment: N/A        Current Outpatient Medications:     ascorbic acid (VITAMIN C) 500 MG/5ML liquid, Take 5 mL by mouth Daily., Disp: , Rfl:     escitalopram (LEXAPRO) 10 MG tablet, TAKE 1 TABLET DAILY, Disp: 90 tablet, Rfl: 3    memantine (NAMENDA) 5 MG tablet, TAKE 2 TABLETS DAILY, Disp: 180 tablet, Rfl: 3    Myrbetriq 50 MG tablet sustained-release 24 hour 24 hr tablet, , Disp: , Rfl:     psyllium (Metamucil) 0.52 g capsule, Take 1 capsule by mouth Daily., Disp: 30 capsule, Rfl: 11    rosuvastatin (Crestor) 10 MG tablet, Take 1 tablet by mouth Daily., Disp: 90 tablet, Rfl: 1     Objective     History of Present Illness  This patient is here to follow-up on labs from last week.    She feels well.    She has no complaints.    She is accompanied by her .    I saw her last she was complaining of frequent loose bowel movements.  None of her GI complaints remain after discontinuing donepezil.  Hyperglycemia         Review of Systems   Constitutional: Negative.    HENT: Negative.     Respiratory: Negative.     Cardiovascular: Negative.    Musculoskeletal: Negative.    Psychiatric/Behavioral: Negative.         Physical Exam  Vitals and nursing note reviewed.   Constitutional:       Appearance: Normal appearance.      Comments: Pleasant, neatly groomed, no distress.   Cardiovascular:      Rate and Rhythm: Regular rhythm.      Heart sounds: Normal heart sounds. No murmur heard.     No gallop.   Pulmonary:      Effort: No respiratory distress.      Breath sounds: Normal breath sounds. No wheezing or rales.   Neurological:      Mental Status: She is alert and oriented to person, place, and time.   Psychiatric:         Mood and Affect: Mood normal.         Behavior: Behavior normal.         Thought Content: Thought content normal.           Problems Addressed this Visit           Cardiac and Vasculature    Hypercholesterolemia    Relevant Medications    rosuvastatin (Crestor) 10 MG tablet       Endocrine and Metabolic    Hyperglycemia - Primary       Mental Health    Major depressive disorder with single episode, in full remission       Neuro    Memory loss     Diagnoses         Codes Comments    Hyperglycemia    -  Primary ICD-10-CM: R73.9  ICD-9-CM: 790.29     Major depressive disorder with single episode, in full remission     ICD-10-CM: F32.5  ICD-9-CM: 296.26     Hypercholesterolemia     ICD-10-CM: E78.00  ICD-9-CM: 272.0     Memory loss     ICD-10-CM: R41.3  ICD-9-CM: 780.93               PLAN  For memory loss is getting worse gradually.  She did have an adverse reaction to donepezil with frequent loose bowel movements have resolved since she stopped taking it.    She has major depressive disorder which she feels is relatively well-controlled on S-Citalopram.    She has hypercholesterolemia.  I feel better if she is taking cholesterol medication so I sent out a prescription for rosuvastatin 10 mg daily.    I would like to have her back in about 3 months.  Fasting labs prior to visit should include: Lipid profile, comprehensive metabolic panel, CBC without differential.  No follow-ups on file.  Answers submitted by the patient for this visit:  Other (Submitted on 12/19/2023)  Please describe your symptoms.: Regularly scheduled visit.  Some questions re: neck pulsations, scalp pains, report on shoulder.  Have you had these symptoms before?: Yes  How long have you been having these symptoms?: Greater than 2 weeks  Please list any medications you are currently taking for this condition.: None  Please describe any probable cause for these symptoms. : ?  Primary Reason for Visit (Submitted on 12/19/2023)  What is the primary reason for your visit?: Other

## 2024-01-01 ENCOUNTER — HOSPITAL ENCOUNTER (OUTPATIENT)
Facility: HOSPITAL | Age: 79
Discharge: HOME | End: 2024-01-01
Attending: EMERGENCY MEDICINE | Admitting: EMERGENCY MEDICINE
Payer: MEDICARE

## 2024-01-01 ENCOUNTER — APPOINTMENT (OUTPATIENT)
Dept: GENERAL RADIOLOGY | Facility: HOSPITAL | Age: 79
End: 2024-01-01
Payer: MEDICARE

## 2024-01-01 VITALS
DIASTOLIC BLOOD PRESSURE: 54 MMHG | BODY MASS INDEX: 31.89 KG/M2 | WEIGHT: 180 LBS | TEMPERATURE: 98.8 F | OXYGEN SATURATION: 97 % | HEART RATE: 64 BPM | RESPIRATION RATE: 18 BRPM | SYSTOLIC BLOOD PRESSURE: 167 MMHG | HEIGHT: 63 IN

## 2024-01-01 DIAGNOSIS — R53.83 OTHER FATIGUE: ICD-10-CM

## 2024-01-01 DIAGNOSIS — U07.1 COVID-19: Primary | ICD-10-CM

## 2024-01-01 LAB
FLUAV SUBTYP SPEC NAA+PROBE: NOT DETECTED
FLUBV RNA ISLT QL NAA+PROBE: NOT DETECTED
RSV RNA NPH QL NAA+NON-PROBE: NOT DETECTED
SARS-COV-2 RNA RESP QL NAA+PROBE: DETECTED

## 2024-01-01 PROCEDURE — 87634 RSV DNA/RNA AMP PROBE: CPT | Performed by: EMERGENCY MEDICINE

## 2024-01-01 PROCEDURE — G0463 HOSPITAL OUTPT CLINIC VISIT: HCPCS | Performed by: PHYSICIAN ASSISTANT

## 2024-01-01 PROCEDURE — 71045 X-RAY EXAM CHEST 1 VIEW: CPT

## 2024-01-01 PROCEDURE — 87636 SARSCOV2 & INF A&B AMP PRB: CPT | Performed by: EMERGENCY MEDICINE

## 2024-01-01 NOTE — FSED PROVIDER NOTE
Subjective   History of Present Illness    Patient is complaining of cough and fatigue for 4 days.  Denies any fever, body aches, chest pain, shortness of breath.  Denies any recent sick contacts.  Denies any dysuria, hematuria, urinary frequency urgency.  She is complaining of decreased urine.    At baseline, patient has a mild dementia.  Spouse denies any change in her mental status, he says that this is usual for her.    Review of Systems   Constitutional:  Negative for activity change and appetite change.   HENT:  Positive for congestion.    Eyes:  Negative for pain.   Respiratory:  Positive for cough. Negative for shortness of breath.    Gastrointestinal:  Negative for nausea and vomiting.   Musculoskeletal:  Negative for arthralgias.   Skin:  Negative for color change.   Neurological:  Negative for dizziness.   All other systems reviewed and are negative.      Past Medical History:   Diagnosis Date    Abdominal pain     ADHD     Ankle sprain past    Arthritis     primary both knees    Bronchitis 08/2019    CTS (carpal tunnel syndrome) surgery c. early 2000's    Disease of thyroid gland     Dislocation, shoulder ?    Diverticulosis of intestine     DVT (deep venous thrombosis) 10/2019    right lower extremity    Fracture of wrist ?    Fracture, foot past    Fractures     MULTIPLE BONE FRACTURES-TOOK FOSAMAX 3.5 YRS    Hyperlipidemia     Hypertension     Irregular heart beat     Kidney stone     Knee swelling R. replaced c. 2019    Lumbar spine pain     Medicare annual wellness visit, subsequent 02/04/2021    Migraines     Mono exposure     AGE 35    Neck strain     Neuralgia neuritis, sciatic nerve     Obstructive sleep apnea     20 YRS AGO-NOT CURRENTLY    Rotator cuff syndrome Trapezius pain?    Scoliosis since childhood    Seizures     As a baby    Stress fracture ?    Tuberculosis     As a child    Vertigo     Wrist sprain        Allergies   Allergen Reactions    Erythromycin Anaphylaxis    Sulfa  Antibiotics Rash    Milk-Related Compounds GI Intolerance    Eggs Or Egg-Derived Products Unknown - Low Severity     PER ALLERGY TESTING RESULTS    Cephalexin Rash    Cephalosporins Rash     STOMACH ISSUES    Molds & Smuts Rash     ITCHING       Past Surgical History:   Procedure Laterality Date    BREAST BIOPSY Left     2019 stereotactic benign    BREAST EXCISIONAL BIOPSY Left     1971 - fibroadenoma    BREAST SURGERY      Benign fibroadnoma removed from the left breast    COLONOSCOPY N/A 04/11/2022    Procedure: COLONOSCOPY;  Surgeon: Leonor Xavier MD;  Location: [facility] MAIN OR;  Service: Gastroenterology;  Laterality: N/A;  Diverticulosis    HAND SURGERY Bilateral 2012    RECONSTRUCTION ON BOTH HANDS    HIP SURGERY  c. 2019    JOINT REPLACEMENT  knee/hip    LITHOTRIPSY      renal    TONGUE SURGERY      TOTAL KNEE ARTHROPLASTY Right 10/22/2019    Procedure: TOTAL KNEE ARTHROPLASTY RIGHT;  Surgeon: Aneesh Okeefe II, MD;  Location: [facility] MAIN OR;  Service: Orthopedics    WRIST SURGERY         Family History   Problem Relation Age of Onset    Hypertension Mother     Other Mother     Transient ischemic attack Mother     Heart disease Mother     Cancer Father         colon    Diabetes Father     Hypertension Father     Other Father         Renal artery aneurysm    Heart disease Father     Stroke Father     Diabetes Paternal Grandmother     Cancer Paternal Grandfather        Social History     Socioeconomic History    Marital status:      Spouse name: Marky    Years of education: College   Tobacco Use    Smoking status: Never     Passive exposure: Never    Smokeless tobacco: Never   Vaping Use    Vaping Use: Never used   Substance and Sexual Activity    Alcohol use: Not Currently     Alcohol/week: 1.0 standard drink of alcohol     Types: 1 Glasses of wine per week     Comment: used medicinally    Drug use: No    Sexual activity: Not Currently     Partners: Male     Comment: N/A            Objective   Physical Exam  Vitals and nursing note reviewed.   Constitutional:       Appearance: Normal appearance. She is normal weight.      Comments: Patient is well-appearing   HENT:      Head: Normocephalic and atraumatic.      Nose: Nose normal.      Mouth/Throat:      Mouth: Mucous membranes are moist.   Eyes:      Pupils: Pupils are equal, round, and reactive to light.   Cardiovascular:      Rate and Rhythm: Normal rate and regular rhythm.      Pulses: Normal pulses.      Heart sounds: Normal heart sounds.   Pulmonary:      Effort: Pulmonary effort is normal.      Breath sounds: Normal breath sounds.   Musculoskeletal:         General: Normal range of motion.      Cervical back: Normal range of motion.      Right lower leg: No edema.      Left lower leg: No edema.   Skin:     General: Skin is warm.   Neurological:      General: No focal deficit present.      Mental Status: She is alert.   Psychiatric:         Behavior: Behavior is cooperative.         Procedures           ED Course  ED Course as of 01/01/24 1419   Mon Jan 01, 2024   1336 I rechecked patient and informed patient and spouse regarding the positive COVID test.  Informed them that the chest x-ray is unremarkable.  Patient is still denying any shortness of breath.  I educated them regarding Paxlovid and they were requesting the prescription.  I gave them warning signs as to when to go to the return to the ER.  They expressed understanding and all questions addressed at this time.    Staff reports that patient has not started the rosuvastatin yet and is still waiting for the arrival of the medication.  I educated him to not take it until 2 to 3 days after she completes the Paxlovid. [SS]   1342 Last blood work was done 2 weeks ago and GFR was normal.  It was 68. [SS]      ED Course User Index  [SS] Denice Hou PA-C                                           Medical Decision Making  Problems Addressed:  COVID-19: complicated acute  illness or injury  Other fatigue: complicated acute illness or injury    Amount and/or Complexity of Data Reviewed  Labs: ordered.  Radiology: ordered.    Risk  Prescription drug management.        Final diagnoses:   COVID-19   Other fatigue       ED Disposition  ED Disposition       ED Disposition   Discharge    Condition   Stable    Comment   --               Raghavendra Marquez MD  [id number] [id number] [phone]               Medication List        New Prescriptions      Nirmatrelvir & Ritonavir (300mg/100mg) 20 x 150 MG & 10 x 100MG tablet therapy pack tablet  Commonly known as: PAXLOVID  Take 3 tablets by mouth 2 (Two) Times a Day for 5 days.               Where to Get Your Medications        You can get these medications from any pharmacy    Bring a paper prescription for each of these medications  Nirmatrelvir & Ritonavir (300mg/100mg) 20 x 150 MG & 10 x 100MG tablet therapy pack tablet

## 2024-01-01 NOTE — DISCHARGE INSTRUCTIONS
I advised you self quarantine through the end of the day tomorrow.  Take the Paxlovid as prescribed.  I strongly advise you start it today.  As discussed, do not start your rosuvastatin medication until 2 to 3 days after you complete the Paxlovid and buy a pulse oximeter at a pharmacy to monitor your oxygen level.  If it drops to 90% or lower, return to the ER.    Follow-up with your primary care doctor next week to recheck your symptoms.

## 2024-01-10 NOTE — PROGRESS NOTES
Patient: Chris Cox  YOB: 1945  Date of Service: 1/10/2024    Chief Complaints: Right shoulder pain    Subjective:    History of Present Illness: Pt is seen in the office today with complaints of right shoulder pain I last saw her November prior to that saw her a few months prior to that we injected her shoulder she got good relief from that she was still feeling good in November now her  states she is having some pain in the shoulder pain in the shoulder blade complaining a little bit more about it.  She does have some dementia so it is difficult to get a great history from her        Allergies:   Allergies   Allergen Reactions    Erythromycin Anaphylaxis    Sulfa Antibiotics Rash    Milk-Related Compounds GI Intolerance    Eggs Or Egg-Derived Products Unknown - Low Severity     PER ALLERGY TESTING RESULTS    Cephalexin Rash    Cephalosporins Rash     STOMACH ISSUES    Molds & Smuts Rash     ITCHING       Medications:   Home Medications:  Current Outpatient Medications on File Prior to Visit   Medication Sig    ascorbic acid (VITAMIN C) 500 MG/5ML liquid Take 5 mL by mouth Daily.    escitalopram (LEXAPRO) 10 MG tablet TAKE 1 TABLET DAILY    memantine (NAMENDA) 5 MG tablet TAKE 2 TABLETS DAILY    Myrbetriq 50 MG tablet sustained-release 24 hour 24 hr tablet     psyllium (Metamucil) 0.52 g capsule Take 1 capsule by mouth Daily.    rosuvastatin (Crestor) 10 MG tablet Take 1 tablet by mouth Daily.     No current facility-administered medications on file prior to visit.     Current Medications:  Scheduled Meds:  Continuous Infusions:No current facility-administered medications for this visit.    PRN Meds:.    I have reviewed the patient's medical history in detail and updated the computerized patient record.  Review and summarization of old records include:    Past Medical History:   Diagnosis Date    Abdominal pain     ADHD     Ankle sprain past    Arthritis     primary both knees     Bronchitis 08/2019    CTS (carpal tunnel syndrome) surgery c. early 2000's    Disease of thyroid gland     Dislocation, shoulder ?    Diverticulosis of intestine     DVT (deep venous thrombosis) 10/2019    right lower extremity    Fracture of wrist ?    Fracture, foot past    Fractures     MULTIPLE BONE FRACTURES-TOOK FOSAMAX 3.5 YRS    Hyperlipidemia     Hypertension     Irregular heart beat     Kidney stone     Knee swelling R. replaced c. 2019    Lumbar spine pain     Medicare annual wellness visit, subsequent 02/04/2021    Migraines     Mono exposure     AGE 35    Neck strain     Neuralgia neuritis, sciatic nerve     Obstructive sleep apnea     20 YRS AGO-NOT CURRENTLY    Rotator cuff syndrome Trapezius pain?    Scoliosis since childhood    Seizures     As a baby    Stress fracture ?    Tuberculosis     As a child    Vertigo     Wrist sprain         Past Surgical History:   Procedure Laterality Date    BREAST BIOPSY Left     2019 stereotactic benign    BREAST EXCISIONAL BIOPSY Left     1971 - fibroadenoma    BREAST SURGERY      Benign fibroadnoma removed from the left breast    COLONOSCOPY N/A 04/11/2022    Procedure: COLONOSCOPY;  Surgeon: Leonor Xavier MD;  Location: [facility] MAIN OR;  Service: Gastroenterology;  Laterality: N/A;  Diverticulosis    HAND SURGERY Bilateral 2012    RECONSTRUCTION ON BOTH HANDS    HIP SURGERY  c. 2019    JOINT REPLACEMENT  knee/hip    LITHOTRIPSY      renal    TONGUE SURGERY      TOTAL KNEE ARTHROPLASTY Right 10/22/2019    Procedure: TOTAL KNEE ARTHROPLASTY RIGHT;  Surgeon: Aneesh Okeefe II, MD;  Location: [facility] MAIN OR;  Service: Orthopedics    WRIST SURGERY          Social History     Occupational History    Occupation: Pediatric Occupational therapist     Comment: [employer]   Tobacco Use    Smoking status: Never     Passive exposure: Never    Smokeless tobacco: Never   Vaping Use    Vaping Use: Never used   Substance and Sexual Activity    Alcohol use:  Not Currently     Alcohol/week: 1.0 standard drink of alcohol     Types: 1 Glasses of wine per week     Comment: used medicinally    Drug use: No    Sexual activity: Not Currently     Partners: Male     Comment: N/A      Social History     Social History Narrative    Not on file        Family History   Problem Relation Age of Onset    Hypertension Mother     Other Mother     Transient ischemic attack Mother     Heart disease Mother     Cancer Father         colon    Diabetes Father     Hypertension Father     Other Father         Renal artery aneurysm    Heart disease Father     Stroke Father     Diabetes Paternal Grandmother     Cancer Paternal Grandfather        ROS: 14 point review of systems was performed and was negative except for documented findings in HPI and today's encounter.     Allergies:   Allergies   Allergen Reactions    Erythromycin Anaphylaxis    Sulfa Antibiotics Rash    Milk-Related Compounds GI Intolerance    Eggs Or Egg-Derived Products Unknown - Low Severity     PER ALLERGY TESTING RESULTS    Cephalexin Rash    Cephalosporins Rash     STOMACH ISSUES    Molds & Smuts Rash     ITCHING     Constitutional:  Denies fever, shaking or chills   Eyes:  Denies change in visual acuity   HENT:  Denies nasal congestion or sore throat   Respiratory:  Denies cough or shortness of breath   Cardiovascular:  Denies chest pain or severe LE edema   GI:  Denies abdominal pain, nausea, vomiting, bloody stools or diarrhea   Musculoskeletal:  Numbness, tingling, or loss of motor function only as noted above in history of present illness.  : Denies painful urination or hematuria  Integument:  Denies rash, lesion or ulceration   Neurologic:  Denies headache or focal weakness  Endocrine:  Denies lymphadenopathy  Psych:  Denies confusion or change in mental status   Hem:  Denies active bleeding      Physical Exam: 78 y.o. female  Wt Readings from Last 3 Encounters:   01/01/24 81.6 kg (180 lb)   12/26/23 83.3 kg (183 lb  9.6 oz)   11/07/23 70 kg (154 lb 4.8 oz)       There is no height or weight on file to calculate BMI.    There were no vitals filed for this visit.  Vital signs reviewed.   General Appearance:    Alert, cooperative, in no acute distress                    Ortho exam  Physical exam of the right shoulder reveals no overlying skin changes no lymphedema no lymphadenopathy.  Patient has active flexion 180 with mild symptoms abduction is similar external rotation is to 50 and internal rotation to the upper lumbar spine with mild symptoms.  Patient has good rotator cuff strength 4+ over 5 with isometric strength testing with pain.  Patient has a positive impingement and a positive Smith sign.  Patient has good cervical range of motion which is full and asymptomatic no radicular symptoms.  Patient has a normal elbow exam.  Good distal pulses are present  Patient has pain with overhead activity and a positive Neer sign and a positive empty can sign , a positive drop arm and a definitive painful arc                Assessment: Right shoulder pain I still think this is probably rotator cuff she got good relief from the injection.  She is not an operative candidate I think if we give her some improvement quality of life and some pain relief injecting this every 3 months and    Plan:   Follow up as indicated.  Ice, elevate, and rest as needed.  Discussed conservative measures of pain control including ice, bracing.      Large Joint Arthrocentesis: R subacromial bursa  Date/Time: 1/12/2024 9:31 AM  Consent given by: patient  Site marked: site marked  Timeout: Immediately prior to procedure a time out was called to verify the correct patient, procedure, equipment, support staff and site/side marked as required   Supporting Documentation  Indications: pain   Procedure Details  Location: shoulder - R subacromial bursa  Preparation: Patient was prepped and draped in the usual sterile fashion  Needle gauge: 21G.  Approach:  posterior  Medications administered: 80 mg methylPREDNISolone acetate 80 MG/ML; 2 mL lidocaine PF 1% 1 %  Patient tolerance: patient tolerated the procedure well with no immediate complications         Elisa Mason M.D.

## 2024-01-12 ENCOUNTER — OFFICE VISIT (OUTPATIENT)
Dept: ORTHOPEDIC SURGERY | Facility: CLINIC | Age: 79
End: 2024-01-12
Payer: MEDICARE

## 2024-01-12 VITALS — BODY MASS INDEX: 31.89 KG/M2 | WEIGHT: 180 LBS | TEMPERATURE: 98.6 F | HEIGHT: 63 IN

## 2024-01-12 DIAGNOSIS — M75.41 IMPINGEMENT SYNDROME OF RIGHT SHOULDER: Primary | ICD-10-CM

## 2024-01-12 RX ORDER — LIDOCAINE HYDROCHLORIDE 10 MG/ML
2 INJECTION, SOLUTION EPIDURAL; INFILTRATION; INTRACAUDAL; PERINEURAL
Status: COMPLETED | OUTPATIENT
Start: 2024-01-12 | End: 2024-01-12

## 2024-01-12 RX ORDER — METHYLPREDNISOLONE ACETATE 80 MG/ML
80 INJECTION, SUSPENSION INTRA-ARTICULAR; INTRALESIONAL; INTRAMUSCULAR; SOFT TISSUE
Status: COMPLETED | OUTPATIENT
Start: 2024-01-12 | End: 2024-01-12

## 2024-01-12 RX ADMIN — LIDOCAINE HYDROCHLORIDE 2 ML: 10 INJECTION, SOLUTION EPIDURAL; INFILTRATION; INTRACAUDAL; PERINEURAL at 09:31

## 2024-01-12 RX ADMIN — METHYLPREDNISOLONE ACETATE 80 MG: 80 INJECTION, SUSPENSION INTRA-ARTICULAR; INTRALESIONAL; INTRAMUSCULAR; SOFT TISSUE at 09:31

## 2024-01-19 RX ORDER — MIRABEGRON 50 MG/1
50 TABLET, FILM COATED, EXTENDED RELEASE ORAL DAILY
Qty: 90 TABLET | Refills: 0 | Status: SHIPPED | OUTPATIENT
Start: 2024-01-19

## 2024-02-01 RX ORDER — ESCITALOPRAM OXALATE 10 MG/1
TABLET ORAL
Qty: 90 TABLET | Refills: 3 | Status: SHIPPED | OUTPATIENT
Start: 2024-02-01

## 2024-02-06 DIAGNOSIS — F02.B4 MODERATE LATE ONSET ALZHEIMER'S DEMENTIA WITH ANXIETY: Primary | ICD-10-CM

## 2024-02-06 DIAGNOSIS — G30.1 MODERATE LATE ONSET ALZHEIMER'S DEMENTIA WITH ANXIETY: Primary | ICD-10-CM

## 2024-02-20 ENCOUNTER — OFFICE VISIT (OUTPATIENT)
Dept: FAMILY MEDICINE CLINIC | Facility: CLINIC | Age: 79
End: 2024-02-20
Payer: MEDICARE

## 2024-02-20 VITALS
BODY MASS INDEX: 28.46 KG/M2 | DIASTOLIC BLOOD PRESSURE: 60 MMHG | TEMPERATURE: 97.8 F | HEART RATE: 69 BPM | OXYGEN SATURATION: 97 % | RESPIRATION RATE: 14 BRPM | SYSTOLIC BLOOD PRESSURE: 124 MMHG | WEIGHT: 160.6 LBS | HEIGHT: 63 IN

## 2024-02-20 DIAGNOSIS — R60.0 BILATERAL EDEMA OF LOWER EXTREMITY: Primary | ICD-10-CM

## 2024-02-20 PROCEDURE — 99213 OFFICE O/P EST LOW 20 MIN: CPT | Performed by: INTERNAL MEDICINE

## 2024-02-26 ENCOUNTER — APPOINTMENT (OUTPATIENT)
Dept: GENERAL RADIOLOGY | Facility: HOSPITAL | Age: 79
End: 2024-02-26
Payer: MEDICARE

## 2024-02-26 ENCOUNTER — HOSPITAL ENCOUNTER (OUTPATIENT)
Facility: HOSPITAL | Age: 79
Discharge: HOME | End: 2024-02-26
Attending: EMERGENCY MEDICINE | Admitting: EMERGENCY MEDICINE
Payer: MEDICARE

## 2024-02-26 VITALS
TEMPERATURE: 97.6 F | SYSTOLIC BLOOD PRESSURE: 131 MMHG | OXYGEN SATURATION: 99 % | DIASTOLIC BLOOD PRESSURE: 60 MMHG | BODY MASS INDEX: 31.41 KG/M2 | RESPIRATION RATE: 16 BRPM | HEART RATE: 66 BPM | HEIGHT: 60 IN | WEIGHT: 160 LBS

## 2024-02-26 DIAGNOSIS — S60.221A CONTUSION OF RIGHT HAND, INITIAL ENCOUNTER: ICD-10-CM

## 2024-02-26 DIAGNOSIS — S93.402A SPRAIN OF LEFT ANKLE, UNSPECIFIED LIGAMENT, INITIAL ENCOUNTER: Primary | ICD-10-CM

## 2024-02-26 DIAGNOSIS — S80.12XA CONTUSION OF LEFT LOWER LEG, INITIAL ENCOUNTER: ICD-10-CM

## 2024-02-26 PROCEDURE — 73110 X-RAY EXAM OF WRIST: CPT

## 2024-02-26 PROCEDURE — G0463 HOSPITAL OUTPT CLINIC VISIT: HCPCS | Performed by: PHYSICIAN ASSISTANT

## 2024-02-26 PROCEDURE — 73610 X-RAY EXAM OF ANKLE: CPT

## 2024-02-26 NOTE — FSED PROVIDER NOTE
Subjective   History of Present Illness  Patient is a 78-year-old female who has dementia.  They were doing yard work today and she had a fall on concrete.  She landed on her hands and knees and is having complaints of left ankle pain and right hand/wrist pain.  She did not hit her head and is not having headaches.  She is not having neck pain.  She is not on blood thinners.  She is accompanied by her  who witnessed the fall and says that she is baseline with her dementia.      Review of Systems   HENT: Negative.     Eyes: Negative.    Respiratory: Negative.     Cardiovascular: Negative.    Gastrointestinal: Negative.    Musculoskeletal:  Negative for back pain and neck pain.   Skin:  Positive for wound.   Psychiatric/Behavioral:  Negative for confusion (Baseline per ).    All other systems reviewed and are negative.      Past Medical History:   Diagnosis Date    Abdominal pain     ADHD     Ankle sprain past    Arthritis     primary both knees    Bronchitis 08/2019    CTS (carpal tunnel syndrome) surgery c. early 2000's    Disease of thyroid gland     Dislocation, shoulder ?    Diverticulosis of intestine     DVT (deep venous thrombosis) 10/2019    right lower extremity    Fracture of wrist ?    Fracture, foot past    Fractures     MULTIPLE BONE FRACTURES-TOOK FOSAMAX 3.5 YRS    Hyperlipidemia     Hypertension     Irregular heart beat     Kidney stone     Knee swelling R. replaced c. 2019    Lumbar spine pain     Medicare annual wellness visit, subsequent 02/04/2021    Migraines     Mono exposure     AGE 35    Neck strain     Neuralgia neuritis, sciatic nerve     Obstructive sleep apnea     20 YRS AGO-NOT CURRENTLY    Rotator cuff syndrome Trapezius pain?    Scoliosis since childhood    Seizures     As a baby    Stress fracture ?    Tuberculosis     As a child    Vertigo     Wrist sprain        Allergies   Allergen Reactions    Erythromycin Anaphylaxis    Sulfa Antibiotics Rash    Milk-Related  Compounds GI Intolerance    Eggs Or Egg-Derived Products Unknown - Low Severity     PER ALLERGY TESTING RESULTS    Cephalexin Rash    Cephalosporins Rash     STOMACH ISSUES    Molds & Smuts Rash     ITCHING       Past Surgical History:   Procedure Laterality Date    BREAST BIOPSY Left     2019 stereotactic benign    BREAST EXCISIONAL BIOPSY Left     1971 - fibroadenoma    BREAST SURGERY      Benign fibroadnoma removed from the left breast    COLONOSCOPY N/A 04/11/2022    Procedure: COLONOSCOPY;  Surgeon: Leonor Xavier MD;  [place] [facility] [place];  Service: Gastroenterology;  Laterality: N/A;  Diverticulosis    HAND SURGERY Bilateral 2012    RECONSTRUCTION ON BOTH HANDS    HIP SURGERY  c. 2019    JOINT REPLACEMENT  knee/hip    LITHOTRIPSY      renal    TONGUE SURGERY      TOTAL KNEE ARTHROPLASTY Right 10/22/2019    Procedure: TOTAL KNEE ARTHROPLASTY RIGHT;  Surgeon: Aneesh Okeefe II, MD;  Location: [facility] MAIN OR;  Service: Orthopedics    WRIST SURGERY         Family History   Problem Relation Age of Onset    Hypertension Mother     Other Mother     Transient ischemic attack Mother     Heart disease Mother     Cancer Father         colon    Diabetes Father     Hypertension Father     Other Father         Renal artery aneurysm    Heart disease Father     Stroke Father     Diabetes Paternal Grandmother     Cancer Paternal Grandfather        Social History     Socioeconomic History    Marital status:      Spouse name: Marky    Years of education: College   Tobacco Use    Smoking status: Never     Passive exposure: Never    Smokeless tobacco: Never   Vaping Use    Vaping Use: Never used   Substance and Sexual Activity    Alcohol use: Not Currently     Alcohol/week: 1.0 standard drink of alcohol     Types: 1 Glasses of wine per week     Comment: used medicinally    Drug use: No    Sexual activity: Not Currently     Partners: Male     Comment: N/A           Objective   Physical Exam  Vitals  reviewed.   Constitutional:       Appearance: Normal appearance.   HENT:      Right Ear: Tympanic membrane normal.      Left Ear: Tympanic membrane normal.   Eyes:      Conjunctiva/sclera: Conjunctivae normal.   Cardiovascular:      Rate and Rhythm: Normal rate.      Pulses: Normal pulses.      Heart sounds: Normal heart sounds.   Pulmonary:      Effort: Pulmonary effort is normal.      Breath sounds: Normal breath sounds.   Abdominal:      Tenderness: There is no right CVA tenderness or left CVA tenderness.   Musculoskeletal:         General: Swelling and tenderness present. No deformity. Normal range of motion.      Cervical back: Normal range of motion and neck supple. No tenderness.   Skin:     General: Skin is warm and dry.      Capillary Refill: Capillary refill takes less than 2 seconds.      Comments: Small, superficial abrasion on right forearm   Neurological:      Mental Status: She is alert. Mental status is at baseline.      Gait: Gait normal.   Psychiatric:         Mood and Affect: Mood normal.         Behavior: Behavior normal.         Procedures           ED Course                                           Medical Decision Making  Presentation, patient chiefly complains about a pain on the right hypothenar eminence and the anterolateral portion of her left high ankle.  She had tripped while walking there were doing gardening.  She fell had FOOSH.  Did not hit head or neck.  No loss of consciousness and has been says she is baseline with her dementia.    X-rays unremarkable other than what looked like an old avulsion off the calcaneus on the left ankle near the distal fibula.  She is not having pain there, her pain is actually higher up and slightly more anterior to the fibula.  She is walking without difficulty and I do feel medically cleared at this time.  No propped up and use ice for few days.  They have family doctor for follow-up she feels appropriate.  Return as needed.    Differential diagnosis  include fracture versus sprain versus contusion    Problems Addressed:  Contusion of left lower leg, initial encounter: complicated acute illness or injury  Contusion of right hand, initial encounter: complicated acute illness or injury  Sprain of left ankle, unspecified ligament, initial encounter: complicated acute illness or injury    Amount and/or Complexity of Data Reviewed  Radiology: ordered.        Final diagnoses:   Sprain of left ankle, unspecified ligament, initial encounter   Contusion of right hand, initial encounter   Contusion of left lower leg, initial encounter       ED Disposition  ED Disposition       ED Disposition   Discharge    Condition   Stable    Comment   --               Raghavendra Marquez MD  [id number] [id number]  [phone]      Keep your upcoming appointment         Medication List      No changes were made to your prescriptions during this visit.

## 2024-02-26 NOTE — FSED PROVIDER NOTE
Subjective   History of Present Illness  Patient is a 78-year-old female who has dementia.  She presents emergency room with her .      Review of Systems    Past Medical History:   Diagnosis Date    Abdominal pain     ADHD     Ankle sprain past    Arthritis     primary both knees    Bronchitis 08/2019    CTS (carpal tunnel syndrome) surgery c. early 2000's    Disease of thyroid gland     Dislocation, shoulder ?    Diverticulosis of intestine     DVT (deep venous thrombosis) 10/2019    right lower extremity    Fracture of wrist ?    Fracture, foot past    Fractures     MULTIPLE BONE FRACTURES-TOOK FOSAMAX 3.5 YRS    Hyperlipidemia     Hypertension     Irregular heart beat     Kidney stone     Knee swelling R. replaced c. 2019    Lumbar spine pain     Medicare annual wellness visit, subsequent 02/04/2021    Migraines     Mono exposure     AGE 35    Neck strain     Neuralgia neuritis, sciatic nerve     Obstructive sleep apnea     20 YRS AGO-NOT CURRENTLY    Rotator cuff syndrome Trapezius pain?    Scoliosis since childhood    Seizures     As a baby    Stress fracture ?    Tuberculosis     As a child    Vertigo     Wrist sprain        Allergies   Allergen Reactions    Erythromycin Anaphylaxis    Sulfa Antibiotics Rash    Milk-Related Compounds GI Intolerance    Eggs Or Egg-Derived Products Unknown - Low Severity     PER ALLERGY TESTING RESULTS    Cephalexin Rash    Cephalosporins Rash     STOMACH ISSUES    Molds & Smuts Rash     ITCHING       Past Surgical History:   Procedure Laterality Date    BREAST BIOPSY Left     2019 stereotactic benign    BREAST EXCISIONAL BIOPSY Left     1971 - fibroadenoma    BREAST SURGERY      Benign fibroadnoma removed from the left breast    COLONOSCOPY N/A 04/11/2022    Procedure: COLONOSCOPY;  Surgeon: Leonor Xavier MD;  Location: [facility] MAIN OR;  Service: Gastroenterology;  Laterality: N/A;  Diverticulosis    HAND SURGERY Bilateral 2012    RECONSTRUCTION ON BOTH HANDS    HIP  SURGERY  c. 2019    JOINT REPLACEMENT  knee/hip    LITHOTRIPSY      renal    TONGUE SURGERY      TOTAL KNEE ARTHROPLASTY Right 10/22/2019    Procedure: TOTAL KNEE ARTHROPLASTY RIGHT;  Surgeon: Aneesh Okeefe II, MD;  Location: [facility];  Service: Orthopedics    WRIST SURGERY         Family History   Problem Relation Age of Onset    Hypertension Mother     Other Mother     Transient ischemic attack Mother     Heart disease Mother     Cancer Father         colon    Diabetes Father     Hypertension Father     Other Father         Renal artery aneurysm    Heart disease Father     Stroke Father     Diabetes Paternal Grandmother     Cancer Paternal Grandfather        Social History     Socioeconomic History    Marital status:      Spouse name: Marky    Years of education: College   Tobacco Use    Smoking status: Never     Passive exposure: Never    Smokeless tobacco: Never   Vaping Use    Vaping Use: Never used   Substance and Sexual Activity    Alcohol use: Not Currently     Alcohol/week: 1.0 standard drink of alcohol     Types: 1 Glasses of wine per week     Comment: used medicinally    Drug use: No    Sexual activity: Not Currently     Partners: Male     Comment: N/A           Objective   Physical Exam    Procedures           ED Course                                           Medical Decision Making  Problems Addressed:  Contusion of left lower leg, initial encounter: complicated acute illness or injury  Contusion of right hand, initial encounter: complicated acute illness or injury  Sprain of left ankle, unspecified ligament, initial encounter: complicated acute illness or injury    Amount and/or Complexity of Data Reviewed  Radiology: ordered.        Final diagnoses:   Sprain of left ankle, unspecified ligament, initial encounter   Contusion of right hand, initial encounter   Contusion of left lower leg, initial encounter       ED Disposition  ED Disposition       ED Disposition   Discharge     Condition   Stable    Comment   --               Raghavendra Marquez MD  [address] [id number]  [phone]      Keep your upcoming appointment         Medication List      No changes were made to your prescriptions during this visit.

## 2024-03-06 DIAGNOSIS — E78.00 HYPERCHOLESTEROLEMIA: Primary | ICD-10-CM

## 2024-03-19 LAB
ALBUMIN SERPL-MCNC: 4.2 G/DL (ref 3.5–5.2)
ALBUMIN/GLOB SERPL: 2.6 G/DL
ALP SERPL-CCNC: 96 U/L (ref 39–117)
ALT SERPL-CCNC: 31 U/L (ref 1–33)
APPEARANCE UR: CLEAR
AST SERPL-CCNC: 24 U/L (ref 1–32)
BACTERIA #/AREA URNS HPF: ABNORMAL /HPF
BASOPHILS # BLD AUTO: 0.02 10*3/MM3 (ref 0–0.2)
BASOPHILS NFR BLD AUTO: 0.5 % (ref 0–1.5)
BILIRUB SERPL-MCNC: 0.4 MG/DL (ref 0–1.2)
BILIRUB UR QL STRIP: NEGATIVE
BUN SERPL-MCNC: 11 MG/DL (ref 8–23)
BUN/CREAT SERPL: 13.1 (ref 7–25)
CALCIUM SERPL-MCNC: 8.9 MG/DL (ref 8.6–10.5)
CASTS URNS MICRO: ABNORMAL
CHLORIDE SERPL-SCNC: 104 MMOL/L (ref 98–107)
CHOLEST SERPL-MCNC: 143 MG/DL (ref 0–200)
CHOLEST/HDLC SERPL: 2.13 {RATIO}
CO2 SERPL-SCNC: 30 MMOL/L (ref 22–29)
COLOR UR: YELLOW
CREAT SERPL-MCNC: 0.84 MG/DL (ref 0.57–1)
EGFRCR SERPLBLD CKD-EPI 2021: 71.2 ML/MIN/1.73
EOSINOPHIL # BLD AUTO: 0.11 10*3/MM3 (ref 0–0.4)
EOSINOPHIL NFR BLD AUTO: 2.8 % (ref 0.3–6.2)
EPI CELLS #/AREA URNS HPF: ABNORMAL /HPF
ERYTHROCYTE [DISTWIDTH] IN BLOOD BY AUTOMATED COUNT: 13.5 % (ref 12.3–15.4)
GLOBULIN SER CALC-MCNC: 1.6 GM/DL
GLUCOSE SERPL-MCNC: 92 MG/DL (ref 65–99)
GLUCOSE UR QL STRIP: NEGATIVE
HCT VFR BLD AUTO: 38.1 % (ref 34–46.6)
HDLC SERPL-MCNC: 67 MG/DL (ref 40–60)
HGB BLD-MCNC: 12.3 G/DL (ref 12–15.9)
HGB UR QL STRIP: ABNORMAL
IMM GRANULOCYTES # BLD AUTO: 0 10*3/MM3 (ref 0–0.05)
IMM GRANULOCYTES NFR BLD AUTO: 0 % (ref 0–0.5)
KETONES UR QL STRIP: NEGATIVE
LDLC SERPL CALC-MCNC: 64 MG/DL (ref 0–100)
LEUKOCYTE ESTERASE UR QL STRIP: NEGATIVE
LYMPHOCYTES # BLD AUTO: 1.31 10*3/MM3 (ref 0.7–3.1)
LYMPHOCYTES NFR BLD AUTO: 32.9 % (ref 19.6–45.3)
MCH RBC QN AUTO: 29.4 PG (ref 26.6–33)
MCHC RBC AUTO-ENTMCNC: 32.3 G/DL (ref 31.5–35.7)
MCV RBC AUTO: 90.9 FL (ref 79–97)
MONOCYTES # BLD AUTO: 0.26 10*3/MM3 (ref 0.1–0.9)
MONOCYTES NFR BLD AUTO: 6.5 % (ref 5–12)
NEUTROPHILS # BLD AUTO: 2.28 10*3/MM3 (ref 1.7–7)
NEUTROPHILS NFR BLD AUTO: 57.3 % (ref 42.7–76)
NITRITE UR QL STRIP: NEGATIVE
NRBC BLD AUTO-RTO: 0 /100 WBC (ref 0–0.2)
PH UR STRIP: 7 [PH] (ref 5–8)
PLATELET # BLD AUTO: 203 10*3/MM3 (ref 140–450)
POTASSIUM SERPL-SCNC: 3.9 MMOL/L (ref 3.5–5.2)
PROT SERPL-MCNC: 5.8 G/DL (ref 6–8.5)
PROT UR QL STRIP: NEGATIVE
RBC # BLD AUTO: 4.19 10*6/MM3 (ref 3.77–5.28)
RBC #/AREA URNS HPF: ABNORMAL /HPF
SODIUM SERPL-SCNC: 138 MMOL/L (ref 136–145)
SP GR UR STRIP: 1.01 (ref 1–1.03)
TRIGL SERPL-MCNC: 55 MG/DL (ref 0–150)
UROBILINOGEN UR STRIP-MCNC: ABNORMAL MG/DL
VLDLC SERPL CALC-MCNC: 12 MG/DL (ref 5–40)
WBC # BLD AUTO: 3.98 10*3/MM3 (ref 3.4–10.8)
WBC #/AREA URNS HPF: ABNORMAL /HPF

## 2024-03-28 ENCOUNTER — OFFICE VISIT (OUTPATIENT)
Dept: FAMILY MEDICINE CLINIC | Facility: CLINIC | Age: 79
End: 2024-03-28
Payer: MEDICARE

## 2024-03-28 VITALS
HEIGHT: 60 IN | HEART RATE: 71 BPM | RESPIRATION RATE: 16 BRPM | SYSTOLIC BLOOD PRESSURE: 126 MMHG | OXYGEN SATURATION: 98 % | TEMPERATURE: 97.6 F | BODY MASS INDEX: 31.47 KG/M2 | DIASTOLIC BLOOD PRESSURE: 54 MMHG | WEIGHT: 160.3 LBS

## 2024-03-28 DIAGNOSIS — E78.00 HYPERCHOLESTEROLEMIA: Primary | ICD-10-CM

## 2024-03-28 DIAGNOSIS — G31.84 MINIMAL COGNITIVE IMPAIRMENT: ICD-10-CM

## 2024-03-28 DIAGNOSIS — F32.5 MAJOR DEPRESSIVE DISORDER WITH SINGLE EPISODE, IN FULL REMISSION: ICD-10-CM

## 2024-03-28 PROCEDURE — 99213 OFFICE O/P EST LOW 20 MIN: CPT | Performed by: INTERNAL MEDICINE

## 2024-03-28 RX ORDER — ONDANSETRON 4 MG/1
4 TABLET, FILM COATED ORAL EVERY 8 HOURS PRN
Qty: 20 TABLET | Refills: 1 | Status: SHIPPED | OUTPATIENT
Start: 2024-03-28

## 2024-04-02 NOTE — PROGRESS NOTES
Subjective   Chris Cox is a 78 y.o. female. Patient is here today for   Chief Complaint   Patient presents with    Follow-up     F/u had fasting labs           Vitals:    03/28/24 1356   BP: 126/54   Pulse: 71   Resp: 16   Temp: 97.6 °F (36.4 °C)   SpO2: 98%     Body mass index is 31.31 kg/m².      Past Medical History:   Diagnosis Date    Abdominal pain     ADHD     Ankle sprain past    Arthritis     primary both knees    Bronchitis 08/2019    CTS (carpal tunnel syndrome) surgery c. early 2000's    Disease of thyroid gland     Dislocation, shoulder ?    Diverticulosis of intestine     DVT (deep venous thrombosis) 10/2019    right lower extremity    Fracture of wrist ?    Fracture, foot past    Fractures     MULTIPLE BONE FRACTURES-TOOK FOSAMAX 3.5 YRS    Hyperlipidemia     Hypertension     Irregular heart beat     Kidney stone     Knee swelling R. replaced c. 2019    Lumbar spine pain     Medicare annual wellness visit, subsequent 02/04/2021    Migraines     Mono exposure     AGE 35    Neck strain     Neuralgia neuritis, sciatic nerve     Obstructive sleep apnea     20 YRS AGO-NOT CURRENTLY    Rotator cuff syndrome Trapezius pain?    Scoliosis since childhood    Seizures     As a baby    Stress fracture ?    Tuberculosis     As a child    Vertigo     Wrist sprain       Allergies   Allergen Reactions    Erythromycin Anaphylaxis    Sulfa Antibiotics Rash    Milk-Related Compounds GI Intolerance    Egg-Derived Products Unknown - Low Severity     PER ALLERGY TESTING RESULTS    Cephalexin Rash    Cephalosporins Rash     STOMACH ISSUES    Molds & Smuts Rash     ITCHING      Social History     Socioeconomic History    Marital status:      Spouse name: Marky    Years of education: College   Tobacco Use    Smoking status: Never     Passive exposure: Never    Smokeless tobacco: Never   Vaping Use    Vaping status: Never Used   Substance and Sexual Activity    Alcohol use: Not Currently     Alcohol/week: 1.0  standard drink of alcohol     Types: 1 Glasses of wine per week     Comment: used medicinally    Drug use: No    Sexual activity: Not Currently     Partners: Male     Comment: N/A        Current Outpatient Medications:     ascorbic acid (VITAMIN C) 500 MG/5ML liquid, Take 5 mL by mouth Daily., Disp: , Rfl:     escitalopram (LEXAPRO) 10 MG tablet, TAKE 1 TABLET DAILY, Disp: 90 tablet, Rfl: 3    memantine (NAMENDA) 5 MG tablet, TAKE 2 TABLETS DAILY, Disp: 180 tablet, Rfl: 3    Myrbetriq 50 MG tablet sustained-release 24 hour 24 hr tablet, Take 50 mg by mouth Daily., Disp: 90 tablet, Rfl: 0    psyllium (Metamucil) 0.52 g capsule, Take 1 capsule by mouth Daily., Disp: 30 capsule, Rfl: 11    rosuvastatin (Crestor) 10 MG tablet, Take 1 tablet by mouth Daily., Disp: 90 tablet, Rfl: 1    ondansetron (Zofran) 4 MG tablet, Take 1 tablet by mouth Every 8 (Eight) Hours As Needed for Nausea or Vomiting., Disp: 20 tablet, Rfl: 1     Objective     History of Present Illness  This patient is here to follow-up on labs done last week.    She is accompanied by her .  .    We have all known each other for many years.  She has developed some minimal cognitive impairment.  She carries on conversation very well today.    She has no complaints.       Review of Systems   Constitutional: Negative.    HENT: Negative.     Respiratory: Negative.     Cardiovascular: Negative.    Gastrointestinal:         She has occasional but infrequent nausea.   Musculoskeletal: Negative.    Psychiatric/Behavioral: Negative.         Physical Exam  Vitals and nursing note reviewed.   Constitutional:       Appearance: Normal appearance. She is obese.      Comments: Pleasant, neatly groomed, no distress.   Cardiovascular:      Rate and Rhythm: Regular rhythm.      Heart sounds: Normal heart sounds. No murmur heard.     No gallop.   Pulmonary:      Effort: No respiratory distress.      Breath sounds: Normal breath sounds. No wheezing or rales.    Neurological:      Mental Status: She is alert and oriented to person, place, and time.   Psychiatric:         Mood and Affect: Mood normal.         Behavior: Behavior normal.         Thought Content: Thought content normal.           Problems Addressed this Visit          Cardiac and Vasculature    Hypercholesterolemia - Primary       Mental Health    Major depressive disorder with single episode, in full remission       Neuro    Minimal cognitive impairment     Diagnoses         Codes Comments    Hypercholesterolemia    -  Primary ICD-10-CM: E78.00  ICD-9-CM: 272.0     Minimal cognitive impairment     ICD-10-CM: G31.84  ICD-9-CM: 331.83     Major depressive disorder with single episode, in full remission     ICD-10-CM: F32.5  ICD-9-CM: 296.26               PLAN  Her hypercholesterolemia is well-controlled.    Her major depressive disorder is well-controlled on S-Citalopram.    She has minimal cognitive impairment which does not seem to have gotten worse recently.  She does continue to take Namenda 5 mg twice daily.    I asked her to follow-up in July.  Fasting labs prior to that visit should include: Lipid profile, comprehensive metabolic panel, CBC, urinalysis.      No follow-ups on file.

## 2024-05-08 ENCOUNTER — HOSPITAL ENCOUNTER (OUTPATIENT)
Dept: SPEECH THERAPY | Facility: HOSPITAL | Age: 79
Setting detail: THERAPIES SERIES
Discharge: HOME | End: 2024-05-08
Payer: MEDICARE

## 2024-05-08 DIAGNOSIS — R41.841 COGNITIVE COMMUNICATION DEFICIT: ICD-10-CM

## 2024-05-08 DIAGNOSIS — F03.90 DEMENTIA, UNSPECIFIED DEMENTIA SEVERITY, UNSPECIFIED DEMENTIA TYPE, UNSPECIFIED WHETHER BEHAVIORAL, PSYCHOTIC, OR MOOD DISTURBANCE OR ANXIETY: Primary | ICD-10-CM

## 2024-05-08 PROCEDURE — 96125 COGNITIVE TEST BY HC PRO: CPT

## 2024-05-15 ENCOUNTER — HOSPITAL ENCOUNTER (OUTPATIENT)
Dept: SPEECH THERAPY | Facility: HOSPITAL | Age: 79
Setting detail: THERAPIES SERIES
Discharge: HOME | End: 2024-05-15
Payer: MEDICARE

## 2024-05-15 DIAGNOSIS — F03.90 DEMENTIA, UNSPECIFIED DEMENTIA SEVERITY, UNSPECIFIED DEMENTIA TYPE, UNSPECIFIED WHETHER BEHAVIORAL, PSYCHOTIC, OR MOOD DISTURBANCE OR ANXIETY: ICD-10-CM

## 2024-05-15 DIAGNOSIS — R41.841 COGNITIVE COMMUNICATION DEFICIT: Primary | ICD-10-CM

## 2024-05-15 PROCEDURE — 92507 TX SP LANG VOICE COMM INDIV: CPT

## 2024-05-15 NOTE — THERAPY TREATMENT NOTE
Outpatient Speech Language Pathology   Adult Speech Language Cognitive Treatment Note  [facility]     Patient Name: Chris Cox  : 1945  MRN: [id number]  Today's Date: 5/15/2024         Visit Date: 05/15/2024   Patient Active Problem List   Diagnosis    Abdominal bruit    Blues    DD (diverticular disease)    Menopausal and perimenopausal disorder    Neuralgia neuritis, sciatic nerve    Right knee pain    Primary osteoarthritis of both knees    Radiculopathy with lower extremity symptoms    Idiopathic peripheral neuropathy    Sprain of collateral ligament of right knee    ADD (attention deficit disorder)    Microscopic hematuria    Chronic pain of left knee    Bleeding diathesis    Bilateral edema of lower extremity    Bilateral swelling of feet and ankles    Hyperglycemia    Venous stasis    Attention deficit disorder (ADD) without hyperactivity    Lesion of skin of scalp    Pre-operative clearance    Total knee replacement status    Chronic deep vein thrombosis (DVT) of calf muscle vein of right lower extremity    Cellulitis of face    Dermatitis    Major depressive disorder with single episode, in full remission    Fatigue due to excessive exertion    Disease of thyroid gland    Vertigo    Medicare annual wellness visit, subsequent    Osteopenia of both hips    Memory loss    Minimal cognitive impairment    Hypercholesterolemia          Visit Dx:    ICD-10-CM ICD-9-CM   1. Cognitive communication deficit  R41.841 799.52   2. Dementia, unspecified dementia severity, unspecified dementia type, unspecified whether behavioral, psychotic, or mood disturbance or anxiety  F03.90 294.20        OP SLP Assessment/Plan - 05/15/24 1437          SLP Assessment    Functional Problems Speech Language- Adult/Cognition  -    Impact on Function: Adult Speech Language/Cognition Difficulty communicating wants, needs, and ideas;Difficulty communicating in a medical emergency;Restrictions in personal and social  life;Difficulty sequencing thoughts to express complex messages;Unable to understand written/spoken language;Difficulty following directions;Difficulty sequencing or problem solving to complete ADLs;Poor attention to task;Requires supervision  -TH    Patient/caregiver participated in establishment of treatment plan and goals Yes  -TH       SLP Plan    Plan Comments Recommend ongoing ST  -TH              User Key  (r) = Recorded By, (t) = Taken By, (c) = Cosigned By      Initials Name Provider Type    TH Pura Smith, INÉS Speech and Language Pathologist                                       SLP OP Goals       Row Name 05/15/24 1400          Verbal Expression Goals    Verbal Expression STG's Patient will improve verbal expressive language skills by describing attributes, function, action and/or uses of an object/item;Patient will improve verbal expressive language skills by providing simple/complex target word when given its definition, meaning, attributes, function, or use  -TH     Status: Patient will improve verbal expressive language skills by describing attributes, function, action and/or uses of an object/item Progressing as expected  -TH     Comments: Patient will improve verbal expressive language skills by describing attributes, function, action and/or uses of an object/item Patient participated in game of novel game of catch phrase requiring moderate cues for use of SFA, but as game went on she improved. She was able to name 3-8 items in a basic category.  -TH        Attention/Orientation Goals    Status: Patient will improve attention skills by sustaining focus and participation to conversation/task Progressing as expected  -TH     Comments: Patient will improve attention skills by sustaining focus and participation to conversation/task Patient was able to sustain attention throughout session without difficulty; she also was able to participate in novel game of blink with good sustained attention to task  and good attention to details of cards. She also demonstrated good problem solving and recall of game rules with only min A needed intermittently throughout game.  -TH               User Key  (r) = Recorded By, (t) = Taken By, (c) = Cosigned By      Initials Name Provider Type    TH Pura Smith SLP Speech and Language Pathologist                   OP SLP Education       Row Name 05/15/24 1438       Education    Assessed Learning needs;Learning preferences;Learning readiness;Learning motivation  -TH    Learning Motivation Strong  -TH    Learning Method Explanation  -TH    Teaching Response Verbalized understanding  -TH              User Key  (r) = Recorded By, (t) = Taken By, (c) = Cosigned By      Initials Name Effective Dates    TH Pura Smith SLP 01/05/24 -                          Time Calculation:   SLP Start Time: 1100  SLP Stop Time: 1145  SLP Time Calculation (min): 45 min    Therapy Charges for Today       Code Description Service Date Service Provider Modifiers Qty    [id number]  ST TREATMENT SPEECH 3 5/15/2024 Pura Smith SLP GN 1                     INÉS Gore  5/15/2024

## 2024-05-22 ENCOUNTER — HOSPITAL ENCOUNTER (OUTPATIENT)
Dept: SPEECH THERAPY | Facility: HOSPITAL | Age: 79
Setting detail: THERAPIES SERIES
Discharge: HOME | End: 2024-05-22
Payer: MEDICARE

## 2024-05-22 DIAGNOSIS — R41.841 COGNITIVE COMMUNICATION DEFICIT: Primary | ICD-10-CM

## 2024-05-22 DIAGNOSIS — F03.90 DEMENTIA, UNSPECIFIED DEMENTIA SEVERITY, UNSPECIFIED DEMENTIA TYPE, UNSPECIFIED WHETHER BEHAVIORAL, PSYCHOTIC, OR MOOD DISTURBANCE OR ANXIETY: ICD-10-CM

## 2024-05-22 PROCEDURE — 92507 TX SP LANG VOICE COMM INDIV: CPT

## 2024-05-22 NOTE — THERAPY TREATMENT NOTE
Outpatient Speech Language Pathology   Adult Speech Language Cognitive Treatment Note  [facility]     Patient Name: Chris Cox  : 1945  MRN: [id number]  Today's Date: 2024         Visit Date: 2024   Patient Active Problem List   Diagnosis    Abdominal bruit    Blues    DD (diverticular disease)    Menopausal and perimenopausal disorder    Neuralgia neuritis, sciatic nerve    Right knee pain    Primary osteoarthritis of both knees    Radiculopathy with lower extremity symptoms    Idiopathic peripheral neuropathy    Sprain of collateral ligament of right knee    ADD (attention deficit disorder)    Microscopic hematuria    Chronic pain of left knee    Bleeding diathesis    Bilateral edema of lower extremity    Bilateral swelling of feet and ankles    Hyperglycemia    Venous stasis    Attention deficit disorder (ADD) without hyperactivity    Lesion of skin of scalp    Pre-operative clearance    Total knee replacement status    Chronic deep vein thrombosis (DVT) of calf muscle vein of right lower extremity    Cellulitis of face    Dermatitis    Major depressive disorder with single episode, in full remission    Fatigue due to excessive exertion    Disease of thyroid gland    Vertigo    Medicare annual wellness visit, subsequent    Osteopenia of both hips    Memory loss    Minimal cognitive impairment    Hypercholesterolemia          Visit Dx:    ICD-10-CM ICD-9-CM   1. Cognitive communication deficit  R41.841 799.52   2. Dementia, unspecified dementia severity, unspecified dementia type, unspecified whether behavioral, psychotic, or mood disturbance or anxiety  F03.90 294.20                              SLP OP Goals       Row Name 24 1100          Subjective Comments    Subjective Comments Patient is pleasant and cooperative.  present during the session. Brought completed divergent naming task back;  reports MOD-MAX cueing initially which then reduced as task progressed.  -CR         Subjective Pain    Able to rate subjective pain? yes  -CR     Pre-Treatment Pain Level 0  -CR     Post-Treatment Pain Level 0  -CR        Verbal Expression Goals    Patient will improve verbal expressive language skills by describing attributes, function, action and/or uses of an object/item 80%:;with intermittent cues  -CR     Status: Patient will improve verbal expressive language skills by describing attributes, function, action and/or uses of an object/item Progressing as expected  -CR     Comments: Patient will improve verbal expressive language skills by describing attributes, function, action and/or uses of an object/item During unstructured conversation and structured tasks, patient used gestures and describing words during word finding difficulties with 80% accuracy given MIN-MOD cues.  -CR     Patient will improve verbal expressive language skills by providing simple/complex target word when given its definition, meaning, attributes, function, or use 80%:;with intermittent cues  -CR     Status: Patient will improve verbal expressive language skills by providing simple/complex target word when given its definition, meaning, attributes, function, or use Progressing as expected  -CR     Comments: Patient will improve verbal expressive language skills by providing simple/complex target word when given its definition, meaning, attributes, function, or use During unstructured conversation and structured tasks, circulocution present. SLP provided attributes and function for patient to provide target word with 80% accuracy given MIN-MOD cues. SLP provided x2 information sheets to  re: communication tips for dementia. Automatic speech task completed with 100% accuracy given NO cues. x2 divergent naming tasks completed with 5 items named in a concrete category given MOD-MAX cues. Pt independently used pen/paper to draw x1 target word.  -CR        Memory Goals    Patient’s memory skills will be enhanced  as reported by patient by using external memory aides 80%:;with intermittent cues  -CR     Status: Patient’s memory skills will be enhanced as reported by patient by using external memory aides Progressing as expected  -CR     Comments: Patient’s memory skills will be enhanced as reported by patient by using external memory aides Introduced memory book to patient and . Completed 'About Me' and calendar sections. Discussed at-home use of calendar and daily schedule to improve patient's awareness and management of time and to decrease burden on .  reports patient used to use a planner but discontinued use a short time ago. SLP encouraged re-introducing calendar at home.  -CR        Attention/Orientation Goals    Patient will improve attention skills by sustaining focus and participation to conversation/task 10 minute task;with intermittent cues  -CR     Status: Patient will improve attention skills by sustaining focus and participation to conversation/task Progressing as expected  -CR     Comments: Patient will improve attention skills by sustaining focus and participation to conversation/task Adequate sustained attention during completion of memory book and communication tasks. ~10-15 mins given intermittent cueing for tangential speech.  -CR               User Key  (r) = Recorded By, (t) = Taken By, (c) = Cosigned By      Initials Name Provider Type    CR Pattie Luna SLP Speech and Language Pathologist                           Time Calculation:   SLP Start Time: 1055  SLP Stop Time: 1145  SLP Time Calculation (min): 50 min  Untimed Charges  41526-IK Treatment/ST Modification Prosth Aug Alter : 50  Total Minutes  Untimed Charges Total Minutes: 50   Total Minutes: 50    Therapy Charges for Today       Code Description Service Date Service Provider Modifiers Qty    [id number]  ST TREATMENT SPEECH 3 5/22/2024 Pattie Luna, INÉS GN 1                     Pattie Luna  SLP  5/22/2024

## 2024-05-29 ENCOUNTER — HOSPITAL ENCOUNTER (OUTPATIENT)
Dept: SPEECH THERAPY | Facility: HOSPITAL | Age: 79
Setting detail: THERAPIES SERIES
Discharge: HOME | End: 2024-05-29
Payer: MEDICARE

## 2024-05-29 DIAGNOSIS — F03.90 DEMENTIA, UNSPECIFIED DEMENTIA SEVERITY, UNSPECIFIED DEMENTIA TYPE, UNSPECIFIED WHETHER BEHAVIORAL, PSYCHOTIC, OR MOOD DISTURBANCE OR ANXIETY: ICD-10-CM

## 2024-05-29 DIAGNOSIS — R41.841 COGNITIVE COMMUNICATION DEFICIT: Primary | ICD-10-CM

## 2024-05-29 PROCEDURE — 92507 TX SP LANG VOICE COMM INDIV: CPT

## 2024-05-29 NOTE — THERAPY TREATMENT NOTE
Outpatient Speech Language Pathology   Adult Speech Language Cognitive Treatment Note  [facility]     Patient Name: Chris Cox  : 1945  MRN: [id number]  Today's Date: 2024         Visit Date: 2024   Patient Active Problem List   Diagnosis    Abdominal bruit    Blues    DD (diverticular disease)    Menopausal and perimenopausal disorder    Neuralgia neuritis, sciatic nerve    Right knee pain    Primary osteoarthritis of both knees    Radiculopathy with lower extremity symptoms    Idiopathic peripheral neuropathy    Sprain of collateral ligament of right knee    ADD (attention deficit disorder)    Microscopic hematuria    Chronic pain of left knee    Bleeding diathesis    Bilateral edema of lower extremity    Bilateral swelling of feet and ankles    Hyperglycemia    Venous stasis    Attention deficit disorder (ADD) without hyperactivity    Lesion of skin of scalp    Pre-operative clearance    Total knee replacement status    Chronic deep vein thrombosis (DVT) of calf muscle vein of right lower extremity    Cellulitis of face    Dermatitis    Major depressive disorder with single episode, in full remission    Fatigue due to excessive exertion    Disease of thyroid gland    Vertigo    Medicare annual wellness visit, subsequent    Osteopenia of both hips    Memory loss    Minimal cognitive impairment    Hypercholesterolemia          Visit Dx:    ICD-10-CM ICD-9-CM   1. Cognitive communication deficit  R41.841 799.52   2. Dementia, unspecified dementia severity, unspecified dementia type, unspecified whether behavioral, psychotic, or mood disturbance or anxiety  F03.90 294.20                              SLP OP Goals       Row Name 24 1100          Subjective Comments    Subjective Comments Patient is pleasant and cooperative.  present during the session. Brings previous speech therapy tasks back today completed; reports challenging but was able to complete.  -CR        Subjective  "Pain    Able to rate subjective pain? yes  -CR     Pre-Treatment Pain Level 0  -CR     Post-Treatment Pain Level 0  -CR        Verbal Expression Goals    Verbal Expression LTG's Patient will be able to verbally express basic wants and needs in home environment  -CR     Patient will be able to verbally express basic wants and needs in home environment 80%:;with intermittent cues  -CR     Status: Patient will be able to verbally express basic wants and needs in home environment New  -CR     Comments: Patient will be able to verbally express basic wants and needs in home environment Discussed dementia diagnosis, cognitive and language deficits with pt and . Provided techniques to promote effective communication in the home environment. Pt and  both very receptive to education.  -CR     Patient will improve verbal expressive language skills by describing attributes, function, action and/or uses of an object/item 80%:;with intermittent cues  -CR     Status: Patient will improve verbal expressive language skills by describing attributes, function, action and/or uses of an object/item Progressing as expected  -CR     Comments: Patient will improve verbal expressive language skills by describing attributes, function, action and/or uses of an object/item Using \"Catch Phrase\", pt required MOD-MAX cues to use attributes to describe target word with 80% accuracy. In more structured task, patient named function, parts, physical appearance, and location given MIN-MOD cues for attention and word finding.  -CR     Patient will improve verbal expressive language skills by providing simple/complex target word when given its definition, meaning, attributes, function, or use 80%:;with intermittent cues  -CR     Status: Patient will improve verbal expressive language skills by providing simple/complex target word when given its definition, meaning, attributes, function, or use Progressing as expected  -CR     Comments: " "Patient will improve verbal expressive language skills by providing simple/complex target word when given its definition, meaning, attributes, function, or use Given definition and attributes, pt provided target word with 80% accuracy given NO cues, increased to 100% accuracy when given MIN cues. Divergent naming task completed with 7 items named in a concrete category given MIN cues and extended time.  -CR        Attention/Orientation Goals    Patient will improve attention skills by sustaining focus and participation to conversation/task 10 minute task;with intermittent cues  -CR     Status: Patient will improve attention skills by sustaining focus and participation to conversation/task Progressing as expected  -CR     Comments: Patient will improve attention skills by sustaining focus and participation to conversation/task Alternating attention with \"Blink\" card sort given visual reminder of x3 sorting paramaters completed by sorting 30 cards in 5 minutes given intermittent cues for alternating attention. Good progress toward goal.  -CR               User Key  (r) = Recorded By, (t) = Taken By, (c) = Cosigned By      Initials Name Provider Type    Pattie Zhang SLP Speech and Language Pathologist                           Time Calculation:   SLP Start Time: 1100  SLP Stop Time: 1145  SLP Time Calculation (min): 45 min  Untimed Charges  19983-IP Treatment/ST Modification Prosth Aug Alter : 45  Total Minutes  Untimed Charges Total Minutes: 45   Total Minutes: 45    Therapy Charges for Today       Code Description Service Date Service Provider Modifiers Qty    [id number]  ST TREATMENT SPEECH 3 5/29/2024 Pattie Luna SLP GN 1                     INÉS Zapata  5/29/2024  "

## 2024-05-31 ENCOUNTER — OFFICE VISIT (OUTPATIENT)
Age: 79
End: 2024-05-31
Payer: MEDICARE

## 2024-05-31 VITALS
HEIGHT: 60 IN | BODY MASS INDEX: 32.79 KG/M2 | DIASTOLIC BLOOD PRESSURE: 80 MMHG | HEART RATE: 64 BPM | WEIGHT: 167 LBS | SYSTOLIC BLOOD PRESSURE: 138 MMHG

## 2024-05-31 DIAGNOSIS — I50.32 CHRONIC DIASTOLIC CONGESTIVE HEART FAILURE: ICD-10-CM

## 2024-05-31 PROCEDURE — 99204 OFFICE O/P NEW MOD 45 MIN: CPT | Performed by: INTERNAL MEDICINE

## 2024-05-31 PROCEDURE — 93000 ELECTROCARDIOGRAM COMPLETE: CPT | Performed by: INTERNAL MEDICINE

## 2024-05-31 RX ORDER — FUROSEMIDE 20 MG/1
20 TABLET ORAL 2 TIMES DAILY
COMMUNITY
End: 2024-05-31 | Stop reason: SDUPTHER

## 2024-05-31 RX ORDER — FUROSEMIDE 40 MG/1
40 TABLET ORAL 2 TIMES DAILY
Qty: 90 TABLET | Refills: 3 | Status: SHIPPED | OUTPATIENT
Start: 2024-05-31

## 2024-05-31 RX ORDER — ROSUVASTATIN CALCIUM 10 MG/1
10 TABLET, COATED ORAL DAILY
Qty: 90 TABLET | Refills: 0 | Status: SHIPPED | OUTPATIENT
Start: 2024-05-31

## 2024-05-31 NOTE — PROGRESS NOTES
Subjective:     Encounter Date:05/31/2024      Patient ID: Chris Cox is a 78 y.o. female.    Chief Complaint: Edema  HPI:   This is a very pleasant 78-year-old woman who presents for evaluation.  She has chronic lower extremity edema.  She has some mild cognitive impairment as a result her  provides most of the history.  He states for several years she has had lower extremity edema.  In the past she has had bilateral lower extremity DVTs.  In 2023 she had Dopplers showing chronic bilateral soleal DVT.  She has been started on Lasix 20 twice daily by her PCP without much change in urination or edema.  She has no weeping or open sores on her legs.  She denies pain in her legs but her legs are tense and erythematous with chronic venous stasis changes.  It extends up to her knees.  She does not have abdominal bloating or swelling in the thighs.  She denies orthopnea or PND.  No obvious exertional dyspnea.  He states that she is quite active working in the garden with good energy levels.  No known angina.    The following portions of the patient's history were reviewed and updated as appropriate: allergies, current medications, past family history, past medical history, past social history, past surgical history and problem list.     REVIEW OF SYSTEMS:   All systems reviewed.  Pertinent positives identified in HPI.  All other systems are negative.    Past Medical History:   Diagnosis Date    Abdominal pain     ADHD     Ankle sprain past    Arthritis     primary both knees    Bronchitis 08/2019    CTS (carpal tunnel syndrome) surgery c. early 2000's    Disease of thyroid gland     Dislocation, shoulder ?    Diverticulosis of intestine     DVT (deep venous thrombosis) 10/2019    right lower extremity    Fracture of wrist ?    Fracture, foot past    Fractures     MULTIPLE BONE FRACTURES-TOOK FOSAMAX 3.5 YRS    Hyperlipidemia     Hypertension     Irregular heart beat     Kidney stone     Knee swelling R.  replaced c. 2019    Lumbar spine pain     Medicare annual wellness visit, subsequent 02/04/2021    Migraines     Mono exposure     AGE 35    Neck strain     Neuralgia neuritis, sciatic nerve     Obstructive sleep apnea     20 YRS AGO-NOT CURRENTLY    Rotator cuff syndrome Trapezius pain?    Scoliosis since childhood    Seizures     As a baby    Stress fracture ?    Tuberculosis     As a child    Vertigo     Wrist sprain        Family History   Problem Relation Age of Onset    Hypertension Mother     Other Mother     Transient ischemic attack Mother     Heart disease Mother     Cancer Father         colon    Diabetes Father     Hypertension Father     Other Father         Renal artery aneurysm    Heart disease Father     Stroke Father     Diabetes Paternal Grandmother     Cancer Paternal Grandfather        Social History     Socioeconomic History    Marital status:      Spouse name: Marky    Years of education: College   Tobacco Use    Smoking status: Never     Passive exposure: Never    Smokeless tobacco: Never   Vaping Use    Vaping status: Never Used   Substance and Sexual Activity    Alcohol use: Not Currently     Alcohol/week: 1.0 standard drink of alcohol     Comment: used medicinally    Drug use: No    Sexual activity: Not Currently     Partners: Male     Comment: N/A       Allergies   Allergen Reactions    Erythromycin Anaphylaxis    Sulfa Antibiotics Rash    Milk-Related Compounds GI Intolerance    Egg-Derived Products Unknown - Low Severity     PER ALLERGY TESTING RESULTS    Cephalexin Rash    Cephalosporins Rash     STOMACH ISSUES    Molds & Smuts Rash     ITCHING       Past Surgical History:   Procedure Laterality Date    BREAST BIOPSY Left     2019 stereotactic benign    BREAST EXCISIONAL BIOPSY Left     1971 - fibroadenoma    BREAST SURGERY      Benign fibroadnoma removed from the left breast    COLONOSCOPY N/A 04/11/2022    Procedure: COLONOSCOPY;  Surgeon: Leonor Xavier MD;  Location: [facility];  Service: Gastroenterology;  Laterality: N/A;  Diverticulosis    HAND SURGERY Bilateral 2012    RECONSTRUCTION ON BOTH HANDS    HIP SURGERY  c. 2019    JOINT REPLACEMENT  knee/hip    LITHOTRIPSY      renal    TONGUE SURGERY      TOTAL KNEE ARTHROPLASTY Right 10/22/2019    Procedure: TOTAL KNEE ARTHROPLASTY RIGHT;  Surgeon: Aneesh Okeefe II, MD;  Location: [facility] MAIN OR;  Service: Orthopedics    WRIST SURGERY           ECG 12 Lead    Date/Time: 5/31/2024 2:49 PM  Performed by: Shyanne Zaragoza MD    Authorized by: Shyanne Zaragoza MD  Comparison: compared with previous ECG from 10/8/2019  Similar to previous ECG  Rhythm: sinus rhythm  Rate: normal  Conduction: conduction normal  ST Segments: ST segments normal  T Waves: T waves normal  QRS axis: normal  Other: no other findings    Clinical impression: normal ECG             Objective:         PHYSICAL EXAM:  GEN: VSS, no distress,   Eyes: normal sclera, normal lids and lashes  HENT: moist mucus membranes,   Respiratory: CTAB, no rales or wheezes  CV: RRR, no murmurs, , +2 DP and 2+ carotid pulses b/l  GI: NABS, soft,  Nontender, nondistended  MSK: +2 b/l edema to calves, no scoliosis or kyphosis  Skin: no rash, warm, dry  Heme/Lymph: no bruising or bleeding  Psych: organized thought, normal behavior and affect  Neuro: Cranial nerves grossly intact, Alert and Oriented x 3.         Assessment:          Diagnosis Plan   1. Chronic diastolic congestive heart failure  Adult Transthoracic Echo Complete w/ Color, Spectral and Contrast if Necessary Per Protocol             Plan:       1.  Lower extremity edema: Most likely related to chronic bilateral soleal DVTs.  Will uptitrate diuretic to 40 mg daily BID.  Will get an echocardiogram as well.  If diuresis does not improve, then I would send her to [facility] for wrapping.    Dr. Marquez, thank you very much for referring this kind patient to me. Please call me with any questions or concerns. I will see  the patient again in the office in 6 weeks.      Shyanne Zaragoza MD  05/31/24  [facility]    Outpatient Encounter Medications as of 5/31/2024   Medication Sig Dispense Refill    ascorbic acid (VITAMIN C) 500 MG/5ML liquid Take 5 mL by mouth Daily.      escitalopram (LEXAPRO) 10 MG tablet TAKE 1 TABLET DAILY 90 tablet 3    furosemide (LASIX) 40 MG tablet Take 1 tablet by mouth 2 (Two) Times a Day. 90 tablet 3    memantine (NAMENDA) 5 MG tablet TAKE 2 TABLETS DAILY 180 tablet 3    Myrbetriq 50 MG tablet sustained-release 24 hour 24 hr tablet Take 50 mg by mouth Daily. 90 tablet 0    psyllium (Metamucil) 0.52 g capsule Take 1 capsule by mouth Daily. 30 capsule 11    rosuvastatin (CRESTOR) 10 MG tablet TAKE 1 TABLET DAILY 90 tablet 0    [DISCONTINUED] furosemide (LASIX) 20 MG tablet Take 1 tablet by mouth 2 (Two) Times a Day.      ondansetron (Zofran) 4 MG tablet Take 1 tablet by mouth Every 8 (Eight) Hours As Needed for Nausea or Vomiting. 20 tablet 1    [DISCONTINUED] rosuvastatin (Crestor) 10 MG tablet Take 1 tablet by mouth Daily. 90 tablet 1     No facility-administered encounter medications on file as of 5/31/2024.

## 2024-06-05 ENCOUNTER — HOSPITAL ENCOUNTER (OUTPATIENT)
Dept: SPEECH THERAPY | Facility: HOSPITAL | Age: 79
Setting detail: THERAPIES SERIES
Discharge: HOME | End: 2024-06-05
Payer: MEDICARE

## 2024-06-05 DIAGNOSIS — R41.841 COGNITIVE COMMUNICATION DEFICIT: Primary | ICD-10-CM

## 2024-06-05 DIAGNOSIS — F03.90 DEMENTIA, UNSPECIFIED DEMENTIA SEVERITY, UNSPECIFIED DEMENTIA TYPE, UNSPECIFIED WHETHER BEHAVIORAL, PSYCHOTIC, OR MOOD DISTURBANCE OR ANXIETY: ICD-10-CM

## 2024-06-05 PROCEDURE — 92507 TX SP LANG VOICE COMM INDIV: CPT

## 2024-06-05 NOTE — THERAPY TREATMENT NOTE
Outpatient Speech Language Pathology   Adult Speech Language Cognitive Treatment Note  [facility]     Patient Name: Chris Cox  : 1945  MRN: [id number]  Today's Date: 2024         Visit Date: 2024   Patient Active Problem List   Diagnosis    Abdominal bruit    Blues    DD (diverticular disease)    Menopausal and perimenopausal disorder    Neuralgia neuritis, sciatic nerve    Right knee pain    Primary osteoarthritis of both knees    Radiculopathy with lower extremity symptoms    Idiopathic peripheral neuropathy    Sprain of collateral ligament of right knee    ADD (attention deficit disorder)    Microscopic hematuria    Chronic pain of left knee    Bleeding diathesis    Bilateral edema of lower extremity    Bilateral swelling of feet and ankles    Hyperglycemia    Venous stasis    Attention deficit disorder (ADD) without hyperactivity    Lesion of skin of scalp    Pre-operative clearance    Total knee replacement status    Chronic deep vein thrombosis (DVT) of calf muscle vein of right lower extremity    Cellulitis of face    Dermatitis    Major depressive disorder with single episode, in full remission    Fatigue due to excessive exertion    Disease of thyroid gland    Vertigo    Medicare annual wellness visit, subsequent    Osteopenia of both hips    Memory loss    Minimal cognitive impairment    Hypercholesterolemia          Visit Dx:    ICD-10-CM ICD-9-CM   1. Cognitive communication deficit  R41.841 799.52   2. Dementia, unspecified dementia severity, unspecified dementia type, unspecified whether behavioral, psychotic, or mood disturbance or anxiety  F03.90 294.20                              SLP OP Goals       Row Name 24 1100          Subjective Comments    Subjective Comments Patient is pleasant and cooperative.  present during session. Reports at-home sequencing task required cueing for x1 prompt.  -CR        Subjective Pain    Able to rate subjective pain? yes  -CR      Pre-Treatment Pain Level 0  -CR     Post-Treatment Pain Level 0  -CR        Verbal Expression Goals    Patient will be able to verbally express basic wants and needs in home environment 80%:;with cues  -CR     Status: Patient will be able to verbally express basic wants and needs in home environment Progressing as expected  -CR     Comments: Patient will be able to verbally express basic wants and needs in home environment Provided  further communication strategies to implement at home when communicating with patient. Discussed upcoming discharge next week.  -CR     Patient will improve verbal expressive language skills by describing attributes, function, action and/or uses of an object/item 80%:;with cues  goal revised this date  -CR     Status: Patient will improve verbal expressive language skills by describing attributes, function, action and/or uses of an object/item Progressing as expected  -CR     Comments: Patient will improve verbal expressive language skills by describing attributes, function, action and/or uses of an object/item VNeST task completed using [employer] harsha on iPad. 80% accuracy given MOD-MAX verbal/visual cues to identify 'who', 'what', 'where', 'why', and 'when' given target verb. Extended time to complete across x3 prompts.  -CR     Patient will improve verbal expressive language skills by providing simple/complex target word when given its definition, meaning, attributes, function, or use 80%:;with cues  goal revised this date  -CR     Status: Patient will improve verbal expressive language skills by providing simple/complex target word when given its definition, meaning, attributes, function, or use Progressing as expected  -CR     Comments: Patient will improve verbal expressive language skills by providing simple/complex target word when given its definition, meaning, attributes, function, or use Pt provided target word when given visual with 80% accuracy given NO cues,  increased to 100% accuracy when given MOD-MAX semantic/phonemic cues.  -CR        Memory Goals    Patient’s memory skills will be enhanced as reported by patient by using external memory aides 80%:;with cues  goal revised this date  -CR     Status: Patient’s memory skills will be enhanced as reported by patient by using external memory aides Progressing as expected  -CR     Comments: Patient’s memory skills will be enhanced as reported by patient by using external memory aides Matching task utilizing iPad with x6 and x12 cards. 80% accuracy given MAX cues to utilize memory strategies.  -CR     Patient will demonstrate improved ability to recall information by listening to paragraph and answering yes/no questions 80%:;with cues  -CR     Status: Patient will demonstrate improved ability to recall information by listening to paragraph and answering yes/no questions Discontinued  -CR        Attention/Orientation Goals    Patient will improve attention skills by sustaining focus and participation to conversation/task 10 minute task;with intermittent cues  -CR     Status: Patient will improve attention skills by sustaining focus and participation to conversation/task Progressing as expected  -CR     Comments: Patient will improve attention skills by sustaining focus and participation to conversation/task Pt required MIN cues during 20-minute VNeST task for alternating attention. No cues required for sustained attention.  -CR               User Key  (r) = Recorded By, (t) = Taken By, (c) = Cosigned By      Initials Name Provider Type    CR Pattie Luna SLP Speech and Language Pathologist                           Time Calculation:   SLP Start Time: 1048  SLP Stop Time: 1145  SLP Time Calculation (min): 57 min    Therapy Charges for Today       Code Description Service Date Service Provider Modifiers Qty    [id number] [facility] TREATMENT SPEECH 3 6/5/2024 Pattie Luna SLP GN 1                     Pattie  Cheryl, INÉS  6/5/2024

## 2024-06-11 ENCOUNTER — PATIENT MESSAGE (OUTPATIENT)
Dept: FAMILY MEDICINE CLINIC | Facility: CLINIC | Age: 79
End: 2024-06-11
Payer: MEDICARE

## 2024-06-12 ENCOUNTER — HOSPITAL ENCOUNTER (OUTPATIENT)
Dept: SPEECH THERAPY | Facility: HOSPITAL | Age: 79
Setting detail: THERAPIES SERIES
Discharge: HOME | End: 2024-06-12
Payer: MEDICARE

## 2024-06-12 DIAGNOSIS — R41.841 COGNITIVE COMMUNICATION DEFICIT: ICD-10-CM

## 2024-06-12 DIAGNOSIS — F03.90 DEMENTIA, UNSPECIFIED DEMENTIA SEVERITY, UNSPECIFIED DEMENTIA TYPE, UNSPECIFIED WHETHER BEHAVIORAL, PSYCHOTIC, OR MOOD DISTURBANCE OR ANXIETY: Primary | ICD-10-CM

## 2024-06-12 PROCEDURE — 92507 TX SP LANG VOICE COMM INDIV: CPT

## 2024-06-12 NOTE — THERAPY DISCHARGE NOTE
Speech Language Pathology Discharge Summary  [facility]       Patient Name: Chris Cox  : 1945  MRN: [id number]    Today's Date: 2024       SLP OP Goals       Row Name 24 1100          Subjective Comments    Subjective Comments Patient is pleasant cooperative.  present during session. Discussed discharge this date which pt and  verbalized understanding/agreement.  -CR        Subjective Pain    Able to rate subjective pain? yes  -CR     Pre-Treatment Pain Level 0  -CR     Post-Treatment Pain Level 0  -CR        Verbal Expression Goals    Patient will be able to verbally express basic wants and needs in home environment 80%:;with cues  -CR     Status: Patient will be able to verbally express basic wants and needs in home environment Achieved  -CR     Comments: Patient will be able to verbally express basic wants and needs in home environment Pt and  report functional communication for wants/needs at home with assistance from  at times.  -CR     Patient will improve verbal expressive language skills by describing attributes, function, action and/or uses of an object/item 80%:;with cues  -CR     Status: Patient will improve verbal expressive language skills by describing attributes, function, action and/or uses of an object/item Achieved  -CR     Comments: Patient will improve verbal expressive language skills by describing attributes, function, action and/or uses of an object/item Pt described picture scenes when prompted to make a sentence regarding problem shown. Pt provided single key words with 100% accuracy given NO cues in each picture scene, but required MOD-MAX cues to create a grammatically correct sentence.  assisted in cueing patient in times of word finding difficulties. SLP provided visual aid (white board) for further success in creating grammatically correct sentence.  -CR     Patient will improve verbal expressive language skills by providing  simple/complex target word when given its definition, meaning, attributes, function, or use 80%:;with cues  -CR     Status: Patient will improve verbal expressive language skills by providing simple/complex target word when given its definition, meaning, attributes, function, or use Achieved  -CR        Executive Function Goals    Patient will improve executive functioning skills by using planning strategies prior to beginning tasks 80%:;with intermittent cues  -CR     Status: Patient will improve executive functioning skills by using planning strategies prior to beginning tasks Discontinued  -CR     Comments: Patient will improve executive functioning skills by using planning strategies prior to beginning tasks Goal not targeted due to emphasis on functional communication throughout plan of care.  -CR        Memory Goals    Patient’s memory skills will be enhanced as reported by patient by using external memory aides 80%:;with cues  -CR     Status: Patient’s memory skills will be enhanced as reported by patient by using external memory aides Achieved  -CR     Comments: Patient’s memory skills will be enhanced as reported by patient by using external memory aides Multiple external memory aids and education provided by SLP throughout plan of care.  -CR     Patient will demonstrate improved ability to recall information by listening to paragraph and answering yes/no questions 80%:;with cues  -CR     Status: Patient will demonstrate improved ability to recall information by listening to paragraph and answering yes/no questions Discontinued  -CR     Comments: Patient will demonstrate improved ability to recall information by listening to paragraph and answering yes/no questions Goal discontinued and not targeted as it was not functional for patient's daily living.  -CR        Attention/Orientation Goals    Patient will improve attention skills by sustaining focus and participation to conversation/task 10 minute  task;with intermittent cues  -CR     Status: Patient will improve attention skills by sustaining focus and participation to conversation/task Achieved  -CR     Comments: Patient will improve attention skills by sustaining focus and participation to conversation/task Adequate attention exhibited throughout 46-minute session given NO cues. Patient with occasional tangential speech due to word finding difficulties, SLP suspects less of an attention deficit.  -CR               User Key  (r) = Recorded By, (t) = Taken By, (c) = Cosigned By      Initials Name Provider Type    Pattie Zhang SLP Speech and Language Pathologist                    OP SLP Discharge Summary  Date of Discharge: 06/12/24  Reason for Discharge: all goals and outcomes met, no further needs identified, no further expectation of functional progress  Progress Toward Achieving Short/long Term Goals: all goals met within established timelines  Discharge Destination: home  Discharge Instructions: Continue to use caregiver trained strategies to promote pt's functional communication.      [facility] Patient Record Number: [id number]    FCM Scores (Percent Functional)  Cognition - 31%      Time Calculation:   Untimed Charges  46776-JF Treatment/ST Modification Prosth Aug Alter : 46  Total Minutes  Untimed Charges Total Minutes: 46   Total Minutes: 46    Therapy Charges for Today       Code Description Service Date Service Provider Modifiers Qty    [id number]  ST TREATMENT SPEECH 3 6/12/2024 Pattie Luna, SLP GN 1                    INÉS Zapata  6/12/2024

## 2024-06-19 ENCOUNTER — APPOINTMENT (OUTPATIENT)
Dept: SPEECH THERAPY | Facility: HOSPITAL | Age: 79
End: 2024-06-19
Payer: MEDICARE

## 2024-06-26 ENCOUNTER — APPOINTMENT (OUTPATIENT)
Dept: SPEECH THERAPY | Facility: HOSPITAL | Age: 79
End: 2024-06-26
Payer: MEDICARE

## 2024-07-02 ENCOUNTER — LAB (OUTPATIENT)
Dept: FAMILY MEDICINE CLINIC | Facility: CLINIC | Age: 79
End: 2024-07-02
Payer: MEDICARE

## 2024-07-02 DIAGNOSIS — E78.00 HYPERCHOLESTEROLEMIA: Primary | ICD-10-CM

## 2024-07-03 LAB
ALBUMIN SERPL-MCNC: 4.2 G/DL (ref 3.5–5.2)
ALBUMIN/GLOB SERPL: 2.2 G/DL
ALP SERPL-CCNC: 94 U/L (ref 39–117)
ALT SERPL-CCNC: 18 U/L (ref 1–33)
APPEARANCE UR: CLEAR
AST SERPL-CCNC: 26 U/L (ref 1–32)
BACTERIA #/AREA URNS HPF: NORMAL /HPF
BASOPHILS # BLD AUTO: 0.02 10*3/MM3 (ref 0–0.2)
BASOPHILS NFR BLD AUTO: 0.4 % (ref 0–1.5)
BILIRUB SERPL-MCNC: 0.5 MG/DL (ref 0–1.2)
BILIRUB UR QL STRIP: NEGATIVE
BUN SERPL-MCNC: 11 MG/DL (ref 8–23)
BUN/CREAT SERPL: 12.8 (ref 7–25)
CALCIUM SERPL-MCNC: 9.2 MG/DL (ref 8.6–10.5)
CASTS URNS MICRO: NORMAL
CHLORIDE SERPL-SCNC: 99 MMOL/L (ref 98–107)
CHOLEST SERPL-MCNC: 127 MG/DL (ref 0–200)
CO2 SERPL-SCNC: 31.2 MMOL/L (ref 22–29)
COLOR UR: YELLOW
CREAT SERPL-MCNC: 0.86 MG/DL (ref 0.57–1)
EGFRCR SERPLBLD CKD-EPI 2021: 69.2 ML/MIN/1.73
EOSINOPHIL # BLD AUTO: 0.08 10*3/MM3 (ref 0–0.4)
EOSINOPHIL NFR BLD AUTO: 1.8 % (ref 0.3–6.2)
EPI CELLS #/AREA URNS HPF: NORMAL /HPF
ERYTHROCYTE [DISTWIDTH] IN BLOOD BY AUTOMATED COUNT: 13.4 % (ref 12.3–15.4)
GLOBULIN SER CALC-MCNC: 1.9 GM/DL
GLUCOSE SERPL-MCNC: 96 MG/DL (ref 65–99)
GLUCOSE UR QL STRIP: NEGATIVE
HCT VFR BLD AUTO: 36.6 % (ref 34–46.6)
HDLC SERPL-MCNC: 59 MG/DL (ref 40–60)
HGB BLD-MCNC: 11.4 G/DL (ref 12–15.9)
HGB UR QL STRIP: NEGATIVE
IMM GRANULOCYTES # BLD AUTO: 0.01 10*3/MM3 (ref 0–0.05)
IMM GRANULOCYTES NFR BLD AUTO: 0.2 % (ref 0–0.5)
KETONES UR QL STRIP: ABNORMAL
LDLC SERPL CALC-MCNC: 56 MG/DL (ref 0–100)
LDLC/HDLC SERPL: 0.97 {RATIO}
LEUKOCYTE ESTERASE UR QL STRIP: ABNORMAL
LYMPHOCYTES # BLD AUTO: 1.31 10*3/MM3 (ref 0.7–3.1)
LYMPHOCYTES NFR BLD AUTO: 28.9 % (ref 19.6–45.3)
MCH RBC QN AUTO: 28.6 PG (ref 26.6–33)
MCHC RBC AUTO-ENTMCNC: 31.1 G/DL (ref 31.5–35.7)
MCV RBC AUTO: 92 FL (ref 79–97)
MONOCYTES # BLD AUTO: 0.43 10*3/MM3 (ref 0.1–0.9)
MONOCYTES NFR BLD AUTO: 9.5 % (ref 5–12)
NEUTROPHILS # BLD AUTO: 2.68 10*3/MM3 (ref 1.7–7)
NEUTROPHILS NFR BLD AUTO: 59.2 % (ref 42.7–76)
NITRITE UR QL STRIP: NEGATIVE
NRBC BLD AUTO-RTO: 0 /100 WBC (ref 0–0.2)
PH UR STRIP: 6.5 [PH] (ref 5–8)
PLATELET # BLD AUTO: 222 10*3/MM3 (ref 140–450)
POTASSIUM SERPL-SCNC: 3.7 MMOL/L (ref 3.5–5.2)
PROT SERPL-MCNC: 6.1 G/DL (ref 6–8.5)
PROT UR QL STRIP: ABNORMAL
RBC # BLD AUTO: 3.98 10*6/MM3 (ref 3.77–5.28)
RBC #/AREA URNS HPF: NORMAL /HPF
SODIUM SERPL-SCNC: 142 MMOL/L (ref 136–145)
SP GR UR STRIP: 1.02 (ref 1–1.03)
TRIGL SERPL-MCNC: 55 MG/DL (ref 0–150)
UROBILINOGEN UR STRIP-MCNC: ABNORMAL MG/DL
VLDLC SERPL CALC-MCNC: 12 MG/DL (ref 5–40)
WBC # BLD AUTO: 4.53 10*3/MM3 (ref 3.4–10.8)
WBC #/AREA URNS HPF: NORMAL /HPF

## 2024-07-09 ENCOUNTER — OFFICE VISIT (OUTPATIENT)
Dept: FAMILY MEDICINE CLINIC | Facility: CLINIC | Age: 79
End: 2024-07-09
Payer: MEDICARE

## 2024-07-09 VITALS
SYSTOLIC BLOOD PRESSURE: 138 MMHG | WEIGHT: 170 LBS | RESPIRATION RATE: 16 BRPM | TEMPERATURE: 97 F | HEIGHT: 60 IN | DIASTOLIC BLOOD PRESSURE: 74 MMHG | HEART RATE: 72 BPM | BODY MASS INDEX: 33.38 KG/M2 | OXYGEN SATURATION: 96 %

## 2024-07-09 DIAGNOSIS — D50.9 IRON DEFICIENCY ANEMIA, UNSPECIFIED IRON DEFICIENCY ANEMIA TYPE: ICD-10-CM

## 2024-07-09 DIAGNOSIS — R31.29 MICROSCOPIC HEMATURIA: Primary | ICD-10-CM

## 2024-07-09 PROCEDURE — 99213 OFFICE O/P EST LOW 20 MIN: CPT | Performed by: INTERNAL MEDICINE

## 2024-07-09 PROCEDURE — 1125F AMNT PAIN NOTED PAIN PRSNT: CPT | Performed by: INTERNAL MEDICINE

## 2024-07-09 NOTE — PROGRESS NOTES
Subjective   Chris Cox is a 78 y.o. female. Patient is here today for   Chief Complaint   Patient presents with    Hyperlipidemia    Edema     BILAT LEG SWELLING          Vitals:    07/09/24 1111   BP: 138/74   Pulse: 72   Resp: 16   Temp: 97 °F (36.1 °C)   SpO2: 96%     Body mass index is 33.2 kg/m².      Past Medical History:   Diagnosis Date    Abdominal pain     ADHD     Ankle sprain past    Arthritis     primary both knees    Bronchitis 08/2019    CTS (carpal tunnel syndrome) surgery c. early 2000's    Disease of thyroid gland     Dislocation, shoulder ?    Diverticulosis of intestine     DVT (deep venous thrombosis) 10/2019    right lower extremity    Fracture of wrist ?    Fracture, foot past    Fractures     MULTIPLE BONE FRACTURES-TOOK FOSAMAX 3.5 YRS    Hyperlipidemia     Hypertension     Irregular heart beat     Kidney stone     Knee swelling R. replaced c. 2019    Lumbar spine pain     Medicare annual wellness visit, subsequent 02/04/2021    Migraines     Mono exposure     AGE 35    Neck strain     Neuralgia neuritis, sciatic nerve     Obstructive sleep apnea     20 YRS AGO-NOT CURRENTLY    Rotator cuff syndrome Trapezius pain?    Scoliosis since childhood    Seizures     As a baby    Stress fracture ?    Tuberculosis     As a child    Vertigo     Wrist sprain       Allergies   Allergen Reactions    Erythromycin Anaphylaxis    Sulfa Antibiotics Rash    Milk-Related Compounds GI Intolerance    Egg-Derived Products Unknown - Low Severity     PER ALLERGY TESTING RESULTS    Cephalexin Rash    Cephalosporins Rash     STOMACH ISSUES    Molds & Smuts Rash     ITCHING      Social History     Socioeconomic History    Marital status:      Spouse name: Marky    Years of education: College   Tobacco Use    Smoking status: Never     Passive exposure: Never    Smokeless tobacco: Never   Vaping Use    Vaping status: Never Used   Substance and Sexual Activity    Alcohol use: Not Currently     Alcohol/week:  1.0 standard drink of alcohol     Comment: used medicinally    Drug use: No    Sexual activity: Not Currently     Partners: Male     Comment: N/A        Current Outpatient Medications:     ascorbic acid (VITAMIN C) 500 MG/5ML liquid, Take 5 mL by mouth Daily., Disp: , Rfl:     escitalopram (LEXAPRO) 10 MG tablet, TAKE 1 TABLET DAILY, Disp: 90 tablet, Rfl: 3    furosemide (LASIX) 40 MG tablet, Take 1 tablet by mouth 2 (Two) Times a Day., Disp: 90 tablet, Rfl: 3    memantine (NAMENDA) 5 MG tablet, TAKE 2 TABLETS DAILY, Disp: 180 tablet, Rfl: 3    Myrbetriq 50 MG tablet sustained-release 24 hour 24 hr tablet, Take 50 mg by mouth Daily., Disp: 90 tablet, Rfl: 0    psyllium (Metamucil) 0.52 g capsule, Take 1 capsule by mouth Daily., Disp: 30 capsule, Rfl: 11    rosuvastatin (CRESTOR) 10 MG tablet, TAKE 1 TABLET DAILY, Disp: 90 tablet, Rfl: 0    ondansetron (Zofran) 4 MG tablet, Take 1 tablet by mouth Every 8 (Eight) Hours As Needed for Nausea or Vomiting., Disp: 20 tablet, Rfl: 1     Objective     History of Present Illness  This patient has bilateral lower extremity edema.  She denies any dyspnea on exertion.  She denies any paroxysmal nocturnal dyspnea or orthopnea.    She been seen by her cardiologist recently who asked her to start taking furosemide 40 mg twice daily.  She has not noticed that she is lost any weight since starting that was furosemide.    She has a history of bilateral lower extremity DVTs. A duplex scan of extremity veins including responses to compression maneuvers ordered by my colleague Dr. Jimenes on January 3 revealed chronic right lower extremity DVT noted in the soleal vein, chronic left lower extremity DVT noted in the soleal vessel, left popliteal fossa fluid collection and all other veins appeared normal bilaterally.      Hyperlipidemia         Review of Systems   Constitutional: Negative.    HENT: Negative.     Respiratory: Negative.     Cardiovascular: Negative.    Musculoskeletal:          She notes bilateral lower extremity edema from the upper calf distally.       Physical Exam  Vitals and nursing note reviewed.   Constitutional:       Appearance: Normal appearance.      Comments: Pleasant, neatly groomed, no distress.   Cardiovascular:      Rate and Rhythm: Regular rhythm.      Heart sounds: Normal heart sounds. No murmur heard.     No gallop.   Pulmonary:      Effort: No respiratory distress.      Breath sounds: Normal breath sounds. No wheezing or rales.   Neurological:      Mental Status: She is alert and oriented to person, place, and time.   Psychiatric:         Mood and Affect: Mood normal.         Behavior: Behavior normal.         Thought Content: Thought content normal.           Problems Addressed this Visit          Genitourinary and Reproductive     Microscopic hematuria - Primary    Relevant Orders    Urinalysis With Microscopic If Indicated (No Culture) - Urine, Clean Catch     Other Visit Diagnoses       Iron deficiency anemia, unspecified iron deficiency anemia type        Relevant Orders    Iron Profile    Vitamin B12          Diagnoses         Codes Comments    Microscopic hematuria    -  Primary ICD-10-CM: R31.29  ICD-9-CM: 599.72     Iron deficiency anemia, unspecified iron deficiency anemia type     ICD-10-CM: D50.9  ICD-9-CM: 280.9               PLAN  I will check an iron and ferritin today she has an anemia on July 2 with a hemoglobin 11.4 and MCV of 92    She has 2+ bilateral lower extremity pitting edema from the mid calf distally.  She could wear knee-high support stockings but I know these are very challenging to put on.  She should keep her legs elevated when she is seated.    Her cardiologist has arranged for her to have an echocardiogram to reassess her cardiovascular function.  I suspect that her chronic bilateral lower extremity edema is really consequence of her bilateral lower extremity post phlebitic syndrome.    She has a history of microscopic hematuria and labs  from last week showed no red blood cells though she did have some white blood cells.    I will recheck urinalysis today.  No follow-ups on file.  Answers submitted by the patient for this visit:  Other (Submitted on 7/2/2024)  Please describe your symptoms.: leg swelling  Have you had these symptoms before?: Yes  How long have you been having these symptoms?: Greater than 2 weeks  Please list any medications you are currently taking for this condition.: Lasix 40mg bid  Please describe any probable cause for these symptoms. : ?  Primary Reason for Visit (Submitted on 7/2/2024)  What is the primary reason for your visit?: Other

## 2024-07-10 LAB
APPEARANCE UR: CLEAR
BILIRUB UR QL STRIP: NEGATIVE
COLOR UR: YELLOW
GLUCOSE UR QL STRIP: NEGATIVE
HGB UR QL STRIP: NEGATIVE
IRON SATN MFR SERPL: 19 % (ref 15–55)
IRON SERPL-MCNC: 69 UG/DL (ref 27–139)
KETONES UR QL STRIP: NEGATIVE
LEUKOCYTE ESTERASE UR QL STRIP: NEGATIVE
NITRITE UR QL STRIP: NEGATIVE
PH UR STRIP: 6.5 [PH] (ref 5–8)
PROT UR QL STRIP: NEGATIVE
SP GR UR STRIP: 1.01 (ref 1–1.03)
TIBC SERPL-MCNC: 359 UG/DL (ref 250–450)
UIBC SERPL-MCNC: 290 UG/DL (ref 118–369)
UROBILINOGEN UR STRIP-MCNC: NORMAL MG/DL
VIT B12 SERPL-MCNC: 469 PG/ML (ref 232–1245)

## 2024-07-11 ENCOUNTER — TELEPHONE (OUTPATIENT)
Dept: CARDIOLOGY | Facility: CLINIC | Age: 79
End: 2024-07-11
Payer: MEDICARE

## 2024-07-11 ENCOUNTER — HOSPITAL ENCOUNTER (OUTPATIENT)
Dept: CARDIOLOGY | Facility: HOSPITAL | Age: 79
Discharge: HOME | End: 2024-07-11
Admitting: INTERNAL MEDICINE
Payer: MEDICARE

## 2024-07-11 VITALS
BODY MASS INDEX: 33.38 KG/M2 | SYSTOLIC BLOOD PRESSURE: 122 MMHG | HEART RATE: 70 BPM | WEIGHT: 170 LBS | DIASTOLIC BLOOD PRESSURE: 70 MMHG | HEIGHT: 60 IN

## 2024-07-11 DIAGNOSIS — I50.32 CHRONIC DIASTOLIC CONGESTIVE HEART FAILURE: ICD-10-CM

## 2024-07-11 LAB
AORTIC ARCH: 2.5 CM
AORTIC DIMENSIONLESS INDEX: 0.8 (DI)
ASCENDING AORTA: 2.5 CM
BH CV ECHO MEAS - ACS: 1.67 CM
BH CV ECHO MEAS - AO MAX PG: 5.5 MMHG
BH CV ECHO MEAS - AO MEAN PG: 3 MMHG
BH CV ECHO MEAS - AO ROOT DIAM: 2.8 CM
BH CV ECHO MEAS - AO V2 MAX: 117 CM/SEC
BH CV ECHO MEAS - AO V2 VTI: 26.5 CM
BH CV ECHO MEAS - AVA(I,D): 2.42 CM2
BH CV ECHO MEAS - EDV(CUBED): 97.3 ML
BH CV ECHO MEAS - EDV(MOD-SP2): 59 ML
BH CV ECHO MEAS - EDV(MOD-SP4): 77 ML
BH CV ECHO MEAS - EF(MOD-BP): 63.2 %
BH CV ECHO MEAS - EF(MOD-SP2): 67.8 %
BH CV ECHO MEAS - EF(MOD-SP4): 59.7 %
BH CV ECHO MEAS - ESV(CUBED): 23.4 ML
BH CV ECHO MEAS - ESV(MOD-SP2): 19 ML
BH CV ECHO MEAS - ESV(MOD-SP4): 31 ML
BH CV ECHO MEAS - FS: 37.8 %
BH CV ECHO MEAS - IVS/LVPW: 1.14 CM
BH CV ECHO MEAS - IVSD: 0.8 CM
BH CV ECHO MEAS - LAT PEAK E' VEL: 8.6 CM/SEC
BH CV ECHO MEAS - LV DIASTOLIC VOL/BSA (35-75): 44.2 CM2
BH CV ECHO MEAS - LV MASS(C)D: 108.5 GRAMS
BH CV ECHO MEAS - LV MAX PG: 4.4 MMHG
BH CV ECHO MEAS - LV MEAN PG: 2 MMHG
BH CV ECHO MEAS - LV SYSTOLIC VOL/BSA (12-30): 17.8 CM2
BH CV ECHO MEAS - LV V1 MAX: 105 CM/SEC
BH CV ECHO MEAS - LV V1 VTI: 22.5 CM
BH CV ECHO MEAS - LVIDD: 4.6 CM
BH CV ECHO MEAS - LVIDS: 2.9 CM
BH CV ECHO MEAS - LVOT AREA: 2.9 CM2
BH CV ECHO MEAS - LVOT DIAM: 1.91 CM
BH CV ECHO MEAS - LVPWD: 0.7 CM
BH CV ECHO MEAS - MED PEAK E' VEL: 7.3 CM/SEC
BH CV ECHO MEAS - MV A DUR: 0.16 SEC
BH CV ECHO MEAS - MV A MAX VEL: 82.7 CM/SEC
BH CV ECHO MEAS - MV DEC SLOPE: 522 CM/SEC2
BH CV ECHO MEAS - MV DEC TIME: 0.25 SEC
BH CV ECHO MEAS - MV E MAX VEL: 95.5 CM/SEC
BH CV ECHO MEAS - MV E/A: 1.15
BH CV ECHO MEAS - MV MAX PG: 5.6 MMHG
BH CV ECHO MEAS - MV MEAN PG: 2.29 MMHG
BH CV ECHO MEAS - MV P1/2T: 64 MSEC
BH CV ECHO MEAS - MV V2 VTI: 36.9 CM
BH CV ECHO MEAS - MVA(P1/2T): 3.4 CM2
BH CV ECHO MEAS - MVA(VTI): 1.74 CM2
BH CV ECHO MEAS - PA ACC TIME: 0.15 SEC
BH CV ECHO MEAS - PA V2 MAX: 96.4 CM/SEC
BH CV ECHO MEAS - PULM A REVS DUR: 0.15 SEC
BH CV ECHO MEAS - PULM A REVS VEL: 37.7 CM/SEC
BH CV ECHO MEAS - PULM DIAS VEL: 32.6 CM/SEC
BH CV ECHO MEAS - PULM S/D: 1.17
BH CV ECHO MEAS - PULM SYS VEL: 38.1 CM/SEC
BH CV ECHO MEAS - QP/QS: 0.66
BH CV ECHO MEAS - RAP SYSTOLE: 3 MMHG
BH CV ECHO MEAS - RV MAX PG: 0.9 MMHG
BH CV ECHO MEAS - RV V1 MAX: 47.6 CM/SEC
BH CV ECHO MEAS - RV V1 VTI: 11.8 CM
BH CV ECHO MEAS - RVOT DIAM: 2.14 CM
BH CV ECHO MEAS - RVSP: 33.1 MMHG
BH CV ECHO MEAS - SV(LVOT): 64.3 ML
BH CV ECHO MEAS - SV(MOD-SP2): 40 ML
BH CV ECHO MEAS - SV(MOD-SP4): 46 ML
BH CV ECHO MEAS - SV(RVOT): 42.3 ML
BH CV ECHO MEAS - SVI(LVOT): 36.9 ML/M2
BH CV ECHO MEAS - SVI(MOD-SP2): 23 ML/M2
BH CV ECHO MEAS - SVI(MOD-SP4): 26.4 ML/M2
BH CV ECHO MEAS - TAPSE (>1.6): 1.91 CM
BH CV ECHO MEAS - TR MAX PG: 30.1 MMHG
BH CV ECHO MEAS - TR MAX VEL: 274.2 CM/SEC
BH CV ECHO MEASUREMENTS AVERAGE E/E' RATIO: 12.01
BH CV XLRA - RV BASE: 2.34 CM
BH CV XLRA - RV LENGTH: 6.9 CM
BH CV XLRA - RV MID: 2.24 CM
BH CV XLRA - TDI S': 12.5 CM/SEC
LEFT ATRIUM VOLUME INDEX: 23.7 ML/M2
SINUS: 2.9 CM
STJ: 2.6 CM

## 2024-07-11 PROCEDURE — 93306 TTE W/DOPPLER COMPLETE: CPT

## 2024-07-11 NOTE — TELEPHONE ENCOUNTER
----- Message from Brittany Gutierrez sent at 7/11/2024  1:42 PM EDT -----  LVM to return call. Echo shows normal heart function with grade 2 diastolic dysfunction. This is likely the culprit of her worsening edema. Please find out how she is doing on the increased dose of furosemide.

## 2024-07-11 NOTE — TELEPHONE ENCOUNTER
Pt's  called back. Went over results. He verbalized understanding. He said the Furosemide has not helped at all. It is like she is not even taking it. They are coming in for an appt with Teresa on Monday to discuss things further. Pt's  mentioned the lymphedema clinic as a possibility (which Dr. Zaragoza mentioned in the last appt).    Thank you,    Yumiko Peralta, RN  [place] [facility] [place]  07/11/24 15:15 EDT

## 2024-07-15 ENCOUNTER — OFFICE VISIT (OUTPATIENT)
Dept: CARDIOLOGY | Facility: CLINIC | Age: 79
End: 2024-07-15
Payer: MEDICARE

## 2024-07-15 VITALS
HEIGHT: 60 IN | WEIGHT: 171 LBS | SYSTOLIC BLOOD PRESSURE: 110 MMHG | HEART RATE: 65 BPM | BODY MASS INDEX: 33.57 KG/M2 | DIASTOLIC BLOOD PRESSURE: 60 MMHG

## 2024-07-15 DIAGNOSIS — R60.0 BILATERAL LEG EDEMA: ICD-10-CM

## 2024-07-15 DIAGNOSIS — I50.32 CHRONIC DIASTOLIC CONGESTIVE HEART FAILURE: Primary | ICD-10-CM

## 2024-07-15 PROCEDURE — 1160F RVW MEDS BY RX/DR IN RCRD: CPT | Performed by: NURSE PRACTITIONER

## 2024-07-15 PROCEDURE — 99214 OFFICE O/P EST MOD 30 MIN: CPT | Performed by: NURSE PRACTITIONER

## 2024-07-15 PROCEDURE — 93000 ELECTROCARDIOGRAM COMPLETE: CPT | Performed by: NURSE PRACTITIONER

## 2024-07-15 PROCEDURE — 1159F MED LIST DOCD IN RCRD: CPT | Performed by: NURSE PRACTITIONER

## 2024-07-15 RX ORDER — BUMETANIDE 2 MG/1
2 TABLET ORAL DAILY
Qty: 30 TABLET | Refills: 11 | Status: SHIPPED | OUTPATIENT
Start: 2024-07-15

## 2024-07-15 NOTE — PROGRESS NOTES
Date of Office Visit: 07/15/2024  Encounter Provider: GARRETT Garcias  Place of Service: [facility] CARDIOLOGY  Patient Name: Chris Cox  :1945    Chief complaint:  Edema    HPI: Chris Cox is a 79 y.o. female who is a patient of Dr. Zaragoza and is new to me today.  She has a history of chronic lower extremity edema mild cognitive impairment.  She has a history several years ago of bilateral lower extremity DVTs.  In  she had a Doppler that showed chronic bilateral soleal DVT.  Her PCP had started her on Lasix 20 mg twice a day without much increase in urination or change in swelling.  Her legs are not weeping there are no sores.  She has some chronic venous stasis changes.  Dr. Zaragoza at her appointment and may increase her Lasix to 40 mg twice a day to see if this would help improve some of her symptoms.  She also ordered an echocardiogram.  Echocardiogram showed grade 2 diastolic dysfunction EF was normal mild mitral valve regurgitation and moderate tricuspid valve regurgitation her RVSP was normal.    Recent labs after increasing diuretics were normal lipid panel is at goal.  She comes in today her legs are still swollen she does not really feel like the increased dose of Lasix is helping.  She is monitoring her salt and fluid intake.  Previous testing and notes have been reviewed by me.   Past Medical History:   Diagnosis Date    Abdominal pain     ADHD     Ankle sprain past    Arthritis     primary both knees    Bronchitis 2019    CTS (carpal tunnel syndrome) surgery c. early     Disease of thyroid gland     Dislocation, shoulder ?    Diverticulosis of intestine     DVT (deep venous thrombosis) 10/2019    right lower extremity    Fracture of wrist ?    Fracture, foot past    Fractures     MULTIPLE BONE FRACTURES-TOOK FOSAMAX 3.5 YRS    Hyperlipidemia     Hypertension     Irregular heart beat     Kidney stone     Knee swelling R. replaced c. 2019     Lumbar spine pain     Medicare annual wellness visit, subsequent 02/04/2021    Migraines     Mono exposure     AGE 35    Neck strain     Neuralgia neuritis, sciatic nerve     Obstructive sleep apnea     20 YRS AGO-NOT CURRENTLY    Rotator cuff syndrome Trapezius pain?    Scoliosis since childhood    Seizures     As a baby    Stress fracture ?    Tuberculosis     As a child    Vertigo     Wrist sprain        Past Surgical History:   Procedure Laterality Date    BREAST BIOPSY Left     2019 stereotactic benign    BREAST EXCISIONAL BIOPSY Left     1971 - fibroadenoma    BREAST SURGERY      Benign fibroadnoma removed from the left breast    COLONOSCOPY N/A 04/11/2022    Procedure: COLONOSCOPY;  Surgeon: Leonor Xavier MD;  Location: [facility] MAIN OR;  Service: Gastroenterology;  Laterality: N/A;  Diverticulosis    HAND SURGERY Bilateral 2012    RECONSTRUCTION ON BOTH HANDS    HIP SURGERY  c. 2019    JOINT REPLACEMENT  knee/hip    LITHOTRIPSY      renal    TONGUE SURGERY      TOTAL KNEE ARTHROPLASTY Right 10/22/2019    Procedure: TOTAL KNEE ARTHROPLASTY RIGHT;  Surgeon: Aneesh Okeefe II, MD;  Location: [facility] MAIN OR;  Service: Orthopedics    WRIST SURGERY         Social History     Socioeconomic History    Marital status:      Spouse name: Marky    Years of education: College   Tobacco Use    Smoking status: Never     Passive exposure: Never    Smokeless tobacco: Never   Vaping Use    Vaping status: Never Used   Substance and Sexual Activity    Alcohol use: Not Currently     Alcohol/week: 1.0 standard drink of alcohol     Comment: used medicinally    Drug use: No    Sexual activity: Not Currently     Partners: Male     Comment: N/A       Family History   Problem Relation Age of Onset    Hypertension Mother     Other Mother     Transient ischemic attack Mother     Heart disease Mother     Cancer Father         colon    Diabetes Father     Hypertension Father     Other Father         Renal artery  "aneurysm    Heart disease Father     Stroke Father     Diabetes Paternal Grandmother     Cancer Paternal Grandfather        ROS    Allergies   Allergen Reactions    Erythromycin Anaphylaxis    Sulfa Antibiotics Rash    Milk-Related Compounds GI Intolerance    Egg-Derived Products Unknown - Low Severity     PER ALLERGY TESTING RESULTS    Cephalexin Rash    Cephalosporins Rash     STOMACH ISSUES    Molds & Smuts Rash     ITCHING         Current Outpatient Medications:     ascorbic acid (VITAMIN C) 500 MG/5ML liquid, Take 5 mL by mouth Daily., Disp: , Rfl:     escitalopram (LEXAPRO) 10 MG tablet, TAKE 1 TABLET DAILY, Disp: 90 tablet, Rfl: 3    furosemide (LASIX) 40 MG tablet, Take 1 tablet by mouth 2 (Two) Times a Day., Disp: 90 tablet, Rfl: 3    memantine (NAMENDA) 5 MG tablet, TAKE 2 TABLETS DAILY, Disp: 180 tablet, Rfl: 3    Myrbetriq 50 MG tablet sustained-release 24 hour 24 hr tablet, Take 50 mg by mouth Daily., Disp: 90 tablet, Rfl: 0    psyllium (Metamucil) 0.52 g capsule, Take 1 capsule by mouth Daily., Disp: 30 capsule, Rfl: 11    rosuvastatin (CRESTOR) 10 MG tablet, TAKE 1 TABLET DAILY, Disp: 90 tablet, Rfl: 0      Objective:     Vitals:    07/15/24 1331   BP: 110/60   BP Location: Left arm   Patient Position: Sitting   Pulse: 65   Weight: 77.6 kg (171 lb)   Height: 152.4 cm (60\")     Body mass index is 33.4 kg/m².    PHYSICAL EXAM:    Constitutional:       General: Not in acute distress.     Appearance: Normal appearance. Well-developed.   Eyes:      Pupils: Pupils are equal, round, and reactive to light.   HENT:      Head: Normocephalic.   Neck:      Vascular: No carotid bruit or JVD.   Pulmonary:      Effort: Pulmonary effort is normal. No tachypnea.      Breath sounds: Normal breath sounds. No wheezing. No rales.   Cardiovascular:      Normal rate. Regular rhythm.      No gallop.    Pulses:     Intact distal pulses.   Edema:     Peripheral edema present.     Pretibial: bilateral 2+ edema of the pretibial " area.     Ankle: bilateral 1+ edema of the ankle.     Feet: bilateral trace edema of the feet.  Abdominal:      General: Bowel sounds are normal.      Palpations: Abdomen is soft.      Tenderness: There is no abdominal tenderness.   Musculoskeletal: Normal range of motion.      Cervical back: Normal range of motion and neck supple. No edema. Skin:     General: Skin is warm and dry.   Neurological:      Mental Status: Alert and oriented to person, place, and time.           ECG 12 Lead    Date/Time: 7/15/2024 3:03 PM  Performed by: Teresa Zendejas APRN    Authorized by: Teresa Zendejas APRN  Comparison: compared with previous ECG from 5/31/2024  Similar to previous ECG  Rhythm: sinus rhythm  Rate: normal  Conduction: non-specific intraventricular conduction delay  Q waves: III and aVF    Other findings: poor R wave progression    Clinical impression: non-specific ECG            Assessment:   Peripheral edema-edema is persistent with increasing Lasix.  Get a switch her to Bumex 2 mg a day to see if we get a better result.  Will check labs in a week.  I am also sending her to the lymphedema clinic for wraps    2.   History of bilateral lower extremity DVT likely contributing to venous insufficiency    3.  Dyslipidemia on statin therapy lipids at goal       Plan:       Follow-up in 3 months         Your medication list            Accurate as of July 15, 2024  1:50 PM. If you have any questions, ask your nurse or doctor.                CONTINUE taking these medications        Instructions Last Dose Given Next Dose Due   ascorbic acid 500 MG/5ML liquid  Commonly known as: VITAMIN C      Take 5 mL by mouth Daily.       escitalopram 10 MG tablet  Commonly known as: LEXAPRO      TAKE 1 TABLET DAILY       furosemide 40 MG tablet  Commonly known as: LASIX      Take 1 tablet by mouth 2 (Two) Times a Day.       memantine 5 MG tablet  Commonly known as: NAMENDA      TAKE 2 TABLETS DAILY       Metamucil 0.52 g  capsule  Generic drug: psyllium      Take 1 capsule by mouth Daily.       Myrbetriq 50 MG tablet sustained-release 24 hour 24 hr tablet  Generic drug: Mirabegron ER      Take 50 mg by mouth Daily.       rosuvastatin 10 MG tablet  Commonly known as: CRESTOR      TAKE 1 TABLET DAILY                  As always, it has been a pleasure to participate in your patient's care.      Sincerely,     Teresa TUCKER

## 2024-07-17 RX ORDER — MIRABEGRON 50 MG/1
50 TABLET, FILM COATED, EXTENDED RELEASE ORAL DAILY
Qty: 90 TABLET | Refills: 1 | Status: SHIPPED | OUTPATIENT
Start: 2024-07-17

## 2024-08-01 ENCOUNTER — LAB (OUTPATIENT)
Dept: LAB | Facility: HOSPITAL | Age: 79
End: 2024-08-01
Payer: MEDICARE

## 2024-08-01 DIAGNOSIS — I50.32 CHRONIC DIASTOLIC CONGESTIVE HEART FAILURE: ICD-10-CM

## 2024-08-01 LAB
ANION GAP SERPL CALCULATED.3IONS-SCNC: 10.9 MMOL/L (ref 5–15)
BUN SERPL-MCNC: 10 MG/DL (ref 8–23)
BUN/CREAT SERPL: 12.8 (ref 7–25)
CALCIUM SPEC-SCNC: 8.6 MG/DL (ref 8.6–10.5)
CHLORIDE SERPL-SCNC: 106 MMOL/L (ref 98–107)
CO2 SERPL-SCNC: 25.1 MMOL/L (ref 22–29)
CREAT SERPL-MCNC: 0.78 MG/DL (ref 0.57–1)
EGFRCR SERPLBLD CKD-EPI 2021: 77.4 ML/MIN/1.73
GLUCOSE SERPL-MCNC: 131 MG/DL (ref 65–99)
POTASSIUM SERPL-SCNC: 3.3 MMOL/L (ref 3.5–5.2)
SODIUM SERPL-SCNC: 142 MMOL/L (ref 136–145)

## 2024-08-01 PROCEDURE — 36415 COLL VENOUS BLD VENIPUNCTURE: CPT

## 2024-08-01 PROCEDURE — 80048 BASIC METABOLIC PNL TOTAL CA: CPT

## 2024-08-02 ENCOUNTER — HOSPITAL ENCOUNTER (EMERGENCY)
Facility: HOSPITAL | Age: 79
Discharge: HOME | End: 2024-08-02
Attending: EMERGENCY MEDICINE
Payer: MEDICARE

## 2024-08-02 ENCOUNTER — APPOINTMENT (OUTPATIENT)
Dept: GENERAL RADIOLOGY | Facility: HOSPITAL | Age: 79
End: 2024-08-02
Payer: MEDICARE

## 2024-08-02 ENCOUNTER — TELEPHONE (OUTPATIENT)
Dept: CARDIOLOGY | Facility: CLINIC | Age: 79
End: 2024-08-02
Payer: MEDICARE

## 2024-08-02 VITALS
OXYGEN SATURATION: 99 % | TEMPERATURE: 97 F | SYSTOLIC BLOOD PRESSURE: 119 MMHG | HEIGHT: 60 IN | DIASTOLIC BLOOD PRESSURE: 60 MMHG | HEART RATE: 55 BPM | BODY MASS INDEX: 33.59 KG/M2 | WEIGHT: 171.08 LBS | RESPIRATION RATE: 16 BRPM

## 2024-08-02 DIAGNOSIS — E87.6 HYPOKALEMIA: ICD-10-CM

## 2024-08-02 DIAGNOSIS — R07.89 ATYPICAL CHEST PAIN: Primary | ICD-10-CM

## 2024-08-02 LAB
ALBUMIN SERPL-MCNC: 4.2 G/DL (ref 3.5–5.2)
ALBUMIN/GLOB SERPL: 1.9 G/DL
ALP SERPL-CCNC: 88 U/L (ref 39–117)
ALT SERPL W P-5'-P-CCNC: 15 U/L (ref 1–33)
ANION GAP SERPL CALCULATED.3IONS-SCNC: 11 MMOL/L (ref 5–15)
AST SERPL-CCNC: 19 U/L (ref 1–32)
BASOPHILS # BLD AUTO: 0.02 10*3/MM3 (ref 0–0.2)
BASOPHILS NFR BLD AUTO: 0.5 % (ref 0–1.5)
BILIRUB SERPL-MCNC: 0.3 MG/DL (ref 0–1.2)
BUN SERPL-MCNC: 10 MG/DL (ref 8–23)
BUN/CREAT SERPL: 12.5 (ref 7–25)
CALCIUM SPEC-SCNC: 9.1 MG/DL (ref 8.6–10.5)
CHLORIDE SERPL-SCNC: 102 MMOL/L (ref 98–107)
CO2 SERPL-SCNC: 28 MMOL/L (ref 22–29)
CREAT SERPL-MCNC: 0.8 MG/DL (ref 0.57–1)
DEPRECATED RDW RBC AUTO: 43.8 FL (ref 37–54)
EGFRCR SERPLBLD CKD-EPI 2021: 75.1 ML/MIN/1.73
EOSINOPHIL # BLD AUTO: 0.06 10*3/MM3 (ref 0–0.4)
EOSINOPHIL NFR BLD AUTO: 1.4 % (ref 0.3–6.2)
ERYTHROCYTE [DISTWIDTH] IN BLOOD BY AUTOMATED COUNT: 13.3 % (ref 12.3–15.4)
GEN 5 2HR TROPONIN T REFLEX: 11 NG/L
GLOBULIN UR ELPH-MCNC: 2.2 GM/DL
GLUCOSE SERPL-MCNC: 114 MG/DL (ref 65–99)
HCT VFR BLD AUTO: 38.4 % (ref 34–46.6)
HGB BLD-MCNC: 12.5 G/DL (ref 12–15.9)
HOLD SPECIMEN: NORMAL
HOLD SPECIMEN: NORMAL
IMM GRANULOCYTES # BLD AUTO: 0.01 10*3/MM3 (ref 0–0.05)
IMM GRANULOCYTES NFR BLD AUTO: 0.2 % (ref 0–0.5)
LYMPHOCYTES # BLD AUTO: 1.35 10*3/MM3 (ref 0.7–3.1)
LYMPHOCYTES NFR BLD AUTO: 30.9 % (ref 19.6–45.3)
MCH RBC QN AUTO: 29.3 PG (ref 26.6–33)
MCHC RBC AUTO-ENTMCNC: 32.6 G/DL (ref 31.5–35.7)
MCV RBC AUTO: 90.1 FL (ref 79–97)
MONOCYTES # BLD AUTO: 0.26 10*3/MM3 (ref 0.1–0.9)
MONOCYTES NFR BLD AUTO: 5.9 % (ref 5–12)
NEUTROPHILS NFR BLD AUTO: 2.67 10*3/MM3 (ref 1.7–7)
NEUTROPHILS NFR BLD AUTO: 61.1 % (ref 42.7–76)
NRBC BLD AUTO-RTO: 0 /100 WBC (ref 0–0.2)
PLATELET # BLD AUTO: 193 10*3/MM3 (ref 140–450)
PMV BLD AUTO: 9.4 FL (ref 6–12)
POTASSIUM SERPL-SCNC: 3 MMOL/L (ref 3.5–5.2)
PROT SERPL-MCNC: 6.4 G/DL (ref 6–8.5)
RBC # BLD AUTO: 4.26 10*6/MM3 (ref 3.77–5.28)
SODIUM SERPL-SCNC: 141 MMOL/L (ref 136–145)
TROPONIN T DELTA: -1 NG/L
TROPONIN T SERPL HS-MCNC: 12 NG/L
WBC NRBC COR # BLD AUTO: 4.37 10*3/MM3 (ref 3.4–10.8)
WHOLE BLOOD HOLD COAG: NORMAL
WHOLE BLOOD HOLD SPECIMEN: NORMAL

## 2024-08-02 PROCEDURE — 80053 COMPREHEN METABOLIC PANEL: CPT

## 2024-08-02 PROCEDURE — 36415 COLL VENOUS BLD VENIPUNCTURE: CPT

## 2024-08-02 PROCEDURE — 99284 EMERGENCY DEPT VISIT MOD MDM: CPT

## 2024-08-02 PROCEDURE — 84484 ASSAY OF TROPONIN QUANT: CPT | Performed by: EMERGENCY MEDICINE

## 2024-08-02 PROCEDURE — 93005 ELECTROCARDIOGRAM TRACING: CPT | Performed by: EMERGENCY MEDICINE

## 2024-08-02 PROCEDURE — 93005 ELECTROCARDIOGRAM TRACING: CPT

## 2024-08-02 PROCEDURE — 71045 X-RAY EXAM CHEST 1 VIEW: CPT

## 2024-08-02 PROCEDURE — 85025 COMPLETE CBC W/AUTO DIFF WBC: CPT

## 2024-08-02 PROCEDURE — 84484 ASSAY OF TROPONIN QUANT: CPT

## 2024-08-02 RX ORDER — SODIUM CHLORIDE 0.9 % (FLUSH) 0.9 %
10 SYRINGE (ML) INJECTION AS NEEDED
Status: DISCONTINUED | OUTPATIENT
Start: 2024-08-02 | End: 2024-08-02 | Stop reason: HOSPADM

## 2024-08-02 RX ORDER — POTASSIUM CHLORIDE 750 MG/1
40 TABLET, FILM COATED, EXTENDED RELEASE ORAL ONCE
Status: COMPLETED | OUTPATIENT
Start: 2024-08-02 | End: 2024-08-02

## 2024-08-02 RX ORDER — ASPIRIN 325 MG
325 TABLET ORAL ONCE
Status: DISCONTINUED | OUTPATIENT
Start: 2024-08-02 | End: 2024-08-02 | Stop reason: HOSPADM

## 2024-08-02 RX ADMIN — POTASSIUM CHLORIDE 40 MEQ: 750 TABLET, EXTENDED RELEASE ORAL at 17:03

## 2024-08-02 NOTE — ED PROVIDER NOTES
EMERGENCY DEPARTMENT ENCOUNTER    Room Number:  12/12  PCP: Raghavendra Marquez MD  Historian: Patient/spouse      HPI:  Chief Complaint: Chest pain  A complete HPI/ROS/PMH/PSH/SH/FH are unobtainable due to: Short-term memory difficulties  Context: Chris Cox is a 79 y.o. female who presents to the ED c/o fairly sudden onset of chest discomfort that began while at rest at home this morning.  Her  states that at the time she described the pain as a dull and pressure type sensation to the central portion of her chest, moderate in intensity, nonradiating.  He is unsure how long the pain lasted but does not feel like that it lasted for very long before fully resolving.  He did give 975 mg of oral aspirin at the time.  He denies that she complained of shortness of breath, back pain, nausea/vomiting, extremity pain, diaphoresis, abdominal pain, or fever/chills.            PAST MEDICAL HISTORY  Active Ambulatory Problems     Diagnosis Date Noted    Abdominal bruit 01/26/2016    Blues 01/26/2016    DD (diverticular disease) 01/26/2016    Menopausal and perimenopausal disorder 01/26/2016    Neuralgia neuritis, sciatic nerve 01/26/2016    Right knee pain 01/26/2016    Primary osteoarthritis of both knees 08/08/2016    Radiculopathy with lower extremity symptoms 08/15/2016    Idiopathic peripheral neuropathy 09/23/2016    Sprain of collateral ligament of right knee 02/03/2017    ADD (attention deficit disorder) 04/20/2017    Microscopic hematuria 10/10/2017    Chronic pain of left knee 02/12/2018    Bleeding diathesis 02/12/2018    Bilateral edema of lower extremity 05/07/2018    Bilateral swelling of feet and ankles 08/23/2018    Hyperglycemia 12/03/2018    Venous stasis 12/03/2018    Attention deficit disorder (ADD) without hyperactivity 01/07/2019    Lesion of skin of scalp 08/14/2019    Pre-operative clearance 10/08/2019    Total knee replacement status 10/22/2019    Chronic deep vein thrombosis (DVT) of calf  muscle vein of right lower extremity 12/05/2019    Cellulitis of face 03/01/2020    Dermatitis 03/01/2020    Major depressive disorder with single episode, in full remission 03/01/2020    Fatigue due to excessive exertion 03/30/2020    Disease of thyroid gland 03/30/2020    Vertigo 07/14/2020    Medicare annual wellness visit, subsequent 02/04/2021    Osteopenia of both hips 07/21/2021    Memory loss 07/21/2021    Minimal cognitive impairment 11/10/2022    Hypercholesterolemia 12/26/2023     Resolved Ambulatory Problems     Diagnosis Date Noted    Chronic lower back pain 01/26/2016    Tear of meniscus of left knee 07/29/2016    Lumbar spine pain 08/15/2016    Viral URI with cough 03/10/2017    Dysfunction of right eustachian tube 04/12/2017    Functional diarrhea 08/22/2017    Painful urination 10/10/2017    Conjunctival hemorrhage 02/12/2018    Subacute maxillary sinusitis 02/12/2018    Left hip pain 02/12/2018    Pain of right hip joint 02/12/2018    Right shoulder pain 05/07/2018     Past Medical History:   Diagnosis Date    Abdominal pain     ADHD     Ankle sprain past    Arthritis     Bronchitis 08/2019    CTS (carpal tunnel syndrome) surgery c. early 2000's    Dislocation, shoulder ?    Diverticulosis of intestine     DVT (deep venous thrombosis) 10/2019    Fracture of wrist ?    Fracture, foot past    Fractures     Hyperlipidemia     Hypertension     Irregular heart beat     Kidney stone     Knee swelling R. replaced c. 2019    Migraines     Mono exposure     Neck strain     Obstructive sleep apnea     Rotator cuff syndrome Trapezius pain?    Scoliosis since childhood    Seizures     Stress fracture ?    Tuberculosis     Wrist sprain          PAST SURGICAL HISTORY  Past Surgical History:   Procedure Laterality Date    BREAST BIOPSY Left     2019 stereotactic benign    BREAST EXCISIONAL BIOPSY Left     1971 - fibroadenoma    BREAST SURGERY      Benign fibroadnoma removed from the left breast    COLONOSCOPY N/A  04/11/2022    Procedure: COLONOSCOPY;  Surgeon: Leonor Xavier MD;  [place] [facility] [place]  Service: Gastroenterology;  Laterality: N/A;  Diverticulosis    HAND SURGERY Bilateral 2012    RECONSTRUCTION ON BOTH HANDS    HIP SURGERY  c. 2019    JOINT REPLACEMENT  knee/hip    LITHOTRIPSY      renal    TONGUE SURGERY      TOTAL KNEE ARTHROPLASTY Right 10/22/2019    Procedure: TOTAL KNEE ARTHROPLASTY RIGHT;  Surgeon: Aneesh Okeefe II, MD;  Location: [facility] MAIN OR;  Service: Orthopedics    WRIST SURGERY           FAMILY HISTORY  Family History   Problem Relation Age of Onset    Hypertension Mother     Other Mother     Transient ischemic attack Mother     Heart disease Mother     Cancer Father         colon    Diabetes Father     Hypertension Father     Other Father         Renal artery aneurysm    Heart disease Father     Stroke Father     Diabetes Paternal Grandmother     Cancer Paternal Grandfather          SOCIAL HISTORY  Social History     Socioeconomic History    Marital status:      Spouse name: Marky    Years of education: College   Tobacco Use    Smoking status: Never     Passive exposure: Never    Smokeless tobacco: Never   Vaping Use    Vaping status: Never Used   Substance and Sexual Activity    Alcohol use: Not Currently     Alcohol/week: 1.0 standard drink of alcohol     Comment: used medicinally    Drug use: No    Sexual activity: Not Currently     Partners: Male     Comment: N/A         ALLERGIES  Erythromycin, Sulfa antibiotics, Milk-related compounds, Egg-derived products, Cephalexin, Cephalosporins, and Molds & smuts        REVIEW OF SYSTEMS  Review of Systems   Constitutional:  Negative for fever.   HENT:  Negative for sore throat.    Eyes: Negative.    Respiratory:  Negative for cough and shortness of breath.    Cardiovascular:  Positive for chest pain.   Gastrointestinal:  Negative for abdominal pain, diarrhea and vomiting.   Genitourinary:  Negative for dysuria.    Musculoskeletal:  Negative for neck pain.   Skin:  Negative for rash.   Allergic/Immunologic: Negative.    Neurological:  Negative for weakness, numbness and headaches.   Hematological: Negative.    Psychiatric/Behavioral: Negative.     All other systems reviewed and are negative.         PHYSICAL EXAM  ED Triage Vitals   Temp Heart Rate Resp BP SpO2   08/02/24 1237 08/02/24 1237 08/02/24 1237 08/02/24 1244 08/02/24 1237   97 °F (36.1 °C) 66 16 138/61 100 %      Temp src Heart Rate Source Patient Position BP Location FiO2 (%)   -- -- -- -- --              Physical Exam  Constitutional:       General: She is not in acute distress.     Appearance: Normal appearance. She is not ill-appearing or toxic-appearing.   HENT:      Head: Normocephalic and atraumatic.   Eyes:      Extraocular Movements: Extraocular movements intact.      Pupils: Pupils are equal, round, and reactive to light.   Cardiovascular:      Rate and Rhythm: Normal rate and regular rhythm.      Heart sounds: No murmur heard.     No friction rub. No gallop.   Pulmonary:      Effort: Pulmonary effort is normal.      Breath sounds: Normal breath sounds.   Abdominal:      General: Abdomen is flat. There is no distension.      Palpations: Abdomen is soft.      Tenderness: There is no abdominal tenderness.   Musculoskeletal:         General: No swelling or tenderness. Normal range of motion.      Cervical back: Normal range of motion and neck supple.   Skin:     General: Skin is warm and dry.   Neurological:      General: No focal deficit present.      Mental Status: She is alert and oriented to person, place, and time.      Sensory: No sensory deficit.      Motor: No weakness.   Psychiatric:         Mood and Affect: Mood normal.         Behavior: Behavior normal.           Vital signs and nursing notes reviewed.          LAB RESULTS  Recent Results (from the past 24 hour(s))   ECG 12 Lead ED Triage Standing Order; Chest Pain    Collection Time: 08/02/24  12:42 PM   Result Value Ref Range    QT Interval 439 ms    QTC Interval 435 ms   Comprehensive Metabolic Panel    Collection Time: 08/02/24 12:55 PM    Specimen: Blood   Result Value Ref Range    Glucose 114 (H) 65 - 99 mg/dL    BUN 10 8 - 23 mg/dL    Creatinine 0.80 0.57 - 1.00 mg/dL    Sodium 141 136 - 145 mmol/L    Potassium 3.0 (L) 3.5 - 5.2 mmol/L    Chloride 102 98 - 107 mmol/L    CO2 28.0 22.0 - 29.0 mmol/L    Calcium 9.1 8.6 - 10.5 mg/dL    Total Protein 6.4 6.0 - 8.5 g/dL    Albumin 4.2 3.5 - 5.2 g/dL    ALT (SGPT) 15 1 - 33 U/L    AST (SGOT) 19 1 - 32 U/L    Alkaline Phosphatase 88 39 - 117 U/L    Total Bilirubin 0.3 0.0 - 1.2 mg/dL    Globulin 2.2 gm/dL    A/G Ratio 1.9 g/dL    BUN/Creatinine Ratio 12.5 7.0 - 25.0    Anion Gap 11.0 5.0 - 15.0 mmol/L    eGFR 75.1 >60.0 mL/min/1.73   High Sensitivity Troponin T    Collection Time: 08/02/24 12:55 PM    Specimen: Blood   Result Value Ref Range    HS Troponin T 12 <14 ng/L   Green Top (Gel)    Collection Time: 08/02/24 12:55 PM   Result Value Ref Range    Extra Tube Hold for add-ons.    Lavender Top    Collection Time: 08/02/24 12:55 PM   Result Value Ref Range    Extra Tube hold for add-on    Gold Top - SST    Collection Time: 08/02/24 12:55 PM   Result Value Ref Range    Extra Tube Hold for add-ons.    Light Blue Top    Collection Time: 08/02/24 12:55 PM   Result Value Ref Range    Extra Tube Hold for add-ons.    CBC Auto Differential    Collection Time: 08/02/24 12:55 PM    Specimen: Blood   Result Value Ref Range    WBC 4.37 3.40 - 10.80 10*3/mm3    RBC 4.26 3.77 - 5.28 10*6/mm3    Hemoglobin 12.5 12.0 - 15.9 g/dL    Hematocrit 38.4 34.0 - 46.6 %    MCV 90.1 79.0 - 97.0 fL    MCH 29.3 26.6 - 33.0 pg    MCHC 32.6 31.5 - 35.7 g/dL    RDW 13.3 12.3 - 15.4 %    RDW-SD 43.8 37.0 - 54.0 fl    MPV 9.4 6.0 - 12.0 fL    Platelets 193 140 - 450 10*3/mm3    Neutrophil % 61.1 42.7 - 76.0 %    Lymphocyte % 30.9 19.6 - 45.3 %    Monocyte % 5.9 5.0 - 12.0 %    Eosinophil  % 1.4 0.3 - 6.2 %    Basophil % 0.5 0.0 - 1.5 %    Immature Grans % 0.2 0.0 - 0.5 %    Neutrophils, Absolute 2.67 1.70 - 7.00 10*3/mm3    Lymphocytes, Absolute 1.35 0.70 - 3.10 10*3/mm3    Monocytes, Absolute 0.26 0.10 - 0.90 10*3/mm3    Eosinophils, Absolute 0.06 0.00 - 0.40 10*3/mm3    Basophils, Absolute 0.02 0.00 - 0.20 10*3/mm3    Immature Grans, Absolute 0.01 0.00 - 0.05 10*3/mm3    nRBC 0.0 0.0 - 0.2 /100 WBC   High Sensitivity Troponin T 2Hr    Collection Time: 08/02/24  3:22 PM    Specimen: Blood   Result Value Ref Range    HS Troponin T 11 <14 ng/L    Troponin T Delta -1 >=-4 - <+4 ng/L       Ordered the above labs and reviewed the results.        RADIOLOGY  XR Chest 1 View    Result Date: 8/2/2024  XR CHEST 1 VW-  HISTORY: Female who is 79 years-old, chest pain  TECHNIQUE: Frontal view of the chest  COMPARISON: 1/1/2024, 10/8/2019  FINDINGS: Heart size is borderline. Aorta is calcified. Pulmonary vasculature is unremarkable. No focal pulmonary consolidation, pleural effusion, or pneumothorax. Eventration of right hemidiaphragm is apparent. No acute osseous process.      No focal pulmonary consolidation. Borderline heart size. Follow-up as clinical indications persist.  This report was finalized on 8/2/2024 2:06 PM by Dr. Marlon Monaco M.D on Workstation: [id number]       Ordered the above noted radiological studies. Reviewed by me in PACS.            PROCEDURES  Procedures    EKG independently interpreted by myself as follows:    EKG          EKG time: 1242  Rhythm/Rate: NSR, 59  P waves and TX: nml  QRS, axis: nml, borderline LAD  ST and T waves: Normal ST segments, nonspecific T wave flattening    Interpreted Contemporaneously by me, independently viewed  unchanged compared to prior 7/15/24        HEART SCORE    History Moderately suspicious (1)  ECG Normal (0)  Age > or = 65 (2)  Risk factors 1 or 2 (1)  Troponin < or = Normal limit (0)    This patient's HEART score is 4    HEART Score Key:  Scores  0-3: 0.9-1.7% risk of adverse cardiac event. In the HEART Score study, these patients were discharged (0.99% in the retrospective study, 1.7% in the prospective study)  Scores 4-6: 12-16.6% risk of adverse cardiac event. In the HEART Score study, these patients were admitted to the hospital. (11.6% retrospective, 16.6% prospective)  Scores ?7: 50-65% risk of adverse cardiac event. In the HEART Score study, these patients were candidates for early invasive measures. (65.2% retrospective, 50.1% prospective)        MEDICATIONS GIVEN IN ER  Medications   sodium chloride 0.9 % flush 10 mL (has no administration in time range)   aspirin tablet 325 mg (0 mg Oral Hold 8/2/24 1527)   potassium chloride (K-DUR,KLOR-CON) ER tablet 40 mEq (40 mEq Oral Given 8/2/24 1703)                   MEDICAL DECISION MAKING, PROGRESS, and CONSULTS    All labs have been independently reviewed by me.  All radiology studies have been reviewed by me and I have also reviewed the radiology report.   EKG's independently viewed and interpreted by me.  Discussion below represents my analysis of pertinent findings related to patient's condition, differential diagnosis, treatment plan and final disposition.      Additional sources:  - Discussed/ obtained information from independent historians: History obtained from the patient as well as the patient's spouse at bedside.    - External (non-ED) record review: Upon medical records review, the patient was last seen and evaluated in the outpatient office of cardiology on 7/15/2024 in follow-up for known chronic diastolic heart failure.    - Chronic or social conditions impacting care: Dementia, CHF    - Shared decision making: Discharge decision based on shared conversations have between myself as well as the patient and the patient's family at bedside.      Orders placed during this visit:  Orders Placed This Encounter   Procedures    XR Chest 1 View    Fonda Draw    Comprehensive Metabolic Panel     High Sensitivity Troponin T    CBC Auto Differential    High Sensitivity Troponin T 2Hr    NPO Diet NPO Type: Strict NPO    Undress & Gown    Continuous Pulse Oximetry    Oxygen Therapy- Nasal Cannula; Titrate 1-6 LPM Per SpO2; 90 - 95%    ECG 12 Lead ED Triage Standing Order; Chest Pain    ECG 12 Lead ED Triage Standing Order; Chest Pain    Insert Peripheral IV    CBC & Differential    Green Top (Gel)    Lavender Top    Gold Top - SST    Light Blue Top           Differential diagnosis includes but is not limited to:    Acute coronary syndrome, peptic ulcer disease, GERD/gastritis, pneumonia, pneumothorax, pulmonary embolism, pleurisy, or pericarditis.      Independent interpretation of labs, radiology studies, and discussions with consultants:    Chest x-ray independently interpreted by myself with my interpretation showing no cardiomegaly no area of edema, infiltrate, or pneumothorax.      ED Course as of 08/02/24 1819   Fri Aug 02, 2024   1647 On reevaluation, the patient is resting comfortably without any further complaints.  She states her chest pain has currently fully resolved and has been resolved for quite some time now.  We will treat her slightly low potassium and she will be stable for discharge with appropriate cardiology follow-up.  They agree with that plan and all questions answered. [BM]      ED Course User Index  [BM] Trevor Sagastume MD         DIAGNOSIS  Final diagnoses:   Atypical chest pain   Hypokalemia         DISPOSITION  DISCHARGE    Patient discharged in stable condition.    Reviewed implications of results, diagnosis, meds, responsibility to follow up, warning signs and symptoms of possible worsening, potential complications and reasons to return to ER.    Patient/Family voiced understanding of above instructions.    Discussed plan for discharge, as there is no emergent indication for admission. Patient referred to primary care provider for BP management due to today's BP. Pt/family  is agreeable and understands need for follow up and repeat testing.  Pt is aware that discharge does not mean that nothing is wrong but it indicates no emergency is present that requires admission and they must continue care with follow-up as given below or physician of their choice.     FOLLOW-UP  Raghavendra Marquez MD  [address]  [facility] [address]  [phone]    Schedule an appointment as soon as possible for a visit       [facility] CARDIOLOGY  [address]  [address]  [facility] [address] [phone]  Schedule an appointment as soon as possible for a visit            Medication List      No changes were made to your prescriptions during this visit.                   Latest Documented Vital Signs:  As of 18:19 EDT  BP- 119/60 HR- 55 Temp- 97 °F (36.1 °C) O2 sat- 99%              --    Please note that portions of this were completed with a voice recognition program.       Note Disclaimer: At [facility], we believe that sharing information builds trust and better relationships. You are receiving this note because you are receiving care at [facility] or recently visited. It is possible you will see health information before a provider has talked with you about it. This kind of information can be easy to misunderstand. To help you fully understand what it means for your health, we urge you to discuss this note with your provider.             Trevor Sagastume MD  08/02/24 [id number]

## 2024-08-02 NOTE — TELEPHONE ENCOUNTER
Reviewed patient's lab results from yesterday evening potassium is low.  I went to call or turns out she is in the emergency room.  Will left her message to call us back when she gets out.

## 2024-08-04 LAB
QT INTERVAL: 439 MS
QTC INTERVAL: 435 MS

## 2024-08-05 ENCOUNTER — TELEPHONE (OUTPATIENT)
Dept: CARDIOLOGY | Facility: CLINIC | Age: 79
End: 2024-08-05
Payer: MEDICARE

## 2024-08-05 DIAGNOSIS — E87.6 HYPOKALEMIA: Primary | ICD-10-CM

## 2024-08-05 RX ORDER — POTASSIUM CHLORIDE 20 MEQ/1
20 TABLET, EXTENDED RELEASE ORAL DAILY
Qty: 30 TABLET | Refills: 11 | Status: SHIPPED | OUTPATIENT
Start: 2024-08-05

## 2024-08-05 NOTE — TELEPHONE ENCOUNTER
Attempt to call patient about potassium.  It was low on Friday and it was replaced in the ER but I wanted to start her on some daily supplement I left her voicemail that I have sent this to her pharmacy if one of the triage nurses could please try to continue to get in touch with her.  I also need a repeat lab on Friday.

## 2024-08-06 ENCOUNTER — TELEPHONE (OUTPATIENT)
Dept: CARDIOLOGY | Facility: CLINIC | Age: 79
End: 2024-08-06
Payer: MEDICARE

## 2024-08-06 NOTE — TELEPHONE ENCOUNTER
Called and left VM. Will continue to try to reach patient. HUB transfer call to triage.     Lindsey Babb RN  [place] [facility]

## 2024-08-06 NOTE — TELEPHONE ENCOUNTER
Notified patient's spouse, Marky, of results/recommendations, allowed per [facility]. He verbalized understanding.    Lindsey Babb RN  [place] [facility]

## 2024-08-06 NOTE — TELEPHONE ENCOUNTER
Called and left VM, will continue to try to reach pt.    HUB- please put patient straight through to triage    Jamila Nuñez RN  Triage RN  08/06/24 10:02 EDT

## 2024-08-13 ENCOUNTER — LAB (OUTPATIENT)
Dept: LAB | Facility: HOSPITAL | Age: 79
End: 2024-08-13
Payer: MEDICARE

## 2024-08-13 DIAGNOSIS — E87.6 HYPOKALEMIA: ICD-10-CM

## 2024-08-13 LAB
ANION GAP SERPL CALCULATED.3IONS-SCNC: 8.4 MMOL/L (ref 5–15)
BUN SERPL-MCNC: 13 MG/DL (ref 8–23)
BUN/CREAT SERPL: 14 (ref 7–25)
CALCIUM SPEC-SCNC: 9.1 MG/DL (ref 8.6–10.5)
CHLORIDE SERPL-SCNC: 105 MMOL/L (ref 98–107)
CO2 SERPL-SCNC: 28.6 MMOL/L (ref 22–29)
CREAT SERPL-MCNC: 0.93 MG/DL (ref 0.57–1)
EGFRCR SERPLBLD CKD-EPI 2021: 62.6 ML/MIN/1.73
GLUCOSE SERPL-MCNC: 105 MG/DL (ref 65–99)
POTASSIUM SERPL-SCNC: 4.2 MMOL/L (ref 3.5–5.2)
SODIUM SERPL-SCNC: 142 MMOL/L (ref 136–145)

## 2024-08-13 PROCEDURE — 36415 COLL VENOUS BLD VENIPUNCTURE: CPT

## 2024-08-13 PROCEDURE — 80048 BASIC METABOLIC PNL TOTAL CA: CPT

## 2024-08-29 RX ORDER — ROSUVASTATIN CALCIUM 10 MG/1
10 TABLET, COATED ORAL DAILY
Qty: 90 TABLET | Refills: 3 | Status: SHIPPED | OUTPATIENT
Start: 2024-08-29

## 2024-09-13 ENCOUNTER — HOSPITAL ENCOUNTER (OUTPATIENT)
Dept: PHYSICAL THERAPY | Facility: HOSPITAL | Age: 79
Setting detail: THERAPIES SERIES
Discharge: HOME | End: 2024-09-13
Payer: MEDICARE

## 2024-09-13 DIAGNOSIS — I89.0 LYMPHEDEMA: Primary | ICD-10-CM

## 2024-09-13 PROCEDURE — 97535 SELF CARE MNGMENT TRAINING: CPT

## 2024-09-13 PROCEDURE — 97162 PT EVAL MOD COMPLEX 30 MIN: CPT

## 2024-09-13 NOTE — THERAPY EVALUATION
Physical Therapy Lymphedema Initial Evaluation  [facility]     Patient Name: Chris Cox  : 1945  MRN: [id number]  Today's Date: 2024      Visit Date: 2024    Visit Dx:    ICD-10-CM ICD-9-CM   1. Lymphedema  I89.0 457.1       Patient Active Problem List   Diagnosis    Abdominal bruit    Blues    DD (diverticular disease)    Menopausal and perimenopausal disorder    Neuralgia neuritis, sciatic nerve    Right knee pain    Primary osteoarthritis of both knees    Radiculopathy with lower extremity symptoms    Idiopathic peripheral neuropathy    Sprain of collateral ligament of right knee    ADD (attention deficit disorder)    Microscopic hematuria    Chronic pain of left knee    Bleeding diathesis    Bilateral edema of lower extremity    Bilateral swelling of feet and ankles    Hyperglycemia    Venous stasis    Attention deficit disorder (ADD) without hyperactivity    Lesion of skin of scalp    Pre-operative clearance    Total knee replacement status    Chronic deep vein thrombosis (DVT) of calf muscle vein of right lower extremity    Cellulitis of face    Dermatitis    Major depressive disorder with single episode, in full remission    Fatigue due to excessive exertion    Disease of thyroid gland    Vertigo    Medicare annual wellness visit, subsequent    Osteopenia of both hips    Memory loss    Minimal cognitive impairment    Hypercholesterolemia        Past Medical History:   Diagnosis Date    Abdominal pain     ADHD     Ankle sprain past    Arthritis     primary both knees    Bronchitis 2019    CTS (carpal tunnel syndrome) surgery c. early 's    Disease of thyroid gland     Dislocation, shoulder ?    Diverticulosis of intestine     DVT (deep venous thrombosis) 10/2019    right lower extremity    Fracture of wrist ?    Fracture, foot past    Fractures     MULTIPLE BONE FRACTURES-TOOK FOSAMAX 3.5 YRS    Hyperlipidemia     Hypertension     Irregular heart beat     Kidney stone      Knee swelling R. replaced c. 2019    Lumbar spine pain     Medicare annual wellness visit, subsequent 02/04/2021    Migraines     Mono exposure     AGE 35    Neck strain     Neuralgia neuritis, sciatic nerve     Obstructive sleep apnea     20 YRS AGO-NOT CURRENTLY    Rotator cuff syndrome Trapezius pain?    Scoliosis since childhood    Seizures     As a baby    Stress fracture ?    Tuberculosis     As a child    Vertigo     Wrist sprain         Past Surgical History:   Procedure Laterality Date    BREAST BIOPSY Left     2019 stereotactic benign    BREAST EXCISIONAL BIOPSY Left     1971 - fibroadenoma    BREAST SURGERY      Benign fibroadnoma removed from the left breast    COLONOSCOPY N/A 04/11/2022    Procedure: COLONOSCOPY;  Surgeon: Leonor Xavier MD;  Location: [facility] MAIN OR;  Service: Gastroenterology;  Laterality: N/A;  Diverticulosis    HAND SURGERY Bilateral 2012    RECONSTRUCTION ON BOTH HANDS    HIP SURGERY  c. 2019    JOINT REPLACEMENT  knee/hip    LITHOTRIPSY      renal    TONGUE SURGERY      TOTAL KNEE ARTHROPLASTY Right 10/22/2019    Procedure: TOTAL KNEE ARTHROPLASTY RIGHT;  Surgeon: Aneesh Okeefe II, MD;  Location: [facility] MAIN OR;  Service: Orthopedics    WRIST SURGERY         Visit Dx:    ICD-10-CM ICD-9-CM   1. Lymphedema  I89.0 457.1        Patient History       Row Name 09/13/24 1500             History    Chief Complaint Swelling  -KA      Date Current Problem(s) Began --  At least 3 months, likely longer  -KA      Brief Description of Current Complaint Pt reports that she has had gradual increase in swelling over the past few years, has gotten worse especially over the last 3 months.  She does have a history of DVT in BLE in the past 5 years.  Water pill changed from Lasix to Bumex which has helped some but has not fully resolved the issue.  She is a retired pediatric occupational therapist.  She is unable to don/doff traditional compression socks and has issues with them  cutting in at the ankles and just below knees.  -KA      Occupation/sports/leisure activities Retired  -KA      How has patient tried to help current problem? Elevation of legs, compression socks  -KA         Pain     Pain Location Leg  -KA      Pain at Present 0  -KA      Pain at Best 0  -KA      Pain at Worst 4  -KA      Pain Frequency Intermittent  -KA      Pain Description Aching;Heaviness  -KA      What Performance Factors Make the Current Problem(s) WORSE? Standing prolonged  -KA      What Performance Factors Make the Current Problem(s) BETTER? Rest  -KA      Difficulties with ADL's? Difficulty with fit of socks and shoes, difficulty standing prolonged, multiple recent falls  -KA         Fall Risk Assessment    Any falls in the past year: Yes  -KA      Number of falls reported in the last 12 months Unknown, multiple falls in the past year  -KA      Factors that contributed to the fall: Tripped  -KA         Services    Are you currently receiving Home Health services No  -KA      Do you plan to receive Home Health services in the near future No  -KA         Daily Activities    Primary Language English  -KA      Are you able to read Yes  -KA      Are you able to write Yes  -KA      How does patient learn best? Listening;Reading;Demonstration  -KA      Teaching needs identified Management of Condition  -KA      Patient is concerned about/has problems with Climbing Stairs;Difficulty with self care (i.e. bathing, dressing, toileting:;Standing;Walking  -KA      Does patient have problems with the following? None  -KA      Pt Participated in POC and Goals Yes  -KA         Safety    Are you being hurt, hit, or frightened by anyone at home or in your life? No  -KA      Are you being neglected by a caregiver No  -KA      Have you had any of the following issues with N/A  -KA                User Key  (r) = Recorded By, (t) = Taken By, (c) = Cosigned By      Initials Name Provider Type    Yamila Haddad, PT Physical  "Therapist                     Lymphedema       Row Name 09/13/24 1600             Subjective Pain    Able to rate subjective pain? yes  -KA      Pre-Treatment Pain Level 0  -KA      Post-Treatment Pain Level 0  -KA         Subjective    Subjective Comments See initial evaluation  -KA         Lymphedema Assessment    Lymphedema Classification RLE:;LLE:;secondary  Late stage 1-early Stage 2  -KA      Lymphedema Precautions other (comment)  Possible CHF, history of DVT bilaterally  -KA         LLIS - Physical Concerns    The amount of pain associated with my lymphedema is: 1  -KA      The amount of limb heaviness associated with my lymphedema is: 3  -KA      The amount of skin tightness associated with my lymphedema is: 3  -KA      The size of my swollen limb(s) seems: 3  -KA      Lymphedema affects the movement of my swollen limb(s): 2  -KA      The strength in my swollen limb(s) is: 2  -KA         LLIS - Psychosocial Concerns    Lymphedema affects my body image (i.e., \"how I think I look\"). 2  -KA      Lymphedema affects my socializing with others. 0  -KA      Lymphedema affects my intimate relations with spouse or partner (rate 0 if not applicable 0  -KA      Lymphedema \"gets me down\" (i.e., depression, frustration, or anger) 1  -KA      I must rely on others for help due to my lymphedema. 0  -KA      I know what to do to manage my lymphedema 4  -KA         LLIS - Functional Concerns    Lymphedema affects my ability to perform self-care activities (i.e. eating, dressing, hygiene) 2  -KA      Lymphedema affects my ability to perform routine home or work-related activities. 1  -KA      Lymphedema affects my performance of preferred leisure activities. 1  -KA      Lymphedema affects proper fit of clothing/shoes 3  -KA      Lymphedema affects my sleep 0  -KA         Posture/Observations    Posture/Observations Comments Forward head, rounded shoulders, narrow ANAIS, decreased dorsiflexion bilaterally; ambulates without AD, " mild general instability  -KA         General ROM    GENERAL ROM COMMENTS Able to reach feet without difficulty, no issues with dorsiflexing ankles  -KA         MMT (Manual Muscle Testing)    General MMT Comments BLE strength grossly 3/5  -KA         Lymphedema Edema Assessment    Ptting Edema Category By grade out of 4  -KA      Pitting Edema + 2/4  -KA      Stemmer Sign negative;bilateral:  -KA      [place] Hump negative;bilateral:  -KA      Edema Assessment Comment Mild lymphedema affecting BLE from foot to knee  -KA         Skin Changes/Observations    Location/Assessment Lower Extremity  -KA      Lower Extremity Conditions bilateral:;intact;clean;dry  -KA      Lower Extremity Color/Pigment bilateral:;fibrosis  -KA      Lower Extremity Skin Details Varicose veins noted BLE, particularly at ankles; dry flaky skin BLE with mild redness, no open wounds or signs of infection  -KA         Lymphedema Sensation    Lymphedema Sensation Reports RLE:;LLE:;normal  -KA      Lymphedema Sensation Tests light touch  -KA      Lymphedema Light Touch RLE:;LLE:;WNL  -KA         Lymphedema Measurements    Measurement Type(s) Circumferential  -KA      Circumferential Areas Lower extremities  -KA         BLE Circumferential (cm)    Measurement Location 1 Knee  -KA      Left 1 36.9 cm  -KA      Right 1 38.2 cm  -KA      Measurement Location 2 10 cm below knee  -KA      Left 2 38.2 cm  -KA      Right 2 39.2 cm  -KA      Measurement Location 3 20 cm below knee  -KA      Left 3 29.8 cm  -KA      Right 3 31.5 cm  -KA      Measurement Location 4 30 cm below knee  -KA      Left 4 25.6 cm  -KA      Right 4 24.6 cm  -KA      Measurement Location 5 Ankle  -KA      Left 5 26 cm  -KA      Right 5 25 cm  -KA      Measurement Location 6 Midfoot  -KA      Left 6 21.7 cm  -KA      Right 6 22.8 cm  -KA      LLE Circumferential Total 178.2 cm  -KA      RLE Circumferential Total 181.3 cm  -KA         Lymphedema Life Impact Scale Totals    A.  Total Q1  - Q17 (Do not include Q18) 28  -KA      B.  Total number of questions answered (Q1-Q17) 17  -KA      C. Divide A by B 1.65  -KA      D. Multiple C by 25 41.25  -KA                User Key  (r) = Recorded By, (t) = Taken By, (c) = Cosigned By      Initials Name Provider Type    Yamila Haddad, PT Physical Therapist                                    Therapy Education  Education Details: Discussed diagnosis, due to early stage lymphedema and still having pretty normal contours of BLE, recommending pt transition straight into long term compression garments.  After discussion, she prefers Juxtalite HD due to failing to be able to use traditional compression stockings previously, recommending size Medium Long.  Will order 3 pair per LTA allowance, discussed that she is eligible for 3 garments per affected extremity every 6 months, will [facility] and ship to her home.  Discussed wear/wash/replacement schedule as well as demo of donning, target compression will be 20-30 mmHg.  Also advised that she begin applying Eucerin lotion daily and performing ankle pump and elevating legs regularly.  Reviewed s/s of cellulitis.  May benefit from traditional PT for falls/balance deficits.  Given: Edema management, Symptoms/condition management, HEP  Program: New  How Provided: Verbal  Provided to: Patient, Other (comment) ()  Level of Understanding: Verbalized  77761 - PT Self Care/Mgmt Minutes: 15       OP Exercises       Row Name 09/13/24 1600             Subjective    Subjective Comments See initial evaluation  -KA         Subjective Pain    Able to rate subjective pain? yes  -KA      Pre-Treatment Pain Level 0  -KA      Post-Treatment Pain Level 0  -KA                User Key  (r) = Recorded By, (t) = Taken By, (c) = Cosigned By      Initials Name Provider Type    Yamila Haddad, PT Physical Therapist                                 PT OP Goals       Row Name 09/13/24 1700          Long Term Goals     LTG Date to Achieve 12/12/24  -     LTG 1 Pt will be independent with application of long term compression garments with/without assist from family.  -     LTG 1 Progress New  -        Time Calculation    PT Goal Re-Cert Due Date 12/12/24  -               User Key  (r) = Recorded By, (t) = Taken By, (c) = Cosigned By      Initials Name Provider Type    Yamila Haddad, PT Physical Therapist                     PT Assessment/Plan       Row Name 09/13/24 1711 09/13/24 1707       PT Assessment    Functional Limitations -- Impaired gait;Limitations in functional capacity and performance;Performance in self-care ADL  -    Impairments -- Edema;Gait;Impaired lymphatic circulation;Integumentary integrity;Muscle strength;Pain;Posture;Poor body mechanics  -    Assessment Comments 79 y.o. female who presents with bilateral Lower Extremity lymphedema.  Presentation is consistent with secondary Stage 1-early Stage 2. Lymphedema is present from feet to knees and is characterized by (-) B stemmer sign, fibrosis B ankles and lower legs, 2+ pitting, dry skin.  Impairments include gait deviations, weakness, swelling, and decreased integumentary integrity.  Pt is limited with ability to stand prolonged, navigate stairs, and with fit of socks and shoes due to lymphedema.  Score on Lymphedema Life Impact Scale is 41.25.  Due to early stage lymphedema, does not require full CDT at this time but does require daily compression therapy, 20-30 mmHg.  Pt most likely to be successful with Juxtalite HD, has been unable to don or wear traditional CCL1 stockings in the past.  May benefit from 1-2 additional visits to reinforce self care techniques discussed today and assess effectiveness of compression system.  -KA --    Rehab Potential Good  -KA --    Patient/caregiver participated in establishment of treatment plan and goals Yes  -KA --    Patient would benefit from skilled therapy intervention Yes  -KA --       PT Plan    PT  Frequency Other (comment)  -BRADY --    Predicted Duration of Therapy Intervention (PT) 2-3 total visits over 3 months  -BRADY --    Planned CPT's? PT EVAL MOD COMPLELITY: 75396;PT RE-EVAL: 96844;PT THER PROC EA 15 MIN: 92717;PT THER ACT EA 15 MIN: 52270;PT MANUAL THERAPY EA 15 MIN: 73338;PT NEUROMUSC RE-EDUCATION EA 15 MIN: 03955;PT GAIT TRAINING EA 15 MIN: 17061;PT SELF CARE/HOME MGMT/TRAIN EA 15: 80687  -BRADY --    PT Plan Comments Order Juxalite HD, assess response to garments, reinforce self care as needed.  -BRADY --              User Key  (r) = Recorded By, (t) = Taken By, (c) = Cosigned By      Initials Name Provider Type    Yamila Haddad, PT Physical Therapist                                 Time Calculation:   Start Time: 1535  Stop Time: 1620  Time Calculation (min): 45 min  Timed Charges  38724 - PT Self Care/Mgmt Minutes: 15  Untimed Charges  PT Eval/Re-eval Minutes: 30  Total Minutes  Timed Charges Total Minutes: 15  Untimed Charges Total Minutes: 30   Total Minutes: 30   Therapy Charges for Today       Code Description Service Date Service Provider Modifiers Qty    17985906238 HC PT SELF CARE/MGMT/TRAIN EA 15 MIN 9/13/2024 Yamila Abel, PT GP 1    02441598336 HC PT EVAL MOD COMPLEXITY 2 9/13/2024 Yamila Abel, PT GP 1                      Yamila Abel, PT  9/13/2024

## 2024-10-03 ENCOUNTER — LAB (OUTPATIENT)
Dept: FAMILY MEDICINE CLINIC | Facility: CLINIC | Age: 79
End: 2024-10-03
Payer: MEDICARE

## 2024-10-03 DIAGNOSIS — E78.00 HYPERCHOLESTEROLEMIA: Primary | ICD-10-CM

## 2024-10-04 LAB
ALBUMIN SERPL-MCNC: 4 G/DL (ref 3.8–4.8)
ALP SERPL-CCNC: 82 IU/L (ref 44–121)
ALT SERPL-CCNC: 14 IU/L (ref 0–32)
AST SERPL-CCNC: 20 IU/L (ref 0–40)
BASOPHILS # BLD AUTO: 0 X10E3/UL (ref 0–0.2)
BASOPHILS NFR BLD AUTO: 1 %
BILIRUB SERPL-MCNC: 0.5 MG/DL (ref 0–1.2)
BUN SERPL-MCNC: 9 MG/DL (ref 8–27)
BUN/CREAT SERPL: 10 (ref 12–28)
CALCIUM SERPL-MCNC: 8.4 MG/DL (ref 8.7–10.3)
CHLORIDE SERPL-SCNC: 100 MMOL/L (ref 96–106)
CHOLEST SERPL-MCNC: 121 MG/DL (ref 100–199)
CO2 SERPL-SCNC: 29 MMOL/L (ref 20–29)
CREAT SERPL-MCNC: 0.89 MG/DL (ref 0.57–1)
EGFRCR SERPLBLD CKD-EPI 2021: 66 ML/MIN/1.73
EOSINOPHIL # BLD AUTO: 0.1 X10E3/UL (ref 0–0.4)
EOSINOPHIL NFR BLD AUTO: 2 %
ERYTHROCYTE [DISTWIDTH] IN BLOOD BY AUTOMATED COUNT: 13.5 % (ref 11.7–15.4)
GLOBULIN SER CALC-MCNC: 1.9 G/DL (ref 1.5–4.5)
GLUCOSE SERPL-MCNC: 101 MG/DL (ref 70–99)
GLUCOSE UR QL STRIP: NORMAL
HCT VFR BLD AUTO: 36.4 % (ref 34–46.6)
HDLC SERPL-MCNC: 52 MG/DL
HGB BLD-MCNC: 11.6 G/DL (ref 11.1–15.9)
IMM GRANULOCYTES # BLD AUTO: 0 X10E3/UL (ref 0–0.1)
IMM GRANULOCYTES NFR BLD AUTO: 0 %
KETONES UR QL STRIP: NORMAL
LDLC SERPL CALC-MCNC: 54 MG/DL (ref 0–99)
LDLC/HDLC SERPL: 1 RATIO (ref 0–3.2)
LYMPHOCYTES # BLD AUTO: 1.5 X10E3/UL (ref 0.7–3.1)
LYMPHOCYTES NFR BLD AUTO: 33 %
MCH RBC QN AUTO: 29.1 PG (ref 26.6–33)
MCHC RBC AUTO-ENTMCNC: 31.9 G/DL (ref 31.5–35.7)
MCV RBC AUTO: 92 FL (ref 79–97)
MONOCYTES # BLD AUTO: 0.3 X10E3/UL (ref 0.1–0.9)
MONOCYTES NFR BLD AUTO: 7 %
NEUTROPHILS # BLD AUTO: 2.6 X10E3/UL (ref 1.4–7)
NEUTROPHILS NFR BLD AUTO: 57 %
PH UR STRIP: NORMAL [PH]
PLATELET # BLD AUTO: 228 X10E3/UL (ref 150–450)
POTASSIUM SERPL-SCNC: 2.7 MMOL/L (ref 3.5–5.2)
PROT SERPL-MCNC: 5.9 G/DL (ref 6–8.5)
PROT UR QL STRIP: NORMAL
RBC # BLD AUTO: 3.98 X10E6/UL (ref 3.77–5.28)
SODIUM SERPL-SCNC: 143 MMOL/L (ref 134–144)
SP GR UR STRIP: NORMAL
TRIGL SERPL-MCNC: 72 MG/DL (ref 0–149)
VLDLC SERPL CALC-MCNC: 15 MG/DL (ref 5–40)
WBC # BLD AUTO: 4.5 X10E3/UL (ref 3.4–10.8)

## 2024-10-04 RX ORDER — POTASSIUM CHLORIDE 1.5 G/1.58G
20 POWDER, FOR SOLUTION ORAL 3 TIMES DAILY
Qty: 60 PACKET | Refills: 0 | Status: SHIPPED | OUTPATIENT
Start: 2024-10-04 | End: 2024-10-10

## 2024-10-10 ENCOUNTER — OFFICE VISIT (OUTPATIENT)
Dept: FAMILY MEDICINE CLINIC | Facility: CLINIC | Age: 79
End: 2024-10-10
Payer: MEDICARE

## 2024-10-10 VITALS
BODY MASS INDEX: 31.06 KG/M2 | HEART RATE: 63 BPM | RESPIRATION RATE: 15 BRPM | SYSTOLIC BLOOD PRESSURE: 142 MMHG | DIASTOLIC BLOOD PRESSURE: 78 MMHG | OXYGEN SATURATION: 98 % | TEMPERATURE: 96.8 F | WEIGHT: 158.2 LBS | HEIGHT: 60 IN

## 2024-10-10 DIAGNOSIS — Z12.31 ENCOUNTER FOR SCREENING MAMMOGRAM FOR MALIGNANT NEOPLASM OF BREAST: ICD-10-CM

## 2024-10-10 DIAGNOSIS — Z78.0 POSTMENOPAUSAL: ICD-10-CM

## 2024-10-10 DIAGNOSIS — E87.6 HYPOKALEMIA: Primary | ICD-10-CM

## 2024-10-10 LAB
ALBUMIN SERPL-MCNC: 4.3 G/DL (ref 3.5–5.2)
ALBUMIN/GLOB SERPL: 2.2 G/DL
ALP SERPL-CCNC: 91 U/L (ref 39–117)
ALT SERPL-CCNC: 14 U/L (ref 1–33)
AST SERPL-CCNC: 20 U/L (ref 1–32)
BILIRUB SERPL-MCNC: 0.4 MG/DL (ref 0–1.2)
BUN SERPL-MCNC: 9 MG/DL (ref 8–23)
BUN/CREAT SERPL: 9.9 (ref 7–25)
CALCIUM SERPL-MCNC: 9.2 MG/DL (ref 8.6–10.5)
CHLORIDE SERPL-SCNC: 105 MMOL/L (ref 98–107)
CO2 SERPL-SCNC: 27.7 MMOL/L (ref 22–29)
CREAT SERPL-MCNC: 0.91 MG/DL (ref 0.57–1)
EGFRCR SERPLBLD CKD-EPI 2021: 64.3 ML/MIN/1.73
GLOBULIN SER CALC-MCNC: 2 GM/DL
GLUCOSE SERPL-MCNC: 75 MG/DL (ref 65–99)
POTASSIUM SERPL-SCNC: 4.2 MMOL/L (ref 3.5–5.2)
PROT SERPL-MCNC: 6.3 G/DL (ref 6–8.5)
SODIUM SERPL-SCNC: 143 MMOL/L (ref 136–145)

## 2024-10-10 PROCEDURE — 99214 OFFICE O/P EST MOD 30 MIN: CPT | Performed by: INTERNAL MEDICINE

## 2024-10-10 PROCEDURE — 1125F AMNT PAIN NOTED PAIN PRSNT: CPT | Performed by: INTERNAL MEDICINE

## 2024-10-10 RX ORDER — POTASSIUM CHLORIDE 1500 MG/1
20 TABLET, EXTENDED RELEASE ORAL 3 TIMES DAILY
Qty: 90 TABLET | Refills: 11 | Status: SHIPPED | OUTPATIENT
Start: 2024-10-10

## 2024-10-10 NOTE — PROGRESS NOTES
Subjective   Chris Cox is a 79 y.o. female. Patient is here today for   Chief Complaint   Patient presents with    Hyperlipidemia    Med Management     Discuss how often to take potassium        History of Present Illness  Here today accompanied by her .  She has no complaints.    She had been taking 20 mg potassium twice daily and a loop diuretic.  I got notified that her potassium is low by my colleague Dr. Kris Arellano a few days ago and increased her potassium to 20 mg 3 times daily.    She is tolerated this.  Hyperlipidemia      History of Present Illness  The patient presents for evaluation of multiple medical concerns. She is accompanied by an adult male.    She reports experiencing chest pain, which was previously evaluated in the emergency room in 08/2024. At that time, it was determined that her pain was due to low potassium levels, not a cardiac issue. She underwent a chest x-ray and EKG, both of which were normal. Her potassium level was found to be low at 3.3 on 08/01/2024, but had normalized by 08/13/2024. She is currently taking Bumex and potassium chloride 20 mEq three times a day.    She also mentions that she has never received a flu shot, except for one instance last year, and she rarely contracts the flu.      Vitals:    10/10/24 1026   BP: 142/78   Pulse: 63   Resp: 15   Temp: 96.8 °F (36 °C)   SpO2: 98%     Body mass index is 30.9 kg/m².    Past Medical History:   Diagnosis Date    Abdominal pain     ADHD     Ankle sprain past    Arthritis     primary both knees    Bronchitis 08/2019    CTS (carpal tunnel syndrome) surgery c. early 2000's    Disease of thyroid gland     Dislocation, shoulder ?    Diverticulosis of intestine     DVT (deep venous thrombosis) 10/2019    right lower extremity    Fracture of wrist ?    Fracture, foot past    Fractures     MULTIPLE BONE FRACTURES-TOOK FOSAMAX 3.5 YRS    Hyperlipidemia     Hypertension     Irregular heart beat     Kidney stone     Knee  swelling R. replaced c. 2019    Lumbar spine pain     Medicare annual wellness visit, subsequent 02/04/2021    Migraines     Mono exposure     AGE 35    Neck strain     Neuralgia neuritis, sciatic nerve     Obstructive sleep apnea     20 YRS AGO-NOT CURRENTLY    Rotator cuff syndrome Trapezius pain?    Scoliosis since childhood    Seizures     As a baby    Stress fracture ?    Tuberculosis     As a child    Vertigo     Wrist sprain       Allergies   Allergen Reactions    Erythromycin Anaphylaxis    Sulfa Antibiotics Rash    Milk-Related Compounds GI Intolerance    Egg-Derived Products Unknown - Low Severity     PER ALLERGY TESTING RESULTS    Cephalexin Rash    Cephalosporins Rash     STOMACH ISSUES    Molds & Smuts Rash     ITCHING      Social History     Socioeconomic History    Marital status:      Spouse name: Marky    Years of education: College   Tobacco Use    Smoking status: Never     Passive exposure: Never    Smokeless tobacco: Never   Vaping Use    Vaping status: Never Used   Substance and Sexual Activity    Alcohol use: Not Currently     Alcohol/week: 1.0 standard drink of alcohol     Comment: used medicinally    Drug use: No    Sexual activity: Not Currently     Partners: Male     Comment: N/A        Current Outpatient Medications:     ascorbic acid (VITAMIN C) 500 MG/5ML liquid, Take 5 mL by mouth Daily., Disp: , Rfl:     bumetanide (BUMEX) 2 MG tablet, Take 1 tablet by mouth Daily., Disp: 30 tablet, Rfl: 11    escitalopram (LEXAPRO) 10 MG tablet, TAKE 1 TABLET DAILY, Disp: 90 tablet, Rfl: 3    memantine (NAMENDA) 5 MG tablet, TAKE 2 TABLETS DAILY, Disp: 180 tablet, Rfl: 3    Myrbetriq 50 MG tablet sustained-release 24 hour 24 hr tablet, TAKE 1 TABLET DAILY, Disp: 90 tablet, Rfl: 1    potassium chloride (KLOR-CON M20) 20 MEQ CR tablet, Take 1 tablet by mouth 3 (Three) Times a Day., Disp: 90 tablet, Rfl: 11    rosuvastatin (CRESTOR) 10 MG tablet, TAKE 1 TABLET DAILY, Disp: 90 tablet, Rfl: 3      Objective     Review of Systems   Constitutional: Negative.    HENT: Negative.     Respiratory: Negative.     Cardiovascular: Negative.    Musculoskeletal: Negative.    Psychiatric/Behavioral: Negative.         Physical Exam  Vitals and nursing note reviewed.   Constitutional:       Appearance: Normal appearance.      Comments: Pleasant, pneumogram, no distress.   Cardiovascular:      Rate and Rhythm: Regular rhythm.      Heart sounds: Normal heart sounds. No murmur heard.     No gallop.   Pulmonary:      Effort: No respiratory distress.      Breath sounds: Normal breath sounds. No wheezing.   Neurological:      Mental Status: She is alert and oriented to person, place, and time.   Psychiatric:         Mood and Affect: Mood normal.         Behavior: Behavior normal.         Thought Content: Thought content normal.       Physical Exam      Results  Laboratory Studies  Potassium was low at 3.3 on August 1, but normal on August 13.    Assessment & Plan    Problems Addressed this Visit          Genitourinary and Reproductive     Hypokalemia - Primary    Relevant Orders    Comprehensive Metabolic Panel    Mammo Screening Bilateral With CAD    DEXA Bone Density Axial     Other Visit Diagnoses       Encounter for screening mammogram for malignant neoplasm of breast        Relevant Orders    Mammo Screening Bilateral With CAD    Postmenopausal        Relevant Orders    DEXA Bone Density Axial          Diagnoses         Codes Comments    Hypokalemia    -  Primary ICD-10-CM: E87.6  ICD-9-CM: 276.8     Encounter for screening mammogram for malignant neoplasm of breast     ICD-10-CM: Z12.31  ICD-9-CM: V76.12     Postmenopausal     ICD-10-CM: Z78.0  ICD-9-CM: V49.81           Assessment & Plan  1. Hypokalemia.  Hypokalemia was noted in previous labs, with potassium levels of 3.3 mEq/L on August 1 and normal levels on August 13. The patient is currently taking Bumex, which can lower potassium levels. A prescription for  potassium chloride 20 mEq, to be taken three times daily, will be provided. A comprehensive metabolic panel will be ordered to monitor her potassium levels. If changes to the potassium dosage are needed based on the results, she will be contacted.    2. Elevated blood pressure.  Elevated blood pressure was noted during today's visit. Blood pressure will be monitored, and adjustments to her treatment plan will be made as necessary.    3. Health Maintenance.  A mammogram and bone density test will be ordered as part of routine health maintenance. She will also get the updated Covid vaccine next week.    Follow-up  Return in 3 months for follow up.      No follow-ups on file.    Patient or patient representative verbalized consent for the use of Ambient Listening during the visit with  Raghavendra Marquez MD for chart documentation. 10/10/2024  17:40 EDT

## 2024-10-17 ENCOUNTER — TRANSCRIBE ORDERS (OUTPATIENT)
Dept: ADMINISTRATIVE | Facility: HOSPITAL | Age: 79
End: 2024-10-17
Payer: MEDICARE

## 2024-10-17 DIAGNOSIS — Z12.31 BREAST CANCER SCREENING BY MAMMOGRAM: Primary | ICD-10-CM

## 2024-10-22 ENCOUNTER — OFFICE VISIT (OUTPATIENT)
Age: 79
End: 2024-10-22
Payer: MEDICARE

## 2024-10-22 VITALS
OXYGEN SATURATION: 99 % | HEIGHT: 60 IN | BODY MASS INDEX: 30.63 KG/M2 | HEART RATE: 63 BPM | WEIGHT: 156 LBS | DIASTOLIC BLOOD PRESSURE: 64 MMHG | SYSTOLIC BLOOD PRESSURE: 130 MMHG

## 2024-10-22 DIAGNOSIS — R60.0 BILATERAL LEG EDEMA: Primary | ICD-10-CM

## 2024-10-22 PROCEDURE — G2211 COMPLEX E/M VISIT ADD ON: HCPCS | Performed by: INTERNAL MEDICINE

## 2024-10-22 PROCEDURE — 99213 OFFICE O/P EST LOW 20 MIN: CPT | Performed by: INTERNAL MEDICINE

## 2024-10-22 NOTE — PROGRESS NOTES
Subjective:     Encounter Date:05/31/2024      Patient ID: Chris Cox is a 79 y.o. female.    Chief Complaint: Edema  HPI:   This is a very pleasant 78-year-old woman who has chronic lower extremity edema.  I met her in May 2024.  We uptitrated her Lasix without much effect.  We was then switched to Bumex, Which resulted in hypokalemia however this has been corrected.  Echocardiogram shows grade 2 diastolic dysfunction with mild MR and mild to moderate TR.  Her RVSP was normal   Her edema has dramatically improved.  She is never really had orthopnea or PND.  She is wearing graduated compression stockings    The following portions of the patient's history were reviewed and updated as appropriate: allergies, current medications, past family history, past medical history, past social history, past surgical history and problem list.     REVIEW OF SYSTEMS:   All systems reviewed.  Pertinent positives identified in HPI.  All other systems are negative.    Past Medical History:   Diagnosis Date    Abdominal pain     ADHD     Ankle sprain past    Arthritis     primary both knees    Bronchitis 08/2019    CTS (carpal tunnel syndrome) surgery c. early 2000's    Disease of thyroid gland     Dislocation, shoulder ?    Diverticulosis of intestine     DVT (deep venous thrombosis) 10/2019    right lower extremity    Fracture of wrist ?    Fracture, foot past    Fractures     MULTIPLE BONE FRACTURES-TOOK FOSAMAX 3.5 YRS    Hyperlipidemia     Hypertension     Irregular heart beat     Kidney stone     Knee swelling R. replaced c. 2019    Lumbar spine pain     Medicare annual wellness visit, subsequent 02/04/2021    Migraines     Mono exposure     AGE 35    Neck strain     Neuralgia neuritis, sciatic nerve     Obstructive sleep apnea     20 YRS AGO-NOT CURRENTLY    Rotator cuff syndrome Trapezius pain?    Scoliosis since childhood    Seizures     As a baby    Stress fracture ?    Tuberculosis     As a child    Vertigo     Wrist  sprain        Family History   Problem Relation Age of Onset    Hypertension Mother     Other Mother     Transient ischemic attack Mother     Heart disease Mother     Cancer Father         colon    Diabetes Father     Hypertension Father     Other Father         Renal artery aneurysm    Heart disease Father     Stroke Father     Diabetes Paternal Grandmother     Cancer Paternal Grandfather        Social History     Socioeconomic History    Marital status:      Spouse name: Marky    Years of education: College   Tobacco Use    Smoking status: Never     Passive exposure: Never    Smokeless tobacco: Never   Vaping Use    Vaping status: Never Used   Substance and Sexual Activity    Alcohol use: Not Currently     Alcohol/week: 1.0 standard drink of alcohol     Comment: used medicinally    Drug use: No    Sexual activity: Not Currently     Partners: Male     Comment: N/A       Allergies   Allergen Reactions    Erythromycin Anaphylaxis    Sulfa Antibiotics Rash    Milk-Related Compounds GI Intolerance    Egg-Derived Products Unknown - Low Severity     PER ALLERGY TESTING RESULTS    Cephalexin Rash    Cephalosporins Rash     STOMACH ISSUES    Molds & Smuts Rash     ITCHING       Past Surgical History:   Procedure Laterality Date    BREAST BIOPSY Left     2019 stereotactic benign    BREAST EXCISIONAL BIOPSY Left     1971 - fibroadenoma    BREAST SURGERY      Benign fibroadnoma removed from the left breast    COLONOSCOPY N/A 04/11/2022    Procedure: COLONOSCOPY;  Surgeon: Leonor Xavier MD;  [place] [facility] [place]  Service: Gastroenterology;  Laterality: N/A;  Diverticulosis    HAND SURGERY Bilateral 2012    RECONSTRUCTION ON BOTH HANDS    HIP SURGERY  c. 2019    JOINT REPLACEMENT  knee/hip    LITHOTRIPSY      renal    TONGUE SURGERY      TOTAL KNEE ARTHROPLASTY Right 10/22/2019    Procedure: TOTAL KNEE ARTHROPLASTY RIGHT;  Surgeon: Aneesh Okeefe II, MD;  Location: [facility] MAIN OR;  Service:  Orthopedics    WRIST SURGERY         Procedures       Objective:         PHYSICAL EXAM:  GEN: VSS, no distress,   Eyes: normal sclera, normal lids and lashes  HENT: moist mucus membranes,   Respiratory: CTAB, no rales or wheezes  CV: RRR, no murmurs, , +2 DP and 2+ carotid pulses b/l  GI: NABS, soft,  Nontender, nondistended  MSK: +1 lower extremity edema no scoliosis or kyphosis  Skin: no rash, warm, dry  Heme/Lymph: no bruising or bleeding  Psych: organized thought, normal behavior and affect  Neuro: Cranial nerves grossly intact, Alert and Oriented x 3.         Assessment:          Diagnosis Plan   1. Bilateral leg edema                 Plan:       1.  Lower extremity edema: Multifactorial.  Continue Bumex and potassium  2.  Grade 2 diastolic dysfunction euvolemic today  3.  Mild MR mild to moderate TR    Dr. Marquez, thank you very much for referring this kind patient to me. Please call me with any questions or concerns. I will see the patient again in the office in 6 months      Shyanne Zaragoza MD  10/22/24  [facility]    Outpatient Encounter Medications as of 10/22/2024   Medication Sig Dispense Refill    ascorbic acid (VITAMIN C) 500 MG/5ML liquid Take 5 mL by mouth Daily.      bumetanide (BUMEX) 2 MG tablet Take 1 tablet by mouth Daily. 30 tablet 11    escitalopram (LEXAPRO) 10 MG tablet TAKE 1 TABLET DAILY 90 tablet 3    memantine (NAMENDA) 5 MG tablet TAKE 2 TABLETS DAILY 180 tablet 3    Myrbetriq 50 MG tablet sustained-release 24 hour 24 hr tablet TAKE 1 TABLET DAILY 90 tablet 1    potassium chloride (KLOR-CON M20) 20 MEQ CR tablet Take 1 tablet by mouth 3 (Three) Times a Day. 90 tablet 11    rosuvastatin (CRESTOR) 10 MG tablet TAKE 1 TABLET DAILY 90 tablet 3     No facility-administered encounter medications on file as of 10/22/2024.

## 2024-11-25 DIAGNOSIS — L98.9 FACIAL SKIN LESION: Primary | ICD-10-CM

## 2024-12-06 DIAGNOSIS — I89.0 LYMPHEDEMA: Primary | ICD-10-CM

## 2024-12-09 RX ORDER — MEMANTINE HYDROCHLORIDE 5 MG/1
10 TABLET ORAL DAILY
Qty: 180 TABLET | Refills: 3 | Status: SHIPPED | OUTPATIENT
Start: 2024-12-09

## 2024-12-11 ENCOUNTER — TRANSCRIBE ORDERS (OUTPATIENT)
Dept: PHYSICAL THERAPY | Facility: HOSPITAL | Age: 79
End: 2024-12-11
Payer: MEDICARE

## 2024-12-11 DIAGNOSIS — I89.0 LYMPHEDEMA: Primary | ICD-10-CM

## 2024-12-16 ENCOUNTER — DOCUMENTATION (OUTPATIENT)
Dept: FAMILY MEDICINE CLINIC | Facility: CLINIC | Age: 79
End: 2024-12-16
Payer: MEDICARE

## 2025-01-02 ENCOUNTER — HOSPITAL ENCOUNTER (OUTPATIENT)
Dept: BONE DENSITY | Facility: HOSPITAL | Age: 80
Discharge: HOME | End: 2025-01-02
Payer: MEDICARE

## 2025-01-02 ENCOUNTER — HOSPITAL ENCOUNTER (OUTPATIENT)
Dept: MAMMOGRAPHY | Facility: HOSPITAL | Age: 80
Discharge: HOME | End: 2025-01-02
Payer: MEDICARE

## 2025-01-02 DIAGNOSIS — Z12.31 BREAST CANCER SCREENING BY MAMMOGRAM: ICD-10-CM

## 2025-01-02 PROCEDURE — 77067 SCR MAMMO BI INCL CAD: CPT

## 2025-01-02 PROCEDURE — 77080 DXA BONE DENSITY AXIAL: CPT

## 2025-01-02 PROCEDURE — 77063 BREAST TOMOSYNTHESIS BI: CPT

## 2025-01-09 RX ORDER — MIRABEGRON 50 MG/1
50 TABLET, FILM COATED, EXTENDED RELEASE ORAL DAILY
Qty: 90 TABLET | Refills: 3 | Status: SHIPPED | OUTPATIENT
Start: 2025-01-09

## 2025-01-14 DIAGNOSIS — R92.8 ABNORMALITY OF LEFT BREAST ON SCREENING MAMMOGRAPHY: Primary | ICD-10-CM

## 2025-01-22 ENCOUNTER — HOSPITAL ENCOUNTER (OUTPATIENT)
Dept: PHYSICAL THERAPY | Facility: HOSPITAL | Age: 80
Setting detail: THERAPIES SERIES
Discharge: HOME | End: 2025-01-22
Payer: MEDICARE

## 2025-01-22 DIAGNOSIS — I89.0 LYMPHEDEMA: Primary | ICD-10-CM

## 2025-01-22 PROCEDURE — 97140 MANUAL THERAPY 1/> REGIONS: CPT

## 2025-01-22 PROCEDURE — 97164 PT RE-EVAL EST PLAN CARE: CPT

## 2025-01-22 PROCEDURE — 97535 SELF CARE MNGMENT TRAINING: CPT

## 2025-01-22 NOTE — THERAPY RE-EVALUATION
Physical Therapy Lymphedema Re-Evaluation  [facility]     Patient Name: Chris Cox  : 1945  MRN: [id number]  Today's Date: 2025      Visit Date: 2025    Visit Dx:    ICD-10-CM ICD-9-CM   1. Lymphedema  I89.0 457.1       Patient Active Problem List   Diagnosis    Abdominal bruit    Blues    DD (diverticular disease)    Menopausal and perimenopausal disorder    Neuralgia neuritis, sciatic nerve    Right knee pain    Primary osteoarthritis of both knees    Radiculopathy with lower extremity symptoms    Idiopathic peripheral neuropathy    Sprain of collateral ligament of right knee    ADD (attention deficit disorder)    Microscopic hematuria    Chronic pain of left knee    Bleeding diathesis    Bilateral edema of lower extremity    Bilateral swelling of feet and ankles    Hyperglycemia    Venous stasis    Attention deficit disorder (ADD) without hyperactivity    Lesion of skin of scalp    Pre-operative clearance    Total knee replacement status    Chronic deep vein thrombosis (DVT) of calf muscle vein of right lower extremity    Cellulitis of face    Dermatitis    Major depressive disorder with single episode, in full remission    Fatigue due to excessive exertion    Disease of thyroid gland    Vertigo    Medicare annual wellness visit, subsequent    Osteopenia of both hips    Memory loss    Minimal cognitive impairment    Hypercholesterolemia    Hypokalemia        Past Medical History:   Diagnosis Date    Abdominal pain     ADHD     Ankle sprain past    Arthritis     primary both knees    Bronchitis 2019    CTS (carpal tunnel syndrome) surgery c. early     Disease of thyroid gland     Dislocation, shoulder ?    Diverticulosis of intestine     DVT (deep venous thrombosis) 10/2019    right lower extremity    Fracture of wrist ?    Fracture, foot past    Fractures     MULTIPLE BONE FRACTURES-TOOK FOSAMAX 3.5 YRS    Hyperlipidemia     Hypertension     Irregular heart beat     Kidney  stone     Knee swelling R. replaced c. 2019    Lumbar spine pain     Medicare annual wellness visit, subsequent 02/04/2021    Migraines     Mono exposure     AGE 35    Neck strain     Neuralgia neuritis, sciatic nerve     Obstructive sleep apnea     20 YRS AGO-NOT CURRENTLY    Rotator cuff syndrome Trapezius pain?    Scoliosis since childhood    Seizures     As a baby    Stress fracture ?    Tuberculosis     As a child    Vertigo     Wrist sprain         Past Surgical History:   Procedure Laterality Date    BREAST BIOPSY Left     2019 stereotactic benign    BREAST EXCISIONAL BIOPSY Left     1971 - fibroadenoma    BREAST SURGERY      Benign fibroadnoma removed from the left breast    COLONOSCOPY N/A 04/11/2022    Procedure: COLONOSCOPY;  Surgeon: Leonor Xavier MD;  [place] [facility] [place]  Service: Gastroenterology;  Laterality: N/A;  Diverticulosis    HAND SURGERY Bilateral 2012    RECONSTRUCTION ON BOTH HANDS    HIP SURGERY  c. 2019    JOINT REPLACEMENT  knee/hip    LITHOTRIPSY      renal    TONGUE SURGERY      TOTAL KNEE ARTHROPLASTY Right 10/22/2019    Procedure: TOTAL KNEE ARTHROPLASTY RIGHT;  Surgeon: Aneesh Okeefe II, MD;  Location: [facility] MAIN OR;  Service: Orthopedics    WRIST SURGERY         Visit Dx:    ICD-10-CM ICD-9-CM   1. Lymphedema  I89.0 457.1            Lymphedema       Row Name 01/22/25 1000             Subjective Pain    Able to rate subjective pain? yes  -KA      Pre-Treatment Pain Level 0  -KA      Post-Treatment Pain Level 0  -KA         Subjective    Subjective Comments Pt reports that she has been wearing compressive undersocks faithfully, but they have not been using velcro component often due to feeling she is doing well just with the undersock.  Needs to know how to use velcro component or if it is necessary.  -KA         Lymphedema Assessment    Lymphedema Classification RLE:;LLE:;secondary  -KA      Lymphedema Precautions other (comment)  Possible CHF, history of DVT  bilaterally  -KA         Lymphedema Edema Assessment    Ptting Edema Category By grade out of 4  -KA      Pitting Edema + 2/4  -KA      Stemmer Sign negative;bilateral:  -KA      Eddy Hump negative;bilateral:  -KA      Edema Assessment Comment Mild lymphedema affecting BLE from foot to knee; feet well reduced today  -KA         Skin Changes/Observations    Location/Assessment Lower Extremity  -KA      Lower Extremity Conditions bilateral:;intact;clean;dry  -KA      Lower Extremity Color/Pigment bilateral:;fibrosis  -KA      Lower Extremity Skin Details Varicose veins noted BLE, particularly at ankles; dry flaky skin BLE with mild redness, no open wounds or signs of infection  -KA         Lymphedema Measurements    Measurement Type(s) Circumferential  -KA      Circumferential Areas Lower extremities  -KA         BLE Circumferential (cm)    Measurement Location 1 Knee  -KA      Left 1 35.3 cm  -KA      Right 1 36.4 cm  -KA      Measurement Location 2 10 cm below knee  -KA      Left 2 36.4 cm  -KA      Right 2 37.3 cm  -KA      Measurement Location 3 20 cm below knee  -KA      Left 3 29.3 cm  -KA      Right 3 29.8 cm  -KA      Measurement Location 4 30 cm below knee  -KA      Left 4 23.7 cm  -KA      Right 4 23 cm  -KA      Measurement Location 5 Ankle  -KA      Left 5 24.8 cm  -KA      Right 5 23.7 cm  -KA      Measurement Location 6 Midfoot  -KA      Left 6 20.8 cm  -KA      Right 6 21.7 cm  -KA      LLE Circumferential Total 170.3 cm  -KA      RLE Circumferential Total 171.9 cm  -KA         Manual Lymphatic Drainage    Manual Lymphatic Drainage Comments Short neck, B axilla, B IA, diaphragmatic breathing, B inguinals, BLE  -KA         Compression/Skin Care    Compression/Skin Care skin care;compression garment  -KA      Skin Care moisturizing lotion applied  -KA      Compression Garment Comments Circaid compressive undersock donned; pt and  instructed in use of Circaid Juxtalite HD velcro component with  20-30 mmHG using model  -KA                User Key  (r) = Recorded By, (t) = Taken By, (c) = Cosigned By      Initials Name Provider Type    Yamila Haddad, PT Physical Therapist                                    Therapy Education  Education Details: Discussed girth measurements, did see improvement with undersock alone, think legs may reduce even further with velcro component applied, reviewed application with target 20-30 mmHg.  Discussed MLD and performed in clinic, pt instructed in abbreviated MLD for lower legs to improve lymphatic flow.  Discussed decongestive exercise and reviewed skin care techniques.  Assuming she does well with Juxtalite HD velcro components, she will be eligible to reorder garments via [employer] in April, pt prefers Juxtalite HD size Medium Long color black for additional garments.  Given: Edema management, Symptoms/condition management, HEP  Program: New, Reinforced  How Provided: Verbal, Demonstration, Written  Provided to: Patient, Caregiver ()  Level of Understanding: Verbalized, Demonstrated  19633 - PT Self Care/Mgmt Minutes: 15       OP Exercises       Row Name 01/22/25 1706 01/22/25 1000          Subjective    Subjective Comments -- Pt reports that she has been wearing compressive undersocks faithfully, but they have not been using velcro component often due to feeling she is doing well just with the undersock.  Needs to know how to use velcro component or if it is necessary.  -KA        Subjective Pain    Able to rate subjective pain? -- yes  -KA     Pre-Treatment Pain Level -- 0  -KA     Post-Treatment Pain Level -- 0  -KA        Total Minutes    92447 - PT Manual Therapy Minutes 25  -KA --               User Key  (r) = Recorded By, (t) = Taken By, (c) = Cosigned By      Initials Name Provider Type    Yamila Haddad, PT Physical Therapist                            Manual Rx (Last 36 Hours)       Manual Treatments       Row Name 01/22/25 1706             Total  Minutes    38416 - PT Manual Therapy Minutes 25  -KA                User Key  (r) = Recorded By, (t) = Taken By, (c) = Cosigned By      Initials Name Provider Type    Yamila Haddad, PT Physical Therapist                     PT OP Goals       Row Name 01/22/25 1600          Long Term Goals    LTG Date to Achieve 12/12/24  -     LTG 1 Pt will be independent with application of long term compression garments with/without assist from family.  -     LTG 1 Progress Ongoing  -     LTG 1 Progress Comments Reviewed application today  -     LTG 2 Pt will be compliant with decongestive exercise including ankle pumps and diaphragmatic breathing.  -     LTG 2 Progress New  -     LTG 3 Pt will apply Eucerin lotion to BLE daily for improved skin condition and reduced infection risk.  -     LTG 3 Progress New  -        Time Calculation    PT Goal Re-Cert Due Date 04/22/25  -               User Key  (r) = Recorded By, (t) = Taken By, (c) = Cosigned By      Initials Name Provider Type    Yamila Haddad, PT Physical Therapist                     PT Assessment/Plan       Row Name 01/22/25 1701          PT Assessment    Functional Limitations Impaired gait;Limitations in functional capacity and performance;Performance in self-care ADL  -     Impairments Edema;Gait;Impaired lymphatic circulation;Integumentary integrity;Muscle strength;Pain;Posture;Poor body mechanics  -     Assessment Comments Pt returns for re-evaluation today, has been wearing Circaid compressive undersocks and feels legs are doing well but wanting to know if she needs to use velcro components and requesting education if she does.  Girth measurements decreased by 7.9 cm LLE and 9.4 cm RLE so she is doing well with undersocks alone, but does have soft edema over level of ankle that would likely resolve with velcro component, reviewed application.  Reviewed other self care including skin care, decongestive exercise, and self MLD.  Performed MLD for BLE today.  Pt remains appropriate for skilled physical therapy to reduce edema, improve skin condition, and improve self management of condition.  Continue with use of Juxtalite HD adding velcro component if tolerated well, eligible to reorder garments as discussed in education section in April 2025.  -KA     Rehab Potential Good  -KA     Patient/caregiver participated in establishment of treatment plan and goals Yes  -KA     Patient would benefit from skilled therapy intervention Yes  -KA        PT Plan    PT Frequency Other (comment)  -KA     Predicted Duration of Therapy Intervention (PT) Re-evaluate in one year, unless indicated sooner  -KA     Planned CPT's? PT EVAL MOD COMPLELITY: 45028;PT RE-EVAL: 71557;PT THER PROC EA 15 MIN: 35049;PT THER ACT EA 15 MIN: 98811;PT MANUAL THERAPY EA 15 MIN: 22541;PT NEUROMUSC RE-EDUCATION EA 15 MIN: 05063;PT GAIT TRAINING EA 15 MIN: 48689;PT SELF CARE/HOME MGMT/TRAIN EA 15: 54687  -     PT Plan Comments Call when ready to reorder Juxtalite HD, re-evaluate in about one year, continue with self care as discussed today.  -               User Key  (r) = Recorded By, (t) = Taken By, (c) = Cosigned By      Initials Name Provider Type    Yamila Haddad PT Physical Therapist                                 Time Calculation:   Start Time: 1015  Stop Time: 1110  Time Calculation (min): 55 min  Timed Charges  99849 - PT Manual Therapy Minutes: 25  16953 - PT Self Care/Mgmt Minutes: 15  Untimed Charges  PT Eval/Re-eval Minutes: 15  Total Minutes  Timed Charges Total Minutes: 40  Untimed Charges Total Minutes: 15   Total Minutes: 55   Therapy Charges for Today       Code Description Service Date Service Provider Modifiers Qty    30277303445 HC PT MANUAL THERAPY EA 15 MIN 1/22/2025 Yamila Abel, PT GP 2    76286976482 HC PT SELF CARE/MGMT/TRAIN EA 15 MIN 1/22/2025 Yamila Abel, PT GP 1    29287385811 HC PT RE-EVAL ESTABLISHED PLAN 2 1/22/2025  Yamila Abel, PT GP 1                      Yamila Abel, PT  1/22/2025

## 2025-01-23 ENCOUNTER — LAB (OUTPATIENT)
Dept: FAMILY MEDICINE CLINIC | Facility: CLINIC | Age: 80
End: 2025-01-23
Payer: MEDICARE

## 2025-01-23 DIAGNOSIS — E78.00 HYPERCHOLESTEROLEMIA: Primary | ICD-10-CM

## 2025-01-24 LAB
ALBUMIN SERPL-MCNC: 4.1 G/DL (ref 3.5–5.2)
ALBUMIN/GLOB SERPL: 1.6 G/DL
ALP SERPL-CCNC: 96 U/L (ref 39–117)
ALT SERPL-CCNC: 7 U/L (ref 1–33)
APPEARANCE UR: CLEAR
AST SERPL-CCNC: 17 U/L (ref 1–32)
BACTERIA #/AREA URNS HPF: NORMAL /HPF
BASOPHILS # BLD AUTO: 0.04 10*3/MM3 (ref 0–0.2)
BASOPHILS NFR BLD AUTO: 0.8 % (ref 0–1.5)
BILIRUB SERPL-MCNC: 0.3 MG/DL (ref 0–1.2)
BILIRUB UR QL STRIP: NEGATIVE
BUN SERPL-MCNC: 13 MG/DL (ref 8–23)
BUN/CREAT SERPL: 12.6 (ref 7–25)
CALCIUM SERPL-MCNC: 9.1 MG/DL (ref 8.6–10.5)
CASTS URNS MICRO: NORMAL
CHLORIDE SERPL-SCNC: 101 MMOL/L (ref 98–107)
CHOLEST SERPL-MCNC: 132 MG/DL (ref 0–200)
CO2 SERPL-SCNC: 28 MMOL/L (ref 22–29)
COLOR UR: YELLOW
CREAT SERPL-MCNC: 1.03 MG/DL (ref 0.57–1)
EGFRCR SERPLBLD CKD-EPI 2021: 55.4 ML/MIN/1.73
EOSINOPHIL # BLD AUTO: 0.11 10*3/MM3 (ref 0–0.4)
EOSINOPHIL NFR BLD AUTO: 2.3 % (ref 0.3–6.2)
EPI CELLS #/AREA URNS HPF: NORMAL /HPF
ERYTHROCYTE [DISTWIDTH] IN BLOOD BY AUTOMATED COUNT: 13 % (ref 12.3–15.4)
GLOBULIN SER CALC-MCNC: 2.6 GM/DL
GLUCOSE SERPL-MCNC: 91 MG/DL (ref 65–99)
GLUCOSE UR QL STRIP: NEGATIVE
HCT VFR BLD AUTO: 39.6 % (ref 34–46.6)
HDLC SERPL-MCNC: 58 MG/DL (ref 40–60)
HGB BLD-MCNC: 12.5 G/DL (ref 12–15.9)
HGB UR QL STRIP: NEGATIVE
IMM GRANULOCYTES # BLD AUTO: 0.01 10*3/MM3 (ref 0–0.05)
IMM GRANULOCYTES NFR BLD AUTO: 0.2 % (ref 0–0.5)
KETONES UR QL STRIP: ABNORMAL
LDLC SERPL CALC-MCNC: 61 MG/DL (ref 0–100)
LEUKOCYTE ESTERASE UR QL STRIP: ABNORMAL
LYMPHOCYTES # BLD AUTO: 1.48 10*3/MM3 (ref 0.7–3.1)
LYMPHOCYTES NFR BLD AUTO: 30.5 % (ref 19.6–45.3)
MCH RBC QN AUTO: 29.1 PG (ref 26.6–33)
MCHC RBC AUTO-ENTMCNC: 31.6 G/DL (ref 31.5–35.7)
MCV RBC AUTO: 92.3 FL (ref 79–97)
MONOCYTES # BLD AUTO: 0.41 10*3/MM3 (ref 0.1–0.9)
MONOCYTES NFR BLD AUTO: 8.4 % (ref 5–12)
NEUTROPHILS # BLD AUTO: 2.81 10*3/MM3 (ref 1.7–7)
NEUTROPHILS NFR BLD AUTO: 57.8 % (ref 42.7–76)
NITRITE UR QL STRIP: NEGATIVE
NRBC BLD AUTO-RTO: 0 /100 WBC (ref 0–0.2)
PH UR STRIP: <=5 [PH] (ref 5–8)
PLATELET # BLD AUTO: 218 10*3/MM3 (ref 140–450)
POTASSIUM SERPL-SCNC: 3.8 MMOL/L (ref 3.5–5.2)
PROT SERPL-MCNC: 6.7 G/DL (ref 6–8.5)
PROT UR QL STRIP: NEGATIVE
RBC # BLD AUTO: 4.29 10*6/MM3 (ref 3.77–5.28)
RBC #/AREA URNS HPF: NORMAL /HPF
SODIUM SERPL-SCNC: 139 MMOL/L (ref 136–145)
SP GR UR STRIP: 1.02 (ref 1–1.03)
TRIGL SERPL-MCNC: 61 MG/DL (ref 0–150)
UROBILINOGEN UR STRIP-MCNC: ABNORMAL MG/DL
VLDLC SERPL CALC-MCNC: 13 MG/DL (ref 5–40)
WBC # BLD AUTO: 4.86 10*3/MM3 (ref 3.4–10.8)
WBC #/AREA URNS HPF: NORMAL /HPF

## 2025-01-30 ENCOUNTER — OFFICE VISIT (OUTPATIENT)
Dept: FAMILY MEDICINE CLINIC | Facility: CLINIC | Age: 80
End: 2025-01-30
Payer: MEDICARE

## 2025-01-30 VITALS
DIASTOLIC BLOOD PRESSURE: 80 MMHG | SYSTOLIC BLOOD PRESSURE: 128 MMHG | BODY MASS INDEX: 31.49 KG/M2 | OXYGEN SATURATION: 97 % | HEART RATE: 60 BPM | RESPIRATION RATE: 16 BRPM | HEIGHT: 60 IN | WEIGHT: 160.4 LBS | TEMPERATURE: 96.6 F

## 2025-01-30 DIAGNOSIS — N63.20 MASS OF LEFT BREAST, UNSPECIFIED QUADRANT: ICD-10-CM

## 2025-01-30 DIAGNOSIS — E78.00 HYPERCHOLESTEROLEMIA: Primary | ICD-10-CM

## 2025-01-30 PROCEDURE — 1125F AMNT PAIN NOTED PAIN PRSNT: CPT | Performed by: INTERNAL MEDICINE

## 2025-01-30 PROCEDURE — 99213 OFFICE O/P EST LOW 20 MIN: CPT | Performed by: INTERNAL MEDICINE

## 2025-01-30 NOTE — PROGRESS NOTES
Subjective   Chris Cox is a 79 y.o. female. Patient is here today for   Chief Complaint   Patient presents with    Hyperlipidemia          Vitals:    01/30/25 0945   BP: 128/80   Pulse: 60   Resp: 16   Temp: 96.6 °F (35.9 °C)   SpO2: 97%     Body mass index is 31.33 kg/m².      Past Medical History:   Diagnosis Date    Abdominal pain     ADHD     Ankle sprain past    Arthritis     primary both knees    Bronchitis 08/2019    CTS (carpal tunnel syndrome) surgery c. early 2000's    Disease of thyroid gland     Dislocation, shoulder ?    Diverticulosis of intestine     DVT (deep venous thrombosis) 10/2019    right lower extremity    Fracture of wrist ?    Fracture, foot past    Fractures     MULTIPLE BONE FRACTURES-TOOK FOSAMAX 3.5 YRS    Hyperlipidemia     Hypertension     Irregular heart beat     Kidney stone     Knee swelling R. replaced c. 2019    Lumbar spine pain     Medicare annual wellness visit, subsequent 02/04/2021    Migraines     Mono exposure     AGE 35    Neck strain     Neuralgia neuritis, sciatic nerve     Obstructive sleep apnea     20 YRS AGO-NOT CURRENTLY    Rotator cuff syndrome Trapezius pain?    Scoliosis since childhood    Seizures     As a baby    Stress fracture ?    Tuberculosis     As a child    Vertigo     Wrist sprain       Allergies   Allergen Reactions    Erythromycin Anaphylaxis    Sulfa Antibiotics Rash    Milk-Related Compounds GI Intolerance    Egg-Derived Products Unknown - Low Severity     PER ALLERGY TESTING RESULTS    Cephalexin Rash    Cephalosporins Rash     STOMACH ISSUES    Molds & Smuts Rash     ITCHING      Social History     Socioeconomic History    Marital status:      Spouse name: Marky    Years of education: College   Tobacco Use    Smoking status: Never     Passive exposure: Never    Smokeless tobacco: Never   Vaping Use    Vaping status: Never Used   Substance and Sexual Activity    Alcohol use: Not Currently     Alcohol/week: 1.0 standard drink of alcohol      Comment: used medicinally    Drug use: No    Sexual activity: Not Currently     Partners: Male     Comment: N/A        Current Outpatient Medications:     ascorbic acid (VITAMIN C) 500 MG/5ML liquid, Take 5 mL by mouth Daily., Disp: , Rfl:     bumetanide (BUMEX) 2 MG tablet, Take 1 tablet by mouth Daily., Disp: 30 tablet, Rfl: 11    escitalopram (LEXAPRO) 10 MG tablet, TAKE 1 TABLET DAILY, Disp: 90 tablet, Rfl: 3    memantine (NAMENDA) 5 MG tablet, TAKE 2 TABLETS DAILY, Disp: 180 tablet, Rfl: 3    Myrbetriq 50 MG tablet sustained-release 24 hour 24 hr tablet, TAKE 1 TABLET DAILY, Disp: 90 tablet, Rfl: 3    potassium chloride (KLOR-CON M20) 20 MEQ CR tablet, Take 1 tablet by mouth 3 (Three) Times a Day., Disp: 90 tablet, Rfl: 11    rosuvastatin (CRESTOR) 10 MG tablet, TAKE 1 TABLET DAILY, Disp: 90 tablet, Rfl: 3     Objective     History of Present Illness  Oksana is here to follow-up on labs done last week and an abnormal mammogram at the beginning of the month.    She feels well.  Hyperlipidemia      3-month follow-up       Review of Systems   Constitutional: Negative.    HENT: Negative.     Respiratory: Negative.     Cardiovascular: Negative.    Musculoskeletal: Negative.    Psychiatric/Behavioral: Negative.         Physical Exam  Vitals and nursing note reviewed.   Constitutional:       Appearance: Normal appearance.      Comments: Pleasant, neatly groomed, no distress.  BMI 31.3.   Cardiovascular:      Rate and Rhythm: Regular rhythm.      Heart sounds: Normal heart sounds.   Pulmonary:      Effort: No respiratory distress.      Breath sounds: Normal breath sounds. No wheezing or rales.   Neurological:      Mental Status: She is alert and oriented to person, place, and time.   Psychiatric:         Mood and Affect: Mood normal.         Behavior: Behavior normal.         Thought Content: Thought content normal.           Problems Addressed this Visit          Cardiac and Vasculature    Hypercholesterolemia -  Primary       Genitourinary and Reproductive     Left breast mass     Diagnoses         Codes Comments    Hypercholesterolemia    -  Primary ICD-10-CM: E78.00  ICD-9-CM: 272.0     Mass of left breast, unspecified quadrant     ICD-10-CM: N63.20  ICD-9-CM: 611.72               PLAN  Left breast architectural distortion and asymmetry.  Radiology recommended further evaluation with CC and MLO spot compression and lateral views with tomosynthesis and targeted ultrasound.  She has that appointment coming up.    Hypercholesterolemia with good control.  No follow-ups on file.

## 2025-02-03 ENCOUNTER — HOSPITAL ENCOUNTER (OUTPATIENT)
Dept: MAMMOGRAPHY | Facility: HOSPITAL | Age: 80
Discharge: HOME | End: 2025-02-03
Payer: MEDICARE

## 2025-02-03 ENCOUNTER — HOSPITAL ENCOUNTER (OUTPATIENT)
Dept: ULTRASOUND IMAGING | Facility: HOSPITAL | Age: 80
Discharge: HOME | End: 2025-02-03
Payer: MEDICARE

## 2025-02-03 DIAGNOSIS — R92.8 ABNORMALITY OF LEFT BREAST ON SCREENING MAMMOGRAPHY: ICD-10-CM

## 2025-02-03 PROCEDURE — 76642 ULTRASOUND BREAST LIMITED: CPT

## 2025-02-03 PROCEDURE — G0279 TOMOSYNTHESIS, MAMMO: HCPCS

## 2025-02-03 PROCEDURE — 77065 DX MAMMO INCL CAD UNI: CPT

## 2025-02-24 RX ORDER — QUETIAPINE FUMARATE 50 MG/1
50 TABLET, FILM COATED ORAL NIGHTLY
Qty: 90 TABLET | Refills: 1 | Status: SHIPPED | OUTPATIENT
Start: 2025-02-24

## 2025-04-02 RX ORDER — BUMETANIDE 2 MG/1
2 TABLET ORAL DAILY
Qty: 90 TABLET | Refills: 1 | Status: SHIPPED | OUTPATIENT
Start: 2025-04-02

## 2025-04-22 ENCOUNTER — OFFICE VISIT (OUTPATIENT)
Dept: CARDIOLOGY | Age: 80
End: 2025-04-22
Payer: MEDICARE

## 2025-04-22 VITALS
WEIGHT: 158 LBS | HEART RATE: 62 BPM | SYSTOLIC BLOOD PRESSURE: 134 MMHG | DIASTOLIC BLOOD PRESSURE: 76 MMHG | HEIGHT: 64 IN | BODY MASS INDEX: 26.98 KG/M2

## 2025-04-22 DIAGNOSIS — E87.6 HYPOKALEMIA: Primary | ICD-10-CM

## 2025-04-22 PROCEDURE — 99214 OFFICE O/P EST MOD 30 MIN: CPT | Performed by: NURSE PRACTITIONER

## 2025-04-22 PROCEDURE — 1160F RVW MEDS BY RX/DR IN RCRD: CPT | Performed by: NURSE PRACTITIONER

## 2025-04-22 PROCEDURE — 93000 ELECTROCARDIOGRAM COMPLETE: CPT | Performed by: NURSE PRACTITIONER

## 2025-04-22 PROCEDURE — 1159F MED LIST DOCD IN RCRD: CPT | Performed by: NURSE PRACTITIONER

## 2025-04-22 NOTE — PROGRESS NOTES
Date of Office Visit: 2025  Encounter Provider: GARRETT Garcias  Place of Service: [facility] CARDIOLOGY  Patient Name: Chris Cox  :1945    Chief Complaint   Patient presents with    Follow-up   : Hyperlipidemia    HPI: Chris Cox is a 79 y.o. female who is a patient of Dr. Zaragoza and is new to me today.  She has a history of chronic lower extremity edema.  We met her in May 2020 for we had increased her Lasix without much effects.  We switched to Bumex which resulted in hypokalemia however this was corrected.  She had grade 2 diastolic dysfunction on echocardiogram with mild mitral valve regurgitation and mild to moderate tricuspid valve regurgitation her RSVP was normal.  She is last in the office in October she was just stable she is here today for 6-month follow-up.    She comes in today for follow-up.  She has been feeling well.  Her potassium has been stable labs a few days ago show a potassium of 4.3 with normal kidney function.  A1c is at goal at 5.3.  She went to the lymphedema clinic she is doing some wraps on her legs at night at home with compression device.  She is really been fairly stable.    Previous testing and notes have been reviewed by me.    Latest Reference Range & Units Most Recent   Sodium 136 - 145 mmol/L 144  25 10:13   Potassium 3.5 - 5.2 mmol/L 4.3  25 10:13   Chloride 98 - 107 mmol/L 107  25 10:13   CO2 22.0 - 29.0 mmol/L 27.8  25 10:13   CO2 22 - 29 mmol/L 26 (E)  22 15:46   Anion Gap 5.0 - 15.0 mmol/L 8.4  24 14:01   BUN 8 - 23 mg/dL 12  25 10:13   Creatinine 0.57 - 1.00 mg/dL 0.93  25 10:13   BUN/Creatinine Ratio 7.0 - 25.0  12.9  25 10:13   eGFR >60.0 mL/min/1.73 62.6  24 14:01   EGFR Result >60.0 mL/min/1.73 62.6  25 10:13   Est GFR by Clearance >60 /1.73 m2 >60 (E)  22 15:46   Glucose 65 - 99 mg/dL 100 (H)  25 10:13   Calcium 8.6 - 10.5 mg/dL 9.1  25  10:13   Alkaline Phosphatase 39 - 117 U/L 90  4/17/25 10:13   Total Protein 6.0 - 8.5 g/dL 5.9 (L)  4/17/25 10:13   Albumin 3.5 - 5.2 g/dL 3.9  4/17/25 10:13   Globulin gm/dL 2.2  8/2/24 12:55   A/G Ratio g/dL 2.0  4/17/25 10:13   AST (SGOT) 1 - 32 U/L 14  4/17/25 10:13   ALT (SGPT) 1 - 33 U/L 7  4/17/25 10:13   Total Bilirubin 0.0 - 1.2 mg/dL 0.3  4/17/25 10:13   eGFR Non African Am >60 mL/min/1.73 83  9/29/21 10:11   eGFR  Am >60 /1.73 m2 >60 (E)  9/21/22 15:46   Hemoglobin A1C 4.80 - 5.60 % 5.30  4/17/25 10:13   (H): Data is abnormally high  (L): Data is abnormally low  (E): External lab result  Past Medical History:   Diagnosis Date    Abdominal pain     ADHD     Ankle sprain past    Arthritis     primary both knees    Bronchitis 08/2019    CTS (carpal tunnel syndrome) surgery c. early 2000's    Disease of thyroid gland     Dislocation, shoulder ?    Diverticulosis of intestine     DVT (deep venous thrombosis) 10/2019    right lower extremity    Fracture of wrist ?    Fracture, foot past    Fractures     MULTIPLE BONE FRACTURES-TOOK FOSAMAX 3.5 YRS    Hyperlipidemia     Hypertension     Irregular heart beat     Kidney stone     Knee swelling R. replaced c. 2019    Lumbar spine pain     Medicare annual wellness visit, subsequent 02/04/2021    Migraines     Mono exposure     AGE 35    Neck strain     Neuralgia neuritis, sciatic nerve     Obstructive sleep apnea     20 YRS AGO-NOT CURRENTLY    Rotator cuff syndrome Trapezius pain?    Scoliosis since childhood    Seizures     As a baby    Stress fracture ?    Tuberculosis     As a child    Vertigo     Wrist sprain        Past Surgical History:   Procedure Laterality Date    BREAST BIOPSY Left     2019 stereotactic benign    BREAST EXCISIONAL BIOPSY Left     1971 - fibroadenoma    BREAST SURGERY      Benign fibroadnoma removed from the left breast    COLONOSCOPY N/A 04/11/2022    Procedure: COLONOSCOPY;  Surgeon: Leonor Xavier MD;  Location: [facility]  OR;  Service: Gastroenterology;  Laterality: N/A;  Diverticulosis    HAND SURGERY Bilateral 2012    RECONSTRUCTION ON BOTH HANDS    HIP SURGERY  c. 2019    JOINT REPLACEMENT  knee/hip    LITHOTRIPSY      renal    TONGUE SURGERY      TOTAL KNEE ARTHROPLASTY Right 10/22/2019    Procedure: TOTAL KNEE ARTHROPLASTY RIGHT;  Surgeon: Aneesh Okeefe II, MD;  Location: [facility] OR;  Service: Orthopedics    WRIST SURGERY         Social History     Socioeconomic History    Marital status:      Spouse name: Marky    Years of education: College   Tobacco Use    Smoking status: Never     Passive exposure: Never    Smokeless tobacco: Never   Vaping Use    Vaping status: Never Used   Substance and Sexual Activity    Alcohol use: Not Currently     Alcohol/week: 1.0 standard drink of alcohol     Comment: used medicinally    Drug use: No    Sexual activity: Not Currently     Partners: Male     Comment: N/A       Family History   Problem Relation Age of Onset    Hypertension Mother     Other Mother     Transient ischemic attack Mother     Heart disease Mother     Cancer Father         colon    Diabetes Father     Hypertension Father     Other Father         Renal artery aneurysm    Heart disease Father     Stroke Father     Diabetes Paternal Grandmother     Cancer Paternal Grandfather        Review of Systems   Constitutional: Negative for diaphoresis and malaise/fatigue.   Cardiovascular:  Negative for chest pain, claudication, dyspnea on exertion, irregular heartbeat, leg swelling, near-syncope, orthopnea, palpitations, paroxysmal nocturnal dyspnea and syncope.   Respiratory:  Negative for cough, shortness of breath and sleep disturbances due to breathing.    Musculoskeletal:  Negative for falls.   Neurological:  Negative for dizziness and weakness.   Psychiatric/Behavioral:  Negative for altered mental status and substance abuse.        Allergies   Allergen Reactions    Erythromycin Anaphylaxis    Sulfa  "Antibiotics Rash    Milk-Related Compounds GI Intolerance    Egg-Derived Products Unknown - Low Severity     PER ALLERGY TESTING RESULTS    Cephalexin Rash    Cephalosporins Rash     STOMACH ISSUES    Molds & Smuts Rash     ITCHING         Current Outpatient Medications:     ascorbic acid (VITAMIN C) 500 MG/5ML liquid, Take 5 mL by mouth Daily., Disp: , Rfl:     bumetanide (BUMEX) 2 MG tablet, TAKE 1 TABLET BY MOUTH DAILY, Disp: 90 tablet, Rfl: 1    memantine (NAMENDA) 5 MG tablet, TAKE 2 TABLETS DAILY, Disp: 180 tablet, Rfl: 3    Myrbetriq 50 MG tablet sustained-release 24 hour 24 hr tablet, TAKE 1 TABLET DAILY, Disp: 90 tablet, Rfl: 3    potassium chloride (KLOR-CON M20) 20 MEQ CR tablet, Take 1 tablet by mouth 3 (Three) Times a Day., Disp: 90 tablet, Rfl: 11    rosuvastatin (CRESTOR) 10 MG tablet, TAKE 1 TABLET DAILY, Disp: 90 tablet, Rfl: 3    QUEtiapine (SEROquel) 50 MG tablet, Take 1 tablet by mouth Every Night., Disp: 90 tablet, Rfl: 1        Objective:     Vitals:    04/22/25 1130   BP: 134/76   Pulse: 62   Weight: 71.7 kg (158 lb)   Height: 162.6 cm (64\")     Body mass index is 27.12 kg/m².    PHYSICAL EXAM:    Constitutional:       General: Not in acute distress.     Appearance: Normal appearance. Well-developed.   Eyes:      Pupils: Pupils are equal, round, and reactive to light.   HENT:      Head: Normocephalic.   Neck:      Vascular: No carotid bruit or JVD.   Pulmonary:      Effort: Pulmonary effort is normal. No tachypnea.      Breath sounds: Normal breath sounds. No wheezing. No rales.   Cardiovascular:      Normal rate. Regular rhythm.      No gallop.    Pulses:     Intact distal pulses.   Edema:     Peripheral edema present.     Pretibial: bilateral trace edema of the pretibial area.     Ankle: bilateral trace edema of the ankle.  Abdominal:      General: Bowel sounds are normal.      Palpations: Abdomen is soft.      Tenderness: There is no abdominal tenderness.   Musculoskeletal: Normal range of " motion.      Cervical back: Normal range of motion and neck supple. No edema. Skin:     General: Skin is warm and dry.   Neurological:      Mental Status: Alert and oriented to person, place, and time.           ECG 12 Lead    Date/Time: 4/22/2025 12:01 PM  Performed by: Teresa Zendejas APRN    Authorized by: Teresa Zendejas APRN  Comparison: compared with previous ECG from 8/2/2024  Similar to previous ECG  Rhythm: sinus rhythm  Rate: normal  Conduction: non-specific intraventricular conduction delay  QRS axis: normal    Clinical impression: abnormal EKG        Lipid Panel          10/3/2024    10:40 1/23/2025    09:49 4/17/2025    10:13   Lipid Panel   Total Cholesterol 121  132  135    Triglycerides 72  61  87    HDL Cholesterol 52  58  53    VLDL Cholesterol 15  13  17    LDL Cholesterol  54  61  65    LDL/HDL Ratio 1.0            Assessment/Plan:      1.  Grade 2 diastolic dysfunction-stable at this time controlled blood pressure salt monitoring    2.  Chronic lower extremity edema stable on current dose of Bumex 2 mg daily continue with compression at home    3.  Hypokalemia stable on current potassium chloride 20 mill equivalents 3 times a day    Follow-up in 1 year or sooner as needed         Your medication list            Accurate as of April 22, 2025 11:59 AM. If you have any questions, ask your nurse or doctor.                CONTINUE taking these medications        Instructions Last Dose Given Next Dose Due   ascorbic acid 500 MG/5ML liquid  Commonly known as: VITAMIN C      Take 5 mL by mouth Daily.       bumetanide 2 MG tablet  Commonly known as: BUMEX      TAKE 1 TABLET BY MOUTH DAILY       memantine 5 MG tablet  Commonly known as: NAMENDA      TAKE 2 TABLETS DAILY       Myrbetriq 50 MG tablet sustained-release 24 hour 24 hr tablet  Generic drug: Mirabegron ER      TAKE 1 TABLET DAILY       potassium chloride 20 MEQ CR tablet  Commonly known as: KLOR-CON M20      Take 1 tablet by mouth 3  (Three) Times a Day.       QUEtiapine 50 MG tablet  Commonly known as: SEROquel      Take 1 tablet by mouth Every Night.       rosuvastatin 10 MG tablet  Commonly known as: CRESTOR      TAKE 1 TABLET DAILY                  As always, it has been a pleasure to participate in your patient's care.      Sincerely,     Teresa TUCKER

## 2025-04-25 RX ORDER — POTASSIUM CHLORIDE 1500 MG/1
20 TABLET, EXTENDED RELEASE ORAL 3 TIMES DAILY
Qty: 90 TABLET | Refills: 0 | Status: SHIPPED | OUTPATIENT
Start: 2025-04-25 | End: 2025-04-28 | Stop reason: SDUPTHER

## 2025-04-28 ENCOUNTER — TELEPHONE (OUTPATIENT)
Dept: FAMILY MEDICINE CLINIC | Facility: CLINIC | Age: 80
End: 2025-04-28
Payer: MEDICARE

## 2025-04-28 RX ORDER — POTASSIUM CHLORIDE 1500 MG/1
20 TABLET, EXTENDED RELEASE ORAL 3 TIMES DAILY
Qty: 270 TABLET | Refills: 0 | Status: SHIPPED | OUTPATIENT
Start: 2025-04-28

## 2025-04-28 RX ORDER — POTASSIUM CHLORIDE 1500 MG/1
20 TABLET, EXTENDED RELEASE ORAL 3 TIMES DAILY
Qty: 270 TABLET | Refills: 0 | Status: SHIPPED | OUTPATIENT
Start: 2025-04-28 | End: 2025-04-28 | Stop reason: SDUPTHER

## 2025-04-28 NOTE — TELEPHONE ENCOUNTER
PT'S  IS REQUESTING A 90 DAY SUPPLY OF     KLOR-CON M20 20 MEQ CR TABLETS 1 TID BE SENT TO "[facility].     ON 4-25-25 DR ARNDT ONLY SENT A 1 MONTH SUPPLY TO "[facility] AND HE GOT A MESSAGE BACK FROM "[facility] THAT THEY WON'T FILL A ONE MONTH SUPPLY AND IT MUST BE QTY #[phone].     MARLI  PT'S   [phone]

## 2025-05-01 ENCOUNTER — OFFICE VISIT (OUTPATIENT)
Dept: FAMILY MEDICINE CLINIC | Facility: CLINIC | Age: 80
End: 2025-05-01
Payer: MEDICARE

## 2025-05-01 VITALS
OXYGEN SATURATION: 94 % | HEART RATE: 61 BPM | RESPIRATION RATE: 16 BRPM | SYSTOLIC BLOOD PRESSURE: 102 MMHG | HEIGHT: 64 IN | TEMPERATURE: 97.6 F | WEIGHT: 163.6 LBS | BODY MASS INDEX: 27.93 KG/M2 | DIASTOLIC BLOOD PRESSURE: 68 MMHG

## 2025-05-01 DIAGNOSIS — E78.00 HYPERCHOLESTEROLEMIA: Primary | ICD-10-CM

## 2025-05-01 DIAGNOSIS — R73.9 HYPERGLYCEMIA: ICD-10-CM

## 2025-05-01 DIAGNOSIS — R41.3 MEMORY LOSS: ICD-10-CM

## 2025-05-01 DIAGNOSIS — Z00.00 MEDICARE ANNUAL WELLNESS VISIT, SUBSEQUENT: ICD-10-CM

## 2025-05-01 NOTE — PROGRESS NOTES
Subjective   The ABCs of the Annual Wellness Visit  Medicare Wellness Visit      Chris Cox is a 79 y.o. patient who presents for a Medicare Wellness Visit.    The following portions of the patient's history were reviewed and   updated as appropriate: allergies, current medications, past family history, past medical history, past social history, past surgical history, and problem list.    Compared to one year ago, the patient's physical   health is the same.  Compared to one year ago, the patient's mental   health is better.    Recent Hospitalizations:  She was not admitted to the hospital during the last year.     Current Medical Providers:  Patient Care Team:  Raghavendra Marquez MD as PCP - General (Internal Medicine)  Sunitha Walter MD as Consulting Physician (Hematology and Oncology)    Outpatient Medications Prior to Visit   Medication Sig Dispense Refill    ascorbic acid (VITAMIN C) 500 MG/5ML liquid Take 5 mL by mouth Daily.      bumetanide (BUMEX) 2 MG tablet TAKE 1 TABLET BY MOUTH DAILY 90 tablet 1    memantine (NAMENDA) 5 MG tablet TAKE 2 TABLETS DAILY 180 tablet 3    Myrbetriq 50 MG tablet sustained-release 24 hour 24 hr tablet TAKE 1 TABLET DAILY 90 tablet 3    potassium chloride (KLOR-CON M20) 20 MEQ CR tablet Take 1 tablet by mouth 3 (Three) Times a Day. 270 tablet 0    rosuvastatin (CRESTOR) 10 MG tablet TAKE 1 TABLET DAILY 90 tablet 3     No facility-administered medications prior to visit.     No opioid medication identified on active medication list. I have reviewed chart for other potential  high risk medication/s and harmful drug interactions in the elderly.      Aspirin is not on active medication list.  Aspirin use is not indicated based on review of current medical condition/s. Risk of harm outweighs potential benefits.  .    Patient Active Problem List   Diagnosis    Abdominal bruit    Blues    DD (diverticular disease)    Menopausal and perimenopausal disorder    Neuralgia neuritis,  "sciatic nerve    Right knee pain    Primary osteoarthritis of both knees    Radiculopathy with lower extremity symptoms    Idiopathic peripheral neuropathy    Sprain of collateral ligament of right knee    ADD (attention deficit disorder)    Microscopic hematuria    Chronic pain of left knee    Bleeding diathesis    Bilateral edema of lower extremity    Bilateral swelling of feet and ankles    Hyperglycemia    Venous stasis    Attention deficit disorder (ADD) without hyperactivity    Lesion of skin of scalp    Pre-operative clearance    Total knee replacement status    Chronic deep vein thrombosis (DVT) of calf muscle vein of right lower extremity    Cellulitis of face    Dermatitis    Major depressive disorder with single episode, in full remission    Fatigue due to excessive exertion    Disease of thyroid gland    Vertigo    Medicare annual wellness visit, subsequent    Osteopenia of both hips    Memory loss    Minimal cognitive impairment    Hypercholesterolemia    Hypokalemia    Left breast mass     Advance Care Planning Advance Directive is on file.  ACP discussion was held with the patient during this visit. Patient has an advance directive in EMR which is still valid.             Objective   Vitals:    05/01/25 0906   BP: 102/68   BP Location: Right arm   Patient Position: Sitting   Cuff Size: Adult   Pulse: 61   Resp: 16   Temp: 97.6 °F (36.4 °C)   TempSrc: Oral   SpO2: 94%   Weight: 74.2 kg (163 lb 9.6 oz)   Height: 162.6 cm (64.02\")   PainSc: 0-No pain       Estimated body mass index is 28.07 kg/m² as calculated from the following:    Height as of this encounter: 162.6 cm (64.02\").    Weight as of this encounter: 74.2 kg (163 lb 9.6 oz).    BMI is >= 25 and <30. (Overweight) The following options were offered after discussion;: weight loss educational material (shared in after visit summary) and exercise counseling/recommendations           Does the patient have evidence of cognitive impairment? Yes  Lab " Results   Component Value Date    CHLPL 135 2025    TRIG 87 2025    HDL 53 2025    LDL 65 2025    VLDL 17 2025    HGBA1C 5.30 2025                                                                                                Health  Risk Assessment    Smoking Status:  Social History     Tobacco Use   Smoking Status Never    Passive exposure: Never   Smokeless Tobacco Never     Alcohol Consumption:  Social History     Substance and Sexual Activity   Alcohol Use Not Currently    Alcohol/week: 1.0 standard drink of alcohol    Comment: used medicinally       Fall Risk Screen  STEADI Fall Risk Assessment was completed, and patient is at MODERATE risk for falls. Assessment completed on:2025    Depression Screening   Little interest or pleasure in doing things? Not at all   Feeling down, depressed, or hopeless? Not at all   PHQ-2 Total Score 0      Health Habits and Functional and Cognitive Screenin/24/2025    10:50 AM   Functional & Cognitive Status   Do you have difficulty preparing food and eating? Yes   Do you have difficulty bathing yourself, getting dressed or grooming yourself? Yes   Do you have difficulty using the toilet? No   Do you have difficulty moving around from place to place? No   Do you have trouble with steps or getting out of a bed or a chair? No   Current Diet Well Balanced Diet   Dental Exam Up to date   Eye Exam Up to date   Exercise (times per week) 0 times per week   Current Exercises Include No Regular Exercise;Yard Work   Do you need help using the phone?  Yes   Are you deaf or do you have serious difficulty hearing?  No   Do you need help to go to places out of walking distance? Yes   Do you need help shopping? Yes   Do you need help preparing meals?  Yes   Do you need help with housework?  Yes   Do you need help with laundry? Yes   Do you need help taking your medications? Yes   Do you need help managing money? Yes   Do you ever drive or ride in  a car without wearing a seat belt? No   Have you felt unusual stress, anger or loneliness in the last month? No   Who do you live with? Spouse   If you need help, do you have trouble finding someone available to you? No   Have you been bothered in the last four weeks by sexual problems? No   Do you have difficulty concentrating, remembering or making decisions? Yes           Age-appropriate Screening Schedule:  Refer to the list below for future screening recommendations based on patient's age, sex and/or medical conditions. Orders for these recommended tests are listed in the plan section. The patient has been provided with a written plan.    Health Maintenance List  Health Maintenance   Topic Date Due    Pneumococcal Vaccine 50+ (1 of 2 - PCV) Never done    ZOSTER VACCINE (1 of 2) Never done    RSV Vaccine - Adults (1 - 1-dose 75+ series) Never done    ANNUAL WELLNESS VISIT  09/27/2024    COVID-19 Vaccine (9 - 2024-25 season) 04/23/2025    INFLUENZA VACCINE  07/01/2025    LIPID PANEL  04/17/2026    DXA SCAN  01/02/2027    COLORECTAL CANCER SCREENING  04/11/2032    TDAP/TD VACCINES (2 - Td or Tdap) 06/19/2033    HEPATITIS C SCREENING  Completed    MAMMOGRAM  Discontinued                                                                                                                                                CMS Preventative Services Quick Reference  Risk Factors Identified During Encounter  None Identified    The above risks/problems have been discussed with the patient.  Pertinent information has been shared with the patient in the After Visit Summary.  An After Visit Summary and PPPS were made available to the patient.    Follow Up:   Next Medicare Wellness visit to be scheduled in 1 year.         Additional E&M Note during same encounter follows:  Patient has additional, significant, and separately identifiable condition(s)/problem(s) that require work above and beyond the Medicare Wellness Visit     Chief  "Complaint  Medicare Wellness-subsequent    Subjective    The patient reports no significant changes in physical health compared to the previous year but notes an improvement in emotional well-being. No hospitalizations have occurred in the past year. Occasional physical activity, including walking and yard work, is reported. Regular visits to the ophthalmologist and dentist are maintained. Alcohol consumption is abstained. The patient expressed a desire to avoid further vaccinations due to a previous adverse reaction to the Prevnar 13 vaccine, which resulted in a six-month period of illness. Her  corroborates this and adds that she has never experienced respiratory issues throughout their 56-year marriage. He also mentions two recent episodes of low mood, characterized by crying, but is uncertain if these were depressive episodes.    Currently, the patient is on memantine therapy, having discontinued donepezil due to diarrhea. Under the care of cardiologist Dr. Zendejas, hypokalemia was identified. Lab work done last week showed normal potassium levels. Scheduled for a follow-up appointment in one year. Treatment at a lymphedema clinic for leg-related issues is ongoing. The patient is on Bumex and potassium 20 mEq 3 times a day.                       Objective   Vital Signs:  /68 (BP Location: Right arm, Patient Position: Sitting, Cuff Size: Adult)   Pulse 61   Temp 97.6 °F (36.4 °C) (Oral)   Resp 16   Ht 162.6 cm (64.02\")   Wt 74.2 kg (163 lb 9.6 oz)   SpO2 94%   BMI 28.07 kg/m²   Physical Exam                  Results             Assessment and Plan        Her hypercholesterolemia is well-controlled.    She got minimal cognitive impairment.  She is having more difficulty with her memory now than in the past.  Memantine 5 mg tablets are working pretty well for her 2 tablets daily.    She had been on donepezil in the past but that caused diarrhea and so she discontinued that at my request many " months ago.    Medicare subsequent annual medical wellness visit.    I asked her to follow-up with me in about         Follow Up   No follow-ups on file.  Patient was given instructions and counseling regarding her condition or for health maintenance advice. Please see specific information pulled into the AVS if appropriate.  Patient or patient representative verbalized consent for the use of Ambient Listening during the visit with  Raghavendra Marquez MD for chart documentation. 5/1/2025  09:31 EDT

## 2025-05-20 ENCOUNTER — HOSPITAL ENCOUNTER (OUTPATIENT)
Facility: HOSPITAL | Age: 80
Discharge: HOME | End: 2025-05-20
Attending: EMERGENCY MEDICINE | Admitting: EMERGENCY MEDICINE
Payer: MEDICARE

## 2025-05-20 VITALS
TEMPERATURE: 97.7 F | OXYGEN SATURATION: 96 % | SYSTOLIC BLOOD PRESSURE: 98 MMHG | HEART RATE: 69 BPM | DIASTOLIC BLOOD PRESSURE: 58 MMHG | BODY MASS INDEX: 27.31 KG/M2 | RESPIRATION RATE: 15 BRPM | HEIGHT: 64 IN | WEIGHT: 160 LBS

## 2025-05-20 DIAGNOSIS — Z51.89 VISIT FOR WOUND CHECK: Primary | ICD-10-CM

## 2025-05-20 PROCEDURE — G0463 HOSPITAL OUTPT CLINIC VISIT: HCPCS | Performed by: PHYSICIAN ASSISTANT

## 2025-05-20 PROCEDURE — 99213 OFFICE O/P EST LOW 20 MIN: CPT | Performed by: PHYSICIAN ASSISTANT

## 2025-05-20 RX ORDER — DOXYCYCLINE 100 MG/1
100 CAPSULE ORAL 2 TIMES DAILY
Qty: 14 CAPSULE | Refills: 0 | Status: SHIPPED | OUTPATIENT
Start: 2025-05-20 | End: 2025-05-27

## 2025-05-20 NOTE — FSED PROVIDER NOTE
Subjective   History of Present Illness    Patient is complaining of two wounds to her left lower leg about 7 days ago.  She is unsure how she got the wound.   has been applying Neosporin to the area with minimal relief.  Denies any fever or bodyaches.    Review of Systems   Constitutional:  Negative for activity change and appetite change.   Eyes:  Negative for pain.   Respiratory:  Negative for shortness of breath.    Gastrointestinal:  Negative for nausea and vomiting.   Musculoskeletal:  Negative for arthralgias.   Skin:  Positive for wound. Negative for color change.   Neurological:  Negative for dizziness.   All other systems reviewed and are negative.      Past Medical History:   Diagnosis Date    Abdominal pain     ADHD     Ankle sprain past    Arthritis     primary both knees    Bronchitis 08/2019    CTS (carpal tunnel syndrome) surgery c. early 2000's    Disease of thyroid gland     Dislocation, shoulder ?    Diverticulosis of intestine     DVT (deep venous thrombosis) 10/2019    right lower extremity    Fracture of wrist ?    Fracture, foot past    Fractures     MULTIPLE BONE FRACTURES-TOOK FOSAMAX 3.5 YRS    Hyperlipidemia     Hypertension     Irregular heart beat     Kidney stone     Knee swelling R. replaced c. 2019    Lumbar spine pain     Medicare annual wellness visit, subsequent 02/04/2021    Migraines     Mono exposure     AGE 35    Neck strain     Neuralgia neuritis, sciatic nerve     Obstructive sleep apnea     20 YRS AGO-NOT CURRENTLY    Rotator cuff syndrome Trapezius pain?    Scoliosis since childhood    Seizures     As a baby    Stress fracture ?    Tuberculosis     As a child    Vertigo     Wrist sprain        Allergies   Allergen Reactions    Erythromycin Anaphylaxis    Sulfa Antibiotics Rash    Milk-Related Compounds GI Intolerance    Egg-Derived Products Unknown - Low Severity     PER ALLERGY TESTING RESULTS    Cephalexin Rash    Cephalosporins Rash     STOMACH ISSUES    Molds &  Smuts Rash     ITCHING       Past Surgical History:   Procedure Laterality Date    BREAST BIOPSY Left     2019 stereotactic benign    BREAST EXCISIONAL BIOPSY Left     1971 - fibroadenoma    BREAST SURGERY      Benign fibroadnoma removed from the left breast    COLONOSCOPY N/A 04/11/2022    Procedure: COLONOSCOPY;  Surgeon: Leonor Xavier MD;  [place] [facility] [place]  Service: Gastroenterology;  Laterality: N/A;  Diverticulosis    HAND SURGERY Bilateral 2012    RECONSTRUCTION ON BOTH HANDS    HIP SURGERY  c. 2019    JOINT REPLACEMENT  knee/hip    LITHOTRIPSY      renal    TONGUE SURGERY      TOTAL KNEE ARTHROPLASTY Right 10/22/2019    Procedure: TOTAL KNEE ARTHROPLASTY RIGHT;  Surgeon: Aneesh Okeeef II, MD;  Location: [facility] MAIN OR;  Service: Orthopedics    WRIST SURGERY         Family History   Problem Relation Age of Onset    Hypertension Mother     Other Mother     Transient ischemic attack Mother     Heart disease Mother     Cancer Father         colon    Diabetes Father     Hypertension Father     Other Father         Renal artery aneurysm    Heart disease Father     Stroke Father     Diabetes Paternal Grandmother     Cancer Paternal Grandfather        Social History     Socioeconomic History    Marital status:      Spouse name: Marky    Years of education: College   Tobacco Use    Smoking status: Never     Passive exposure: Never    Smokeless tobacco: Never   Vaping Use    Vaping status: Never Used   Substance and Sexual Activity    Alcohol use: Not Currently     Alcohol/week: 1.0 standard drink of alcohol     Comment: used medicinally    Drug use: No    Sexual activity: Not Currently     Partners: Male     Comment: N/A           Objective   Physical Exam  Vitals and nursing note reviewed.   Constitutional:       Appearance: Normal appearance. She is normal weight.   HENT:      Head: Normocephalic and atraumatic.      Nose: Nose normal.      Mouth/Throat:      Mouth: Mucous membranes  are moist.   Eyes:      Pupils: Pupils are equal, round, and reactive to light.   Cardiovascular:      Rate and Rhythm: Normal rate and regular rhythm.      Pulses: Normal pulses.      Heart sounds: Normal heart sounds.   Pulmonary:      Effort: Pulmonary effort is normal.      Breath sounds: Normal breath sounds.   Musculoskeletal:         General: Normal range of motion.      Cervical back: Normal range of motion.      Right lower leg: No edema.      Left lower leg: No edema.   Skin:     General: Skin is warm.             Comments: Lateral left lower leg: there are two superficial wounds; 2cm x 2.5 cm and 1 x 1.5 cm; no active drainage noted, no surrounding erythema   Neurological:      General: No focal deficit present.      Mental Status: She is alert.   Psychiatric:         Behavior: Behavior is cooperative.         Procedures           ED Course                                           Medical Decision Making  Problems Addressed:  Visit for wound check: complicated acute illness or injury    Risk  Prescription drug management.        Final diagnoses:   Visit for wound check       ED Disposition  ED Disposition       ED Disposition   Discharge    Condition   Stable    Comment   --               Raghavendra Marquez MD  [id number] [id number] [phone]          [place] [address]  [phone]  Call            Medication List        New Prescriptions      doxycycline 100 MG capsule  Commonly known as: MONODOX  Take 1 capsule by mouth 2 (Two) Times a Day for 7 days.               Where to Get Your Medications        These medications were sent to [id number] - [place], [place] - [address] [facility] [address] [facility] - [phone]  - [phone] FX  [address] [facility] [facility] [address]      Phone: [phone]   doxycycline 100 MG capsule

## 2025-05-20 NOTE — DISCHARGE INSTRUCTIONS
Change the wound dressing daily for the next few days.  Take the medication as prescribed.  I advise that you follow-up with the [facility] wound care clinic, make an appointment today for later this week.

## 2025-06-04 ENCOUNTER — TELEPHONE (OUTPATIENT)
Dept: FAMILY MEDICINE CLINIC | Facility: CLINIC | Age: 80
End: 2025-06-04

## 2025-06-04 RX ORDER — POTASSIUM CHLORIDE 1500 MG/1
20 TABLET, EXTENDED RELEASE ORAL 3 TIMES DAILY
Qty: 270 TABLET | Refills: 0 | Status: SHIPPED | OUTPATIENT
Start: 2025-06-04

## 2025-06-04 NOTE — TELEPHONE ENCOUNTER
PT'S  CALLED AND IS REQUESTING A REFILL ON PT'S     POTASSIUM CHLORIDE 20 MEQ CR TABLETS 1 TAB TID WITH REFILLS.    MUST BE SENT TO     [facility]    [facility]  [place]  [phone]

## 2025-07-10 RX ORDER — BUMETANIDE 2 MG/1
2 TABLET ORAL DAILY
Qty: 30 TABLET | Refills: 3 | Status: SHIPPED | OUTPATIENT
Start: 2025-07-10

## 2025-08-04 DIAGNOSIS — D50.9 IRON DEFICIENCY ANEMIA, UNSPECIFIED IRON DEFICIENCY ANEMIA TYPE: ICD-10-CM

## 2025-08-04 DIAGNOSIS — E87.6 HYPOKALEMIA: ICD-10-CM

## 2025-08-04 DIAGNOSIS — R73.9 HYPERGLYCEMIA: ICD-10-CM

## 2025-08-04 DIAGNOSIS — E78.00 HYPERCHOLESTEROLEMIA: Primary | ICD-10-CM

## 2025-08-05 LAB
ALBUMIN SERPL-MCNC: 3.7 G/DL (ref 3.5–5.2)
ALBUMIN/GLOB SERPL: 1.9 G/DL
ALP SERPL-CCNC: 81 U/L (ref 39–117)
ALT SERPL-CCNC: 10 U/L (ref 1–33)
AST SERPL-CCNC: 16 U/L (ref 1–32)
BASOPHILS # BLD AUTO: 0.02 10*3/MM3 (ref 0–0.2)
BASOPHILS NFR BLD AUTO: 0.5 % (ref 0–1.5)
BILIRUB SERPL-MCNC: 0.3 MG/DL (ref 0–1.2)
BUN SERPL-MCNC: 14 MG/DL (ref 8–23)
BUN/CREAT SERPL: 14.9 (ref 7–25)
CALCIUM SERPL-MCNC: 8.7 MG/DL (ref 8.6–10.5)
CHLORIDE SERPL-SCNC: 105 MMOL/L (ref 98–107)
CHOLEST SERPL-MCNC: 126 MG/DL (ref 0–200)
CO2 SERPL-SCNC: 28.3 MMOL/L (ref 22–29)
CREAT SERPL-MCNC: 0.94 MG/DL (ref 0.57–1)
EGFRCR SERPLBLD CKD-EPI 2021: 61.5 ML/MIN/1.73
EOSINOPHIL # BLD AUTO: 0.11 10*3/MM3 (ref 0–0.4)
EOSINOPHIL NFR BLD AUTO: 2.7 % (ref 0.3–6.2)
ERYTHROCYTE [DISTWIDTH] IN BLOOD BY AUTOMATED COUNT: 13.2 % (ref 12.3–15.4)
GLOBULIN SER CALC-MCNC: 1.9 GM/DL
GLUCOSE SERPL-MCNC: 88 MG/DL (ref 65–99)
HBA1C MFR BLD: 4.9 % (ref 4.8–5.6)
HCT VFR BLD AUTO: 34.4 % (ref 34–46.6)
HDLC SERPL-MCNC: 55 MG/DL (ref 40–60)
HGB BLD-MCNC: 11.3 G/DL (ref 12–15.9)
IMM GRANULOCYTES # BLD AUTO: 0 10*3/MM3 (ref 0–0.05)
IMM GRANULOCYTES NFR BLD AUTO: 0 % (ref 0–0.5)
LDLC SERPL CALC-MCNC: 60 MG/DL (ref 0–100)
LDLC/HDLC SERPL: 1.13 {RATIO}
LYMPHOCYTES # BLD AUTO: 1.26 10*3/MM3 (ref 0.7–3.1)
LYMPHOCYTES NFR BLD AUTO: 31.1 % (ref 19.6–45.3)
MCH RBC QN AUTO: 30.7 PG (ref 26.6–33)
MCHC RBC AUTO-ENTMCNC: 32.8 G/DL (ref 31.5–35.7)
MCV RBC AUTO: 93.5 FL (ref 79–97)
MONOCYTES # BLD AUTO: 0.29 10*3/MM3 (ref 0.1–0.9)
MONOCYTES NFR BLD AUTO: 7.2 % (ref 5–12)
NEUTROPHILS # BLD AUTO: 2.37 10*3/MM3 (ref 1.7–7)
NEUTROPHILS NFR BLD AUTO: 58.5 % (ref 42.7–76)
NRBC BLD AUTO-RTO: 0 /100 WBC (ref 0–0.2)
PLATELET # BLD AUTO: 193 10*3/MM3 (ref 140–450)
POTASSIUM SERPL-SCNC: 4 MMOL/L (ref 3.5–5.2)
PROT SERPL-MCNC: 5.6 G/DL (ref 6–8.5)
RBC # BLD AUTO: 3.68 10*6/MM3 (ref 3.77–5.28)
SODIUM SERPL-SCNC: 143 MMOL/L (ref 136–145)
TRIGL SERPL-MCNC: 45 MG/DL (ref 0–150)
VLDLC SERPL CALC-MCNC: 11 MG/DL (ref 5–40)
WBC # BLD AUTO: 4.05 10*3/MM3 (ref 3.4–10.8)

## 2025-08-07 RX ORDER — ESCITALOPRAM OXALATE 10 MG/1
10 TABLET ORAL DAILY
Qty: 90 TABLET | Refills: 3 | OUTPATIENT
Start: 2025-08-07

## 2025-08-13 ENCOUNTER — OFFICE VISIT (OUTPATIENT)
Dept: FAMILY MEDICINE CLINIC | Facility: CLINIC | Age: 80
End: 2025-08-13
Payer: MEDICARE

## 2025-08-13 VITALS
SYSTOLIC BLOOD PRESSURE: 120 MMHG | DIASTOLIC BLOOD PRESSURE: 84 MMHG | RESPIRATION RATE: 16 BRPM | WEIGHT: 160 LBS | OXYGEN SATURATION: 97 % | HEIGHT: 64 IN | TEMPERATURE: 96.9 F | HEART RATE: 68 BPM | BODY MASS INDEX: 27.31 KG/M2

## 2025-08-13 DIAGNOSIS — F02.B4 MODERATE ALZHEIMER'S DEMENTIA WITH ANXIETY, UNSPECIFIED TIMING OF DEMENTIA ONSET: ICD-10-CM

## 2025-08-13 DIAGNOSIS — E78.00 HYPERCHOLESTEROLEMIA: ICD-10-CM

## 2025-08-13 DIAGNOSIS — F32.5 MAJOR DEPRESSIVE DISORDER WITH SINGLE EPISODE, IN FULL REMISSION: Primary | ICD-10-CM

## 2025-08-13 DIAGNOSIS — G30.9 MODERATE ALZHEIMER'S DEMENTIA WITH ANXIETY, UNSPECIFIED TIMING OF DEMENTIA ONSET: ICD-10-CM

## 2025-08-13 RX ORDER — ESCITALOPRAM OXALATE 20 MG/1
20 TABLET ORAL DAILY
Qty: 90 TABLET | Refills: 3 | Status: SHIPPED | OUTPATIENT
Start: 2025-08-13 | End: 2025-08-13

## 2025-08-13 RX ORDER — ESCITALOPRAM OXALATE 10 MG/1
10 TABLET ORAL DAILY
Qty: 90 TABLET | Refills: 3 | Status: SHIPPED | OUTPATIENT
Start: 2025-08-13

## 2025-08-19 PROBLEM — F02.B0 MODERATE ALZHEIMER'S DEMENTIA: Status: ACTIVE | Noted: 2025-08-19

## 2025-08-19 PROBLEM — G30.9 MODERATE ALZHEIMER'S DEMENTIA: Status: ACTIVE | Noted: 2025-08-19

## 2025-08-26 RX ORDER — ROSUVASTATIN CALCIUM 10 MG/1
10 TABLET, COATED ORAL DAILY
Qty: 90 TABLET | Refills: 3 | OUTPATIENT
Start: 2025-08-26

## (undated) DEVICE — GLV SURG SENSICARE W/ALOE PF LF 9 STRL

## (undated) DEVICE — SUT VIC 2/0 CT2 27IN J269H

## (undated) DEVICE — PK KN TOTL 40

## (undated) DEVICE — SOL ISO/ALC RUB 70PCT 4OZ

## (undated) DEVICE — FLEX ADVANTAGE 1500CC: Brand: FLEX ADVANTAGE

## (undated) DEVICE — GLV SURG PREMIERPRO ORTHO LTX PF SZ8.5 BRN

## (undated) DEVICE — SUT VIC 0 CT1 36IN J946H

## (undated) DEVICE — GOWN ISOL W/THUMB UNIV BLU BX/15

## (undated) DEVICE — CANN NASL CO2 TRULINK W/O2 A/

## (undated) DEVICE — GLV SURG TRIUMPH CLASSIC PF LTX 8.5 STRL

## (undated) DEVICE — ADHS LIQ MASTISOL 2/3ML

## (undated) DEVICE — DRSNG BURN ACTICOAT FLEX 7 1X24IN

## (undated) DEVICE — Device

## (undated) DEVICE — SYR LUERLOK 20CC BX/50

## (undated) DEVICE — GLV SURG SENSICARE PI PF LF 8.5 GRN STRL

## (undated) DEVICE — APPL CHLORAPREP W/TINT 26ML ORNG

## (undated) DEVICE — STERILE PATIENT PROTECTIVE PAD FOR IMP® KNEE POSITIONERS & COHESIVE WRAP (10 / CASE): Brand: DE MAYO KNEE POSITIONER®

## (undated) DEVICE — 2108 SERIES SAGITTAL BLADE, NO OFFSET  (12.4 X 1.19 X 82.1MM)

## (undated) DEVICE — GLV SURG PREMIERPRO ORTHO LTX PF SZ9 BRN

## (undated) DEVICE — BNDG ELAS ELITE V/CLOSE 6IN 5YD LF STRL

## (undated) DEVICE — DUAL CUT SAGITTAL BLADE

## (undated) DEVICE — NDL SPINE 20G 3 1/2 YEL STRL 1P/U

## (undated) DEVICE — ZIP 24 SURGICAL SKIN CLOSURE DEVICE, PSA: Brand: ZIP 24 SURGICAL SKIN CLOSURE DEVICE

## (undated) DEVICE — KT ORCA ORCAPOD DISP STRL

## (undated) DEVICE — SUT VIC 1 CT1 36IN J947H